# Patient Record
Sex: FEMALE | Race: WHITE | NOT HISPANIC OR LATINO
[De-identification: names, ages, dates, MRNs, and addresses within clinical notes are randomized per-mention and may not be internally consistent; named-entity substitution may affect disease eponyms.]

---

## 2019-06-26 ENCOUNTER — APPOINTMENT (OUTPATIENT)
Dept: PULMONOLOGY | Facility: CLINIC | Age: 84
End: 2019-06-26
Payer: MEDICARE

## 2019-06-26 VITALS
BODY MASS INDEX: 32.5 KG/M2 | OXYGEN SATURATION: 91 % | SYSTOLIC BLOOD PRESSURE: 100 MMHG | DIASTOLIC BLOOD PRESSURE: 59 MMHG | HEART RATE: 61 BPM | WEIGHT: 172 LBS

## 2019-06-26 DIAGNOSIS — J45.909 UNSPECIFIED ASTHMA, UNCOMPLICATED: ICD-10-CM

## 2019-06-26 DIAGNOSIS — R05 COUGH: ICD-10-CM

## 2019-06-26 PROCEDURE — 94729 DIFFUSING CAPACITY: CPT

## 2019-06-26 PROCEDURE — 99214 OFFICE O/P EST MOD 30 MIN: CPT | Mod: 25

## 2019-06-26 PROCEDURE — 94727 GAS DIL/WSHOT DETER LNG VOL: CPT

## 2019-06-26 PROCEDURE — 94060 EVALUATION OF WHEEZING: CPT

## 2019-06-26 PROCEDURE — 94618 PULMONARY STRESS TESTING: CPT

## 2019-06-27 PROBLEM — J45.909 ASTHMA: Status: ACTIVE | Noted: 2019-06-27

## 2019-06-27 NOTE — HISTORY OF PRESENT ILLNESS
[FreeTextEntry1] : 88 F  \par 84  presents for follow up.  she was least seen in 2015. She reports chronic shortness of breath. She has developed renal failure and is on hemodialysis.  She is using oxygen at home through nasal cannula. \par \par She has a past history of diabetes mellitus, hypertension, prior pneumonia, CVA 2005 and 2014, affected speech.  She also has retinopathy that has been treated with laser.\par \par Light smoker for a short time, many years ago\par \par She has been having cough, URI symptoms laryngitis, no fever, had weakness\par No dyspnea. Did not take antibiotics\par Took antihistamine.\par \par She feels mild improvement only.\par She has discolored phlegm.\par \par \par

## 2019-06-27 NOTE — PHYSICAL EXAM
[1+ Pitting] : 1+  pitting [General Appearance - Well Developed] : well developed [General Appearance - Well Nourished] : well nourished [General Appearance - In No Acute Distress] : no acute distress [Normal Oropharynx] : normal oropharynx [Neck Appearance] : the appearance of the neck was normal [Neck Cervical Mass (___cm)] : no neck mass was observed [Jugular Venous Distention Increased] : there was no jugular-venous distention [Heart Rate And Rhythm] : heart rate and rhythm were normal [Heart Sounds] : normal S1 and S2 [Arterial Pulses Normal] : the arterial pulses were normal [Murmurs] : no murmurs present [Exaggerated Use Of Accessory Muscles For Inspiration] : no accessory muscle use [Respiration, Rhythm And Depth] : normal respiratory rhythm and effort [Auscultation Breath Sounds / Voice Sounds] : lungs were clear to auscultation bilaterally [Abdomen Tenderness] : non-tender [Bowel Sounds] : normal bowel sounds [Abdomen Soft] : soft [] : no hepato-splenomegaly [Affect] : the affect was normal [Motor Exam] : the motor exam was normal [Mood] : the mood was normal [FreeTextEntry1] : mild diminished mild coarse [FreeTextEntry2] : pretibial

## 2019-06-27 NOTE — REVIEW OF SYSTEMS
[Constipation] : constipation [Fever] : no fever [Chills] : no chills [Fatigue] : no fatigue [Nasal Congestion] : no nasal congestion [Ear Disturbance] : no ear disturbance [Cough] : no cough [Sputum] : not coughing up ~M sputum [Hemoptysis] : no hemoptysis [Heartburn] : no heartburn [Reflux] : no reflux [Indigestion] : no indigestion [Urgency] : no feelings of urinary urgency [Dysuria] : no dysuria [Trauma] : no ~T physical trauma

## 2019-06-27 NOTE — PROCEDURE
[FreeTextEntry1] : \par She underwent 6 minute walk that demonstrated oxygen saturation maintained above 90%\par \par PFT\par \par moderate restriction \par moderate obstruction\par

## 2019-06-27 NOTE — DISCUSSION/SUMMARY
[FreeTextEntry1] : Obstructive airways disease, asthma\par \par Dyspnea on exertion\par \par Oxygen dependence.  I suspect she requires to use oxygen with exertion.  She was unable to walk adequately during testing.\par \par Plan:\par Overnight oximetry.  Continue oxygen therapy in the meantime\par inhaled bronchodilator\par \par No need for ICS/LABA based on PFT\par \par Ajit Mendenhall MD FCCP \par \par

## 2021-05-04 ENCOUNTER — INPATIENT (INPATIENT)
Facility: HOSPITAL | Age: 86
LOS: 12 days | Discharge: HOME | DRG: 640 | End: 2021-05-17
Attending: INTERNAL MEDICINE | Admitting: INTERNAL MEDICINE
Payer: MEDICARE

## 2021-05-04 VITALS
SYSTOLIC BLOOD PRESSURE: 185 MMHG | TEMPERATURE: 97 F | RESPIRATION RATE: 39 BRPM | HEART RATE: 73 BPM | DIASTOLIC BLOOD PRESSURE: 59 MMHG | OXYGEN SATURATION: 97 %

## 2021-05-04 DIAGNOSIS — J96.01 ACUTE RESPIRATORY FAILURE WITH HYPOXIA: ICD-10-CM

## 2021-05-04 DIAGNOSIS — Z95.0 PRESENCE OF CARDIAC PACEMAKER: Chronic | ICD-10-CM

## 2021-05-04 LAB
24R-OH-CALCIDIOL SERPL-MCNC: 34.1 NG/ML — SIGNIFICANT CHANGE UP (ref 30–80)
A1C WITH ESTIMATED AVERAGE GLUCOSE RESULT: 6.6 % — HIGH (ref 4–5.6)
ALBUMIN SERPL ELPH-MCNC: 3 G/DL — LOW (ref 3.5–5)
ALBUMIN SERPL ELPH-MCNC: 3.1 G/DL — LOW (ref 3.5–5)
ALP SERPL-CCNC: 177 U/L — HIGH (ref 40–120)
ALP SERPL-CCNC: 191 U/L — HIGH (ref 40–120)
ALT FLD-CCNC: 20 U/L DA — SIGNIFICANT CHANGE UP (ref 10–60)
ALT FLD-CCNC: 22 U/L DA — SIGNIFICANT CHANGE UP (ref 10–60)
ANION GAP SERPL CALC-SCNC: 6 MMOL/L — SIGNIFICANT CHANGE UP (ref 5–17)
ANION GAP SERPL CALC-SCNC: 8 MMOL/L — SIGNIFICANT CHANGE UP (ref 5–17)
APTT BLD: 27.7 SEC — SIGNIFICANT CHANGE UP (ref 27.5–35.5)
APTT BLD: >200 SEC — CRITICAL HIGH (ref 27.5–35.5)
AST SERPL-CCNC: 20 U/L — SIGNIFICANT CHANGE UP (ref 10–40)
AST SERPL-CCNC: 22 U/L — SIGNIFICANT CHANGE UP (ref 10–40)
BASE EXCESS BLDA CALC-SCNC: -3.1 MMOL/L — LOW (ref -2–3)
BASE EXCESS BLDV CALC-SCNC: -2.1 MMOL/L — SIGNIFICANT CHANGE UP
BASOPHILS # BLD AUTO: 0.06 K/UL — SIGNIFICANT CHANGE UP (ref 0–0.2)
BASOPHILS # BLD AUTO: 0.07 K/UL — SIGNIFICANT CHANGE UP (ref 0–0.2)
BASOPHILS NFR BLD AUTO: 0.4 % — SIGNIFICANT CHANGE UP (ref 0–2)
BASOPHILS NFR BLD AUTO: 0.5 % — SIGNIFICANT CHANGE UP (ref 0–2)
BILIRUB SERPL-MCNC: 0.3 MG/DL — SIGNIFICANT CHANGE UP (ref 0.2–1.2)
BILIRUB SERPL-MCNC: 0.4 MG/DL — SIGNIFICANT CHANGE UP (ref 0.2–1.2)
BLD GP AB SCN SERPL QL: SIGNIFICANT CHANGE UP
BLOOD GAS COMMENTS ARTERIAL: SIGNIFICANT CHANGE UP
BUN SERPL-MCNC: 55 MG/DL — HIGH (ref 7–18)
BUN SERPL-MCNC: 60 MG/DL — HIGH (ref 7–18)
CALCIUM SERPL-MCNC: 9 MG/DL — SIGNIFICANT CHANGE UP (ref 8.4–10.5)
CALCIUM SERPL-MCNC: 9.3 MG/DL — SIGNIFICANT CHANGE UP (ref 8.4–10.5)
CHLORIDE SERPL-SCNC: 96 MMOL/L — SIGNIFICANT CHANGE UP (ref 96–108)
CHLORIDE SERPL-SCNC: 97 MMOL/L — SIGNIFICANT CHANGE UP (ref 96–108)
CHOLEST SERPL-MCNC: 121 MG/DL — SIGNIFICANT CHANGE UP
CO2 SERPL-SCNC: 23 MMOL/L — SIGNIFICANT CHANGE UP (ref 22–31)
CO2 SERPL-SCNC: 26 MMOL/L — SIGNIFICANT CHANGE UP (ref 22–31)
CREAT SERPL-MCNC: 5.12 MG/DL — HIGH (ref 0.5–1.3)
CREAT SERPL-MCNC: 5.55 MG/DL — HIGH (ref 0.5–1.3)
EOSINOPHIL # BLD AUTO: 0.08 K/UL — SIGNIFICANT CHANGE UP (ref 0–0.5)
EOSINOPHIL # BLD AUTO: 0.15 K/UL — SIGNIFICANT CHANGE UP (ref 0–0.5)
EOSINOPHIL NFR BLD AUTO: 0.6 % — SIGNIFICANT CHANGE UP (ref 0–6)
EOSINOPHIL NFR BLD AUTO: 1 % — SIGNIFICANT CHANGE UP (ref 0–6)
ESTIMATED AVERAGE GLUCOSE: 143 MG/DL — HIGH (ref 68–114)
GLUCOSE SERPL-MCNC: 313 MG/DL — HIGH (ref 70–99)
GLUCOSE SERPL-MCNC: 332 MG/DL — HIGH (ref 70–99)
HBV SURFACE AG SER-ACNC: SIGNIFICANT CHANGE UP
HCO3 BLDA-SCNC: 25 MMOL/L — SIGNIFICANT CHANGE UP (ref 21–28)
HCO3 BLDV-SCNC: 28 MMOL/L — SIGNIFICANT CHANGE UP (ref 22–29)
HCT VFR BLD CALC: 21.6 % — LOW (ref 34.5–45)
HCT VFR BLD CALC: 24.3 % — LOW (ref 34.5–45)
HCT VFR BLD CALC: 26.3 % — LOW (ref 34.5–45)
HCT VFR BLD CALC: 28.8 % — LOW (ref 34.5–45)
HDLC SERPL-MCNC: 89 MG/DL — SIGNIFICANT CHANGE UP
HGB BLD-MCNC: 7.1 G/DL — LOW (ref 11.5–15.5)
HGB BLD-MCNC: 8 G/DL — LOW (ref 11.5–15.5)
HGB BLD-MCNC: 8.5 G/DL — LOW (ref 11.5–15.5)
HGB BLD-MCNC: 9.6 G/DL — LOW (ref 11.5–15.5)
HOROWITZ INDEX BLDA+IHG-RTO: 100 — SIGNIFICANT CHANGE UP
HOROWITZ INDEX BLDV+IHG-RTO: 100 — SIGNIFICANT CHANGE UP
IMM GRANULOCYTES NFR BLD AUTO: 1.5 % — SIGNIFICANT CHANGE UP (ref 0–1.5)
IMM GRANULOCYTES NFR BLD AUTO: 2.2 % — HIGH (ref 0–1.5)
INR BLD: 1.24 RATIO — HIGH (ref 0.88–1.16)
LACTATE SERPL-SCNC: 0.9 MMOL/L — SIGNIFICANT CHANGE UP (ref 0.7–2)
LIPID PNL WITH DIRECT LDL SERPL: 24 MG/DL — SIGNIFICANT CHANGE UP
LYMPHOCYTES # BLD AUTO: 0.99 K/UL — LOW (ref 1–3.3)
LYMPHOCYTES # BLD AUTO: 1.7 K/UL — SIGNIFICANT CHANGE UP (ref 1–3.3)
LYMPHOCYTES # BLD AUTO: 11.2 % — LOW (ref 13–44)
LYMPHOCYTES # BLD AUTO: 7.2 % — LOW (ref 13–44)
MAGNESIUM SERPL-MCNC: 2.5 MG/DL — SIGNIFICANT CHANGE UP (ref 1.6–2.6)
MCHC RBC-ENTMCNC: 31.6 PG — SIGNIFICANT CHANGE UP (ref 27–34)
MCHC RBC-ENTMCNC: 31.6 PG — SIGNIFICANT CHANGE UP (ref 27–34)
MCHC RBC-ENTMCNC: 31.8 PG — SIGNIFICANT CHANGE UP (ref 27–34)
MCHC RBC-ENTMCNC: 32.3 GM/DL — SIGNIFICANT CHANGE UP (ref 32–36)
MCHC RBC-ENTMCNC: 32.4 PG — SIGNIFICANT CHANGE UP (ref 27–34)
MCHC RBC-ENTMCNC: 32.9 GM/DL — SIGNIFICANT CHANGE UP (ref 32–36)
MCHC RBC-ENTMCNC: 32.9 GM/DL — SIGNIFICANT CHANGE UP (ref 32–36)
MCHC RBC-ENTMCNC: 33.3 GM/DL — SIGNIFICANT CHANGE UP (ref 32–36)
MCV RBC AUTO: 96 FL — SIGNIFICANT CHANGE UP (ref 80–100)
MCV RBC AUTO: 96.9 FL — SIGNIFICANT CHANGE UP (ref 80–100)
MCV RBC AUTO: 97.3 FL — SIGNIFICANT CHANGE UP (ref 80–100)
MCV RBC AUTO: 97.8 FL — SIGNIFICANT CHANGE UP (ref 80–100)
MONOCYTES # BLD AUTO: 1.47 K/UL — HIGH (ref 0–0.9)
MONOCYTES # BLD AUTO: 1.75 K/UL — HIGH (ref 0–0.9)
MONOCYTES NFR BLD AUTO: 10.7 % — SIGNIFICANT CHANGE UP (ref 2–14)
MONOCYTES NFR BLD AUTO: 11.6 % — SIGNIFICANT CHANGE UP (ref 2–14)
NEUTROPHILS # BLD AUTO: 10.98 K/UL — HIGH (ref 1.8–7.4)
NEUTROPHILS # BLD AUTO: 11.15 K/UL — HIGH (ref 1.8–7.4)
NEUTROPHILS NFR BLD AUTO: 73.5 % — SIGNIFICANT CHANGE UP (ref 43–77)
NEUTROPHILS NFR BLD AUTO: 79.6 % — HIGH (ref 43–77)
NON HDL CHOLESTEROL: 32 MG/DL — SIGNIFICANT CHANGE UP
NRBC # BLD: 0 /100 WBCS — SIGNIFICANT CHANGE UP (ref 0–0)
NT-PROBNP SERPL-SCNC: HIGH PG/ML (ref 0–450)
PCO2 BLDA: 59 MMHG — HIGH (ref 32–35)
PCO2 BLDV: 78 MMHG — HIGH (ref 39–42)
PH BLDA: 7.24 — LOW (ref 7.35–7.45)
PH BLDV: 7.17 — LOW (ref 7.32–7.43)
PHOSPHATE SERPL-MCNC: 5.1 MG/DL — HIGH (ref 2.5–4.5)
PLATELET # BLD AUTO: 206 K/UL — SIGNIFICANT CHANGE UP (ref 150–400)
PLATELET # BLD AUTO: 258 K/UL — SIGNIFICANT CHANGE UP (ref 150–400)
PLATELET # BLD AUTO: 268 K/UL — SIGNIFICANT CHANGE UP (ref 150–400)
PLATELET # BLD AUTO: 311 K/UL — SIGNIFICANT CHANGE UP (ref 150–400)
PO2 BLDA: 524 MMHG — HIGH (ref 83–108)
PO2 BLDV: 56 MMHG — SIGNIFICANT CHANGE UP
POTASSIUM SERPL-MCNC: 5.3 MMOL/L — SIGNIFICANT CHANGE UP (ref 3.5–5.3)
POTASSIUM SERPL-MCNC: 6 MMOL/L — HIGH (ref 3.5–5.3)
POTASSIUM SERPL-SCNC: 5.3 MMOL/L — SIGNIFICANT CHANGE UP (ref 3.5–5.3)
POTASSIUM SERPL-SCNC: 6 MMOL/L — HIGH (ref 3.5–5.3)
PROT SERPL-MCNC: 6.5 G/DL — SIGNIFICANT CHANGE UP (ref 6–8.3)
PROT SERPL-MCNC: 7.2 G/DL — SIGNIFICANT CHANGE UP (ref 6–8.3)
PROTHROM AB SERPL-ACNC: 14.6 SEC — HIGH (ref 10.6–13.6)
RAPID RVP RESULT: SIGNIFICANT CHANGE UP
RBC # BLD: 2.23 M/UL — LOW (ref 3.8–5.2)
RBC # BLD: 2.53 M/UL — LOW (ref 3.8–5.2)
RBC # BLD: 2.69 M/UL — LOW (ref 3.8–5.2)
RBC # BLD: 2.96 M/UL — LOW (ref 3.8–5.2)
RBC # FLD: 15.1 % — HIGH (ref 10.3–14.5)
RBC # FLD: 15.2 % — HIGH (ref 10.3–14.5)
RBC # FLD: 15.2 % — HIGH (ref 10.3–14.5)
RBC # FLD: 15.3 % — HIGH (ref 10.3–14.5)
SAO2 % BLDA: 100 % — SIGNIFICANT CHANGE UP
SAO2 % BLDV: 85.1 % — SIGNIFICANT CHANGE UP
SARS-COV-2 RNA SPEC QL NAA+PROBE: SIGNIFICANT CHANGE UP
SODIUM SERPL-SCNC: 126 MMOL/L — LOW (ref 135–145)
SODIUM SERPL-SCNC: 130 MMOL/L — LOW (ref 135–145)
TRIGL SERPL-MCNC: 42 MG/DL — SIGNIFICANT CHANGE UP
TROPONIN I SERPL-MCNC: <0.015 NG/ML — SIGNIFICANT CHANGE UP (ref 0–0.04)
VIT B12 SERPL-MCNC: 885 PG/ML — SIGNIFICANT CHANGE UP (ref 232–1245)
WBC # BLD: 10.2 K/UL — SIGNIFICANT CHANGE UP (ref 3.8–10.5)
WBC # BLD: 12.91 K/UL — HIGH (ref 3.8–10.5)
WBC # BLD: 13.79 K/UL — HIGH (ref 3.8–10.5)
WBC # BLD: 15.33 K/UL — HIGH (ref 3.8–10.5)
WBC # FLD AUTO: 10.2 K/UL — SIGNIFICANT CHANGE UP (ref 3.8–10.5)
WBC # FLD AUTO: 12.91 K/UL — HIGH (ref 3.8–10.5)
WBC # FLD AUTO: 13.79 K/UL — HIGH (ref 3.8–10.5)
WBC # FLD AUTO: 15.33 K/UL — HIGH (ref 3.8–10.5)

## 2021-05-04 PROCEDURE — 99291 CRITICAL CARE FIRST HOUR: CPT

## 2021-05-04 PROCEDURE — 71045 X-RAY EXAM CHEST 1 VIEW: CPT | Mod: 26

## 2021-05-04 PROCEDURE — 99291 CRITICAL CARE FIRST HOUR: CPT | Mod: CS

## 2021-05-04 RX ORDER — CHLORHEXIDINE GLUCONATE 213 G/1000ML
1 SOLUTION TOPICAL DAILY
Refills: 0 | Status: DISCONTINUED | OUTPATIENT
Start: 2021-05-04 | End: 2021-05-17

## 2021-05-04 RX ORDER — ERYTHROPOIETIN 10000 [IU]/ML
8000 INJECTION, SOLUTION INTRAVENOUS; SUBCUTANEOUS
Refills: 0 | Status: DISCONTINUED | OUTPATIENT
Start: 2021-05-04 | End: 2021-05-16

## 2021-05-04 RX ORDER — INSULIN LISPRO 100/ML
VIAL (ML) SUBCUTANEOUS EVERY 6 HOURS
Refills: 0 | Status: DISCONTINUED | OUTPATIENT
Start: 2021-05-04 | End: 2021-05-05

## 2021-05-04 RX ORDER — FUROSEMIDE 40 MG
40 TABLET ORAL ONCE
Refills: 0 | Status: COMPLETED | OUTPATIENT
Start: 2021-05-04 | End: 2021-05-04

## 2021-05-04 RX ORDER — NITROGLYCERIN 6.5 MG
10 CAPSULE, EXTENDED RELEASE ORAL
Qty: 50 | Refills: 0 | Status: DISCONTINUED | OUTPATIENT
Start: 2021-05-04 | End: 2021-05-04

## 2021-05-04 RX ORDER — HEPARIN SODIUM 5000 [USP'U]/ML
3500 INJECTION INTRAVENOUS; SUBCUTANEOUS EVERY 6 HOURS
Refills: 0 | Status: DISCONTINUED | OUTPATIENT
Start: 2021-05-04 | End: 2021-05-04

## 2021-05-04 RX ORDER — PHENYLEPHRINE HYDROCHLORIDE 10 MG/ML
0.1 INJECTION INTRAVENOUS
Qty: 40 | Refills: 0 | Status: DISCONTINUED | OUTPATIENT
Start: 2021-05-04 | End: 2021-05-05

## 2021-05-04 RX ORDER — HEPARIN SODIUM 5000 [USP'U]/ML
7500 INJECTION INTRAVENOUS; SUBCUTANEOUS EVERY 6 HOURS
Refills: 0 | Status: DISCONTINUED | OUTPATIENT
Start: 2021-05-04 | End: 2021-05-04

## 2021-05-04 RX ORDER — INSULIN GLARGINE 100 [IU]/ML
5 INJECTION, SOLUTION SUBCUTANEOUS AT BEDTIME
Refills: 0 | Status: DISCONTINUED | OUTPATIENT
Start: 2021-05-04 | End: 2021-05-04

## 2021-05-04 RX ORDER — HEPARIN SODIUM 5000 [USP'U]/ML
INJECTION INTRAVENOUS; SUBCUTANEOUS
Qty: 25000 | Refills: 0 | Status: DISCONTINUED | OUTPATIENT
Start: 2021-05-04 | End: 2021-05-04

## 2021-05-04 RX ADMIN — ERYTHROPOIETIN 8000 UNIT(S): 10000 INJECTION, SOLUTION INTRAVENOUS; SUBCUTANEOUS at 13:44

## 2021-05-04 RX ADMIN — CHLORHEXIDINE GLUCONATE 1 APPLICATION(S): 213 SOLUTION TOPICAL at 11:18

## 2021-05-04 RX ADMIN — HEPARIN SODIUM 1700 UNIT(S)/HR: 5000 INJECTION INTRAVENOUS; SUBCUTANEOUS at 07:07

## 2021-05-04 RX ADMIN — Medication 40 MILLIGRAM(S): at 00:55

## 2021-05-04 NOTE — ED ADULT NURSE NOTE - CAS DISCH ACCOMP BY
Eyes with no visual disturbances.  Ears clean and dry and no hearing difficulties. Nose with pink mucosa and no drainage.  Mouth mucous membranes moist and pink.  No tenderness or swelling to throat or neck. MD/RN

## 2021-05-04 NOTE — ADVANCED PRACTICE NURSE CONSULT - ASSESSMENT
This is a 89yr old female patient admitted for Acute Respiratory Failure, presenting with the following:  -There is a Stage 1 Pressure Injury to the Bilateral Heels and Coccyx, as evident by non-blanchable erythema  -There is a Stage 2 Pressure Injury to the Bilateral Gluteus with red tissue, scant drainage, and periwound maceration

## 2021-05-04 NOTE — H&P ADULT - NSICDXPASTMEDICALHX_GEN_ALL_CORE_FT
PAST MEDICAL HISTORY:  Afib     CKD (chronic kidney disease)     DM (diabetes mellitus)     HLD (hyperlipidemia)     HTN (hypertension)

## 2021-05-04 NOTE — H&P ADULT - ATTENDING COMMENTS
Patient seen and examined with ICU Resident upon consultation request at 1.30AM. Data reviewed. Case and plan of care discussed with resident and agree with note except as otherwise stated in my note as follows.  This is 89 year old woman with h/o HTN, DM, Afib (on Eliquis), ESRD (on HD) presented to the ED with SOB and found to have acute pulmonary edema. In the ED she was placed on BIPAP support and given IV lasix with little diuresis. Accepted to ICU on bi-level support with plan for HD in am.

## 2021-05-04 NOTE — ADVANCED PRACTICE NURSE CONSULT - RECOMMEDATIONS
-Clean all wounds with normal saline and apply skin prep to the surrounding skin  -Apply a Foam dressing to the Bilateral Gluteal wounds Q 72hrs PRN  -Elevate/float the patients heels using heel protectors and reposition the patient Q 2hrs using wedges or pillows

## 2021-05-04 NOTE — ED PROVIDER NOTE - CLINICAL SUMMARY MEDICAL DECISION MAKING FREE TEXT BOX
89 y/o female with history of HTN, HLD, afib with PPM, on hemodialysis, presents with acute shortness of breath. Placed on BiPAP on arrival to ED. Will obtain EKG, labs, and CXR. 89 y/o female with history of HTN, HLD, afib with PPM, on hemodialysis, presents with acute shortness of breath. Placed on BiPAP on arrival to ED. Will obtain EKG, labs, and CXR.  ekg shows vpaced rhythm, trop neg, proBNP 11K, CXR shows bibasilar opacities  Discussed baove with patient's daughter Sheila [phone]  discussed above with Dr. Huitron from nephrology who will schedule patient for HD  discussed above with ICU team Dr. Garcia who agrees with plan for admission

## 2021-05-04 NOTE — CONSULT NOTE ADULT - SUBJECTIVE AND OBJECTIVE BOX
[facility] NEPHROLOGY- CONSULTATION NOTE    Patient is a 89yo Female with ESRD on HD (TTS @ [facility]; Nephrologist Dr. 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 Pt a/w acute hypoxic respiratory distress 2/2 pulmonary edema requiring BIPAP. Pt now hypotensive and bradycardic. Nephrology consulted for ESRD status.     Unable to obtain history from patient; pt on BIPAP.   Last HD 5/1 @ [facility]. Spoke with pt's daughter Cece [phone]. Pt with 5 admissions in the past 6 weeks due to fluid overload/ SOB. Pt had COVID in March and the last hospitalization at [facility] d/c on 4/29 due to SOB, chest pain requiring leadless pacemaker placement. As per daughter she has chronic LE edema. However pt cannot stand the long stretch of no HD after Sat dialysis til Tuesday. She is requesting 4 times a week HD as an outpt. However, according to Dr. Goldberg, she needs onsite HD since they are having difficulty removing fluid despite midodrine on board; unstable.       PAST MEDICAL & SURGICAL HISTORY:  HTN (hypertension)    HLD (hyperlipidemia)    CKD (chronic kidney disease)    Afib    DM (diabetes mellitus)    Artificial pacemaker      No Known Allergies    Home Medications Reviewed  Hospital Medications:   MEDICATIONS  (STANDING):  chlorhexidine 2% Cloths 1 Application(s) Topical daily  heparin  Infusion.  Unit(s)/Hr (17 mL/Hr) IV Continuous <Continuous>  insulin glargine Injectable (LANTUS) 5 Unit(s) SubCutaneous at bedtime  insulin lispro (ADMELOG) corrective regimen sliding scale   SubCutaneous every 6 hours      REVIEW OF SYSTEMS: Unable to obtain; pt on BiPAP    VITALS:  T(F): 96.4 (05-04-21 @ 07:00), Max: 96.8 (05-04-21 @ 00:38)  HR: 49 (05-04-21 @ 11:00)  BP: 105/31 (05-04-21 @ 11:00)  RR: 17 (05-04-21 @ 11:00)  SpO2: 100% (05-04-21 @ 11:00)  Wt(kg): --    05-03 @ 07:01  -  05-04 @ 07:00  --------------------------------------------------------  IN: 17 mL / OUT: 150 mL / NET: -133 mL        Weight (kg): 94.5 (05-04 @ 04:01)    PHYSICAL EXAM:  Gen: Mild resp distress on BiPAP  HEENT: anicteric; on BIPAP  Neck: no JVD  Cards: julius , +S1/S2,   Resp: decreased BS at bases  GI: soft, NT/ND, NABS  : +meza  Extremities: +2-3 LE edema B/L  Derm: no rashes  Neuro: no gross deficits    LABS:  05-04    126<L>  |  97  |  60<H>  ----------------------------<  332<H>  6.0<H>   |  23  |  5.55<H>    Ca    9.0      04 May 2021 06:07  Phos  5.1     05-04  Mg     2.5     05-04    TPro  6.5  /  Alb  3.0<L>  /  TBili  0.4  /  DBili      /  AST  22  /  ALT  20  /  AlkPhos  177<H>  05-04    Creatinine Trend: 5.55 <--, 5.12 <--                        7.1    10.20 )-----------( 206      ( 04 May 2021 11:38 )             21.6     Urine Studies:      RADIOLOGY & ADDITIONAL STUDIES:    < from: Xray Chest 1 View- PORTABLE-Urgent (05.04.21 @ 01:48) >    EXAM:  XR CHEST PORTABLE URGENT 1V                            PROCEDURE DATE:  05/04/2021          INTERPRETATION:  INDICATION: Chest Pain    PRIORS: None    VIEWS: Portable AP radiography of the chest performed. Evaluation limited secondary to shallow inspiration.    FINDINGS: Heart size appears within normal limits. A right-sided dual-lumen central venous catheter is identified, the distal tips overlying the expected region of the SVC/RA confluence. Note is made of an indwelling Micra pacemaker. No hilar or superior mediastinal abnormalities are identified. Interstitial prominence bilaterally may reflect shallow inspiration. Mild interstitial edema cannot be excluded. No pleural effusion or pneumothorax is demonstrated. No mediastinal shift is noted. Degenerative changes thoracic spine.    IMPRESSION: Interstitial prominence may reflect shallow inspiration. Mild interstitial edema cannot be excluded.      < end of copied text >

## 2021-05-04 NOTE — H&P ADULT - HISTORY OF PRESENT ILLNESS
Pt is a 89 y/o F, wheel chair bound, from home with PMH of Afib on Eliquis, ESRD on HD, DM, HTN presented with shortness of breath since few days. Pt was also courtney batres, pt started to develop gradually shortness of breath since few days at rest. She mentioned that the pt was admitted recently to [facility] for shortness of breath and chest pain leadless pacemaker was placed, discharged on april 29th. She has been regularly getting dialysis on Tuesday/thursday / saturday.    Pt is a 89 y/o F, wheel chair bound, from home with PMH of Afib on Eliquis, ESRD on HD, DM, HTN presented with shortness of breath since few days. Pt also complaining of chest discomfort. As per pt's daughter Johnny batres, pt started to develop gradually shortness of breath since few days at rest. She mentioned that the pt was admitted recently to [facility] for shortness of breath and chest pain, leadless pacemaker was placed, discharged on april 29th. She has been regularly getting dialysis on Tuesday/thursday / saturday.  Pt denied fever, cough, abdominal pain. nausea, vomiting and other complains.

## 2021-05-04 NOTE — H&P ADULT - ASSESSMENT
Pt is a 89 y/o F, wheel chair bound, from home with PMH of Afib on Eliquis, ESRD on HD, DM, HTN presented with shortness of breath since few days.    Assessment:    Acute hypoxic respiratory failure  Pulmonary edema  ESRD  Mild Hyponatremia  Anemia Pt is a 89 y/o F, wheel chair bound, from home with PMH of Afib on Eliquis, ESRD on HD, DM, HTN presented with shortness of breath since few days.    Assessment:    Acute hypoxic respiratory failure  Pulmonary edema  ESRD  Mild Hyponatremia  Anemia    Plan    Neuro:  - Alert and oriented   -No acute issues  CARDIOVASCULAR  #  PULMONARY  #  GASTROINTESTINAL  #  RENAL  #  Neurology  #  INFECTIOUS DISEASE  #  ENDOCRINE  #  HEME  #  FLUIDS/ELECTROLYTES/NUTRITION  -IVF  -Monitor, Replete to K>4 and Mg>2  -Diet    PROPHYLAXIS  -DVT  -GI    DISPO: Admit to ICU    CODE STATUS: Full code Pt is a 89 y/o F, wheel chair bound, from home with PMH of Afib on Eliquis, ESRD on HD, DM, HTN presented with shortness of breath since few days.    Assessment:    1.Acute hypoxic respiratory failure  2.Pulmonary edema  3.ESRD  4.Mild Hyponatremia  5.Anemia    Plan    Neuro:  - Alert and oriented   -No acute issues    CARDIOVASCULAR  # Atrial fibrillation  -Pt is on Eliquis at home  -Continue Heparin drip for now as pt is npo    # CHF  -Possible CHF given elevated pro BNP and pulmonary edema  -Need to obtain record from [facility]  # Acute Hypoxic Respiratory failure  - Likely secondary to fluid overload  -Plan for HD at am  -Continue BiPAP support    GASTROINTESTINAL  # NPO as pt is on BiPAP    RENAL  #ESRD on Dialysis  -Pt gets regular dialysis on Tuesda/ thursday/saturday  -Plan for Dialysis today  -Monitor BMP  -Avoid nephrotoxic medications  -Nephrology Dr. Huitron    INFECTIOUS DISEASE  # No acute issues    ENDOCRINE  #Pt takes Lantus 10 U at bedtime and sliding scale Humalog at home  -Continue sliding scale Humalog and lantus  -F/u HBA1C    HEME  # Anemia  - Likely anemia due to chronic kidney disease  - Monitor cbc  -f/u iron studies      -Diet : NPO    PROPHYLAXIS  -DVT: Heparin       DISPO: Admit to ICU    CODE STATUS: Full code

## 2021-05-04 NOTE — PROGRESS NOTE ADULT - SUBJECTIVE AND OBJECTIVE BOX
INTERVAL HPI/OVERNIGHT EVENTS: ***    PRESSORS: [ ] YES [ ] NO  WHICH:    ANTIBIOTICS:                      Antimicrobial:    Cardiovascular:    Pulmonary:    Hematalogic:  heparin  Infusion.  Unit(s)/Hr IV Continuous <Continuous>    Other:  chlorhexidine 2% Cloths 1 Application(s) Topical daily  insulin glargine Injectable (LANTUS) 5 Unit(s) SubCutaneous at bedtime  insulin lispro (ADMELOG) corrective regimen sliding scale   SubCutaneous every 6 hours    chlorhexidine 2% Cloths 1 Application(s) Topical daily  heparin  Infusion.  Unit(s)/Hr IV Continuous <Continuous>  insulin glargine Injectable (LANTUS) 5 Unit(s) SubCutaneous at bedtime  insulin lispro (ADMELOG) corrective regimen sliding scale   SubCutaneous every 6 hours    Drug Dosing Weight    Weight (kg): 94.5 (04 May 2021 04:01)    CENTRAL LINE: [ ] YES [ ] NO  LOCATION:   DATE INSERTED:  REMOVE: [ ] YES [ ] NO  EXPLAIN:    STUBBS: [ ] YES [ ] NO    DATE INSERTED:  REMOVE:  [ ] YES [ ] NO  EXPLAIN:    A-LINE:  [ ] YES [ ] NO  LOCATION:   DATE INSERTED:  REMOVE:  [ ] YES [ ] NO  EXPLAIN:    PMH -reviewed admission note, no change since admission    ICU Vital Signs Last 24 Hrs  T(C): 35.8 (04 May 2021 07:00), Max: 36 (04 May 2021 00:38)  T(F): 96.4 (04 May 2021 07:00), Max: 96.8 (04 May 2021 00:38)  HR: 49 (04 May 2021 08:35) (49 - 73)  BP: 110/63 (04 May 2021 08:00) (83/54 - 185/59)  BP(mean): 74 (04 May 2021 08:00) (64 - 114)  ABP: --  ABP(mean): --  RR: 16 (04 May 2021 08:00) (14 - 39)  SpO2: 99% (04 May 2021 08:35) (97% - 100%)      ABG - ( 04 May 2021 02:08 )  pH, Arterial: 7.24  pH, Blood: x     /  pCO2: 59    /  pO2: 524   / HCO3: 25    / Base Excess: -3.1  /  SaO2: 100                   05-03 @ 07:01  -  05-04 @ 07:00  --------------------------------------------------------  IN: 17 mL / OUT: 150 mL / NET: -133 mL            PHYSICAL EXAM:    GENERAL: NAD, well-groomed, well-developed  HEAD:  Atraumatic, Normocephalic  EYES: EOMI, PERRLA, conjunctiva and sclera clear  ENMT: No tonsillar erythema, exudates, or enlargement; Moist mucous membranes, Good dentition, No lesions  NECK: Supple, normal appearance, No JVD; Normal thyroid; Trachea midline  NERVOUS SYSTEM:  Alert & Oriented X3, Good concentration; Motor Strength 5/5 B/L upper and lower extremities; DTRs 2+ intact and symmetric  CHEST/LUNG: No chest deformity; Normal percussion bilaterally; No rales, rhonchi, wheezing   HEART: Regular rate and rhythm; No murmurs, rubs, or gallops  ABDOMEN: Soft, Nontender, Nondistended; Bowel sounds present  EXTREMITIES:  2+ Peripheral Pulses, No clubbing, cyanosis, or edema  LYMPH: No lymphadenopathy noted  SKIN: No rashes or lesions; Good capillary refill      LABS:  CBC Full  -  ( 04 May 2021 06:07 )  WBC Count : 13.79 K/uL  RBC Count : 2.69 M/uL  Hemoglobin : 8.5 g/dL  Hematocrit : 26.3 %  Platelet Count - Automated : 258 K/uL  Mean Cell Volume : 97.8 fl  Mean Cell Hemoglobin : 31.6 pg  Mean Cell Hemoglobin Concentration : 32.3 gm/dL  Auto Neutrophil # : 10.98 K/uL  Auto Lymphocyte # : 0.99 K/uL  Auto Monocyte # : 1.47 K/uL  Auto Eosinophil # : 0.08 K/uL  Auto Basophil # : 0.06 K/uL  Auto Neutrophil % : 79.6 %  Auto Lymphocyte % : 7.2 %  Auto Monocyte % : 10.7 %  Auto Eosinophil % : 0.6 %  Auto Basophil % : 0.4 %    05-04    126<L>  |  97  |  60<H>  ----------------------------<  332<H>  6.0<H>   |  23  |  5.55<H>    Ca    9.0      04 May 2021 06:07  Phos  5.1     05-04  Mg     2.5     05-04    TPro  6.5  /  Alb  3.0<L>  /  TBili  0.4  /  DBili  x   /  AST  22  /  ALT  20  /  AlkPhos  177<H>  05-04    PT/INR - ( 04 May 2021 06:07 )   PT: 14.6 sec;   INR: 1.24 ratio         PTT - ( 04 May 2021 06:07 )  PTT:27.7 sec        RADIOLOGY & ADDITIONAL STUDIES REVIEWED:  ***    GOALS OF CARE DISCUSSION WITH PATIENT/FAMILY/PROXY:    CRITICAL CARE TIME SPENT: 35 minutes INTERVAL HPI/OVERNIGHT EVENTS:   Pt admitted overnight. Pt without complaints of pain at the time of examination. Pt on BIPAP, somnolent but arousable and able to follow commands and nod yes/no appropriately. Spoke with patietn daughter Cece Hoffman  to discuss patient condition and plans. Consent for central/a-line, blood transfusion obtained. Pt for urgent HD due to fluid overload and bipap requirement.     PRESSORS: [ ] YES [ ] NO  WHICH:    ANTIBIOTICS:                      Antimicrobial:    Cardiovascular:    Pulmonary:    Hematalogic:  heparin  Infusion.  Unit(s)/Hr IV Continuous <Continuous>    Other:  chlorhexidine 2% Cloths 1 Application(s) Topical daily  insulin glargine Injectable (LANTUS) 5 Unit(s) SubCutaneous at bedtime  insulin lispro (ADMELOG) corrective regimen sliding scale   SubCutaneous every 6 hours    chlorhexidine 2% Cloths 1 Application(s) Topical daily  heparin  Infusion.  Unit(s)/Hr IV Continuous <Continuous>  insulin glargine Injectable (LANTUS) 5 Unit(s) SubCutaneous at bedtime  insulin lispro (ADMELOG) corrective regimen sliding scale   SubCutaneous every 6 hours    Drug Dosing Weight    Weight (kg): 94.5 (04 May 2021 04:01)    CENTRAL LINE: [ ] YES [ ] NO  LOCATION:   DATE INSERTED:  REMOVE: [ ] YES [ ] NO  EXPLAIN:    STUBBS: [ ] YES [ ] NO    DATE INSERTED:  REMOVE:  [ ] YES [ ] NO  EXPLAIN:    A-LINE:  [ ] YES [ ] NO  LOCATION:   DATE INSERTED:  REMOVE:  [ ] YES [ ] NO  EXPLAIN:    PMH -reviewed admission note, no change since admission    ICU Vital Signs Last 24 Hrs  T(C): 35.8 (04 May 2021 07:00), Max: 36 (04 May 2021 00:38)  T(F): 96.4 (04 May 2021 07:00), Max: 96.8 (04 May 2021 00:38)  HR: 49 (04 May 2021 08:35) (49 - 73)  BP: 110/63 (04 May 2021 08:00) (83/54 - 185/59)  BP(mean): 74 (04 May 2021 08:00) (64 - 114)  ABP: --  ABP(mean): --  RR: 16 (04 May 2021 08:00) (14 - 39)  SpO2: 99% (04 May 2021 08:35) (97% - 100%)      ABG - ( 04 May 2021 02:08 )  pH, Arterial: 7.24  pH, Blood: x     /  pCO2: 59    /  pO2: 524   / HCO3: 25    / Base Excess: -3.1  /  SaO2: 100                   05-03 @ 07:01  -  05-04 @ 07:00  --------------------------------------------------------  IN: 17 mL / OUT: 150 mL / NET: -133 mL            PHYSICAL EXAM:    GENERAL: NAD, well-groomed, well-developed  HEAD:  Atraumatic, Normocephalic  EYES: EOMI, PERRLA, conjunctiva and sclera clear  ENMT: No tonsillar erythema, exudates, or enlargement; Moist mucous membranes, Good dentition, No lesions  NECK: Supple, normal appearance, No JVD; Normal thyroid; Trachea midline  NERVOUS SYSTEM:  Alert & Oriented X2  CHEST/LUNG: decreased breath sounds b/l  HEART: Regular rate and rhythm; No murmurs, rubs, or gallops  ABDOMEN: Soft, obese abdomen, Bowel sounds present  EXTREMITIES:  (+) b/l LE edema, +2-3  LYMPH: No lymphadenopathy noted  SKIN: No rashes or lesions; Good capillary refill      LABS:  CBC Full  -  ( 04 May 2021 06:07 )  WBC Count : 13.79 K/uL  RBC Count : 2.69 M/uL  Hemoglobin : 8.5 g/dL  Hematocrit : 26.3 %  Platelet Count - Automated : 258 K/uL  Mean Cell Volume : 97.8 fl  Mean Cell Hemoglobin : 31.6 pg  Mean Cell Hemoglobin Concentration : 32.3 gm/dL  Auto Neutrophil # : 10.98 K/uL  Auto Lymphocyte # : 0.99 K/uL  Auto Monocyte # : 1.47 K/uL  Auto Eosinophil # : 0.08 K/uL  Auto Basophil # : 0.06 K/uL  Auto Neutrophil % : 79.6 %  Auto Lymphocyte % : 7.2 %  Auto Monocyte % : 10.7 %  Auto Eosinophil % : 0.6 %  Auto Basophil % : 0.4 %    05-04    126<L>  |  97  |  60<H>  ----------------------------<  332<H>  6.0<H>   |  23  |  5.55<H>    Ca    9.0      04 May 2021 06:07  Phos  5.1     05-04  Mg     2.5     05-04    TPro  6.5  /  Alb  3.0<L>  /  TBili  0.4  /  DBili  x   /  AST  22  /  ALT  20  /  AlkPhos  177<H>  05-04    PT/INR - ( 04 May 2021 06:07 )   PT: 14.6 sec;   INR: 1.24 ratio         PTT - ( 04 May 2021 06:07 )  PTT:27.7 sec        RADIOLOGY & ADDITIONAL STUDIES REVIEWED:  ***    GOALS OF CARE DISCUSSION WITH PATIENT/FAMILY/PROXY:    CRITICAL CARE TIME SPENT: 35 minutes

## 2021-05-04 NOTE — H&P ADULT - NSHPLABSRESULTS_GEN_ALL_CORE
9.6    15.33 )-----------( 311      ( 04 May 2021 00:42 )             28.8     05-04    130<L>  |  96  |  55<H>  ----------------------------<  313<H>  5.3   |  26  |  5.12<H>    Ca    9.3      04 May 2021 00:42    TPro  7.2  /  Alb  3.1<L>  /  TBili  0.3  /  DBili  x   /  AST  20  /  ALT  22  /  AlkPhos  191<H>  05-04

## 2021-05-04 NOTE — ED PROVIDER NOTE - OBJECTIVE STATEMENT
89 y/o female h/o HTN, HLD, afib on Eliquis, CKD on dialysis, DM, coming in with acute onset of shortness of breath. Per EMS, pt was noted to be hypoxic in the 80's on room air, and was placed on a nonrebreather then on CPAP with improvement to high 90's. BP noted to be 90's systolic after nitroglycerin spray.

## 2021-05-04 NOTE — H&P ADULT - NEUROLOGICAL DETAILS
alert and oriented x 3/sensation intact/deep reflexes intact/cranial nerves intact/superficial reflexes intact/normal strength

## 2021-05-04 NOTE — PROGRESS NOTE ADULT - ASSESSMENT
Pt is a 89 y/o F, wheel chair bound, from home with PMH of Afib on Eliquis, ESRD on HD, DM, HTN presented with shortness of breath since few days.    Assessment:    1.Acute hypoxic respiratory failure  2.Pulmonary edema  3.ESRD  4.Mild Hyponatremia  5.Anemia    Plan    Neuro:  - Alert and oriented   -No acute issues    CARDIOVASCULAR  # Atrial fibrillation  -Pt is on Eliquis at home  -Continue Heparin drip for now as pt is npo    # CHF  -Possible CHF given elevated pro BNP and pulmonary edema  -Need to obtain record from [facility]  # Acute Hypoxic Respiratory failure  - Likely secondary to fluid overload  -Plan for HD at am  -Continue BiPAP support    GASTROINTESTINAL  # NPO as pt is on BiPAP    RENAL  #ESRD on Dialysis  -Pt gets regular dialysis on Tuesda/ thursday/saturday  -Plan for Dialysis today  -Monitor BMP  -Avoid nephrotoxic medications  -Nephrology Dr. Huitron    INFECTIOUS DISEASE  # No acute issues    ENDOCRINE  #Pt takes Lantus 10 U at bedtime and sliding scale Humalog at home  -Continue sliding scale Humalog and lantus  -F/u HBA1C    HEME  # Anemia  - Likely anemia due to chronic kidney disease  - Monitor cbc  -f/u iron studies      -Diet : NPO    PROPHYLAXIS  -DVT: Heparin       DISPO: Admit to ICU    CODE STATUS: Full code Pt is a 89 y/o F, wheel chair bound, from home with PMH of Afib on Eliquis, ESRD on HD, DM, HTN presented with shortness of breath since few days.    Assessment:    1.Acute hypoxic respiratory failure  2.Pulmonary edema  3.ESRD  4.Mild Hyponatremia  5.Anemia    Plan    Neuro:  - Alert and oriented   -No acute issues    CARDIOVASCULAR  # Atrial fibrillation  -Pt is on Eliquis at home  -initially started on heparin drip d/c due to acute drop in hemoglobin    # CHF  -Possible CHF given elevated pro BNP and pulmonary edema  -elevation in proBNP CHF vs ESRD  -Need to obtain record [facility]    #Hypotension  -borderline low bloodpressure that does not improve on midodrine for HD  -Start on phenylephrine for now  -Start on levophed due to events of bradycardia during dialysis once central line is placed    PULMONARY  # Acute Hypoxic Respiratory failure  - Likely secondary to fluid overload  -Continue BiPAP support    GASTROINTESTINAL  # NPO as pt is on BiPAP    RENAL  #ESRD on Dialysis  -Pt gets regular dialysis on Tuesday/thursday/saturday  -Plan for Dialysis 5/4  -Monitor BMP  -Avoid nephrotoxic medications  -Nephrology Dr. Huitron    INFECTIOUS DISEASE  # No acute issues    ENDOCRINE  #Pt takes Lantus 10 U at bedtime and sliding scale Humalog at home  -Continue sliding scale Humalog and lantus  -F/u HBA1C    HEME  # Anemia  - Hb 9 >8 >7 >8 >  - Likely anemia due to chronic kidney disease  - Monitor cbc  -f/u iron studies      -Diet : NPO    PROPHYLAXIS  -DVT: Heparin       DISPO: Monitor in ICU    CODE STATUS: Full code

## 2021-05-04 NOTE — ED PROVIDER NOTE - CARE PLAN
Principal Discharge DX:	Acute respiratory failure with hypoxia and hypercapnia  Secondary Diagnosis:	ESRD (end stage renal disease)  Secondary Diagnosis:	CHF (congestive heart failure)

## 2021-05-04 NOTE — ED PROVIDER NOTE - PMH
Afib    CKD (chronic kidney disease)    DM (diabetes mellitus)    HLD (hyperlipidemia)    HTN (hypertension)

## 2021-05-04 NOTE — ED PROVIDER NOTE - CARDIAC, MLM
Normal rate, regular rhythm.  Heart sounds S1, S2.  No murmurs, rubs or gallops. 3+ pitting edema b/l LE.

## 2021-05-04 NOTE — ED PROVIDER NOTE - UNABLE TO OBTAIN
Severe Illness/Injury HPI limited due to pt's clinical condition ROS limited due to pt's clinical condition

## 2021-05-04 NOTE — ED ADULT NURSE NOTE - OBJECTIVE STATEMENT
Pt BIBEMS, as notification c/o respiratory distress. Pt awake, breathing labored. Pt BIBEMS, as notification c/o respiratory distress. Pt obese, awake, breathing labored via non rebreather, speak in short word. Pt then place on BIPAP, Pt dialysis port to Rt upper chest, rounded abdomen, bilateral leg and feet +4 edema, Pt also noted redness and rash to under breast, and stage II ulcer to bilateral buttock,

## 2021-05-05 LAB
ALBUMIN SERPL ELPH-MCNC: 2.7 G/DL — LOW (ref 3.5–5)
ALP SERPL-CCNC: 172 U/L — HIGH (ref 40–120)
ALT FLD-CCNC: 17 U/L DA — SIGNIFICANT CHANGE UP (ref 10–60)
ANION GAP SERPL CALC-SCNC: 4 MMOL/L — LOW (ref 5–17)
AST SERPL-CCNC: 14 U/L — SIGNIFICANT CHANGE UP (ref 10–40)
BILIRUB SERPL-MCNC: 0.3 MG/DL — SIGNIFICANT CHANGE UP (ref 0.2–1.2)
BUN SERPL-MCNC: 26 MG/DL — HIGH (ref 7–18)
CALCIUM SERPL-MCNC: 8.4 MG/DL — SIGNIFICANT CHANGE UP (ref 8.4–10.5)
CHLORIDE SERPL-SCNC: 101 MMOL/L — SIGNIFICANT CHANGE UP (ref 96–108)
CO2 SERPL-SCNC: 29 MMOL/L — SIGNIFICANT CHANGE UP (ref 22–31)
COVID-19 SPIKE DOMAIN AB INTERP: POSITIVE
COVID-19 SPIKE DOMAIN ANTIBODY RESULT: 14.2 U/ML — HIGH
CREAT SERPL-MCNC: 3.65 MG/DL — HIGH (ref 0.5–1.3)
FERRITIN SERPL-MCNC: 972 NG/ML — HIGH (ref 15–150)
GLUCOSE SERPL-MCNC: 111 MG/DL — HIGH (ref 70–99)
HCT VFR BLD CALC: 27 % — LOW (ref 34.5–45)
HGB BLD-MCNC: 8.7 G/DL — LOW (ref 11.5–15.5)
IRON SATN MFR SERPL: 12 % — LOW (ref 15–50)
IRON SATN MFR SERPL: 39 UG/DL — LOW (ref 40–150)
MAGNESIUM SERPL-MCNC: 2.3 MG/DL — SIGNIFICANT CHANGE UP (ref 1.6–2.6)
MCHC RBC-ENTMCNC: 31.6 PG — SIGNIFICANT CHANGE UP (ref 27–34)
MCHC RBC-ENTMCNC: 32.2 GM/DL — SIGNIFICANT CHANGE UP (ref 32–36)
MCV RBC AUTO: 98.2 FL — SIGNIFICANT CHANGE UP (ref 80–100)
NRBC # BLD: 0 /100 WBCS — SIGNIFICANT CHANGE UP (ref 0–0)
PLATELET # BLD AUTO: 279 K/UL — SIGNIFICANT CHANGE UP (ref 150–400)
POTASSIUM SERPL-MCNC: 4.3 MMOL/L — SIGNIFICANT CHANGE UP (ref 3.5–5.3)
POTASSIUM SERPL-SCNC: 4.3 MMOL/L — SIGNIFICANT CHANGE UP (ref 3.5–5.3)
PROT SERPL-MCNC: 6.2 G/DL — SIGNIFICANT CHANGE UP (ref 6–8.3)
RBC # BLD: 2.75 M/UL — LOW (ref 3.8–5.2)
RBC # FLD: 15.9 % — HIGH (ref 10.3–14.5)
SARS-COV-2 IGG+IGM SERPL QL IA: 14.2 U/ML — HIGH
SARS-COV-2 IGG+IGM SERPL QL IA: POSITIVE
SODIUM SERPL-SCNC: 134 MMOL/L — LOW (ref 135–145)
TIBC SERPL-MCNC: 314 UG/DL — SIGNIFICANT CHANGE UP (ref 250–450)
UIBC SERPL-MCNC: 275 UG/DL — SIGNIFICANT CHANGE UP (ref 110–370)
WBC # BLD: 14.1 K/UL — HIGH (ref 3.8–10.5)
WBC # FLD AUTO: 14.1 K/UL — HIGH (ref 3.8–10.5)

## 2021-05-05 RX ORDER — INSULIN LISPRO 100/ML
VIAL (ML) SUBCUTANEOUS
Refills: 0 | Status: DISCONTINUED | OUTPATIENT
Start: 2021-05-05 | End: 2021-05-17

## 2021-05-05 RX ORDER — POLYETHYLENE GLYCOL 3350 17 G/17G
17 POWDER, FOR SOLUTION ORAL DAILY
Refills: 0 | Status: DISCONTINUED | OUTPATIENT
Start: 2021-05-05 | End: 2021-05-17

## 2021-05-05 RX ORDER — MIDODRINE HYDROCHLORIDE 2.5 MG/1
10 TABLET ORAL
Refills: 0 | Status: DISCONTINUED | OUTPATIENT
Start: 2021-05-05 | End: 2021-05-06

## 2021-05-05 RX ORDER — MIDODRINE HYDROCHLORIDE 2.5 MG/1
10 TABLET ORAL EVERY 8 HOURS
Refills: 0 | Status: DISCONTINUED | OUTPATIENT
Start: 2021-05-05 | End: 2021-05-05

## 2021-05-05 RX ORDER — AMIODARONE HYDROCHLORIDE 400 MG/1
200 TABLET ORAL DAILY
Refills: 0 | Status: DISCONTINUED | OUTPATIENT
Start: 2021-05-05 | End: 2021-05-17

## 2021-05-05 RX ORDER — APIXABAN 2.5 MG/1
2.5 TABLET, FILM COATED ORAL
Refills: 0 | Status: DISCONTINUED | OUTPATIENT
Start: 2021-05-05 | End: 2021-05-17

## 2021-05-05 RX ORDER — SENNA PLUS 8.6 MG/1
2 TABLET ORAL AT BEDTIME
Refills: 0 | Status: DISCONTINUED | OUTPATIENT
Start: 2021-05-05 | End: 2021-05-17

## 2021-05-05 RX ADMIN — SENNA PLUS 2 TABLET(S): 8.6 TABLET ORAL at 21:39

## 2021-05-05 RX ADMIN — CHLORHEXIDINE GLUCONATE 1 APPLICATION(S): 213 SOLUTION TOPICAL at 11:12

## 2021-05-05 RX ADMIN — Medication 3: at 17:21

## 2021-05-05 RX ADMIN — APIXABAN 2.5 MILLIGRAM(S): 2.5 TABLET, FILM COATED ORAL at 17:21

## 2021-05-05 RX ADMIN — Medication 4: at 21:39

## 2021-05-05 RX ADMIN — AMIODARONE HYDROCHLORIDE 200 MILLIGRAM(S): 400 TABLET ORAL at 21:39

## 2021-05-05 RX ADMIN — POLYETHYLENE GLYCOL 3350 17 GRAM(S): 17 POWDER, FOR SOLUTION ORAL at 12:15

## 2021-05-05 NOTE — PROGRESS NOTE ADULT - SUBJECTIVE AND OBJECTIVE BOX
INTERVAL HPI/OVERNIGHT EVENTS: ***    PRESSORS: [ ] YES [ ] NO  WHICH:    ANTIBIOTICS:                      Antimicrobial:    Cardiovascular:  phenylephrine    Infusion 0.1 MICROgram(s)/kG/Min IV Continuous <Continuous>    Pulmonary:    Hematalogic:    Other:  chlorhexidine 2% Cloths 1 Application(s) Topical daily  epoetin maria d-epbx (RETACRIT) Injectable 8000 Unit(s) IV Push <User Schedule>  insulin lispro (ADMELOG) corrective regimen sliding scale   SubCutaneous every 6 hours    chlorhexidine 2% Cloths 1 Application(s) Topical daily  epoetin maria d-epbx (RETACRIT) Injectable 8000 Unit(s) IV Push <User Schedule>  insulin lispro (ADMELOG) corrective regimen sliding scale   SubCutaneous every 6 hours  phenylephrine    Infusion 0.1 MICROgram(s)/kG/Min IV Continuous <Continuous>    Drug Dosing Weight    Weight (kg): 94.5 (04 May 2021 04:01)    CENTRAL LINE: [ ] YES [ ] NO  LOCATION:   DATE INSERTED:  REMOVE: [ ] YES [ ] NO  EXPLAIN:    STUBBS: [ ] YES [ ] NO    DATE INSERTED:  REMOVE:  [ ] YES [ ] NO  EXPLAIN:    A-LINE:  [ ] YES [ ] NO  LOCATION:   DATE INSERTED:  REMOVE:  [ ] YES [ ] NO  EXPLAIN:    PMH -reviewed admission note, no change since admission    ICU Vital Signs Last 24 Hrs  T(C): 36.4 (05 May 2021 04:00), Max: 37.1 (04 May 2021 11:30)  T(F): 97.5 (05 May 2021 04:00), Max: 98.8 (04 May 2021 11:30)  HR: 48 (05 May 2021 06:00) (44 - 66)  BP: 159/43 (05 May 2021 06:00) (100/30 - 160/23)  BP(mean): 76 (05 May 2021 06:00) (51 - 76)  ABP: --  ABP(mean): --  RR: 15 (05 May 2021 06:00) (15 - 24)  SpO2: 100% (05 May 2021 06:00) (99% - 100%)      ABG - ( 04 May 2021 02:08 )  pH, Arterial: 7.24  pH, Blood: x     /  pCO2: 59    /  pO2: 524   / HCO3: 25    / Base Excess: -3.1  /  SaO2: 100                   05-04 @ 07:01  -  05-05 @ 07:00  --------------------------------------------------------  IN: 208.8 mL / OUT: 48 mL / NET: 160.8 mL            PHYSICAL EXAM:    GENERAL: NAD, well-groomed, well-developed  HEAD:  Atraumatic, Normocephalic  EYES: EOMI, PERRLA, conjunctiva and sclera clear  ENMT: No tonsillar erythema, exudates, or enlargement; Moist mucous membranes, Good dentition, No lesions  NECK: Supple, normal appearance, No JVD; Normal thyroid; Trachea midline  NERVOUS SYSTEM:  Alert & Oriented X3, Good concentration; Motor Strength 5/5 B/L upper and lower extremities; DTRs 2+ intact and symmetric  CHEST/LUNG: No chest deformity; Normal percussion bilaterally; No rales, rhonchi, wheezing   HEART: Regular rate and rhythm; No murmurs, rubs, or gallops  ABDOMEN: Soft, Nontender, Nondistended; Bowel sounds present  EXTREMITIES:  2+ Peripheral Pulses, No clubbing, cyanosis, or edema  LYMPH: No lymphadenopathy noted  SKIN: No rashes or lesions; Good capillary refill      LABS:  CBC Full  -  ( 05 May 2021 05:22 )  WBC Count : 14.10 K/uL  RBC Count : 2.75 M/uL  Hemoglobin : 8.7 g/dL  Hematocrit : 27.0 %  Platelet Count - Automated : 279 K/uL  Mean Cell Volume : 98.2 fl  Mean Cell Hemoglobin : 31.6 pg  Mean Cell Hemoglobin Concentration : 32.2 gm/dL  Auto Neutrophil # : x  Auto Lymphocyte # : x  Auto Monocyte # : x  Auto Eosinophil # : x  Auto Basophil # : x  Auto Neutrophil % : x  Auto Lymphocyte % : x  Auto Monocyte % : x  Auto Eosinophil % : x  Auto Basophil % : x    05-05    134<L>  |  101  |  26<H>  ----------------------------<  111<H>  4.3   |  29  |  3.65<H>    Ca    8.4      05 May 2021 05:22  Phos  5.1     05-04  Mg     2.3     05-05    TPro  6.2  /  Alb  2.7<L>  /  TBili  0.3  /  DBili  x   /  AST  14  /  ALT  17  /  AlkPhos  172<H>  05-05    PT/INR - ( 04 May 2021 06:07 )   PT: 14.6 sec;   INR: 1.24 ratio         PTT - ( 04 May 2021 13:54 )  PTT:>200.0 sec    Culture Results:   No growth to date. (05-04 @ 06:24)  Culture Results:   No growth to date. (05-04 @ 06:24)      RADIOLOGY & ADDITIONAL STUDIES REVIEWED:  ***    GOALS OF CARE DISCUSSION WITH PATIENT/FAMILY/PROXY:    CRITICAL CARE TIME SPENT: 35 minutes INTERVAL HPI/OVERNIGHT EVENTS:  No acute events overnight. Pt last HD 5/4, 3.5L removed. Pt removed from BIPAP, placed on NC this AM.    PRESSORS: [X] YES [ ] NO  WHICH: pheny    ANTIBIOTICS:                      Antimicrobial:    Cardiovascular:  phenylephrine    Infusion 0.1 MICROgram(s)/kG/Min IV Continuous <Continuous>    Pulmonary:    Hematalogic:    Other:  chlorhexidine 2% Cloths 1 Application(s) Topical daily  epoetin maria d-epbx (RETACRIT) Injectable 8000 Unit(s) IV Push <User Schedule>  insulin lispro (ADMELOG) corrective regimen sliding scale   SubCutaneous every 6 hours    chlorhexidine 2% Cloths 1 Application(s) Topical daily  epoetin maria d-epbx (RETACRIT) Injectable 8000 Unit(s) IV Push <User Schedule>  insulin lispro (ADMELOG) corrective regimen sliding scale   SubCutaneous every 6 hours  phenylephrine    Infusion 0.1 MICROgram(s)/kG/Min IV Continuous <Continuous>    Drug Dosing Weight    Weight (kg): 94.5 (04 May 2021 04:01)    CENTRAL LINE: [ ] YES [ ] NO  LOCATION:   DATE INSERTED:  REMOVE: [ ] YES [ ] NO  EXPLAIN:    STUBBS: [X] YES [ ] NO    DATE INSERTED:  REMOVE:  [ ] YES [ ] NO  EXPLAIN:    A-LINE:  [ ] YES [ ] NO  LOCATION:   DATE INSERTED:  REMOVE:  [ ] YES [ ] NO  EXPLAIN:    PMH -reviewed admission note, no change since admission    ICU Vital Signs Last 24 Hrs  T(C): 36.4 (05 May 2021 04:00), Max: 37.1 (04 May 2021 11:30)  T(F): 97.5 (05 May 2021 04:00), Max: 98.8 (04 May 2021 11:30)  HR: 48 (05 May 2021 06:00) (44 - 66)  BP: 159/43 (05 May 2021 06:00) (100/30 - 160/23)  BP(mean): 76 (05 May 2021 06:00) (51 - 76)  ABP: --  ABP(mean): --  RR: 15 (05 May 2021 06:00) (15 - 24)  SpO2: 100% (05 May 2021 06:00) (99% - 100%)      ABG - ( 04 May 2021 02:08 )  pH, Arterial: 7.24  pH, Blood: x     /  pCO2: 59    /  pO2: 524   / HCO3: 25    / Base Excess: -3.1  /  SaO2: 100                   05-04 @ 07:01  -  05-05 @ 07:00  --------------------------------------------------------  IN: 208.8 mL / OUT: 48 mL / NET: 160.8 mL            PHYSICAL EXAM:    GENERAL: NAD, obese  HEAD:  Atraumatic, Normocephalic  EYES: EOMI, PERRLA, conjunctiva and sclera clear  ENMT: No tonsillar erythema, exudates, or enlargement; Moist mucous membranes, Good dentition, No lesions  NECK: Supple, normal appearance, No JVD; Normal thyroid; Trachea midline  NERVOUS SYSTEM:  Alert & Oriented X3, Good concentration; moves all extremities spontaneously  CHEST/LUNG: (+) b/l decreased breath sounds at bases with crackles  HEART: Regular rate and rhythm; No murmurs, rubs, or gallops  ABDOMEN: Soft, Nontender, Nondistended; Bowel sounds present  EXTREMITIES:  2+ Peripheral Pulses, No clubbing, cyanosis, or edema  LYMPH: No lymphadenopathy noted  SKIN: No rashes or lesions; Good capillary refill      LABS:  CBC Full  -  ( 05 May 2021 05:22 )  WBC Count : 14.10 K/uL  RBC Count : 2.75 M/uL  Hemoglobin : 8.7 g/dL  Hematocrit : 27.0 %  Platelet Count - Automated : 279 K/uL  Mean Cell Volume : 98.2 fl  Mean Cell Hemoglobin : 31.6 pg  Mean Cell Hemoglobin Concentration : 32.2 gm/dL  Auto Neutrophil # : x  Auto Lymphocyte # : x  Auto Monocyte # : x  Auto Eosinophil # : x  Auto Basophil # : x  Auto Neutrophil % : x  Auto Lymphocyte % : x  Auto Monocyte % : x  Auto Eosinophil % : x  Auto Basophil % : x    05-05    134<L>  |  101  |  26<H>  ----------------------------<  111<H>  4.3   |  29  |  3.65<H>    Ca    8.4      05 May 2021 05:22  Phos  5.1     05-04  Mg     2.3     05-05    TPro  6.2  /  Alb  2.7<L>  /  TBili  0.3  /  DBili  x   /  AST  14  /  ALT  17  /  AlkPhos  172<H>  05-05    PT/INR - ( 04 May 2021 06:07 )   PT: 14.6 sec;   INR: 1.24 ratio         PTT - ( 04 May 2021 13:54 )  PTT:>200.0 sec    Culture Results:   No growth to date. (05-04 @ 06:24)  Culture Results:   No growth to date. (05-04 @ 06:24)      RADIOLOGY & ADDITIONAL STUDIES REVIEWED:  ***    GOALS OF CARE DISCUSSION WITH PATIENT/FAMILY/PROXY:    CRITICAL CARE TIME SPENT: 35 minutes

## 2021-05-05 NOTE — PROGRESS NOTE ADULT - SUBJECTIVE AND OBJECTIVE BOX
[facility] NEPHROLOGY- PROGRESS NOTE    Patient is a 89yo Female with ESRD on HD, Afib on Eliquis, HTN, Intradialytic hypotension req Midodrine prior to HD, failed Rt AVF due to steal syndrome s/p ligation, h/o COVID in March, s/p PPM 2 weeks ago p/w SOB and chest comfort. Pt a/w acute hypoxic respiratory distress 2/2 pulmonary edema requiring BIPAP. Pt now hypotensive and bradycardic. Nephrology consulted for ESRD status.     Hospital Medications: Medications reviewed.  REVIEW OF SYSTEMS: Denies any SOB, chest pain, n/v/d or abd pain    VITALS:  T(F): 98.2 (05-05-21 @ 07:00), Max: 98.2 (05-04-21 @ 15:33)  HR: 59 (05-05-21 @ 12:15)  BP: 121/40 (05-05-21 @ 11:00)  RR: 16 (05-05-21 @ 12:15)  SpO2: 100% (05-05-21 @ 12:15)  Wt(kg): --    Weight (kg): 94.5 (05-04 @ 04:01)    05-04 @ 07:01  -  05-05 @ 07:00  --------------------------------------------------------  IN: 208.8 mL / OUT: 48 mL / NET: 160.8 mL      PHYSICAL EXAM:  Gen: NAD  HEENT: anicteric;  Neck: no JVD  Cards: julius , +S1/S2,   Resp: decreased BS at bases  GI: soft, NT/ND, NABS  : +meza  Extremities: +1 LE edema B/L  Access: Rt IJ tunneled HD catheter- benign    LABS:  05-05    134<L>  |  101  |  26<H>  ----------------------------<  111<H>  4.3   |  29  |  3.65<H>    Ca    8.4      05 May 2021 05:22  Phos  5.1     05-04  Mg     2.3     05-05    TPro  6.2  /  Alb  2.7<L>  /  TBili  0.3  /  DBili      /  AST  14  /  ALT  17  /  AlkPhos  172<H>  05-05    Creatinine Trend: 3.65 <--, 5.55 <--, 5.12 <--                        8.7    14.10 )-----------( 279      ( 05 May 2021 05:22 )             27.0     Urine Studies:      RADIOLOGY & ADDITIONAL STUDIES:

## 2021-05-05 NOTE — PROGRESS NOTE ADULT - ASSESSMENT
Pt is a 89 y/o F, wheel chair bound, from home with PMH of Afib on Eliquis, ESRD on HD, DM, HTN presented with shortness of breath since few days.    Assessment:    1.Acute hypoxic respiratory failure  2.Pulmonary edema  3.ESRD  4.Mild Hyponatremia  5.Anemia    Plan    Neuro:  - Alert and oriented   -No acute issues    CARDIOVASCULAR  # Atrial fibrillation  -Pt is on Eliquis at home  -initially started on heparin drip d/c due to acute drop in hemoglobin    # CHF  -Possible CHF given elevated pro BNP and pulmonary edema  -elevation in proBNP CHF vs ESRD  -Need to obtain record from [facility]    #Hypotension  -borderline low bloodpressure that does not improve on midodrine for HD  -Start on phenylephrine for now  -Start on levophed due to events of bradycardia during dialysis once central line is placed    PULMONARY  # Acute Hypoxic Respiratory failure  - Likely secondary to fluid overload  -Continue BiPAP support    GASTROINTESTINAL  # NPO as pt is on BiPAP    RENAL  #ESRD on Dialysis  -Pt gets regular dialysis on Tuesday/thursday/saturday  -Plan for Dialysis 5/4  -Monitor BMP  -Avoid nephrotoxic medications  -Nephrology Dr. Huitron    INFECTIOUS DISEASE  # No acute issues    ENDOCRINE  #Pt takes Lantus 10 U at bedtime and sliding scale Humalog at home  -Continue sliding scale Humalog and lantus  -F/u HBA1C    HEME  # Anemia  - Hb 9 >8 >7 >8 >  - Likely anemia due to chronic kidney disease  - Monitor cbc  -f/u iron studies      -Diet : NPO    PROPHYLAXIS  -DVT: Heparin       DISPO: Monitor in ICU    CODE STATUS: Full code Pt is a 89 y/o F, wheel chair bound, from home with PMH of Afib on Eliquis, ESRD on HD, DM, HTN presented with shortness of breath since few days.    Assessment:    1.Acute hypoxic respiratory failure  2.Pulmonary edema  3.ESRD  4.Mild Hyponatremia  5.Anemia    Plan    Neuro:  - Alert and oriented   -No acute issues    CARDIOVASCULAR  # Atrial fibrillation  -Pt is on Eliquis at home  - Restart home amio 200mg qd  -initially started on heparin drip d/c due to acute drop in hemoglobin  - Recent wireless pacemaker place 2 weeks ago    # CHF  -Possible CHF given elevated pro BNP and pulmonary edema  -elevation in proBNP CHF vs ESRD  -Need to obtain record from [facility]  - Cardio Consult Dr Arteaga    #Hypotension  -borderline low bloodpressure that does not improve on midodrine for HD  -Titrate down on pheny  - Start midodrine 10 q8 prior to dialysis  -Start on levophed due to events of bradycardia during dialysis once central line is placed    PULMONARY  # Acute Hypoxic Respiratory failure  - Likely secondary to fluid overload  -Continue BiPAP support    GASTROINTESTINAL  # NPO as pt is on BiPAP    RENAL  #ESRD on Dialysis  -Pt gets regular dialysis on Tuesday/thursday/saturday  -Plan for Dialysis 5/4  -Monitor BMP  -Avoid nephrotoxic medications  -Nephrology Dr. Huitron    INFECTIOUS DISEASE  # No acute issues    ENDOCRINE  #Pt takes Lantus 10 U at bedtime and sliding scale Humalog at home  -Continue sliding scale Humalog  - D/C lantus  -HBA1C 6.6    HEME  # Anemia  - Hb 9 >8 >7 >8 > 8.7   - Likely anemia due to chronic kidney disease  - Monitor cbc  -f/u iron studies      -Diet : NPO    PROPHYLAXIS  -DVT: Heparin       DISPO: Monitor in ICU    CODE STATUS: Full code Pt is a 89 y/o F, wheel chair bound, from home with PMH of Afib on Eliquis, ESRD on HD, DM, HTN presented with shortness of breath since few days.    Assessment:    1.Acute hypoxic respiratory failure  2.Pulmonary edema  3.ESRD  4.Mild Hyponatremia  5.Anemia    Plan    Neuro:  - Alert and oriented   -No acute issues    CARDIOVASCULAR  # Atrial fibrillation  -Pt is on Eliquis at home  - Restart home amio 200mg qd  -initially started on heparin drip d/c due to acute drop in hemoglobin  - Recent wireless pacemaker place 2 weeks ago    # CHF  -Possible CHF given elevated pro BNP and pulmonary edema  -elevation in proBNP CHF vs ESRD  -Need to obtain record from [facility]  - Cardio Consult Dr Arteaga    #Hypotension  -borderline low bloodpressure that does not improve on midodrine for HD  -Titrate down on pheny  - Start midodrine 10 q8 prior to dialysis  -Start on levophed due to events of bradycardia during dialysis once central line is placed    PULMONARY  # Acute Hypoxic Respiratory failure  - Likely secondary to fluid overload  -Continue BiPAP support    GASTROINTESTINAL  # NPO as pt is on BiPAP    RENAL  #ESRD on Dialysis  -Pt gets regular dialysis on Tuesday/thursday/saturday  -Last HD 5/5. Plan for Dialysis 5/6  -Monitor BMP  - c/w meza catheter, pt oliguric  -Avoid nephrotoxic medications  -Nephrology Dr. Huitron    INFECTIOUS DISEASE  # No acute issues    ENDOCRINE  #Pt takes Lantus 10 U at bedtime and sliding scale Humalog at home  -Continue sliding scale Humalog  - D/C lantus for now  -HBA1C 6.6    HEME  # Anemia  - Hb 9 >8 >7 >8 > 8.7   - Likely anemia due to chronic kidney disease  - Monitor cbc  - Iron studies ACD, low total iron and saturation    -Diet : NPO    PROPHYLAXIS  -DVT: eliquis      DISPO: Monitor in ICU    CODE STATUS: Full code Pt is a 89 y/o F, wheel chair bound, from home with PMH of Afib on Eliquis, ESRD on HD, DM, HTN presented with shortness of breath since few days.    Assessment:    1.Acute hypoxic respiratory failure  2.Pulmonary edema  3.ESRD  4.Mild Hyponatremia  5.Anemia    Plan    Neuro:  - Alert and oriented   -No acute issues    CARDIOVASCULAR  # Atrial fibrillation  -Pt is on Eliquis at home  - Restart home amio 200mg qd  -initially started on heparin drip d/c due to acute drop in hemoglobin  - Recent wireless pacemaker place 2 weeks ago    # CHF  -Possible CHF given elevated pro BNP and pulmonary edema  -elevation in proBNP CHF vs ESRD  -Need to obtain record from [facility]  - Cardio Consult Dr Arteaga    #Hypotension  -borderline low bloodpressure that does not improve on midodrine for HD  -Titrate down on pheny  - Start midodrine 10 q8 prior to dialysis  -Start on levophed due to events of bradycardia during dialysis once central line is placed    PULMONARY  # Acute Hypoxic Respiratory failure  - Likely secondary to fluid overload  -Continue BiPAP support    GASTROINTESTINAL  # NPO as pt is on BiPAP    RENAL  #ESRD on Dialysis  -Pt gets regular dialysis on Tuesday/thursday/saturday  -Last HD 5/5. Plan for Dialysis 5/6  -Monitor BMP  - c/w meza catheter, pt oliguric  -Avoid nephrotoxic medications  -Nephrology Dr. Huitron    INFECTIOUS DISEASE  # No acute issues    ENDOCRINE  #Pt takes Lantus 10 U at bedtime and sliding scale Humalog at home  -Continue sliding scale Humalog  - D/C lantus for now  -HBA1C 6.6    HEME  # Anemia  - Hb 9 >8 >7 >8 > 8.7   - Likely anemia due to chronic kidney disease  - Monitor cbc  - Iron studies ACD, low total iron and saturation    -Diet : NPO    PROPHYLAXIS  -DVT: eliquis      DISPO: Monitor in ICU    CODE STATUS: Full code

## 2021-05-05 NOTE — PROGRESS NOTE ADULT - ASSESSMENT
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 Pt a/w acute hypoxic respiratory distress 2/2 pulmonary edema requiring BIPAP. Pt now hypotensive and bradycardic. Nephrology consulted for ESRD status.     1) ESRD: Last HD on 5/4, tolerated well with net 2.7L removed. Plan for next HD 5/6. Monitor electrolytes.  2) Hyperkalemia- resolved with HD. c/w low K diet. Monitor lytes.   3) Hypotension- with bradycardia. Continue to hold antihypertensive medications. BP improving; now off IV pressors and on PO midodrine. Plan as per ICU. Bradycardia- with recent PPM placement; Cards followingt. Monitor BP/HR  4) Anemia of renal disease: Hb improving. Will avoid IV iron due to elevated Ferritin. c/w Epogen 8k units IV tiw with HD. Monitor Hb.  5) Hyperphosphatemia: Phosphorus acceptable. No need for phos binders at this time. c/w low phos diet. Monitor serum calcium and phosphorus.      [facility] NEPHROLOGY  Brad Chen M.D.  Oneal Tim D.O.  Roselia Huitron M.D.  Milli Platt, MSN, ANP-C  [phone]    [address] [place] [address]

## 2021-05-05 NOTE — CHART NOTE - NSCHARTNOTEFT_GEN_A_CORE
Spoke with HCP Cece at bedside to provide update on patient status and treatment plan. Support provided. All questions answered.

## 2021-05-05 NOTE — CONSULT NOTE ADULT - SUBJECTIVE AND OBJECTIVE BOX
HISTORY OF PRESENT ILLNESS: HPI:  Pt is a 89 y/o F, wheel chair bound, from home with PMH of PAF on Eliquis, ESRD on HD, DM, HTN presented with shortness of breath since few days. Pt also complaining of chest discomfort. As per pt's daughter Johnny batres, pt started to develop gradually shortness of breath since few days at rest. She mentioned that the pt was admitted recently to [facility] for shortness of breath and chest pain, leadless pacemaker was placed by Dr. Evangelina Mcclelland, discharged on april 29th. She has been regularly getting dialysis on Tuesday/thursday / saturday.  Pt denied fever, cough, abdominal pain. nausea, vomiting and other complains.        PAST MEDICAL & SURGICAL HISTORY:  HTN (hypertension)    HLD (hyperlipidemia)    CKD (chronic kidney disease)    Afib    DM (diabetes mellitus)    Artificial pacemaker            MEDICATIONS:  MEDICATIONS  (STANDING):  aMIOdarone    Tablet 200 milliGRAM(s) Oral daily  apixaban 2.5 milliGRAM(s) Oral two times a day  chlorhexidine 2% Cloths 1 Application(s) Topical daily  epoetin maria d-epbx (RETACRIT) Injectable 8000 Unit(s) IV Push <User Schedule>  insulin lispro (ADMELOG) corrective regimen sliding scale   SubCutaneous every 6 hours  midodrine 10 milliGRAM(s) Oral every 8 hours  polyethylene glycol 3350 17 Gram(s) Oral daily  senna 2 Tablet(s) Oral at bedtime      Allergies    No Known Allergies    Intolerances        FAMILY HISTORY:    Non-contributary for premature coronary disease or sudden cardiac death    SOCIAL HISTORY:    [ X] Non-smoker  [ ] Smoker  [ ] Alcohol      REVIEW OF SYSTEMS:  [ ]chest pain  [  ]shortness of breath  [  ]palpitations  [  ]syncope  [ ]near syncope [ ]upper extremity weakness   [ ] lower extremity weakness  [  ]diplopia  [  ]altered mental status   [  ]fevers  [ ]chills [ ]nausea  [ ]vomitting  [  ]dysphagia    [ ]abdominal pain  [ ]melena  [ ]BRBPR    [  ]epistaxis  [  ]rash    [ ]lower extremity edema        [ ] All others negative	  [ ] Unable to obtain      LABS:	 	    CARDIAC MARKERS:  CARDIAC MARKERS ( 04 May 2021 00:42 )  <0.015 ng/mL / x     / x     / x     / x                                  8.7    14.10 )-----------( 279      ( 05 May 2021 05:22 )             27.0     Hb Trend: 8.7<--    05-05    134<L>  |  101  |  26<H>  ----------------------------<  111<H>  4.3   |  29  |  3.65<H>    Ca    8.4      05 May 2021 05:22  Phos  5.1     05-04  Mg     2.3     05-05    TPro  6.2  /  Alb  2.7<L>  /  TBili  0.3  /  DBili  x   /  AST  14  /  ALT  17  /  AlkPhos  172<H>  05-05    Creatinine Trend: 3.65<--, 5.55<--, 5.12<--    PHYSICAL EXAM:  T(C): 36.8 (05-05-21 @ 07:00), Max: 36.8 (05-04-21 @ 15:33)  HR: 59 (05-05-21 @ 12:15) (44 - 66)  BP: 121/40 (05-05-21 @ 11:00) (103/39 - 160/23)  RR: 16 (05-05-21 @ 12:15) (14 - 24)  SpO2: 100% (05-05-21 @ 12:15) (97% - 100%)  Wt(kg): --     I&O's Summary    04 May 2021 07:01  -  05 May 2021 07:00  --------------------------------------------------------  IN: 208.8 mL / OUT: 48 mL / NET: 160.8 mL      HEENT:  (-)icterus (-)pallor  CV: N S1 S2 1/6 SHANE (+)2 Pulses B/l  Resp:  Clear to ausculatation B/L, normal effort  GI: (+) BS Soft, NT, ND  Lymph:  (-)Edema, (-)obvious lymphadenopathy  Skin: Warm to touch, Normal turgor  Psych: Appropriate mood and affect        TELEMETRY: 	  Sinus    ECG:  	afib demand vpaced 72 BPM    RADIOLOGY:         CXR:   < from: Xray Chest 1 View- PORTABLE-Urgent (05.04.21 @ 01:48) >  Interstitial prominence may reflect shallow inspiration. Mild interstitial edema cannot be excluded.    < end of copied text >      ASSESSMENT/PLAN: 	89y Female  wheel chair bound, from home with PMH of PAF on Eliquis, leadless PPM placed by Dr. Evangelina Mcclelland at [facility], ESRD on HD, DM, HTN presented with shortness of breath since few days.    - Keep net negative with HD  - Echo  - obtain records from primary cardio    I once again thank you for allowing me to participate in the care of your patient.  If you have any questions or concerns please do not hesitate to contact me.    Christopher Arteaga MD, [facility]  BEEPER [phone]

## 2021-05-06 DIAGNOSIS — R53.81 OTHER MALAISE: ICD-10-CM

## 2021-05-06 DIAGNOSIS — Z51.5 ENCOUNTER FOR PALLIATIVE CARE: ICD-10-CM

## 2021-05-06 DIAGNOSIS — J96.01 ACUTE RESPIRATORY FAILURE WITH HYPOXIA: ICD-10-CM

## 2021-05-06 DIAGNOSIS — N18.6 END STAGE RENAL DISEASE: ICD-10-CM

## 2021-05-06 LAB
ALBUMIN SERPL ELPH-MCNC: 2.5 G/DL — LOW (ref 3.5–5)
ALP SERPL-CCNC: 145 U/L — HIGH (ref 40–120)
ALT FLD-CCNC: 14 U/L DA — SIGNIFICANT CHANGE UP (ref 10–60)
ANION GAP SERPL CALC-SCNC: 7 MMOL/L — SIGNIFICANT CHANGE UP (ref 5–17)
APPEARANCE UR: ABNORMAL
AST SERPL-CCNC: 8 U/L — LOW (ref 10–40)
BACTERIA # UR AUTO: ABNORMAL /HPF
BASOPHILS # BLD AUTO: 0.03 K/UL — SIGNIFICANT CHANGE UP (ref 0–0.2)
BASOPHILS NFR BLD AUTO: 0.4 % — SIGNIFICANT CHANGE UP (ref 0–2)
BILIRUB SERPL-MCNC: 0.4 MG/DL — SIGNIFICANT CHANGE UP (ref 0.2–1.2)
BILIRUB UR-MCNC: NEGATIVE — SIGNIFICANT CHANGE UP
BUN SERPL-MCNC: 42 MG/DL — HIGH (ref 7–18)
CALCIUM SERPL-MCNC: 8.2 MG/DL — LOW (ref 8.4–10.5)
CHLORIDE SERPL-SCNC: 99 MMOL/L — SIGNIFICANT CHANGE UP (ref 96–108)
CO2 SERPL-SCNC: 28 MMOL/L — SIGNIFICANT CHANGE UP (ref 22–31)
COLOR SPEC: YELLOW — SIGNIFICANT CHANGE UP
CREAT SERPL-MCNC: 5.37 MG/DL — HIGH (ref 0.5–1.3)
DIFF PNL FLD: ABNORMAL
EOSINOPHIL # BLD AUTO: 0.11 K/UL — SIGNIFICANT CHANGE UP (ref 0–0.5)
EOSINOPHIL NFR BLD AUTO: 1.3 % — SIGNIFICANT CHANGE UP (ref 0–6)
EPI CELLS # UR: ABNORMAL /HPF
GLUCOSE BLDC GLUCOMTR-MCNC: 214 MG/DL — HIGH (ref 70–99)
GLUCOSE SERPL-MCNC: 151 MG/DL — HIGH (ref 70–99)
GLUCOSE UR QL: NEGATIVE — SIGNIFICANT CHANGE UP
HCT VFR BLD CALC: 24 % — LOW (ref 34.5–45)
HGB BLD-MCNC: 7.8 G/DL — LOW (ref 11.5–15.5)
IMM GRANULOCYTES NFR BLD AUTO: 0.9 % — SIGNIFICANT CHANGE UP (ref 0–1.5)
KETONES UR-MCNC: NEGATIVE — SIGNIFICANT CHANGE UP
LEUKOCYTE ESTERASE UR-ACNC: ABNORMAL
LYMPHOCYTES # BLD AUTO: 1.11 K/UL — SIGNIFICANT CHANGE UP (ref 1–3.3)
LYMPHOCYTES # BLD AUTO: 13.6 % — SIGNIFICANT CHANGE UP (ref 13–44)
MAGNESIUM SERPL-MCNC: 2.3 MG/DL — SIGNIFICANT CHANGE UP (ref 1.6–2.6)
MCHC RBC-ENTMCNC: 32.1 PG — SIGNIFICANT CHANGE UP (ref 27–34)
MCHC RBC-ENTMCNC: 32.5 GM/DL — SIGNIFICANT CHANGE UP (ref 32–36)
MCV RBC AUTO: 98.8 FL — SIGNIFICANT CHANGE UP (ref 80–100)
MONOCYTES # BLD AUTO: 1.32 K/UL — HIGH (ref 0–0.9)
MONOCYTES NFR BLD AUTO: 16.2 % — HIGH (ref 2–14)
NEUTROPHILS # BLD AUTO: 5.52 K/UL — SIGNIFICANT CHANGE UP (ref 1.8–7.4)
NEUTROPHILS NFR BLD AUTO: 67.6 % — SIGNIFICANT CHANGE UP (ref 43–77)
NITRITE UR-MCNC: NEGATIVE — SIGNIFICANT CHANGE UP
NRBC # BLD: 0 /100 WBCS — SIGNIFICANT CHANGE UP (ref 0–0)
PH UR: 6 — SIGNIFICANT CHANGE UP (ref 5–8)
PHOSPHATE SERPL-MCNC: 4.1 MG/DL — SIGNIFICANT CHANGE UP (ref 2.5–4.5)
PLATELET # BLD AUTO: 190 K/UL — SIGNIFICANT CHANGE UP (ref 150–400)
POTASSIUM SERPL-MCNC: 4.4 MMOL/L — SIGNIFICANT CHANGE UP (ref 3.5–5.3)
POTASSIUM SERPL-SCNC: 4.4 MMOL/L — SIGNIFICANT CHANGE UP (ref 3.5–5.3)
PROT SERPL-MCNC: 5.6 G/DL — LOW (ref 6–8.3)
PROT UR-MCNC: 500 MG/DL
RBC # BLD: 2.43 M/UL — LOW (ref 3.8–5.2)
RBC # FLD: 15.5 % — HIGH (ref 10.3–14.5)
RBC CASTS # UR COMP ASSIST: >50 /HPF (ref 0–2)
SODIUM SERPL-SCNC: 134 MMOL/L — LOW (ref 135–145)
SP GR SPEC: 1.01 — SIGNIFICANT CHANGE UP (ref 1.01–1.02)
UROBILINOGEN FLD QL: NEGATIVE — SIGNIFICANT CHANGE UP
WBC # BLD: 8.16 K/UL — SIGNIFICANT CHANGE UP (ref 3.8–10.5)
WBC # FLD AUTO: 8.16 K/UL — SIGNIFICANT CHANGE UP (ref 3.8–10.5)
WBC UR QL: >50 /HPF (ref 0–5)

## 2021-05-06 PROCEDURE — 99223 1ST HOSP IP/OBS HIGH 75: CPT

## 2021-05-06 PROCEDURE — 99497 ADVNCD CARE PLAN 30 MIN: CPT | Mod: 25

## 2021-05-06 RX ORDER — INSULIN GLARGINE 100 [IU]/ML
5 INJECTION, SOLUTION SUBCUTANEOUS AT BEDTIME
Refills: 0 | Status: DISCONTINUED | OUTPATIENT
Start: 2021-05-06 | End: 2021-05-17

## 2021-05-06 RX ORDER — MIDODRINE HYDROCHLORIDE 2.5 MG/1
10 TABLET ORAL
Refills: 0 | Status: DISCONTINUED | OUTPATIENT
Start: 2021-05-06 | End: 2021-05-13

## 2021-05-06 RX ORDER — INSULIN GLARGINE 100 [IU]/ML
5 INJECTION, SOLUTION SUBCUTANEOUS EVERY MORNING
Refills: 0 | Status: DISCONTINUED | OUTPATIENT
Start: 2021-05-06 | End: 2021-05-06

## 2021-05-06 RX ADMIN — Medication 2: at 21:41

## 2021-05-06 RX ADMIN — CHLORHEXIDINE GLUCONATE 1 APPLICATION(S): 213 SOLUTION TOPICAL at 11:43

## 2021-05-06 RX ADMIN — ERYTHROPOIETIN 8000 UNIT(S): 10000 INJECTION, SOLUTION INTRAVENOUS; SUBCUTANEOUS at 18:09

## 2021-05-06 RX ADMIN — Medication 3: at 11:54

## 2021-05-06 RX ADMIN — Medication 5: at 17:26

## 2021-05-06 RX ADMIN — AMIODARONE HYDROCHLORIDE 200 MILLIGRAM(S): 400 TABLET ORAL at 05:30

## 2021-05-06 RX ADMIN — INSULIN GLARGINE 5 UNIT(S): 100 INJECTION, SOLUTION SUBCUTANEOUS at 21:41

## 2021-05-06 RX ADMIN — Medication 5 MILLIGRAM(S): at 23:34

## 2021-05-06 RX ADMIN — POLYETHYLENE GLYCOL 3350 17 GRAM(S): 17 POWDER, FOR SOLUTION ORAL at 11:43

## 2021-05-06 RX ADMIN — MIDODRINE HYDROCHLORIDE 10 MILLIGRAM(S): 2.5 TABLET ORAL at 08:45

## 2021-05-06 RX ADMIN — APIXABAN 2.5 MILLIGRAM(S): 2.5 TABLET, FILM COATED ORAL at 05:30

## 2021-05-06 RX ADMIN — Medication 1: at 07:08

## 2021-05-06 RX ADMIN — SENNA PLUS 2 TABLET(S): 8.6 TABLET ORAL at 21:41

## 2021-05-06 NOTE — PROGRESS NOTE ADULT - SUBJECTIVE AND OBJECTIVE BOX
INTERVAL HPI/OVERNIGHT EVENTS: ***    PRESSORS: [ ] YES [ ] NO  WHICH:    ANTIBIOTICS:                      Antimicrobial:    Cardiovascular:  aMIOdarone    Tablet 200 milliGRAM(s) Oral daily  midodrine 10 milliGRAM(s) Oral <User Schedule>    Pulmonary:    Hematalogic:  apixaban 2.5 milliGRAM(s) Oral two times a day    Other:  chlorhexidine 2% Cloths 1 Application(s) Topical daily  epoetin maria d-epbx (RETACRIT) Injectable 8000 Unit(s) IV Push <User Schedule>  insulin glargine Injectable (LANTUS) 5 Unit(s) SubCutaneous at bedtime  insulin lispro (ADMELOG) corrective regimen sliding scale   SubCutaneous Before meals and at bedtime  polyethylene glycol 3350 17 Gram(s) Oral daily  senna 2 Tablet(s) Oral at bedtime    aMIOdarone    Tablet 200 milliGRAM(s) Oral daily  apixaban 2.5 milliGRAM(s) Oral two times a day  chlorhexidine 2% Cloths 1 Application(s) Topical daily  epoetin maria d-epbx (RETACRIT) Injectable 8000 Unit(s) IV Push <User Schedule>  insulin glargine Injectable (LANTUS) 5 Unit(s) SubCutaneous at bedtime  insulin lispro (ADMELOG) corrective regimen sliding scale   SubCutaneous Before meals and at bedtime  midodrine 10 milliGRAM(s) Oral <User Schedule>  polyethylene glycol 3350 17 Gram(s) Oral daily  senna 2 Tablet(s) Oral at bedtime    Drug Dosing Weight    Weight (kg): 94.5 (04 May 2021 04:01)    CENTRAL LINE: [ ] YES [ ] NO  LOCATION:   DATE INSERTED:  REMOVE: [ ] YES [ ] NO  EXPLAIN:    STUBBS: [ ] YES [ ] NO    DATE INSERTED:  REMOVE:  [ ] YES [ ] NO  EXPLAIN:    A-LINE:  [ ] YES [ ] NO  LOCATION:   DATE INSERTED:  REMOVE:  [ ] YES [ ] NO  EXPLAIN:    PMH -reviewed admission note, no change since admission    ICU Vital Signs Last 24 Hrs  T(C): 36.1 (06 May 2021 07:00), Max: 36.9 (05 May 2021 12:00)  T(F): 96.9 (06 May 2021 07:00), Max: 98.5 (05 May 2021 12:00)  HR: 57 (06 May 2021 08:15) (51 - 72)  BP: 145/47 (06 May 2021 08:00) (104/33 - 166/53)  BP(mean): 76 (06 May 2021 08:00) (54 - 87)  ABP: --  ABP(mean): --  RR: 16 (06 May 2021 08:00) (14 - 24)  SpO2: 99% (06 May 2021 08:15) (93% - 100%)            05- @ 07:01  -  - @ 07:00  --------------------------------------------------------  IN: 60 mL / OUT: 134 mL / NET: -74 mL            PHYSICAL EXAM:    GENERAL: NAD, well-groomed, well-developed  HEAD:  Atraumatic, Normocephalic  EYES: EOMI, PERRLA, conjunctiva and sclera clear  ENMT: No tonsillar erythema, exudates, or enlargement; Moist mucous membranes, Good dentition, No lesions  NECK: Supple, normal appearance, No JVD; Normal thyroid; Trachea midline  NERVOUS SYSTEM:  Alert & Oriented X3, Good concentration; Motor Strength 5/5 B/L upper and lower extremities; DTRs 2+ intact and symmetric  CHEST/LUNG: No chest deformity; Normal percussion bilaterally; No rales, rhonchi, wheezing   HEART: Regular rate and rhythm; No murmurs, rubs, or gallops  ABDOMEN: Soft, Nontender, Nondistended; Bowel sounds present  EXTREMITIES:  2+ Peripheral Pulses, No clubbing, cyanosis, or edema  LYMPH: No lymphadenopathy noted  SKIN: No rashes or lesions; Good capillary refill      LABS:  CBC Full  -  ( 06 May 2021 04:32 )  WBC Count : 8.16 K/uL  RBC Count : 2.43 M/uL  Hemoglobin : 7.8 g/dL  Hematocrit : 24.0 %  Platelet Count - Automated : 190 K/uL  Mean Cell Volume : 98.8 fl  Mean Cell Hemoglobin : 32.1 pg  Mean Cell Hemoglobin Concentration : 32.5 gm/dL  Auto Neutrophil # : 5.52 K/uL  Auto Lymphocyte # : 1.11 K/uL  Auto Monocyte # : 1.32 K/uL  Auto Eosinophil # : 0.11 K/uL  Auto Basophil # : 0.03 K/uL  Auto Neutrophil % : 67.6 %  Auto Lymphocyte % : 13.6 %  Auto Monocyte % : 16.2 %  Auto Eosinophil % : 1.3 %  Auto Basophil % : 0.4 %        134<L>  |  99  |  42<H>  ----------------------------<  151<H>  4.4   |  28  |  5.37<H>    Ca    8.2<L>      06 May 2021 04:32  Phos  4.1       Mg     2.3         TPro  5.6<L>  /  Alb  2.5<L>  /  TBili  0.4  /  DBili  x   /  AST  8<L>  /  ALT  14  /  AlkPhos  145<H>      PTT - ( 04 May 2021 13:54 )  PTT:>200.0 sec  Urinalysis Basic - ( 06 May 2021 04:15 )    Color: Yellow / Appearance: very cloudy / S.015 / pH: x  Gluc: x / Ketone: Negative  / Bili: Negative / Urobili: Negative   Blood: x / Protein: 500 mg/dL / Nitrite: Negative   Leuk Esterase: Moderate / RBC: >50 /HPF / WBC >50 /HPF   Sq Epi: x / Non Sq Epi: Occasional /HPF / Bacteria: Moderate /HPF      Culture Results:   No growth to date. ( @ 06:24)  Culture Results:   No growth to date. ( @ 06:24)      RADIOLOGY & ADDITIONAL STUDIES REVIEWED:  ***    GOALS OF CARE DISCUSSION WITH PATIENT/FAMILY/PROXY:    CRITICAL CARE TIME SPENT: 35 minutes INTERVAL HPI/OVERNIGHT EVENTS:  No acute events overnight. Pt not in distress, able to tolerate diet. Pt for HD today with midodrine to be given 30mins before dialysis.    PRESSORS: [ ] YES [ ] NO  WHICH:    ANTIBIOTICS:                      Antimicrobial:    Cardiovascular:  aMIOdarone    Tablet 200 milliGRAM(s) Oral daily  midodrine 10 milliGRAM(s) Oral <User Schedule>    Pulmonary:    Hematalogic:  apixaban 2.5 milliGRAM(s) Oral two times a day    Other:  chlorhexidine 2% Cloths 1 Application(s) Topical daily  epoetin maria d-epbx (RETACRIT) Injectable 8000 Unit(s) IV Push <User Schedule>  insulin glargine Injectable (LANTUS) 5 Unit(s) SubCutaneous at bedtime  insulin lispro (ADMELOG) corrective regimen sliding scale   SubCutaneous Before meals and at bedtime  polyethylene glycol 3350 17 Gram(s) Oral daily  senna 2 Tablet(s) Oral at bedtime    aMIOdarone    Tablet 200 milliGRAM(s) Oral daily  apixaban 2.5 milliGRAM(s) Oral two times a day  chlorhexidine 2% Cloths 1 Application(s) Topical daily  epoetin maria d-epbx (RETACRIT) Injectable 8000 Unit(s) IV Push <User Schedule>  insulin glargine Injectable (LANTUS) 5 Unit(s) SubCutaneous at bedtime  insulin lispro (ADMELOG) corrective regimen sliding scale   SubCutaneous Before meals and at bedtime  midodrine 10 milliGRAM(s) Oral <User Schedule>  polyethylene glycol 3350 17 Gram(s) Oral daily  senna 2 Tablet(s) Oral at bedtime    Drug Dosing Weight    Weight (kg): 94.5 (04 May 2021 04:01)    CENTRAL LINE: [ ] YES [ ] NO  LOCATION:   DATE INSERTED:  REMOVE: [ ] YES [ ] NO  EXPLAIN:    STUBBS: [ ] YES [ ] NO    DATE INSERTED:  REMOVE:  [ ] YES [ ] NO  EXPLAIN:    A-LINE:  [ ] YES [ ] NO  LOCATION:   DATE INSERTED:  REMOVE:  [ ] YES [ ] NO  EXPLAIN:    PMH -reviewed admission note, no change since admission    ICU Vital Signs Last 24 Hrs  T(C): 36.1 (06 May 2021 07:00), Max: 36.9 (05 May 2021 12:00)  T(F): 96.9 (06 May 2021 07:00), Max: 98.5 (05 May 2021 12:00)  HR: 57 (06 May 2021 08:15) (51 - 72)  BP: 145/47 (06 May 2021 08:00) (104/33 - 166/53)  BP(mean): 76 (06 May 2021 08:00) (54 - 87)  ABP: --  ABP(mean): --  RR: 16 (06 May 2021 08:00) (14 - 24)  SpO2: 99% (06 May 2021 08:15) (93% - 100%)            05- @ 07:01  -  - @ 07:00  --------------------------------------------------------  IN: 60 mL / OUT: 134 mL / NET: -74 mL            PHYSICAL EXAM:    GENERAL: NAD, well-groomed, well-developed  HEAD:  Atraumatic, Normocephalic  EYES: EOMI, PERRLA, conjunctiva and sclera clear  ENMT: No tonsillar erythema, exudates, or enlargement; Moist mucous membranes, Good dentition, No lesions  NECK: Supple, normal appearance, No JVD; Normal thyroid; Trachea midline  NERVOUS SYSTEM:  Alert & Oriented X3, Good concentration; Motor Strength 5/5 B/L upper and lower extremities; DTRs 2+ intact and symmetric  CHEST/LUNG: No chest deformity; Normal percussion bilaterally; No rales, rhonchi, wheezing   HEART: Regular rate and rhythm; No murmurs, rubs, or gallops  ABDOMEN: Soft, Nontender, Nondistended; Bowel sounds present  EXTREMITIES:  2+ Peripheral Pulses, No clubbing, cyanosis, or edema  LYMPH: No lymphadenopathy noted  SKIN: No rashes or lesions; Good capillary refill      LABS:  CBC Full  -  ( 06 May 2021 04:32 )  WBC Count : 8.16 K/uL  RBC Count : 2.43 M/uL  Hemoglobin : 7.8 g/dL  Hematocrit : 24.0 %  Platelet Count - Automated : 190 K/uL  Mean Cell Volume : 98.8 fl  Mean Cell Hemoglobin : 32.1 pg  Mean Cell Hemoglobin Concentration : 32.5 gm/dL  Auto Neutrophil # : 5.52 K/uL  Auto Lymphocyte # : 1.11 K/uL  Auto Monocyte # : 1.32 K/uL  Auto Eosinophil # : 0.11 K/uL  Auto Basophil # : 0.03 K/uL  Auto Neutrophil % : 67.6 %  Auto Lymphocyte % : 13.6 %  Auto Monocyte % : 16.2 %  Auto Eosinophil % : 1.3 %  Auto Basophil % : 0.4 %        134<L>  |  99  |  42<H>  ----------------------------<  151<H>  4.4   |  28  |  5.37<H>    Ca    8.2<L>      06 May 2021 04:32  Phos  4.1       Mg     2.3         TPro  5.6<L>  /  Alb  2.5<L>  /  TBili  0.4  /  DBili  x   /  AST  8<L>  /  ALT  14  /  AlkPhos  145<H>      PTT - ( 04 May 2021 13:54 )  PTT:>200.0 sec  Urinalysis Basic - ( 06 May 2021 04:15 )    Color: Yellow / Appearance: very cloudy / S.015 / pH: x  Gluc: x / Ketone: Negative  / Bili: Negative / Urobili: Negative   Blood: x / Protein: 500 mg/dL / Nitrite: Negative   Leuk Esterase: Moderate / RBC: >50 /HPF / WBC >50 /HPF   Sq Epi: x / Non Sq Epi: Occasional /HPF / Bacteria: Moderate /HPF      Culture Results:   No growth to date. ( @ 06:24)  Culture Results:   No growth to date. ( @ 06:24)      RADIOLOGY & ADDITIONAL STUDIES REVIEWED:  ***    GOALS OF CARE DISCUSSION WITH PATIENT/FAMILY/PROXY:    CRITICAL CARE TIME SPENT: 35 minutes

## 2021-05-06 NOTE — PROGRESS NOTE ADULT - ASSESSMENT
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 Pt a/w acute hypoxic respiratory distress 2/2 pulmonary edema requiring BIPAP. Pt now hypotensive and bradycardic. Nephrology consulted for ESRD status.     1) ESRD: Last HD on 5/4, tolerated well with net 2.7L removed. Plan for next HD 5/6. Monitor electrolytes.  2) Hyperkalemia- resolved with HD. c/w low K diet. Monitor lytes.   3) Hypotension- with bradycardia. Continue to hold antihypertensive medications. BP improving; now off IV pressors and on PO midodrine prior to HD to aid in fluid removal with HD  Bradycardia- with recent PPM placement; Cards following. Monitor BP/HR. Plan as per ICU  4) Anemia of renal disease: Hb low. Will avoid IV iron due to elevated Ferritin. c/w Epogen 8k units IV tiw with HD. Monitor Hb.  5) Hyperphosphatemia: Phosphorus acceptable. No need for phos binders at this time. c/w low phos diet. Monitor serum calcium and phosphorus.      [facility]PH[facility]  Brad Chen M.D.  Oneal Tim D.O.  Roselia Huitron M.D.  Milli Platt, PARAG, ANP-C  [phone]    [address] [place] [address]

## 2021-05-06 NOTE — DIETITIAN INITIAL EVALUATION ADULT. - ADD RECOMMEND
Please change PO supplement to Glucerna shake once/ day. Please add fluid controll to diet order, as needed. MD to monitor. RD available. Please change PO supplement to Glucerna shake once/ day. Please add fluid control to diet order, as needed. MD to monitor. RD available.

## 2021-05-06 NOTE — PROGRESS NOTE ADULT - ASSESSMENT
89 F, wheel chair bound, from home with PMH of Afib on Eliquis, ESRD on HD, DM, HTN presented with shortness of breath since few days admitted to ICU for AHRF secondary to fluid overload ESRD.    1.Acute hypoxic respiratory failure  2.Pulmonary edema  3.ESRD  4.Mild Hyponatremia  5.Anemia    Plan         CARDIOVASCULAR  # Atrial fibrillation  -Pt is on Eliquis at home  - c/w home amio 200mg qd  - initially started on heparin drip d/c due to acute drop in hemoglobin  - c/w eliquis  - Recent wireless pacemaker place 2 weeks ago  cards f/u    # CHF  -Possible CHF given elevated pro BNP and pulmonary edema  -elevation in proBNP CHF vs ESRD  - F/U Echo  -Need to obtain record from [facility]  - Cardio Consult Dr Arteaga    #Hypotension  -borderline low bloodpressure that does not improve on midodrine for HD  - d/c pheny   - Midodrine 10mg prior to dialysis    PULMONARY  # Acute Hypoxic Respiratory failure  - Likely secondary to fluid overload  -Continue BiPAP support    GASTROINTESTINAL  c/o constipation  start colace, senna    RENAL  #ESRD on Dialysis  -Pt gets regular dialysis on Tuesday/thursday/saturday  -Last HD 5/5. Plan for Dialysis 5/6  -Monitor BMP  - c/w meza catheter, pt oliguric  -Avoid nephrotoxic medications  -Nephrology Dr. Huitron    INFECTIOUS DISEASE  # No acute issues    ENDOCRINE  #Pt takes Lantus 10 U at bedtime and sliding scale Humalog at home  -Continue sliding scale Humalog  - Lantus 5u  -HBA1C 6.6    HEME  # Anemia  - Hb 9 >8 >7 >8 > 8.7 > 7.8  - Likely anemia due to chronic kidney disease  - Monitor cbc  - Iron studies ACD, low total iron and saturation    -Diet : regular    PROPHYLAXIS  -DVT: eliquis      plan d/w pt and icu    Advanced care planning was discussed with patient and family.  Advanced care planning forms were reviewed and discussed as appropriate.  Differential diagnosis and plan of care discussed with patient after the evaluation.   Pain assessed and judicious use of narcotics when appropriate was discussed.  Importance of Fall prevention discussed.  Counseling on Smoking and Alcohol cessation was offered when appropriate.  Counseling on Diet, exercise, and medication compliance was done.   Approx 50 minutes spent.

## 2021-05-06 NOTE — CHART NOTE - NSCHARTNOTEFT_GEN_A_CORE
ICU transfer Note    89 F, wheel chair bound, from home with PMH of Afib on Eliquis, ESRD on HD, DM, HTN presented with shortness of breath since few days admitted to ICU for AHRF secondary to fluid overload ESRD requiring BIPAP. Pt w/ afib on amiodarone, eliquis, metoprolol continued on amiodarone and eliquis. Metoprolol held due to hypotension. Rate controlled on telemetry. Pt with recent wireless pacemaker placed 2 weeks prior to admission. Pt with elevated proBNP is due ESRD as echo shoes normal EF >55% with mild to mod MR, and b/l pleural effusions. Cardiology Dr Arteaga consulted. Pt with fluid overload and hypotension. Pt underwent HD 5/4 with phenylephrine, 3.5L fluid removed. Pt off pressors 5/5. BIPAP discontinued 5/5. Hypertension meds continued to be held. Pt underwent HD 5/6 without phenylephrine or midodrine. Campa catheter removed 5/6, pt is oliguric, ~80cc of urine over 24hr. Pt diabetes, A1c 6.6, lantus 5u. Takes lantus 10u at home. Anemia hb trended with iron panel showing ACD. No transfusion needed.    Follow Up:  F/U Palliative care for GO - 5th hospitalization in 4 weeks  HD T TH S  Resume metoprolol for afib as tolerated by BP    Discussed with PGY-1 Cannabal covering Floor and Attending Dr. Duke. Patient will be transfered to floor. Discussed with ICU attending. Patient clinically stable for downgrade ICU transfer Note    89 F, wheel chair bound, from home with PMH of Afib on Eliquis, ESRD on HD, DM, HTN presented with shortness of breath since few days admitted to ICU for AHRF secondary to fluid overload ESRD requiring BIPAP. Pt w/ afib on amiodarone, eliquis, metoprolol continued on amiodarone and eliquis. Metoprolol held due to hypotension. Rate controlled on telemetry. Pt with recent wireless pacemaker placed 2 weeks prior to admission. Pt with elevated proBNP is due ESRD as echo shoes normal EF >55% with mild to mod MR, and b/l pleural effusions. Cardiology Dr Arteaga consulted. Pt with fluid overload and hypotension. Pt underwent HD 5/4 with phenylephrine, 3.5L fluid removed. Pt off pressors 5/5. BIPAP discontinued 5/5. Hypertension meds continued to be held. Pt underwent HD 5/6 without phenylephrine or midodrine. Campa catheter removed 5/6, pt is oliguric, ~80cc of urine over 24hr. Pt diabetes, A1c 6.6, lantus 5u. Takes lantus 10u at home. Anemia hb trended with iron panel showing ACD. No transfusion needed.    Follow Up:  F/U Palliative care for GOC at 3:30PM 5/7 - 5th hospitalization in 4 weeks  HD T TH S  Resume metoprolol for afib as tolerated by BP    Discussed with PGY-1 Cannabal covering Floor and Attending Dr. Duke. Patient will be transfered to floor. Discussed with ICU attending. Patient clinically stable for downgrade ICU transfer Note    89 F, wheel chair bound, from home with PMH of Afib on Eliquis, ESRD on HD, DM, HTN presented with shortness of breath since few days admitted to ICU for AHRF secondary to fluid overload ESRD requiring BIPAP. Pt w/ afib on amiodarone, eliquis, metoprolol continued on amiodarone and eliquis. Metoprolol held due to hypotension. Rate controlled on telemetry. Pt with recent wireless pacemaker placed 2 weeks prior to admission. Pt with elevated proBNP is due ESRD as echo shoes normal EF >55% with mild to mod MR, and b/l pleural effusions. Cardiology Dr Arteaga consulted. Pt with fluid overload and hypotension. Pt underwent HD 5/4 with phenylephrine, 3.5L fluid removed. Pt off pressors 5/5. BIPAP discontinued 5/5. Hypertension meds continued to be held. Pt underwent HD 5/6 without phenylephrine or midodrine. Campa catheter removed 5/6, pt is oliguric, ~80cc of urine over 24hr. Pt diabetes, A1c 6.6, lantus 5u. Takes lantus 10u at home. Anemia hb trended with iron panel showing ACD. No transfusion needed.    Follow Up:  F/U Palliative care for GOC at 3:30PM 5/7 - 5th hospitalization in 4 weeks  HD T TH S (last HD 5/6)  Resume metoprolol for afib as tolerated by BP    Discussed with PGY-1 Shaun covering Floor and Attending Dr. Duke. Patient will be transfered to floor. Discussed with ICU attending. Patient clinically stable for downgrade ICU transfer Note    89 F, wheel chair bound, from home with PMH of Afib on Eliquis, ESRD on HD, DM, HTN, Home O2 3L presented with shortness of breath since few days admitted to ICU for AHRF secondary to fluid overload ESRD requiring BIPAP. Pt w/ afib on amiodarone, eliquis, metoprolol continued on amiodarone and eliquis. Metoprolol held due to hypotension. Rate controlled on telemetry. Pt with recent wireless pacemaker placed 2 weeks prior to admission. Pt with elevated proBNP is due ESRD as echo shoes normal EF >55% with mild to mod MR, and b/l pleural effusions. Cardiology Dr Arteaga consulted. Pt with fluid overload and hypotension. Pt underwent HD 5/4 with phenylephrine, 3.5L fluid removed. Pt off pressors 5/5. BIPAP discontinued 5/5. Hypertension meds continued to be held. Pt underwent HD 5/6 without phenylephrine or midodrine. Campa catheter removed 5/6, pt is oliguric, ~80cc of urine over 24hr. Pt diabetes, A1c 6.6, lantus 5u. Takes lantus 10u at home. Anemia hb trended with iron panel showing ACD. No transfusion needed.    Follow Up:  F/U Palliative care for GOC at 3:30PM 5/7 - 5th hospitalization in 4 weeks  HD T TH S (last HD 5/6)  Resume metoprolol for afib as tolerated by BP    Discussed with PGY-1 Shaun covering Floor and Attending Dr. Duke. Patient will be transfered to floor. Discussed with ICU attending. Patient clinically stable for downgrade

## 2021-05-06 NOTE — CONSULT NOTE ADULT - PROBLEM SELECTOR RECOMMENDATION 2
Improving; 2/2 fluid overload due to ESRD on HD. Patient was on bipap; now NC. No signs of respiratory distress. 2/2 fluid overload due to ESRD on HD. Patient was on bipap; now NC. No signs of respiratory distress. Plan for HD today. Full code.

## 2021-05-06 NOTE — PHYSICAL THERAPY INITIAL EVALUATION ADULT - PERTINENT HX OF CURRENT PROBLEM, REHAB EVAL
Pt. admitted for gradual shortness of breath for few days. Diagnosed with acute respiratory failure with hypoxia.

## 2021-05-06 NOTE — PHYSICAL THERAPY INITIAL EVALUATION ADULT - DIAGNOSIS, PT EVAL
Overall decline in functional mobility due to generalized weakness, increasing SOB, decreased balance and endurance.

## 2021-05-06 NOTE — PROGRESS NOTE ADULT - SUBJECTIVE AND OBJECTIVE BOX
Patient denies chest pain or shortness of breath.   Review of systems otherwise (-)  	  MEDICATIONS:  MEDICATIONS  (STANDING):  aMIOdarone    Tablet 200 milliGRAM(s) Oral daily  apixaban 2.5 milliGRAM(s) Oral two times a day  chlorhexidine 2% Cloths 1 Application(s) Topical daily  epoetin maria d-epbx (RETACRIT) Injectable 8000 Unit(s) IV Push <User Schedule>  insulin glargine Injectable (LANTUS) 5 Unit(s) SubCutaneous at bedtime  insulin lispro (ADMELOG) corrective regimen sliding scale   SubCutaneous Before meals and at bedtime  midodrine 10 milliGRAM(s) Oral <User Schedule>  polyethylene glycol 3350 17 Gram(s) Oral daily  senna 2 Tablet(s) Oral at bedtime      LABS:	 	    CARDIAC MARKERS:  CARDIAC MARKERS ( 04 May 2021 00:42 )  <0.015 ng/mL / x     / x     / x     / x                                7.8    8.16  )-----------( 190      ( 06 May 2021 04:32 )             24.0     Hemoglobin: 7.8 g/dL (05-06 @ 04:32)  Hemoglobin: 8.7 g/dL (05-05 @ 05:22)  Hemoglobin: 8.0 g/dL (05-04 @ 12:29)  Hemoglobin: 7.1 g/dL (05-04 @ 11:38)  Hemoglobin: 8.5 g/dL (05-04 @ 06:07)      05-06    134<L>  |  99  |  42<H>  ----------------------------<  151<H>  4.4   |  28  |  5.37<H>    Ca    8.2<L>      06 May 2021 04:32  Phos  4.1     05-06  Mg     2.3     05-06    TPro  5.6<L>  /  Alb  2.5<L>  /  TBili  0.4  /  DBili  x   /  AST  8<L>  /  ALT  14  /  AlkPhos  145<H>  05-06    Creatinine Trend: 5.37<--, 3.65<--, 5.55<--, 5.12<--      PHYSICAL EXAM:  T(C): 36.1 (05-06-21 @ 07:00), Max: 36.6 (05-05-21 @ 16:00)  HR: 64 (05-06-21 @ 11:00) (54 - 73)  BP: 154/89 (05-06-21 @ 11:00) (108/33 - 166/53)  RR: 24 (05-06-21 @ 11:00) (15 - 24)  SpO2: 96% (05-06-21 @ 11:00) (93% - 100%)  Wt(kg): --  I&O's Summary    05 May 2021 07:01  -  06 May 2021 07:00  --------------------------------------------------------  IN: 60 mL / OUT: 134 mL / NET: -74 mL    06 May 2021 07:01  -  06 May 2021 12:37  --------------------------------------------------------  IN: 100 mL / OUT: 105 mL / NET: -5 mL      Height (cm): 157.5 (05-06 @ 11:09)    Gen: Appears well in NAD  HEENT:  (-)icterus (-)pallor  CV: N S1 S2 1/6 SHANE (+)2 Pulses B/l  Resp:  Clear to ausculatation B/L, normal effort  GI: (+) BS Soft, NT, ND  Lymph:  (-)Edema, (-)obvious lymphadenopathy  Skin: Warm to touch, Normal turgor  Psych: Appropriate mood and affect      TELEMETRY: 	  sinus        ASSESSMENT/PLAN: 	89y  Female wheel chair bound, from home with PMH of PAF on Eliquis, leadless PPM placed by Dr. Evangelina Mcclelland at [facility], ESRD on HD, DM, HTN presented with shortness of breath since few days.    - Keep net negative with HD  - Echo with normal LV function   - Palliative eval noted    Christopher Arteaga MD, [facility]  BEEPER [phone]

## 2021-05-06 NOTE — CONSULT NOTE ADULT - PROBLEM SELECTOR RECOMMENDATION 3
2/2 chronic comorbidities including ESRD on HD. Patient from home, lives with dtr, + wheel chair bound. Noted with + generalized weakness, dependent on transfers, assist with ADLs except independent of feeds. Recommend PT eval. 2/2 chronic comorbidities including ESRD on HD. Patient from home, lives with dtr, + wheel chair bound, assist with ADLs except feeding, +O2 3-4L via NC at night. Noted with + generalized weakness, dependent on transfers, assist with ADLs except independent of feeds. Recommend PT eval.

## 2021-05-06 NOTE — CONSULT NOTE ADULT - PROBLEM SELECTOR RECOMMENDATION 9
Patient dependent on HD. Last HD 5/4; plan for HD today. Off pressor; continues midodrine, retacrit. Per report as outpatient, patient unable to tolerate no HD after Sat dialysis until Tuesday and daughter was requesting 4X/week HD as outpatient, however HD center reported patient needs onsite HD since they are having difficulty removing fluid despite midodrine on board. Nephro following. Patient dependent on HD. Last HD 5/4; plan for HD today. Off pressor; continues midodrine, retacrit. Per report as outpatient, patient unable to tolerate no HD after Sat dialysis until Tuesday and daughter was requesting 4X/week HD as outpatient, however HD center reported patient needs onsite HD since they are having difficulty removing fluid despite midodrine on board. Family meeting planned for tomorrow 3:30 to further discuss future HD plans/options and goals of care. Nephro following. Patient remains a full code.

## 2021-05-06 NOTE — DIETITIAN INITIAL EVALUATION ADULT. - PERTINENT LABORATORY DATA
05-06 Na134 mmol/L<L> Glu 151 mg/dL<H> K+ 4.4 mmol/L Cr  5.37 mg/dL<H> BUN 42 mg/dL<H>   05-06 Phos 4.1 mg/dL   05-06 Alb 2.5 g/dL<L>     05-04 Chol 121 mg/dL LDL --    HDL 89 mg/dL Trig 42 mg/dL    05-04-21 @ 10:40 HgbA1C 6.6      Vitamin D, 25-Hydroxy: 34.1 ng/mL (05-04-21 @ 09:36)

## 2021-05-06 NOTE — DIETITIAN INITIAL EVALUATION ADULT. - OTHER INFO
Prtessure injury stage 2 B/L gluteus, stage 2 B/L heels, coccyx). Pt noted as wheelchair bound PTA. Admit with fluid overload. Pt seen, in HD. Reports good intake, no c/o other than does not like Nepro (took some, hard to get down). Pt's daughter outside HD, confirms info (reports she gives pt one Glucerna shakes/ day PTA. Daughter reports despite pt not generally liking some items PTA, pt has enjoyed/ consumed  the meals here (including those items like beef stew, Reports no need for consistency change

## 2021-05-06 NOTE — PROGRESS NOTE ADULT - SUBJECTIVE AND OBJECTIVE BOX
YOLANDA BROWN  89y Female  MRN:[id number]    Patient is a 89y old  Female who presents with a chief complaint of Shortness of breath (06 May 2021 17:00)    HPI:  Pt is a 89 y/o F, wheel chair bound, from home with PMH of Afib on Eliquis, ESRD on HD, DM, HTN presented with shortness of breath since few days. Pt also complaining of chest discomfort. As per pt's daughter Johnny brown, pt started to develop gradually shortness of breath since few days at rest. She mentioned that the pt was admitted recently to [facility] for shortness of breath and chest pain, leadless pacemaker was placed, discharged on . She has been regularly getting dialysis on Tuesday/thursday / saturday.  Pt denied fever, cough, abdominal pain. nausea, vomiting and other complains.     (04 May 2021 02:48)      Patient seen and evaluated at bedside. No acute events overnight except as noted.    Interval HPI:  pt now tx out of micu     PAST MEDICAL & SURGICAL HISTORY:  HTN (hypertension)    HLD (hyperlipidemia)    CKD (chronic kidney disease)    Afib    DM (diabetes mellitus)    Artificial pacemaker        REVIEW OF SYSTEMS:  as per hpi     VITALS:  Vital Signs Last 24 Hrs  T(C): 36.6 (06 May 2021 20:40), Max: 36.8 (06 May 2021 17:05)  T(F): 97.8 (06 May 2021 20:40), Max: 98.2 (06 May 2021 17:05)  HR: 76 (06 May 2021 20:40) (54 - 106)  BP: 135/45 (06 May 2021 20:40) (114/54 - 182/65)  BP(mean): 80 (06 May 2021 17:00) (65 - 129)  RR: 24 (06 May 2021 20:40) (15 - 29)  SpO2: 95% (06 May 2021 20:40) (93% - 100%)  CAPILLARY BLOOD GLUCOSE      POCT Blood Glucose.: 214 mg/dL (06 May 2021 21:24)  POCT Blood Glucose.: 356 mg/dL (06 May 2021 17:21)  POCT Blood Glucose.: 288 mg/dL (06 May 2021 11:50)  POCT Blood Glucose.: 174 mg/dL (06 May 2021 07:06)    I&O's Summary    05 May 2021 07:01  -  06 May 2021 07:00  --------------------------------------------------------  IN: 60 mL / OUT: 134 mL / NET: -74 mL    06 May 2021 07:01  -  06 May 2021 22:35  --------------------------------------------------------  IN: 900 mL / OUT: 3055 mL / NET: -2155 mL        PHYSICAL EXAM:  GENERAL: NAD, well-developed  HEAD:  Atraumatic, Normocephalic  EYES: EOMI, PERRLA, conjunctiva and sclera clear  NECK: Supple, No JVD  CHEST/LUNG: Clear to auscultation bilaterally; No wheeze  HEART: S1, S2; No murmurs, rubs, or gallops  ABDOMEN: Soft, Nontender, Nondistended; Bowel sounds present  EXTREMITIES:  2+ Peripheral Pulses, No clubbing, cyanosis, or edema  PSYCH: Normal affect  NEUROLOGY: AAOX3; non-focal  SKIN: No rashes or lesions    Consultant(s) Notes Reviewed:  [x ] YES  [ ] NO  Care Discussed with Consultants/Other Providers [ x] YES  [ ] NO    MEDS:  MEDICATIONS  (STANDING):  aMIOdarone    Tablet 200 milliGRAM(s) Oral daily  apixaban 2.5 milliGRAM(s) Oral two times a day  chlorhexidine 2% Cloths 1 Application(s) Topical daily  epoetin maria d-epbx (RETACRIT) Injectable 8000 Unit(s) IV Push <User Schedule>  insulin glargine Injectable (LANTUS) 5 Unit(s) SubCutaneous at bedtime  insulin lispro (ADMELOG) corrective regimen sliding scale   SubCutaneous Before meals and at bedtime  midodrine 10 milliGRAM(s) Oral <User Schedule>  polyethylene glycol 3350 17 Gram(s) Oral daily  senna 2 Tablet(s) Oral at bedtime    MEDICATIONS  (PRN):    ALLERGIES:  No Known Allergies      LABS:                        7.8    8.16  )-----------( 190      ( 06 May 2021 04:32 )             24.0     -    134<L>  |  99  |  42<H>  ----------------------------<  151<H>  4.4   |  28  |  5.37<H>    Ca    8.2<L>      06 May 2021 04:32  Phos  4.1     -  Mg     2.3     -    TPro  5.6<L>  /  Alb  2.5<L>  /  TBili  0.4  /  DBili  x   /  AST  8<L>  /  ALT  14  /  AlkPhos  145<H>            LIVER FUNCTIONS - ( 06 May 2021 04:32 )  Alb: 2.5 g/dL / Pro: 5.6 g/dL / ALK PHOS: 145 U/L / ALT: 14 U/L DA / AST: 8 U/L / GGT: x           Urinalysis Basic - ( 06 May 2021 04:15 )    Color: Yellow / Appearance: very cloudy / S.015 / pH: x  Gluc: x / Ketone: Negative  / Bili: Negative / Urobili: Negative   Blood: x / Protein: 500 mg/dL / Nitrite: Negative   Leuk Esterase: Moderate / RBC: >50 /HPF / WBC >50 /HPF   Sq Epi: x / Non Sq Epi: Occasional /HPF / Bacteria: Moderate /HPF      TSH:   A1c:  BNP:  Lipid panel:   cultures: Culture Results:   No growth to date. ( @ 06:24)  Culture Results:   No growth to date. ( @ 06:24)      RADIOLOGY & ADDITIONAL TESTS:    Imaging Personally Reviewed:  [ ] YES  [ ] NO

## 2021-05-06 NOTE — PROGRESS NOTE ADULT - ASSESSMENT
Pt is a 89 y/o F, wheel chair bound, from home with PMH of Afib on Eliquis, ESRD on HD, DM, HTN presented with shortness of breath since few days.    Assessment:    1.Acute hypoxic respiratory failure  2.Pulmonary edema  3.ESRD  4.Mild Hyponatremia  5.Anemia    Plan    Neuro:  - Alert and oriented   -No acute issues    CARDIOVASCULAR  # Atrial fibrillation  -Pt is on Eliquis at home  - Restart home amio 200mg qd  -initially started on heparin drip d/c due to acute drop in hemoglobin  - Recent wireless pacemaker place 2 weeks ago    # CHF  -Possible CHF given elevated pro BNP and pulmonary edema  -elevation in proBNP CHF vs ESRD  -Need to obtain record [facility]  - Cardio Consult Dr Arteaga    #Hypotension  -borderline low bloodpressure that does not improve on midodrine for HD  -Titrate down on pheny  - Start midodrine 10 q8 prior to dialysis  -Start on levophed due to events of bradycardia during dialysis once central line is placed    PULMONARY  # Acute Hypoxic Respiratory failure  - Likely secondary to fluid overload  -Continue BiPAP support    GASTROINTESTINAL  # NPO as pt is on BiPAP    RENAL  #ESRD on Dialysis  -Pt gets regular dialysis on Tuesday/thursday/saturday  -Last HD 5/5. Plan for Dialysis 5/6  -Monitor BMP  - c/w meza catheter, pt oliguric  -Avoid nephrotoxic medications  -Nephrology Dr. Huitron    INFECTIOUS DISEASE  # No acute issues    ENDOCRINE  #Pt takes Lantus 10 U at bedtime and sliding scale Humalog at home  -Continue sliding scale Humalog  - D/C lantus for now  -HBA1C 6.6    HEME  # Anemia  - Hb 9 >8 >7 >8 > 8.7   - Likely anemia due to chronic kidney disease  - Monitor cbc  - Iron studies ACD, low total iron and saturation    -Diet : NPO    PROPHYLAXIS  -DVT: eliquis      DISPO: Monitor in ICU    CODE STATUS: Full code 89 F, wheel chair bound, from home with PMH of Afib on Eliquis, ESRD on HD, DM, HTN presented with shortness of breath since few days admitted to ICU for AHRF secondary to fluid overload ESRD.    1.Acute hypoxic respiratory failure  2.Pulmonary edema  3.ESRD  4.Mild Hyponatremia  5.Anemia    Plan    Neuro:  - Alert and oriented   -No acute issues    CARDIOVASCULAR  # Atrial fibrillation  -Pt is on Eliquis at home  - c/w home amio 200mg qd  - initially started on heparin drip d/c due to acute drop in hemoglobin  - c/w eliquis  - Recent wireless pacemaker place 2 weeks ago    # CHF  -Possible CHF given elevated pro BNP and pulmonary edema  -elevation in proBNP CHF vs ESRD  - F/U Echo  -Need to obtain record from [facility]  - Cardio Consult Dr Arteaga    #Hypotension  -borderline low bloodpressure that does not improve on midodrine for HD  - d/c pheny   - Midodrine 10mg prior to dialysis    PULMONARY  # Acute Hypoxic Respiratory failure  - Likely secondary to fluid overload  -Continue BiPAP support    GASTROINTESTINAL  # on Renal diet    RENAL  #ESRD on Dialysis  -Pt gets regular dialysis on Tuesday/thursday/saturday  -Last HD 5/5. Plan for Dialysis 5/6  -Monitor BMP  - c/w meza catheter, pt oliguric  -Avoid nephrotoxic medications  -Nephrology Dr. Huitron    INFECTIOUS DISEASE  # No acute issues    ENDOCRINE  #Pt takes Lantus 10 U at bedtime and sliding scale Humalog at home  -Continue sliding scale Humalog  - Lantus 5u  -HBA1C 6.6    HEME  # Anemia  - Hb 9 >8 >7 >8 > 8.7 > 7.8  - Likely anemia due to chronic kidney disease  - Monitor cbc  - Iron studies ACD, low total iron and saturation    -Diet : NPO    PROPHYLAXIS  -DVT: eliquis      DISPO: Transfer to medicine    CODE STATUS: Full code 89 F, wheel chair bound, from home with PMH of Afib on Eliquis, ESRD on HD, DM, HTN presented with shortness of breath since few days admitted to ICU for AHRF secondary to fluid overload ESRD.    1.Acute hypoxic respiratory failure  2.Pulmonary edema  3.ESRD  4.Mild Hyponatremia  5.Anemia    Plan    Neuro:  - Alert and oriented   -No acute issues    CARDIOVASCULAR  # Atrial fibrillation  -Pt is on Eliquis at home  - c/w home amio 200mg qd  - initially started on heparin drip d/c due to acute drop in hemoglobin  - c/w eliquis  - Recent wireless pacemaker place 2 weeks ago    # CHF  -Possible CHF given elevated pro BNP and pulmonary edema  -elevation in proBNP CHF vs ESRD  - F/U Echo  -Need to obtain record from [facility]  - Cardio Consult Dr Arteaga    #Hypotension  -borderline low bloodpressure that does not improve on midodrine for HD  - d/c pheny   - Midodrine 10mg prior to dialysis    PULMONARY  # Acute Hypoxic Respiratory failure  - Likely secondary to fluid overload  -Continue BiPAP support    GASTROINTESTINAL  # on Renal diet    RENAL  #ESRD on Dialysis  -Pt gets regular dialysis on Tuesday/thursday/saturday  -Last HD 5/5. Plan for Dialysis 5/6  -Monitor BMP  - c/w meza catheter, pt oliguric  -Avoid nephrotoxic medications  -Nephrology Dr. Huitron    INFECTIOUS DISEASE  # No acute issues    ENDOCRINE  #Pt takes Lantus 10 U at bedtime and sliding scale Humalog at home  -Continue sliding scale Humalog  - Lantus 5u  -HBA1C 6.6    HEME  # Anemia  - Hb 9 >8 >7 >8 > 8.7 > 7.8  - Likely anemia due to chronic kidney disease  - Monitor cbc  - Iron studies ACD, low total iron and saturation    -Diet : regular    PROPHYLAXIS  -DVT: eliquis      DISPO: Transfer to medicine    CODE STATUS: Full code

## 2021-05-06 NOTE — DIETITIAN INITIAL EVALUATION ADULT. - PERTINENT MEDS FT
MEDICATIONS  (STANDING):  aMIOdarone    Tablet 200 milliGRAM(s) Oral daily  apixaban 2.5 milliGRAM(s) Oral two times a day  chlorhexidine 2% Cloths 1 Application(s) Topical daily  epoetin maria d-epbx (RETACRIT) Injectable 8000 Unit(s) IV Push <User Schedule>  insulin glargine Injectable (LANTUS) 5 Unit(s) SubCutaneous at bedtime  insulin lispro (ADMELOG) corrective regimen sliding scale   SubCutaneous Before meals and at bedtime  midodrine 10 milliGRAM(s) Oral <User Schedule>  polyethylene glycol 3350 17 Gram(s) Oral daily  senna 2 Tablet(s) Oral at bedtime    MEDICATIONS  (PRN):

## 2021-05-06 NOTE — CONSULT NOTE ADULT - PROBLEM SELECTOR RECOMMENDATION 4
See GOC section above. Patient's daughter/Cece Hoffman ([phone]) is primary surrogate.    Discussed with Dr. Correa, ICU team, Dr. Huitron, Dr. Arteaga See GOC section above. Patient's daughter/Cece Hoffman ([phone]) is primary surrogate. Family meeting planned for tomorrow 5/7 3:30 to further discuss future HD options and advance directives. At this time, patient remains a full code.     Discussed with Dr. Correa, ICU team, Dr. Huitron, Dr. Arteaga, [facility] HD Outpatient

## 2021-05-06 NOTE — CONSULT NOTE ADULT - CONVERSATION DETAILS
5/6/21: Reviewed status with ICU team and nephro. Spoke with patient at bedside. Patient A&O x 3, able to make needs known. Deferring to daughter/Cece ([phone]) for medical decision making. Spoke with dtr on the phone; ICU resident present. Discussed status; reviewed medications, labs, diagnostics. Daughter appears realistic - acknowledging patient's recurrent hospitalizations and need for additional support in the home with ADLs, etc. Discussed risks vs benefits of resuscitation, intubation. Daughter states she spoke with her siblings this morning about advance directives and they can benefit from additional education. Requested family meeting with patient tomorrow (along with 2 sons who will be present and conferencing in 2 daughters: Cece and Belkis) 5/7 3:30PM to further discuss goals of care and plan for HD. Diana, patient's nurse manager at [facility]ialysis unit ([phone]) will also be present to discuss future outpatient HD plans after speaking with medical director. Patient agreeable for family meeting. At this time, patient remains a full code.  services offered and accepted. 5/6/21: Reviewed status with ICU team and nephro. Spoke with patient at bedside. Patient A&O x 3, able to make needs known. Deferring to daughter/Cece ([phone]) for medical decision making. Spoke with dtr on the phone; ICU resident present. Discussed status; reviewed medications, labs, diagnostics. Daughter appears realistic - acknowledging patient's recurrent hospitalizations and need for additional support in the home with ADLs, etc. Discussed risks vs benefits of resuscitation, intubation. Daughter states she spoke with her siblings this morning about advance directives and they can benefit from additional education. Requested family meeting with patient tomorrow (along with 2 sons who will be present and conferencing in 2 daughters: Cece and Belkis) 5/7 3:30PM to further discuss goals of care and plan for HD. Diana, patient's nurse manager at [facility] Dialysis unit ([phone]) will also be present to discuss future outpatient HD plans after speaking with medical director. Patient agreeable for family meeting. At this time, patient remains a full code.  services offered and accepted.    Due to the patient's health status and restrictions on visitation during the Public Health Emergency, the Advance Care Planning service was performed via telephone with patient's ___daughter______.

## 2021-05-06 NOTE — CONSULT NOTE ADULT - ATTENDING COMMENTS
89 y/o F WCbound,  with ESRD on HD, multiple hospitalizations over past 6 wks. Plan for HD today off pressor, on midodrine.  Family had been addressing w outpt HD for 4d sessions. Pt alert, NAD. Pt deferring to daughter for decisionmaking. Ongoing GOC discussions, for FM w children and patient tomorrow.

## 2021-05-06 NOTE — PROGRESS NOTE ADULT - SUBJECTIVE AND OBJECTIVE BOX
[facility] NEPHROLOGY- PROGRESS NOTE    Patient is a 89yo Female with ESRD on HD, Afib on Eliquis, HTN, Intradialytic hypotension req Midodrine prior to HD, failed Rt AVF due to steal syndrome s/p ligation, h/o COVID in March, s/p PPM 2 weeks ago p/w SOB and chest comfort. Pt a/w acute hypoxic respiratory distress 2/2 pulmonary edema requiring BIPAP. Pt now hypotensive and bradycardic. Nephrology consulted for ESRD status.     Hospital Medications: Medications reviewed.  REVIEW OF SYSTEMS: Denies any SOB, chest pain, n/v/d or abd pain +itching under left breast    VITALS:  T(F): 96.9 (21 @ 07:00), Max: 97.8 (21 @ 16:00)  HR: 70 (21 @ 12:00)  BP: 157/62 (21 @ 12:00)  RR: 26 (21 @ 12:00)  SpO2: 94% (21 @ 12:00)  Wt(kg): --     @ 07:01  -   @ 07:00  --------------------------------------------------------  IN: 60 mL / OUT: 134 mL / NET: -74 mL     @ 07:01  -   @ 14:00  --------------------------------------------------------  IN: 100 mL / OUT: 105 mL / NET: -5 mL      Height (cm): 157.5 ( @ 11:09)      PHYSICAL EXAM:  Gen: NAD  HEENT: anicteric;  Neck: no JVD  Cards: julius , +S1/S2,   Resp: mild rales at bases b/l  GI: soft, NT/ND, NABS  : +meza  Extremities: +trace LE edema B/L  Access: Rt IJ tunneled HD catheter- benign    LABS:      134<L>  |  99  |  42<H>  ----------------------------<  151<H>  4.4   |  28  |  5.37<H>    Ca    8.2<L>      06 May 2021 04:32  Phos  4.1       Mg     2.3         TPro  5.6<L>  /  Alb  2.5<L>  /  TBili  0.4  /  DBili      /  AST  8<L>  /  ALT  14  /  AlkPhos  145<H>      Creatinine Trend: 5.37 <--, 3.65 <--, 5.55 <--, 5.12 <--                        7.8    8.16  )-----------( 190      ( 06 May 2021 04:32 )             24.0     Urine Studies:  Urinalysis Basic - ( 06 May 2021 04:15 )    Color: Yellow / Appearance: very cloudy / S.015 / pH:   Gluc:  / Ketone: Negative  / Bili: Negative / Urobili: Negative   Blood:  / Protein: 500 mg/dL / Nitrite: Negative   Leuk Esterase: Moderate / RBC: >50 /HPF / WBC >50 /HPF   Sq Epi:  / Non Sq Epi: Occasional /HPF / Bacteria: Moderate /HPF           [facility] NEPHROLOGY- PROGRESS NOTE    Patient is a 89yo Female with ESRD on HD, Afib on Eliquis, HTN, Intradialytic hypotension req Midodrine prior to HD, failed Rt AVF due to steal syndrome s/p ligation, h/o COVID in March, s/p PPM 2 weeks ago p/w SOB and chest comfort. Pt a/w acute hypoxic respiratory distress 2/2 pulmonary edema requiring BIPAP. Pt now hypotensive and bradycardic. Nephrology consulted for ESRD status.     Hospital Medications: Medications reviewed.  REVIEW OF SYSTEMS: Denies any SOB, chest pain, n/v/d or abd pain +itching under left breast    VITALS:  T(F): 96.9 (21 @ 07:00), Max: 97.8 (21 @ 16:00)  HR: 70 (21 @ 12:00)  BP: 157/62 (21 @ 12:00)  RR: 26 (21 @ 12:00)  SpO2: 94% (21 @ 12:00)  Wt(kg): --     @ 07:01  -   @ 07:00  --------------------------------------------------------  IN: 60 mL / OUT: 134 mL / NET: -74 mL     @ 07:01  -   @ 14:00  --------------------------------------------------------  IN: 100 mL / OUT: 105 mL / NET: -5 mL      Height (cm): 157.5 ( @ 11:09)      PHYSICAL EXAM:  Gen: NAD, +erythema under left breast  HEENT: anicteric;  Neck: no JVD  Cards: julius , +S1/S2,   Resp: mild rales at bases b/l  GI: soft, NT/ND, NABS  : +meza  Extremities: +trace LE edema B/L  Access: Rt IJ tunneled HD catheter- benign    LABS:      134<L>  |  99  |  42<H>  ----------------------------<  151<H>  4.4   |  28  |  5.37<H>    Ca    8.2<L>      06 May 2021 04:32  Phos  4.1       Mg     2.3         TPro  5.6<L>  /  Alb  2.5<L>  /  TBili  0.4  /  DBili      /  AST  8<L>  /  ALT  14  /  AlkPhos  145<H>      Creatinine Trend: 5.37 <--, 3.65 <--, 5.55 <--, 5.12 <--                        7.8    8.16  )-----------( 190      ( 06 May 2021 04:32 )             24.0     Urine Studies:  Urinalysis Basic - ( 06 May 2021 04:15 )    Color: Yellow / Appearance: very cloudy / S.015 / pH:   Gluc:  / Ketone: Negative  / Bili: Negative / Urobili: Negative   Blood:  / Protein: 500 mg/dL / Nitrite: Negative   Leuk Esterase: Moderate / RBC: >50 /HPF / WBC >50 /HPF   Sq Epi:  / Non Sq Epi: Occasional /HPF / Bacteria: Moderate /HPF

## 2021-05-06 NOTE — PHYSICAL THERAPY INITIAL EVALUATION ADULT - GENERAL OBSERVATIONS, REHAB EVAL
Pt. received supine in bed, NAD, on O2 via NC at 2L/M. Pt. on cardiac monitor, prima fit and bilateral lower limb protectors. Pt. cooperative and motivated during eval.  Pt. A&O x 3.

## 2021-05-06 NOTE — CONSULT NOTE ADULT - SUBJECTIVE AND OBJECTIVE BOX
HPI:  Pt is a 89 y/o F, wheel chair bound, from home with PMH of Afib on Eliquis, ESRD on HD, DM, HTN presented with shortness of breath since few days. Pt also complaining of chest discomfort. As per pt's daughter Johnny batres, pt started to develop gradually shortness of breath since few days at rest. She mentioned that the pt was admitted recently to [facility] for shortness of breath and chest pain, leadless pacemaker was placed, discharged on . She has been regularly getting dialysis on Tuesday/Thursday/Saturday. Pt denied fever, cough, abdominal pain. nausea, vomiting and other complains.  (04 May 2021 02:48)    Brief hospital course:  Patient admitted to ICU for acute respiratory failure requiring bipap 2/2 fluid overload. Patient s/p emergent HD ; plan for HD today. Patient now off pressor; continues midodrine. To note, this is patient's 6th hospitalization in the past 6 weeks due to fluid overload/ SOB. Pt had COVID in 2021 and the last hospitalization at [facility] d/c on  due to SOB, chest pain requiring leadless pacemaker placement. Per report, patient unable to tolerate no HD after Sat dialysis until Tuesday and daughter was requesting 4X/week HD as outpatient, however HD center reported patient needs onsite HD since they are having difficulty removing fluid despite midodrine on board.     PAST MEDICAL & SURGICAL HISTORY:  HTN (hypertension)  HLD (hyperlipidemia)  CKD (chronic kidney disease)  Afib  DM (diabetes mellitus)  Artificial pacemaker  On HD    SOCIAL HISTORY:    Admitted from:  home; lives with dtr  Substance abuse history:     none         Tobacco hx:  none             Alcohol hx:  none            Home Opioid hx: none  Orthodox:     n/a                               Preferred Language: English    Surrogate/HCP/Guardian:     Cece Batres (dtr): [phone]    FAMILY HISTORY: No significant family hx reported     Baseline ADLs (prior to admission): wheelchair bound    No Known Allergies    Present Symptoms:   Dyspnea:   Nausea/Vomiting:   Anxiety:  Depressed   Fatigue:  Loss of appetite:   Pain:                                location:          Review of Systems: [All others negative or Unable to obtain due to poor mentation]    MEDICATIONS  (STANDING):  aMIOdarone    Tablet 200 milliGRAM(s) Oral daily  apixaban 2.5 milliGRAM(s) Oral two times a day  chlorhexidine 2% Cloths 1 Application(s) Topical daily  epoetin maria d-epbx (RETACRIT) Injectable 8000 Unit(s) IV Push <User Schedule>  insulin glargine Injectable (LANTUS) 5 Unit(s) SubCutaneous at bedtime  insulin lispro (ADMELOG) corrective regimen sliding scale   SubCutaneous Before meals and at bedtime  midodrine 10 milliGRAM(s) Oral <User Schedule>  polyethylene glycol 3350 17 Gram(s) Oral daily  senna 2 Tablet(s) Oral at bedtime    MEDICATIONS  (PRN):      PHYSICAL EXAM:    Vital Signs Last 24 Hrs  T(C): 36.1 (06 May 2021 07:00), Max: 36.9 (05 May 2021 12:00)  T(F): 96.9 (06 May 2021 07:00), Max: 98.5 (05 May 2021 12:00)  HR: 63 (06 May 2021 10:00) (54 - 73)  BP: 114/54 (06 May 2021 10:00) (108/33 - 166/53)  BP(mean): 72 (06 May 2021 10:00) (54 - 129)  RR: 24 (06 May 2021 10:00) (15 - 24)  SpO2: 96% (06 May 2021 10:00) (93% - 100%)    General: Patient awake, A&O x3, verbal, follows commands. No sings of distress.   Karnofsky Performance Score/Palliative Performance Status Version2:    20%    HEENT: normal    Lungs: comfortable, on  . No signs of respiratory distress.    CV: PPM, on midodrine  GI: continent  : on HD, + meza  Musculoskeletal: +generalized weakness; dependent on transfers, assist with ADLs except independent of feeds   Skin: b/l buttocks stage 2, coccyx stage 1, b/l heel stage 1 as per nursing notes  Neuro: no deficits cognitive impairment dsyphagia/dysarthria paresis: other:  Oral intake ability: moderate  Diet: soft    LABS:                        7.8    8.16  )-----------( 190      ( 06 May 2021 04:32 )             24.0     05-06    134<L>  |  99  |  42<H>  ----------------------------<  151<H>  4.4   |  28  |  5.37<H>    Ca    8.2<L>      06 May 2021 04:32  Phos  4.1     05-  Mg     2.3     -    TPro  5.6<L>  /  Alb  2.5<L>  /  TBili  0.4  /  DBili  x   /  AST  8<L>  /  ALT  14  /  AlkPhos  145<H>  -    Urinalysis Basic - ( 06 May 2021 04:15 )    Color: Yellow / Appearance: very cloudy / S.015 / pH: x  Gluc: x / Ketone: Negative  / Bili: Negative / Urobili: Negative   Blood: x / Protein: 500 mg/dL / Nitrite: Negative   Leuk Esterase: Moderate / RBC: >50 /HPF / WBC >50 /HPF   Sq Epi: x / Non Sq Epi: Occasional /HPF / Bacteria: Moderate /HPF        RADIOLOGY & ADDITIONAL STUDIES:  < from: Xray Chest 1 View- PORTABLE-Urgent (21 @ 01:48) >  EXAM:  XR CHEST PORTABLE URGENT 1V                        PROCEDURE DATE:  2021    INTERPRETATION:  INDICATION: Chest Pain  PRIORS: None  VIEWS: Portable AP radiography of the chest performed. Evaluation limited secondary to shallow inspiration.  FINDINGS: Heart size appears within normal limits. A right-sided dual-lumen central venous catheter is identified, the distal tips overlying the expected region of the SVC/RA confluence. Note is made of an indwelling Micra pacemaker. No hilar or superior mediastinal abnormalities are identified. Interstitial prominence bilaterally may reflect shallow inspiration. Mild interstitial edema cannot be excluded. No pleural effusion or pneumothorax is demonstrated. No mediastinal shift is noted. Degenerative changes thoracic spine.    IMPRESSION: Interstitial prominence may reflect shallow inspiration. Mild interstitial edema cannot be excluded.  < end of copied text >      ADVANCE DIRECTIVES:    HPI:  Pt is a 89 y/o F, wheel chair bound, from home with PMH of Afib on Eliquis, ESRD on HD, DM, HTN presented with shortness of breath since few days. Pt also complaining of chest discomfort. As per pt's daughter Johnny batres, pt started to develop gradually shortness of breath since few days at rest. She mentioned that the pt was admitted recently to [facility] for shortness of breath and chest pain, leadless pacemaker was placed, discharged on . She has been regularly getting dialysis on Tuesday/Thursday/Saturday. Pt denied fever, cough, abdominal pain. nausea, vomiting and other complains.  (04 May 2021 02:48)    Brief hospital course:  Patient admitted to ICU for acute respiratory failure requiring bipap 2/2 fluid overload. Patient s/p emergent HD ; plan for HD today. Patient now off pressor; continues midodrine. To note, this is patient's 5th hospitalization in the past 6 weeks due to fluid overload/ SOB. Pt had COVID in 2021 and the last hospitalization at [facility] d/c on  due to SOB, chest pain requiring leadless pacemaker placement. Per report, patient unable to tolerate no HD after Sat dialysis until Tuesday and daughter was requesting 4X/week HD as outpatient, however HD center reported patient needs onsite HD since they are having difficulty removing fluid despite midodrine on board.     PAST MEDICAL & SURGICAL HISTORY:  HTN (hypertension)  HLD (hyperlipidemia)  CKD (chronic kidney disease)  Afib  DM (diabetes mellitus)  Artificial pacemaker  On HD    SOCIAL HISTORY:    Admitted from:  home; lives with dtsenia/Belkis  Substance abuse history:     none         Tobacco hx:  none             Alcohol hx:  none            Home Opioid hx: none  Shinto:     n/a                               Preferred Language: English    Surrogate/HCP/Guardian:     Cece Batres (dtr): [phone]    FAMILY HISTORY: No significant family hx reported     Baseline ADLs (prior to admission): wheelchair bound, daughter is primary caregiver; assist with ADLs except feeding. O2 3-4L NC at night    No Known Allergies    Present Symptoms:   Dyspnea: none  Nausea/Vomiting:  none  Pain:  none      Review of Systems: Patient with no c/o at time of exam.     MEDICATIONS  (STANDING):  aMIOdarone    Tablet 200 milliGRAM(s) Oral daily  apixaban 2.5 milliGRAM(s) Oral two times a day  chlorhexidine 2% Cloths 1 Application(s) Topical daily  epoetin maria d-epbx (RETACRIT) Injectable 8000 Unit(s) IV Push <User Schedule>  insulin glargine Injectable (LANTUS) 5 Unit(s) SubCutaneous at bedtime  insulin lispro (ADMELOG) corrective regimen sliding scale   SubCutaneous Before meals and at bedtime  midodrine 10 milliGRAM(s) Oral <User Schedule>  polyethylene glycol 3350 17 Gram(s) Oral daily  senna 2 Tablet(s) Oral at bedtime    MEDICATIONS  (PRN):      PHYSICAL EXAM:    Vital Signs Last 24 Hrs  T(C): 36.1 (06 May 2021 07:00), Max: 36.9 (05 May 2021 12:00)  T(F): 96.9 (06 May 2021 07:00), Max: 98.5 (05 May 2021 12:00)  HR: 63 (06 May 2021 10:00) (54 - 73)  BP: 114/54 (06 May 2021 10:00) (108/33 - 166/53)  BP(mean): 72 (06 May 2021 10:00) (54 - 129)  RR: 24 (06 May 2021 10:00) (15 - 24)  SpO2: 96% (06 May 2021 10:00) (93% - 100%)    General: Patient awake, A&O x3, verbal, follows commands. No signs of distress.   Karnofsky Performance Score/Palliative Performance Status Version2:    20%    HEENT: normal    Lungs: comfortable, on NC. No signs of respiratory distress.    CV: PPM, on midodrine  GI: continent  : on HD, + meza  Musculoskeletal: +generalized weakness; dependent on transfers, assist with ADLs except independent of feeds   Skin: b/l buttocks stage 2, coccyx stage 1, b/l heel stage 1 as per nursing notes  Neuro: patient A&O, verbal, follows commands    Oral intake ability: moderate  Diet: soft    LABS:                        7.8    8.16  )-----------( 190      ( 06 May 2021 04:32 )             24.0     05-06    134<L>  |  99  |  42<H>  ----------------------------<  151<H>  4.4   |  28  |  5.37<H>    Ca    8.2<L>      06 May 2021 04:32  Phos  4.1     -  Mg     2.3     -    TPro  5.6<L>  /  Alb  2.5<L>  /  TBili  0.4  /  DBili  x   /  AST  8<L>  /  ALT  14  /  AlkPhos  145<H>      Urinalysis Basic - ( 06 May 2021 04:15 )    Color: Yellow / Appearance: very cloudy / S.015 / pH: x  Gluc: x / Ketone: Negative  / Bili: Negative / Urobili: Negative   Blood: x / Protein: 500 mg/dL / Nitrite: Negative   Leuk Esterase: Moderate / RBC: >50 /HPF / WBC >50 /HPF   Sq Epi: x / Non Sq Epi: Occasional /HPF / Bacteria: Moderate /HPF        RADIOLOGY & ADDITIONAL STUDIES:  < from: Xray Chest 1 View- PORTABLE-Urgent (21 @ 01:48) >  EXAM:  XR CHEST PORTABLE URGENT 1V                        PROCEDURE DATE:  2021    INTERPRETATION:  INDICATION: Chest Pain  PRIORS: None  VIEWS: Portable AP radiography of the chest performed. Evaluation limited secondary to shallow inspiration.  FINDINGS: Heart size appears within normal limits. A right-sided dual-lumen central venous catheter is identified, the distal tips overlying the expected region of the SVC/RA confluence. Note is made of an indwelling Micra pacemaker. No hilar or superior mediastinal abnormalities are identified. Interstitial prominence bilaterally may reflect shallow inspiration. Mild interstitial edema cannot be excluded. No pleural effusion or pneumothorax is demonstrated. No mediastinal shift is noted. Degenerative changes thoracic spine.    IMPRESSION: Interstitial prominence may reflect shallow inspiration. Mild interstitial edema cannot be excluded.  < end of copied text >      ADVANCE DIRECTIVES:  full code    HPI:  Pt is a 89 y/o F, wheel chair bound, from home with PMH of Afib on Eliquis, ESRD on HD, DM, HTN presented with shortness of breath since few days. Pt also complaining of chest discomfort. As per pt's daughter Johnny batres, pt started to develop gradually shortness of breath since few days at rest. She mentioned that the pt was admitted recently to [facility] for shortness of breath and chest pain, leadless pacemaker was placed, discharged on . She has been regularly getting dialysis on Tuesday/Thursday/Saturday. Pt denied fever, cough, abdominal pain. nausea, vomiting and other complains.  (04 May 2021 02:48)    Brief hospital course:  Patient admitted to ICU for acute respiratory failure requiring bipap 2/2 fluid overload. Patient s/p emergent HD ; plan for HD today. Patient now off pressor; continues midodrine. To note, this is patient's 5th hospitalization in the past 6 weeks due to fluid overload/ SOB. Pt had COVID in 2021 and the last hospitalization at [facility] d/c on  due to SOB, chest pain requiring leadless pacemaker placement. Per report, patient unable to tolerate no HD after Sat dialysis until Tuesday and daughter was requesting 4X/week HD as outpatient, however HD center reported patient needs onsite HD since they are having difficulty removing fluid despite midodrine on board.     PAST MEDICAL & SURGICAL HISTORY:  HTN (hypertension)  HLD (hyperlipidemia)  CKD (chronic kidney disease)  Afib  DM (diabetes mellitus)  Artificial pacemaker  On HD    SOCIAL HISTORY:    Admitted from:  home; lives with dtsenia/Belkis  Substance abuse history:     none         Tobacco hx:  none             Alcohol hx:  none            Home Opioid hx: none  Moravian:     n/a                               Preferred Language: English    Surrogate/HCP/Guardian:     Cece Batres (dtr): [phone]    FAMILY HISTORY: No significant family hx reported     Baseline ADLs (prior to admission): wheelchair bound, daughter is primary caregiver; assist with ADLs except feeding. O2 3-4L NC at night    No Known Allergies    Present Symptoms:   Dyspnea: none  Nausea/Vomiting:  none  Pain:  none      Review of Systems: Patient with no c/o at time of exam.     MEDICATIONS  (STANDING):  aMIOdarone    Tablet 200 milliGRAM(s) Oral daily  apixaban 2.5 milliGRAM(s) Oral two times a day  chlorhexidine 2% Cloths 1 Application(s) Topical daily  epoetin maria d-epbx (RETACRIT) Injectable 8000 Unit(s) IV Push <User Schedule>  insulin glargine Injectable (LANTUS) 5 Unit(s) SubCutaneous at bedtime  insulin lispro (ADMELOG) corrective regimen sliding scale   SubCutaneous Before meals and at bedtime  midodrine 10 milliGRAM(s) Oral <User Schedule>  polyethylene glycol 3350 17 Gram(s) Oral daily  senna 2 Tablet(s) Oral at bedtime    MEDICATIONS  (PRN):      PHYSICAL EXAM:    Vital Signs Last 24 Hrs  T(C): 36.1 (06 May 2021 07:00), Max: 36.9 (05 May 2021 12:00)  T(F): 96.9 (06 May 2021 07:00), Max: 98.5 (05 May 2021 12:00)  HR: 63 (06 May 2021 10:00) (54 - 73)  BP: 114/54 (06 May 2021 10:00) (108/33 - 166/53)  BP(mean): 72 (06 May 2021 10:00) (54 - 129)  RR: 24 (06 May 2021 10:00) (15 - 24)  SpO2: 96% (06 May 2021 10:00) (93% - 100%)    General: Patient awake, A&O x3, verbal, follows commands. No signs of distress.   Karnofsky Performance Score/Palliative Performance Status Version2:    20%    HEENT: normal    Lungs: comfortable, on NC. No signs of respiratory distress.    CV: PPM, on midodrine  GI: continent, + morbidly obese  : on HD, + meza  Musculoskeletal: +generalized weakness; dependent on transfers, assist with ADLs except independent of feeds   Skin: b/l buttocks stage 2, coccyx stage 1, b/l heel stage 1 as per nursing notes  Neuro: patient A&O, verbal, follows commands    Oral intake ability: full capability  Diet: soft    LABS:                        7.8    8.16  )-----------( 190      ( 06 May 2021 04:32 )             24.0     05-06    134<L>  |  99  |  42<H>  ----------------------------<  151<H>  4.4   |  28  |  5.37<H>    Ca    8.2<L>      06 May 2021 04:32  Phos  4.1     -  Mg     2.3         TPro  5.6<L>  /  Alb  2.5<L>  /  TBili  0.4  /  DBili  x   /  AST  8<L>  /  ALT  14  /  AlkPhos  145<H>      Urinalysis Basic - ( 06 May 2021 04:15 )    Color: Yellow / Appearance: very cloudy / S.015 / pH: x  Gluc: x / Ketone: Negative  / Bili: Negative / Urobili: Negative   Blood: x / Protein: 500 mg/dL / Nitrite: Negative   Leuk Esterase: Moderate / RBC: >50 /HPF / WBC >50 /HPF   Sq Epi: x / Non Sq Epi: Occasional /HPF / Bacteria: Moderate /HPF        RADIOLOGY & ADDITIONAL STUDIES:  < from: Xray Chest 1 View- PORTABLE-Urgent (21 @ 01:48) >  EXAM:  XR CHEST PORTABLE URGENT 1V                        PROCEDURE DATE:  2021    INTERPRETATION:  INDICATION: Chest Pain  PRIORS: None  VIEWS: Portable AP radiography of the chest performed. Evaluation limited secondary to shallow inspiration.  FINDINGS: Heart size appears within normal limits. A right-sided dual-lumen central venous catheter is identified, the distal tips overlying the expected region of the SVC/RA confluence. Note is made of an indwelling Micra pacemaker. No hilar or superior mediastinal abnormalities are identified. Interstitial prominence bilaterally may reflect shallow inspiration. Mild interstitial edema cannot be excluded. No pleural effusion or pneumothorax is demonstrated. No mediastinal shift is noted. Degenerative changes thoracic spine.    IMPRESSION: Interstitial prominence may reflect shallow inspiration. Mild interstitial edema cannot be excluded.  < end of copied text >      ADVANCE DIRECTIVES:  full code    HPI:  Pt is a 89 y/o F, wheel chair bound, from home with PMH of Afib on Eliquis, ESRD on HD, DM, HTN presented with shortness of breath since few days. Pt also complaining of chest discomfort. As per pt's daughter Johnny batres, pt started to develop gradually shortness of breath since few days at rest. She mentioned that the pt was admitted recently to [facility] for shortness of breath and chest pain, leadless pacemaker was placed, discharged on . She has been regularly getting dialysis on Tuesday/Thursday/Saturday. Pt denied fever, cough, abdominal pain. nausea, vomiting and other complains.  (04 May 2021 02:48)    Brief hospital course:  Patient admitted to ICU for acute respiratory failure requiring bipap 2/2 fluid overload. Patient s/p emergent HD ; plan for HD today. Patient now off pressor; continues midodrine. To note, this is patient's 5th hospitalization in the past 6 weeks due to fluid overload/ SOB. Pt had COVID in 2021 and the last hospitalization at [facility] d/c on  due to SOB, chest pain requiring leadless pacemaker placement. Per report, patient unable to tolerate no HD after Sat dialysis until Tuesday and daughter was requesting 4X/week HD as outpatient, however HD center reported patient needs onsite HD since they are having difficulty removing fluid despite midodrine on board.     PAST MEDICAL & SURGICAL HISTORY:  HTN (hypertension)  HLD (hyperlipidemia)  CKD (chronic kidney disease)  Afib  DM (diabetes mellitus)  Artificial pacemaker  On HD    SOCIAL HISTORY:    Admitted from:  home; lives with dtsenia/Belkis  Substance abuse history:     none         Tobacco hx:  none             Alcohol hx:  none            Home Opioid hx: none  Taoism:   Congregational                        Preferred Language: English    Surrogate/HCP/Guardian:     Cece Batres (dtr): [phone]    FAMILY HISTORY: No significant family hx reported     Baseline ADLs (prior to admission): wheelchair bound, daughter is primary caregiver; assist with ADLs except feeding. O2 3-4L NC at night    No Known Allergies    Present Symptoms:   Dyspnea: none  Nausea/Vomiting:  none  Pain:  none      Review of Systems: Patient with no c/o at time of exam.     MEDICATIONS  (STANDING):  aMIOdarone    Tablet 200 milliGRAM(s) Oral daily  apixaban 2.5 milliGRAM(s) Oral two times a day  chlorhexidine 2% Cloths 1 Application(s) Topical daily  epoetin maria d-epbx (RETACRIT) Injectable 8000 Unit(s) IV Push <User Schedule>  insulin glargine Injectable (LANTUS) 5 Unit(s) SubCutaneous at bedtime  insulin lispro (ADMELOG) corrective regimen sliding scale   SubCutaneous Before meals and at bedtime  midodrine 10 milliGRAM(s) Oral <User Schedule>  polyethylene glycol 3350 17 Gram(s) Oral daily  senna 2 Tablet(s) Oral at bedtime    MEDICATIONS  (PRN):      PHYSICAL EXAM:    Vital Signs Last 24 Hrs  T(C): 36.1 (06 May 2021 07:00), Max: 36.9 (05 May 2021 12:00)  T(F): 96.9 (06 May 2021 07:00), Max: 98.5 (05 May 2021 12:00)  HR: 63 (06 May 2021 10:00) (54 - 73)  BP: 114/54 (06 May 2021 10:00) (108/33 - 166/53)  BP(mean): 72 (06 May 2021 10:00) (54 - 129)  RR: 24 (06 May 2021 10:00) (15 - 24)  SpO2: 96% (06 May 2021 10:00) (93% - 100%)    General: Patient awake, A&O x3, verbal, follows commands. No signs of distress.   Karnofsky Performance Score/Palliative Performance Status Version2:    20%    HEENT: normal    Lungs: comfortable, on NC. No signs of respiratory distress.    CV: PPM, on midodrine  GI: continent, + morbidly obese  : on HD, + meza  Musculoskeletal: +generalized weakness; dependent on transfers, assist with ADLs except independent of feeds   Skin: b/l buttocks stage 2, coccyx stage 1, b/l heel stage 1 as per nursing notes  Neuro: patient A&O, verbal, follows commands    Oral intake ability: full capability  Diet: soft    LABS:                        7.8    8.16  )-----------( 190      ( 06 May 2021 04:32 )             24.0     05-06    134<L>  |  99  |  42<H>  ----------------------------<  151<H>  4.4   |  28  |  5.37<H>    Ca    8.2<L>      06 May 2021 04:32  Phos  4.1     -  Mg     2.3     -    TPro  5.6<L>  /  Alb  2.5<L>  /  TBili  0.4  /  DBili  x   /  AST  8<L>  /  ALT  14  /  AlkPhos  145<H>  -    Urinalysis Basic - ( 06 May 2021 04:15 )    Color: Yellow / Appearance: very cloudy / S.015 / pH: x  Gluc: x / Ketone: Negative  / Bili: Negative / Urobili: Negative   Blood: x / Protein: 500 mg/dL / Nitrite: Negative   Leuk Esterase: Moderate / RBC: >50 /HPF / WBC >50 /HPF   Sq Epi: x / Non Sq Epi: Occasional /HPF / Bacteria: Moderate /HPF        RADIOLOGY & ADDITIONAL STUDIES:  < from: Xray Chest 1 View- PORTABLE-Urgent (21 @ 01:48) >  EXAM:  XR CHEST PORTABLE URGENT 1V                        PROCEDURE DATE:  2021    INTERPRETATION:  INDICATION: Chest Pain  PRIORS: None  VIEWS: Portable AP radiography of the chest performed. Evaluation limited secondary to shallow inspiration.  FINDINGS: Heart size appears within normal limits. A right-sided dual-lumen central venous catheter is identified, the distal tips overlying the expected region of the SVC/RA confluence. Note is made of an indwelling Micra pacemaker. No hilar or superior mediastinal abnormalities are identified. Interstitial prominence bilaterally may reflect shallow inspiration. Mild interstitial edema cannot be excluded. No pleural effusion or pneumothorax is demonstrated. No mediastinal shift is noted. Degenerative changes thoracic spine.    IMPRESSION: Interstitial prominence may reflect shallow inspiration. Mild interstitial edema cannot be excluded.  < end of copied text >      ADVANCE DIRECTIVES:  full code    HPI:  Pt is a 89 y/o F, wheel chair bound, from home with PMH of Afib on Eliquis, ESRD on HD, DM, HTN presented with shortness of breath since few days. Pt also complaining of chest discomfort. As per pt's daughter Johnny batres, pt started to develop gradually shortness of breath since few days at rest. She mentioned that the pt was admitted recently to [facility] for shortness of breath and chest pain, leadless pacemaker was placed, discharged on . She has been regularly getting dialysis on Tuesday/Thursday/Saturday. Pt denied fever, cough, abdominal pain. nausea, vomiting and other complains.  (04 May 2021 02:48)    Brief hospital course:  Patient admitted to ICU for acute respiratory failure requiring bipap 2/2 fluid overload. Patient s/p emergent HD ; plan for HD today. Patient now off pressor; continues midodrine. To note, this is patient's 5th hospitalization in the past 6 weeks due to fluid overload/ SOB. Pt had COVID in 2021 and the last hospitalization at [facility] d/c on  due to SOB, chest pain requiring leadless pacemaker placement. Per report, patient unable to tolerate no HD after Sat dialysis until Tuesday and daughter was requesting 4X/week HD as outpatient, however HD center reported patient needs onsite HD since they are having difficulty removing fluid despite midodrine on board.     PAST MEDICAL & SURGICAL HISTORY:  HTN (hypertension)  HLD (hyperlipidemia)  CKD (chronic kidney disease)  Afib  DM (diabetes mellitus)  Artificial pacemaker  On HD    SOCIAL HISTORY:    Admitted from:  home; lives with dtsenia/Belkis  Substance abuse history:     none         Tobacco hx:  none             Alcohol hx:  none            Home Opioid hx: none  Oriental orthodox:   Confucianist                        Preferred Language: English    Surrogate/HCP/Guardian:     Cece Batres (dtr): [phone]    FAMILY HISTORY: No significant family hx reported     Baseline ADLs (prior to admission): wheelchair bound, + walker as tolerated, daughter is primary caregiver; assist with ADLs except feeding. O2 3-4L NC at night    No Known Allergies    Present Symptoms:   Dyspnea: none  Nausea/Vomiting:  none  Pain:  none      Review of Systems: Patient with no c/o at time of exam.     MEDICATIONS  (STANDING):  aMIOdarone    Tablet 200 milliGRAM(s) Oral daily  apixaban 2.5 milliGRAM(s) Oral two times a day  chlorhexidine 2% Cloths 1 Application(s) Topical daily  epoetin maria d-epbx (RETACRIT) Injectable 8000 Unit(s) IV Push <User Schedule>  insulin glargine Injectable (LANTUS) 5 Unit(s) SubCutaneous at bedtime  insulin lispro (ADMELOG) corrective regimen sliding scale   SubCutaneous Before meals and at bedtime  midodrine 10 milliGRAM(s) Oral <User Schedule>  polyethylene glycol 3350 17 Gram(s) Oral daily  senna 2 Tablet(s) Oral at bedtime    MEDICATIONS  (PRN):      PHYSICAL EXAM:    Vital Signs Last 24 Hrs  T(C): 36.1 (06 May 2021 07:00), Max: 36.9 (05 May 2021 12:00)  T(F): 96.9 (06 May 2021 07:00), Max: 98.5 (05 May 2021 12:00)  HR: 63 (06 May 2021 10:00) (54 - 73)  BP: 114/54 (06 May 2021 10:00) (108/33 - 166/53)  BP(mean): 72 (06 May 2021 10:00) (54 - 129)  RR: 24 (06 May 2021 10:00) (15 - 24)  SpO2: 96% (06 May 2021 10:00) (93% - 100%)    General: Patient awake, A&O x3, verbal, follows commands. No signs of distress.   Karnofsky Performance Score/Palliative Performance Status Version2:    20%    HEENT: normal    Lungs: comfortable, on NC. No signs of respiratory distress.    CV: PPM, on midodrine  GI: continent, + morbidly obese  : on HD, + meza  Musculoskeletal: +generalized weakness; dependent on transfers, assist with ADLs except independent of feeds   Skin: b/l buttocks stage 2, coccyx stage 1, b/l heel stage 1 as per nursing notes  Neuro: patient A&O, verbal, follows commands    Oral intake ability: full capability  Diet: soft    LABS:                        7.8    8.16  )-----------( 190      ( 06 May 2021 04:32 )             24.0     05-06    134<L>  |  99  |  42<H>  ----------------------------<  151<H>  4.4   |  28  |  5.37<H>    Ca    8.2<L>      06 May 2021 04:32  Phos  4.1     -  Mg     2.3     -    TPro  5.6<L>  /  Alb  2.5<L>  /  TBili  0.4  /  DBili  x   /  AST  8<L>  /  ALT  14  /  AlkPhos  145<H>  -    Urinalysis Basic - ( 06 May 2021 04:15 )    Color: Yellow / Appearance: very cloudy / S.015 / pH: x  Gluc: x / Ketone: Negative  / Bili: Negative / Urobili: Negative   Blood: x / Protein: 500 mg/dL / Nitrite: Negative   Leuk Esterase: Moderate / RBC: >50 /HPF / WBC >50 /HPF   Sq Epi: x / Non Sq Epi: Occasional /HPF / Bacteria: Moderate /HPF        RADIOLOGY & ADDITIONAL STUDIES:  < from: Xray Chest 1 View- PORTABLE-Urgent (21 @ 01:48) >  EXAM:  XR CHEST PORTABLE URGENT 1V                        PROCEDURE DATE:  2021    INTERPRETATION:  INDICATION: Chest Pain  PRIORS: None  VIEWS: Portable AP radiography of the chest performed. Evaluation limited secondary to shallow inspiration.  FINDINGS: Heart size appears within normal limits. A right-sided dual-lumen central venous catheter is identified, the distal tips overlying the expected region of the SVC/RA confluence. Note is made of an indwelling Micra pacemaker. No hilar or superior mediastinal abnormalities are identified. Interstitial prominence bilaterally may reflect shallow inspiration. Mild interstitial edema cannot be excluded. No pleural effusion or pneumothorax is demonstrated. No mediastinal shift is noted. Degenerative changes thoracic spine.    IMPRESSION: Interstitial prominence may reflect shallow inspiration. Mild interstitial edema cannot be excluded.  < end of copied text >      ADVANCE DIRECTIVES:  full code

## 2021-05-07 DIAGNOSIS — E11.9 TYPE 2 DIABETES MELLITUS WITHOUT COMPLICATIONS: ICD-10-CM

## 2021-05-07 DIAGNOSIS — J96.01 ACUTE RESPIRATORY FAILURE WITH HYPOXIA: ICD-10-CM

## 2021-05-07 DIAGNOSIS — Z29.9 ENCOUNTER FOR PROPHYLACTIC MEASURES, UNSPECIFIED: ICD-10-CM

## 2021-05-07 DIAGNOSIS — I50.9 HEART FAILURE, UNSPECIFIED: ICD-10-CM

## 2021-05-07 DIAGNOSIS — I48.91 UNSPECIFIED ATRIAL FIBRILLATION: ICD-10-CM

## 2021-05-07 DIAGNOSIS — D64.9 ANEMIA, UNSPECIFIED: ICD-10-CM

## 2021-05-07 LAB
ALBUMIN SERPL ELPH-MCNC: 2.6 G/DL — LOW (ref 3.5–5)
ALP SERPL-CCNC: 157 U/L — HIGH (ref 40–120)
ALT FLD-CCNC: 15 U/L DA — SIGNIFICANT CHANGE UP (ref 10–60)
ANION GAP SERPL CALC-SCNC: 9 MMOL/L — SIGNIFICANT CHANGE UP (ref 5–17)
AST SERPL-CCNC: 13 U/L — SIGNIFICANT CHANGE UP (ref 10–40)
BASOPHILS # BLD AUTO: 0.04 K/UL — SIGNIFICANT CHANGE UP (ref 0–0.2)
BASOPHILS NFR BLD AUTO: 0.5 % — SIGNIFICANT CHANGE UP (ref 0–2)
BILIRUB SERPL-MCNC: 0.4 MG/DL — SIGNIFICANT CHANGE UP (ref 0.2–1.2)
BUN SERPL-MCNC: 23 MG/DL — HIGH (ref 7–18)
CALCIUM SERPL-MCNC: 8.4 MG/DL — SIGNIFICANT CHANGE UP (ref 8.4–10.5)
CHLORIDE SERPL-SCNC: 101 MMOL/L — SIGNIFICANT CHANGE UP (ref 96–108)
CO2 SERPL-SCNC: 27 MMOL/L — SIGNIFICANT CHANGE UP (ref 22–31)
CREAT SERPL-MCNC: 3.75 MG/DL — HIGH (ref 0.5–1.3)
EOSINOPHIL # BLD AUTO: 0.1 K/UL — SIGNIFICANT CHANGE UP (ref 0–0.5)
EOSINOPHIL NFR BLD AUTO: 1.3 % — SIGNIFICANT CHANGE UP (ref 0–6)
GLUCOSE BLDC GLUCOMTR-MCNC: 199 MG/DL — HIGH (ref 70–99)
GLUCOSE BLDC GLUCOMTR-MCNC: 210 MG/DL — HIGH (ref 70–99)
GLUCOSE BLDC GLUCOMTR-MCNC: 255 MG/DL — HIGH (ref 70–99)
GLUCOSE BLDC GLUCOMTR-MCNC: 372 MG/DL — HIGH (ref 70–99)
GLUCOSE SERPL-MCNC: 197 MG/DL — HIGH (ref 70–99)
HCT VFR BLD CALC: 26.4 % — LOW (ref 34.5–45)
HGB BLD-MCNC: 8.6 G/DL — LOW (ref 11.5–15.5)
IMM GRANULOCYTES NFR BLD AUTO: 1.1 % — SIGNIFICANT CHANGE UP (ref 0–1.5)
LYMPHOCYTES # BLD AUTO: 0.88 K/UL — LOW (ref 1–3.3)
LYMPHOCYTES # BLD AUTO: 11.1 % — LOW (ref 13–44)
MAGNESIUM SERPL-MCNC: 2.3 MG/DL — SIGNIFICANT CHANGE UP (ref 1.6–2.6)
MCHC RBC-ENTMCNC: 32.1 PG — SIGNIFICANT CHANGE UP (ref 27–34)
MCHC RBC-ENTMCNC: 32.6 GM/DL — SIGNIFICANT CHANGE UP (ref 32–36)
MCV RBC AUTO: 98.5 FL — SIGNIFICANT CHANGE UP (ref 80–100)
MONOCYTES # BLD AUTO: 1.07 K/UL — HIGH (ref 0–0.9)
MONOCYTES NFR BLD AUTO: 13.5 % — SIGNIFICANT CHANGE UP (ref 2–14)
NEUTROPHILS # BLD AUTO: 5.75 K/UL — SIGNIFICANT CHANGE UP (ref 1.8–7.4)
NEUTROPHILS NFR BLD AUTO: 72.5 % — SIGNIFICANT CHANGE UP (ref 43–77)
NRBC # BLD: 0 /100 WBCS — SIGNIFICANT CHANGE UP (ref 0–0)
PHOSPHATE SERPL-MCNC: 3 MG/DL — SIGNIFICANT CHANGE UP (ref 2.5–4.5)
PLATELET # BLD AUTO: 220 K/UL — SIGNIFICANT CHANGE UP (ref 150–400)
POTASSIUM SERPL-MCNC: 3.8 MMOL/L — SIGNIFICANT CHANGE UP (ref 3.5–5.3)
POTASSIUM SERPL-SCNC: 3.8 MMOL/L — SIGNIFICANT CHANGE UP (ref 3.5–5.3)
PROT SERPL-MCNC: 6.1 G/DL — SIGNIFICANT CHANGE UP (ref 6–8.3)
RBC # BLD: 2.68 M/UL — LOW (ref 3.8–5.2)
RBC # FLD: 15.2 % — HIGH (ref 10.3–14.5)
SODIUM SERPL-SCNC: 137 MMOL/L — SIGNIFICANT CHANGE UP (ref 135–145)
WBC # BLD: 7.93 K/UL — SIGNIFICANT CHANGE UP (ref 3.8–10.5)
WBC # FLD AUTO: 7.93 K/UL — SIGNIFICANT CHANGE UP (ref 3.8–10.5)

## 2021-05-07 PROCEDURE — 99497 ADVNCD CARE PLAN 30 MIN: CPT | Mod: 25

## 2021-05-07 PROCEDURE — 99498 ADVNCD CARE PLAN ADDL 30 MIN: CPT | Mod: 25

## 2021-05-07 PROCEDURE — 99232 SBSQ HOSP IP/OBS MODERATE 35: CPT

## 2021-05-07 RX ORDER — CALCIUM ACETATE 667 MG
667 TABLET ORAL
Refills: 0 | Status: DISCONTINUED | OUTPATIENT
Start: 2021-05-07 | End: 2021-05-07

## 2021-05-07 RX ORDER — METOPROLOL TARTRATE 50 MG
25 TABLET ORAL EVERY 8 HOURS
Refills: 0 | Status: DISCONTINUED | OUTPATIENT
Start: 2021-05-07 | End: 2021-05-13

## 2021-05-07 RX ORDER — ATORVASTATIN CALCIUM 80 MG/1
10 TABLET, FILM COATED ORAL AT BEDTIME
Refills: 0 | Status: DISCONTINUED | OUTPATIENT
Start: 2021-05-07 | End: 2021-05-17

## 2021-05-07 RX ORDER — ACETAMINOPHEN 500 MG
650 TABLET ORAL ONCE
Refills: 0 | Status: COMPLETED | OUTPATIENT
Start: 2021-05-07 | End: 2021-05-07

## 2021-05-07 RX ORDER — AMLODIPINE BESYLATE 2.5 MG/1
2.5 TABLET ORAL DAILY
Refills: 0 | Status: DISCONTINUED | OUTPATIENT
Start: 2021-05-07 | End: 2021-05-17

## 2021-05-07 RX ADMIN — Medication 5: at 11:38

## 2021-05-07 RX ADMIN — AMIODARONE HYDROCHLORIDE 200 MILLIGRAM(S): 400 TABLET ORAL at 05:03

## 2021-05-07 RX ADMIN — SENNA PLUS 2 TABLET(S): 8.6 TABLET ORAL at 21:47

## 2021-05-07 RX ADMIN — INSULIN GLARGINE 5 UNIT(S): 100 INJECTION, SOLUTION SUBCUTANEOUS at 21:46

## 2021-05-07 RX ADMIN — Medication 1: at 07:53

## 2021-05-07 RX ADMIN — Medication 2: at 17:41

## 2021-05-07 RX ADMIN — CHLORHEXIDINE GLUCONATE 1 APPLICATION(S): 213 SOLUTION TOPICAL at 11:38

## 2021-05-07 RX ADMIN — APIXABAN 2.5 MILLIGRAM(S): 2.5 TABLET, FILM COATED ORAL at 17:41

## 2021-05-07 RX ADMIN — APIXABAN 2.5 MILLIGRAM(S): 2.5 TABLET, FILM COATED ORAL at 05:03

## 2021-05-07 RX ADMIN — Medication 650 MILLIGRAM(S): at 23:10

## 2021-05-07 RX ADMIN — ATORVASTATIN CALCIUM 10 MILLIGRAM(S): 80 TABLET, FILM COATED ORAL at 21:47

## 2021-05-07 RX ADMIN — Medication 650 MILLIGRAM(S): at 22:40

## 2021-05-07 RX ADMIN — Medication 3: at 21:47

## 2021-05-07 RX ADMIN — Medication 10 MILLIGRAM(S): at 11:41

## 2021-05-07 RX ADMIN — Medication 25 MILLIGRAM(S): at 21:47

## 2021-05-07 NOTE — PROGRESS NOTE ADULT - SUBJECTIVE AND OBJECTIVE BOX
OVERNIGHT EVENTS: no overnight events. Tolerated HD yesterday. c/o constipation, no improvement w miralax, ordered for suppository per primary team.     Present Symptoms:   Pain: denies  Fatigue:denies  Nausea:denies  Lack of Appetite: denies  SOB: improved  Depression:denies  Anxiety:denies  Review of Systems:  All others negative      MEDICATIONS  (STANDING):  aMIOdarone    Tablet 200 milliGRAM(s) Oral daily  amLODIPine   Tablet 2.5 milliGRAM(s) Oral daily  apixaban 2.5 milliGRAM(s) Oral two times a day  atorvastatin 10 milliGRAM(s) Oral at bedtime  chlorhexidine 2% Cloths 1 Application(s) Topical daily  diphenhydrAMINE   Injectable 25 milliGRAM(s) IV Push once  epoetin maria d-epbx (RETACRIT) Injectable 8000 Unit(s) IV Push <User Schedule>  insulin glargine Injectable (LANTUS) 5 Unit(s) SubCutaneous at bedtime  insulin lispro (ADMELOG) corrective regimen sliding scale   SubCutaneous Before meals and at bedtime  metoprolol tartrate 25 milliGRAM(s) Oral every 8 hours  midodrine 10 milliGRAM(s) Oral <User Schedule>  polyethylene glycol 3350 17 Gram(s) Oral daily  senna 2 Tablet(s) Oral at bedtime    MEDICATIONS  (PRN):  aluminum hydroxide/magnesium hydroxide/simethicone Suspension 30 milliLiter(s) Oral every 6 hours PRN Dyspepsia  bisacodyl Suppository 10 milliGRAM(s) Rectal daily PRN Constipation      PHYSICAL EXAM:  Vital Signs Last 24 Hrs  T(C): 36.7 (11 May 2021 11:00), Max: 36.8 (10 May 2021 13:46)  T(F): 98 (11 May 2021 11:00), Max: 98.3 (10 May 2021 13:46)  HR: 62 (11 May 2021 11:00) (59 - 67)  BP: 169/66 (11 May 2021 11:00) (153/55 - 191/58)  BP(mean): --  RR: 18 (11 May 2021 11:00) (16 - 18)  SpO2: 100% (11 May 2021 11:00) (95% - 100%)         Palliative Performance Scale/Karnofsky Score: 50      GENERAL: alert, sitting in chair, obese,  NAD  HEENT: Atraumatic, oropharynx clear, neck supple  CHEST/LUNG: unlabored  HEART: Regular rate and rhythm    ABDOMEN: Soft, Nontender, Nondistended   MUSCULOSKELETAL:  No  edema   NERVOUS SYSTEM:  Alert & Oriented X3,  follows commands  SKIN: No rashes or lesions noted  Oral intake: fair       LABS:                          8.4    7.67  )-----------( 230      ( 11 May 2021 11:02 )             26.3     05-11    133<L>  |  97  |  55<H>  ----------------------------<  297<H>  4.3   |  26  |  6.26<H>    Ca    7.7<L>      11 May 2021 11:02  Phos  4.1     05-10  Mg     2.3     05-10          RADIOLOGY & ADDITIONAL STUDIES:

## 2021-05-07 NOTE — PROGRESS NOTE ADULT - SUBJECTIVE AND OBJECTIVE BOX
[facility] NEPHROLOGY- PROGRESS NOTE    Patient is a 89yo Female with ESRD on HD, Afib on Eliquis, HTN, Intradialytic hypotension req Midodrine prior to HD, failed Rt AVF due to steal syndrome s/p ligation, h/o COVID in March, s/p PPM 2 weeks ago p/w SOB and chest comfort. Pt a/w acute hypoxic respiratory distress 2/2 pulmonary edema requiring BIPAP. Pt now hypotensive and bradycardic. Nephrology consulted for ESRD status.     Hospital Medications: Medications reviewed.  REVIEW OF SYSTEMS: Denies any SOB, chest pain, n/v/d or abd pain +iconstipated    VITALS:  T(F): 98 (21 @ 13:54), Max: 98.2 (21 @ 17:05)  HR: 89 (21 @ 13:54)  BP: 175/73 (21 @ 13:55)  RR: 16 (21 @ 13:54)  SpO2: 95% (21 @ 13:54)  Wt(kg): --     @ 07:01  -   @ 07:00  --------------------------------------------------------  IN: 900 mL / OUT: 3055 mL / NET: -2155 mL      Height (cm): 157.4 ( @ 17:00)      PHYSICAL EXAM:  Gen: NAD,   HEENT: anicteric;  Neck: no JVD  Cards: +S1/S2,   Resp: mild rales at bases b/l  GI: soft, NT/ND, NABS  Extremities: +trace LE edema B/L  Access: Rt IJ tunneled HD catheter- benign    LABS:      137  |  101  |  23<H>  ----------------------------<  197<H>  3.8   |  27  |  3.75<H>    Ca    8.4      07 May 2021 08:20  Phos  3.0       Mg     2.3         TPro  6.1  /  Alb  2.6<L>  /  TBili  0.4  /  DBili      /  AST  13  /  ALT  15  /  AlkPhos  157<H>  -07    Creatinine Trend: 3.75 <--, 5.37 <--, 3.65 <--, 5.55 <--, 5.12 <--                        8.6    7.93  )-----------( 220      ( 07 May 2021 08:20 )             26.4     Urine Studies:  Urinalysis Basic - ( 06 May 2021 04:15 )    Color: Yellow / Appearance: very cloudy / S.015 / pH:   Gluc:  / Ketone: Negative  / Bili: Negative / Urobili: Negative   Blood:  / Protein: 500 mg/dL / Nitrite: Negative   Leuk Esterase: Moderate / RBC: >50 /HPF / WBC >50 /HPF   Sq Epi:  / Non Sq Epi: Occasional /HPF / Bacteria: Moderate /HPF

## 2021-05-07 NOTE — PROGRESS NOTE ADULT - SUBJECTIVE AND OBJECTIVE BOX
Patient denies chest pain or shortness of breath.   Review of systems otherwise (-)  	  aMIOdarone    Tablet 200 milliGRAM(s) Oral daily  apixaban 2.5 milliGRAM(s) Oral two times a day  bisacodyl Suppository 10 milliGRAM(s) Rectal daily PRN  chlorhexidine 2% Cloths 1 Application(s) Topical daily  epoetin maria d-epbx (RETACRIT) Injectable 8000 Unit(s) IV Push <User Schedule>  insulin glargine Injectable (LANTUS) 5 Unit(s) SubCutaneous at bedtime  insulin lispro (ADMELOG) corrective regimen sliding scale   SubCutaneous Before meals and at bedtime  midodrine 10 milliGRAM(s) Oral <User Schedule>  polyethylene glycol 3350 17 Gram(s) Oral daily  senna 2 Tablet(s) Oral at bedtime                            8.6    7.93  )-----------( 220      ( 07 May 2021 08:20 )             26.4       Hemoglobin: 8.6 g/dL (05-07 @ 08:20)  Hemoglobin: 7.8 g/dL (05-06 @ 04:32)  Hemoglobin: 8.7 g/dL (05-05 @ 05:22)  Hemoglobin: 8.0 g/dL (05-04 @ 12:29)  Hemoglobin: 7.1 g/dL (05-04 @ 11:38)      05-07    137  |  101  |  23<H>  ----------------------------<  197<H>  3.8   |  27  |  3.75<H>    Ca    8.4      07 May 2021 08:20  Phos  3.0     05-07  Mg     2.3     05-07    TPro  6.1  /  Alb  2.6<L>  /  TBili  0.4  /  DBili  x   /  AST  13  /  ALT  15  /  AlkPhos  157<H>  05-07    Creatinine Trend: 3.75<--, 5.37<--, 3.65<--, 5.55<--, 5.12<--    COAGS:           T(C): 36.8 (05-07-21 @ 05:10), Max: 36.8 (05-06-21 @ 17:05)  HR: 69 (05-07-21 @ 11:54) (66 - 106)  BP: 139/60 (05-07-21 @ 11:54) (127/59 - 182/65)  RR: 18 (05-07-21 @ 05:10) (18 - 29)  SpO2: 99% (05-07-21 @ 11:54) (95% - 100%)  Wt(kg): --    I&O's Summary    06 May 2021 07:01  -  07 May 2021 07:00  --------------------------------------------------------  IN: 900 mL / OUT: 3055 mL / NET: -2155 mL        HEENT:  (-)icterus (-)pallor  CV: N S1 S2 1/6 SHANE (+)2 Pulses B/l  Resp:  Clear to ausculatation B/L, normal effort  GI: (+) BS Soft, NT, ND  Lymph:  (-)Edema, (-)obvious lymphadenopathy  Skin: Warm to touch, Normal turgor  Psych: Appropriate mood and affect      TELEMETRY: 	  off        ASSESSMENT/PLAN: 	89y  Female wheel chair bound, from home with PMH of PAF on Eliquis, leadless PPM placed by Dr. Evangelina Mcclelland at [facility], ESRD on HD, DM, HTN presented with shortness of breath since few days.    - Keep net negative with HD  - Echo with normal LV function   - Palliative eval noted  - On midodrine for BP support on HD  - Cont eliquis and Amio follow up with Dr. Evangelina Mcclelland for EP    Christopher Arteaga MD, [facility]  BEEPER [phone]

## 2021-05-07 NOTE — PROGRESS NOTE ADULT - PROBLEM SELECTOR PLAN 1
Patient dependent on HD. Off pressor. Last HD 5/6, tolerated on midodrine.   Per report as outpatient, patient unable to tolerate no HD after Sat dialysis until Tuesday and daughter was requesting 4X/week HD as outpatient. Dr Huitron following while inpatient, has confirmed w HD outpatient center that 4th session on Mondays is available for patient once discharged. Family and pt in agreement.

## 2021-05-07 NOTE — PROGRESS NOTE ADULT - PROBLEM SELECTOR PLAN 2
Pt gets regular dialysis on T,T,S  Last HD 5/6. Plan for Dialysis 5/8  Monitor BMP  c/w meza catheter, pt oliguric  Avoid nephrotoxic medications  Nephrology Dr. Huitron

## 2021-05-07 NOTE — PROGRESS NOTE ADULT - ASSESSMENT
Pt is a 89 y/o F, wheel chair bound, from home with PMH of Afib on Eliquis, ESRD on HD, DM, HTN presented with shortness of breath since few days. Pt also complaining of chest discomfort. As per pt's daughter Johnny batres, pt started to develop gradually shortness of breath since few days at rest. She mentioned that the pt was admitted recently to [facility] for shortness of breath and chest pain, leadless pacemaker was placed, discharged on april 29th. She has been regularly getting dialysis on Tuesday/thursday / saturday.  Patient admitted to ICU for SOB 2/2 fluid overload and was downgraded to medicine for management. Nephrology consulted, last HD yesterday. Cardiology consulted for management of SOB and Afib. Echo with EF>55%. PT evaluation done with recommendation for home PT, patien tis wheelchair bound and has own rolling walker.

## 2021-05-07 NOTE — PROGRESS NOTE ADULT - CONVERSATION DETAILS
Had extensive family discussion with patient, 2 sons present as well as 2 daughters conferenced by phone regarding current condition, overall goals of care. Dr Huitron and nurse manager from [facility] HD center also participated via phone for part of the discussion. Daughter Cece verbalized various concerns regarding current HD schedule as well as medications and wanting more assistance w nurse visits at home. Dr Huitron and nurse mgr advised that patient has been confirmed an additional 4th  session on Mondays to try to prevent freq hospitalizations due to volume overload. Patient expressed that she has been tolerating HD ok and wishes to continue her HD sessions as long as she can. Her goal is to continue whatever measures can prolong her life and benefit her. Discussed risks/benefits of other LST such as CPR/intubation, feeding tubes in the context of her comorbidities, freq hospitalizations, debility and to consider what quality of life is most important to her as aggressive measures unlikely to improve her overall condition and likely would leave her more debilitated if successful at all. They will discuss as a family and requested that the MOLST form be left to review. Support provided, all questions answered.
Patient is the decision maker when in full capacity and wants full treatment including trial of intubation and CPR.  If patient becomes incapable making decisions, she designates her daughter Cece and son Agustin as healthcare proxy

## 2021-05-07 NOTE — PROGRESS NOTE ADULT - PROBLEM SELECTOR PLAN 3
Pt is on Eliquis at home  c/w home amio 200mg qd  initially started on heparin drip d/c due to acute drop in hemoglobin  c/w eliquis  Recent wireless pacemaker place 2 weeks ago  cards f/u

## 2021-05-07 NOTE — PROGRESS NOTE ADULT - PROBLEM SELECTOR PLAN 6
Anemia  Hb 8.6/26.4 today  Likely anemia due to chronic kidney disease  Monitor cbc  Iron studies ACD, low total iron and saturation

## 2021-05-07 NOTE — PROGRESS NOTE ADULT - PROBLEM SELECTOR PLAN 3
2/2 chronic comorbidities including ESRD on HD. Patient from home, lives with dtr, + wheel chair bound, assist with ADLs except feeding, +O2 3-4L via NC at night. Noted with + generalized weakness, dependent on transfers, assist with ADLs except independent of feeds. Recommend PT eval.

## 2021-05-07 NOTE — PROGRESS NOTE ADULT - PROBLEM SELECTOR PLAN 4
Possible CHF given elevated pro BNP and pulmonary edema  elevation in proBNP CHF vs ESRD  Echo with EF>55%  Need to obtain record from [facility]  Cardio Consult Dr Arteaga

## 2021-05-07 NOTE — PROGRESS NOTE ADULT - ASSESSMENT
Patient is a 89yo Female with ESRD on HD, Afib on Eliquis, HTN, Intradialytic hypotension req Midodrine prior to HD, failed Rt AVF due to steal syndrome s/p ligation, h/o COVID in March, s/p PPM 2 weeks ago p/w SOB and chest comfort. Pt a/w acute hypoxic respiratory distress 2/2 pulmonary edema requiring BIPAP. Pt now hypotensive and bradycardic. Nephrology consulted for ESRD status.     1) ESRD: Last HD on 5/6, tolerated well with net 2L removed. Plan for next HD 5/8. Monitor electrolytes.  2) Hyperkalemia- resolved with HD. c/w low K diet. Monitor lytes.   3) Hypotension- with bradycardia. Continue to hold antihypertensive medications. BP improving; now off IV pressors and on PO midodrine prior to HD to aid in fluid removal with HD  Bradycardia- with recent PPM placement; resolved. Cards following. Monitor BP/HR.   4) Anemia of renal disease: Hb improving. Will avoid IV iron due to elevated Ferritin. c/w Epogen 8k units IV tiw with HD. Monitor Hb.  5) Hyperphosphatemia: Phosphorus acceptable. No need for phos binders at this time. c/w low phos diet. Monitor serum calcium and phosphorus.      [facility] NEPHROLOGY  Brad Chen M.D.  Oneal Tim D.O.  Roselia uHitron M.D.  Milli Platt, MSN, ANP-C  [phone]    [address] [place] [address]

## 2021-05-07 NOTE — PROGRESS NOTE ADULT - PROBLEM SELECTOR PLAN 2
2/2 fluid overload due to ESRD on HD. Patient was on bipap; now NC. No signs of respiratory distress. HD schedule per renal.  Full code.

## 2021-05-08 LAB
GLUCOSE BLDC GLUCOMTR-MCNC: 129 MG/DL — HIGH (ref 70–99)
GLUCOSE BLDC GLUCOMTR-MCNC: 139 MG/DL — HIGH (ref 70–99)
GLUCOSE BLDC GLUCOMTR-MCNC: 286 MG/DL — HIGH (ref 70–99)
GLUCOSE BLDC GLUCOMTR-MCNC: 291 MG/DL — HIGH (ref 70–99)

## 2021-05-08 RX ADMIN — INSULIN GLARGINE 5 UNIT(S): 100 INJECTION, SOLUTION SUBCUTANEOUS at 21:14

## 2021-05-08 RX ADMIN — AMLODIPINE BESYLATE 2.5 MILLIGRAM(S): 2.5 TABLET ORAL at 05:16

## 2021-05-08 RX ADMIN — POLYETHYLENE GLYCOL 3350 17 GRAM(S): 17 POWDER, FOR SOLUTION ORAL at 11:44

## 2021-05-08 RX ADMIN — ATORVASTATIN CALCIUM 10 MILLIGRAM(S): 80 TABLET, FILM COATED ORAL at 21:13

## 2021-05-08 RX ADMIN — SENNA PLUS 2 TABLET(S): 8.6 TABLET ORAL at 21:14

## 2021-05-08 RX ADMIN — APIXABAN 2.5 MILLIGRAM(S): 2.5 TABLET, FILM COATED ORAL at 05:16

## 2021-05-08 RX ADMIN — APIXABAN 2.5 MILLIGRAM(S): 2.5 TABLET, FILM COATED ORAL at 21:13

## 2021-05-08 RX ADMIN — ERYTHROPOIETIN 8000 UNIT(S): 10000 INJECTION, SOLUTION INTRAVENOUS; SUBCUTANEOUS at 19:41

## 2021-05-08 RX ADMIN — CHLORHEXIDINE GLUCONATE 1 APPLICATION(S): 213 SOLUTION TOPICAL at 14:46

## 2021-05-08 RX ADMIN — Medication 3: at 11:43

## 2021-05-08 RX ADMIN — Medication 3: at 16:52

## 2021-05-08 RX ADMIN — AMIODARONE HYDROCHLORIDE 200 MILLIGRAM(S): 400 TABLET ORAL at 05:16

## 2021-05-08 RX ADMIN — MIDODRINE HYDROCHLORIDE 10 MILLIGRAM(S): 2.5 TABLET ORAL at 16:26

## 2021-05-08 RX ADMIN — Medication 25 MILLIGRAM(S): at 05:16

## 2021-05-08 NOTE — PROGRESS NOTE ADULT - PROBLEM SELECTOR PLAN 2
Pt gets regular dialysis on T,T,S  tolerated HD today   Monitor BMP  c/w meza catheter, pt oliguric  Avoid nephrotoxic medications  Nephrology Dr. Huitron

## 2021-05-08 NOTE — PROGRESS NOTE ADULT - ASSESSMENT
Patient is a 89yo Female with ESRD on HD, Afib on Eliquis, HTN, Intradialytic hypotension req Midodrine prior to HD, failed Rt AVF due to steal syndrome s/p ligation, h/o COVID in March, s/p PPM 2 weeks ago p/w SOB and chest comfort. Pt a/w acute hypoxic respiratory distress 2/2 pulmonary edema requiring BIPAP. Pt now hypotensive and bradycardic. Nephrology consulted for ESRD status.     1) ESRD: HD today. Continue with maintenance hemodialysis treatment. Monitor BMP.  2) Hyperkalemia- resolved with HD. c/w low K diet. Monitor lytes.   3) Hypotension- with bradycardia. Continue to hold antihypertensive medications. BP improving; now off IV pressors and on PO midodrine prior to HD to aid in fluid removal with HD  Bradycardia- with recent PPM placement; resolved. Cards following. Monitor BP/HR.   4) Anemia of renal disease: Hb improving. Will avoid IV iron due to elevated Ferritin. c/w Epogen 8k units IV tiw with HD. Monitor Hb.  5) Hyperphosphatemia: Phosphorus acceptable. No need for phos binders at this time. c/w low phos diet. Monitor serum calcium and phosphorus.      [facility] NEPHROLOGY  Brad Chen M.D.  Oneal Tim D.O.  Roselia Huitron M.D.  Milli Platt, MSN, ANP-C  [phone]    [address] [place] [address]   Patient is a 89yo Female with ESRD on HD, Afib on Eliquis, HTN, Intradialytic hypotension req Midodrine prior to HD, failed Rt AVF due to steal syndrome s/p ligation, h/o COVID in March, s/p PPM 2 weeks ago p/w SOB and chest comfort. Pt a/w acute hypoxic respiratory distress 2/2 pulmonary edema requiring BIPAP. Pt now hypotensive and bradycardic. Nephrology consulted for ESRD status.     1) ESRD: HD today. Continue with maintenance hemodialysis treatment. Monitor BMP.  2) Hyperkalemia- resolved with HD. c/w low K diet. Monitor lytes.   3) Hypotension- with bradycardia, now with very variable and fluctuating BPs. Pt is on metoprolol and midodrine. Management per cardiology.  Bradycardia- with recent PPM placement; resolved. Cards following. Monitor BP/HR.   4) Anemia of renal disease: Hb improving. Will avoid IV iron due to elevated Ferritin. c/w Epogen 8k units IV tiw with HD. Monitor Hb.  5) Hyperphosphatemia: Phosphorus acceptable. No need for phos binders at this time. c/w low phos diet. Monitor serum calcium and phosphorus.      [facility] NEPHROLOGY  Brad Chen M.D.  Oneal Tim D.O.  Roselia Huitron M.D.  Milli Platt, MSN, ANP-C  [phone]    [address] [place] [address]

## 2021-05-08 NOTE — PROGRESS NOTE ADULT - SUBJECTIVE AND OBJECTIVE BOX
Patient is a 89y old  Female who presents with a chief complaint of Shortness of breath (06 May 2021 19:35)      INTERVAL HPI/OVERNIGHT EVENTS: no new complaints    MEDICATIONS  (STANDING):  aMIOdarone    Tablet 200 milliGRAM(s) Oral daily  amLODIPine   Tablet 2.5 milliGRAM(s) Oral daily  apixaban 2.5 milliGRAM(s) Oral two times a day  atorvastatin 10 milliGRAM(s) Oral at bedtime  chlorhexidine 2% Cloths 1 Application(s) Topical daily  epoetin maria d-epbx (RETACRIT) Injectable 8000 Unit(s) IV Push <User Schedule>  insulin glargine Injectable (LANTUS) 5 Unit(s) SubCutaneous at bedtime  insulin lispro (ADMELOG) corrective regimen sliding scale   SubCutaneous Before meals and at bedtime  metoprolol tartrate 25 milliGRAM(s) Oral every 8 hours  midodrine 10 milliGRAM(s) Oral <User Schedule>  polyethylene glycol 3350 17 Gram(s) Oral daily  senna 2 Tablet(s) Oral at bedtime    MEDICATIONS  (PRN):  bisacodyl Suppository 10 milliGRAM(s) Rectal daily PRN Constipation      __________________________________________________  REVIEW OF SYSTEMS:    CONSTITUTIONAL: No fever,   EYES: no acute visual disturbances  NECK: No pain or stiffness  RESPIRATORY: No cough; shortness of breath  CARDIOVASCULAR: No chest pain, no palpitations  GASTROINTESTINAL: No pain. No nausea or vomiting; No diarrhea   NEUROLOGICAL: No headache or numbness, no tremors  MUSCULOSKELETAL: No joint pain, no muscle pain  GENITOURINARY: ESRD on HD,   PSYCHIATRY: no depression , no anxiety  ALL OTHER  ROS negative      Vital Signs Last 24 Hrs  T(C): 36.6 (08 May 2021 20:45), Max: 36.7 (07 May 2021 22:39)  T(F): 97.9 (08 May 2021 20:45), Max: 98 (07 May 2021 22:39)  HR: 57 (08 May 2021 20:45) (57 - 65)  BP: 151/61 (08 May 2021 20:45) (102/65 - 183/75)  BP(mean): --  RR: 17 (08 May 2021 20:45) (17 - 18)  SpO2: 100% (08 May 2021 20:45) (98% - 100%)    ________________________________________________  PHYSICAL EXAM:  GENERAL: NAD  HEENT: Normocephalic;  conjunctivae and sclerae clear; moist mucous membranes;   NECK : supple  CHEST/LUNG: Clear to auscultation bilaterally with good air entry   HEART: Afib,  irregular; no murmurs, gallops or rubs  ABDOMEN: Soft, Nontender, Nondistended; Bowel sounds present  EXTREMITIES: no cyanosis; no edema; no calf tenderness  SKIN: warm and dry; no rash  NERVOUS SYSTEM:  Awake and alert; Oriented  to place, person and time ; no new deficits    _________________________________________________  LABS:                                               8.6    7.93  )-----------( 220      ( 07 May 2021 08:20 )             26.4   05-07    137  |  101  |  23<H>  ----------------------------<  197<H>  3.8   |  27  |  3.75<H>    Ca    8.4      07 May 2021 08:20  Phos  3.0     05-07  Mg     2.3     05-07    TPro  6.1  /  Alb  2.6<L>  /  TBili  0.4  /  DBili  x   /  AST  13  /  ALT  15  /  AlkPhos  157<H>  05-07         PROCEDURE DATE:  05/04/2021          INTERPRETATION:  INDICATION: Chest Pain    PRIORS: None    VIEWS: Portable AP radiography of the chest performed. Evaluation limited secondary to shallow inspiration.    FINDINGS: Heart size appears within normal limits. A right-sided dual-lumen central venous catheter is identified, the distal tips overlying the expected region of the SVC/RA confluence. Note is made of an indwelling Micra pacemaker. No hilar or superior mediastinal abnormalities are identified. Interstitial prominence bilaterally may reflect shallow inspiration. Mild interstitial edema cannot be excluded. No pleural effusion or pneumothorax is demonstrated. No mediastinal shift is noted. Degenerative changes thoracic spine.    IMPRESSION: Interstitial prominence may reflect shallow inspiration. Mild interstitial edema cannot be excluded.              NYASIA VAIL MD; Attending Radiologist  This document has been electronically signed. May  4 2021  9:12AM    Imaging  Reviewed:  YES/NO    Consultant(s) Notes Reviewed:   YES/ No      Plan of care was discussed with patient and /or primary care giver; all questions and concerns were addressed

## 2021-05-08 NOTE — PROGRESS NOTE ADULT - PROBLEM SELECTOR PLAN 4
Possible CHF given elevated pro BNP and pulmonary edema  elevation in proBNP CHF vs ESRD  Echo with EF>55%  Need to obtain record from [facility]  Cardio Consult Dr Arteaga

## 2021-05-08 NOTE — PROGRESS NOTE ADULT - ASSESSMENT
Pt is a 89 y/o F, wheel chair bound, from home with PMH of Afib on Eliquis, ESRD on HD, DM, HTN presented with shortness of breath since few days. Pt also complaining of chest discomfort. As per pt's daughter Johnny batres, pt started to develop gradually shortness of breath since few days at rest. She mentioned that the pt was admitted recently to [facility] for shortness of breath and chest pain, leadless pacemaker was placed, discharged on april 29th. She has been regularly getting dialysis on Tuesday/thursday / saturday.  Patient admitted to ICU for SOB 2/2 fluid overload and was downgraded to medicine for management. Nephrology consulted, last HD yesterday. Cardiology consulted for management of SOB and Afib. Echo with EF>55%. PT evaluation done with recommendation for home PT, patien tis wheelchair bound and has own rolling walker.

## 2021-05-08 NOTE — PROGRESS NOTE ADULT - SUBJECTIVE AND OBJECTIVE BOX
[facility] NEPHROLOGY- PROGRESS NOTE    Patient is a 89yo Female with ESRD on HD, Afib on Eliquis, HTN, Intradialytic hypotension req Midodrine prior to HD, failed Rt AVF due to steal syndrome s/p ligation, h/o COVID in March, s/p PPM 2 weeks ago p/w SOB and chest comfort. Pt a/w acute hypoxic respiratory distress 2/2 pulmonary edema requiring BIPAP. Pt now hypotensive and bradycardic. Nephrology consulted for ESRD status.     Hospital Medications: Medications reviewed.  REVIEW OF SYSTEMS: Denies any SOB, chest pain, n/v/d or abd pain +iconstipated    VITALS:  T(F): 98 (21 @ 22:39), Max: 98 (21 @ 13:54)  HR: 59 (21 @ 06:38)  BP: 102/65 (21 @ 06:38)  RR: 18 (21 @ 06:38)  SpO2: 100% (21 @ 06:38)        PHYSICAL EXAM:  Gen: NAD,   HEENT: anicteric;  Neck: no JVD  Cards: +S1/S2,   Resp: mild rales at bases b/l  GI: soft, NT/ND, NABS  Extremities: +trace LE edema B/L  Access: Rt IJ tunneled HD catheter- benign      LABS:      137  |  101  |  23<H>  ----------------------------<  197<H>  3.8   |  27  |  3.75<H>    Ca    8.4      07 May 2021 08:20  Phos  3.0       Mg     2.3         TPro  6.1  /  Alb  2.6<L>  /  TBili  0.4  /  DBili      /  AST  13  /  ALT  15  /  AlkPhos  157<H>      Creatinine Trend: 3.75 <--, 5.37 <--, 3.65 <--, 5.55 <--, 5.12 <--                        8.6    7.93  )-----------( 220      ( 07 May 2021 08:20 )             26.4     Urine Studies:  Urinalysis Basic - ( 06 May 2021 04:15 )    Color: Yellow / Appearance: very cloudy / S.015 / pH:   Gluc:  / Ketone: Negative  / Bili: Negative / Urobili: Negative   Blood:  / Protein: 500 mg/dL / Nitrite: Negative   Leuk Esterase: Moderate / RBC: >50 /HPF / WBC >50 /HPF   Sq Epi:  / Non Sq Epi: Occasional /HPF / Bacteria: Moderate /HPF   [facility] NEPHROLOGY- PROGRESS NOTE    Patient is a 89yo Female with ESRD on HD, Afib on Eliquis, HTN, Intradialytic hypotension req Midodrine prior to HD, failed Rt AVF due to steal syndrome s/p ligation, h/o COVID in March, s/p PPM 2 weeks ago p/w SOB and chest comfort. Pt a/w acute hypoxic respiratory distress 2/2 pulmonary edema requiring BIPAP. Pt now hypotensive and bradycardic. Nephrology consulted for ESRD status.     Hospital Medications: Medications reviewed.  REVIEW OF SYSTEMS: Denies any SOB, chest pain, n/v/d or abd pain +iconstipated    VITALS:  T(F): 98 (21 @ 22:39), Max: 98 (21 @ 13:54)  HR: 59 (21 @ 06:38)  BP: 102/65 (21 @ 06:38)  RR: 18 (21 @ 06:38)  SpO2: 100% (21 @ 06:38)    PHYSICAL EXAM:  Gen: NAD,   HEENT: anicteric;  Neck: no JVD  Cards: +S1/S2,   Resp: mild rales at bases b/l  GI: soft, NT/ND, NABS  Extremities: +trace-1+ LE edema B/L  Access: Rt IJ tunneled HD catheter- benign      LABS:      137  |  101  |  23<H>  ----------------------------<  197<H>  3.8   |  27  |  3.75<H>    Ca    8.4      07 May 2021 08:20  Phos  3.0       Mg     2.3         TPro  6.1  /  Alb  2.6<L>  /  TBili  0.4  /  DBili      /  AST  13  /  ALT  15  /  AlkPhos  157<H>      Creatinine Trend: 3.75 <--, 5.37 <--, 3.65 <--, 5.55 <--, 5.12 <--                        8.6    7.93  )-----------( 220      ( 07 May 2021 08:20 )             26.4     Urine Studies:  Urinalysis Basic - ( 06 May 2021 04:15 )    Color: Yellow / Appearance: very cloudy / S.015 / pH:   Gluc:  / Ketone: Negative  / Bili: Negative / Urobili: Negative   Blood:  / Protein: 500 mg/dL / Nitrite: Negative   Leuk Esterase: Moderate / RBC: >50 /HPF / WBC >50 /HPF   Sq Epi:  / Non Sq Epi: Occasional /HPF / Bacteria: Moderate /HPF

## 2021-05-08 NOTE — PROGRESS NOTE ADULT - SUBJECTIVE AND OBJECTIVE BOX
Patient denies chest pain or shortness of breath.   Review of systems otherwise (-)  	  aMIOdarone    Tablet 200 milliGRAM(s) Oral daily  amLODIPine   Tablet 2.5 milliGRAM(s) Oral daily  apixaban 2.5 milliGRAM(s) Oral two times a day  atorvastatin 10 milliGRAM(s) Oral at bedtime  bisacodyl Suppository 10 milliGRAM(s) Rectal daily PRN  chlorhexidine 2% Cloths 1 Application(s) Topical daily  epoetin maria d-epbx (RETACRIT) Injectable 8000 Unit(s) IV Push <User Schedule>  insulin glargine Injectable (LANTUS) 5 Unit(s) SubCutaneous at bedtime  insulin lispro (ADMELOG) corrective regimen sliding scale   SubCutaneous Before meals and at bedtime  metoprolol tartrate 25 milliGRAM(s) Oral every 8 hours  midodrine 10 milliGRAM(s) Oral <User Schedule>  polyethylene glycol 3350 17 Gram(s) Oral daily  senna 2 Tablet(s) Oral at bedtime                            8.6    7.93  )-----------( 220      ( 07 May 2021 08:20 )             26.4       Hemoglobin: 8.6 g/dL (05-07 @ 08:20)  Hemoglobin: 7.8 g/dL (05-06 @ 04:32)  Hemoglobin: 8.7 g/dL (05-05 @ 05:22)  Hemoglobin: 8.0 g/dL (05-04 @ 12:29)  Hemoglobin: 7.1 g/dL (05-04 @ 11:38)      05-07    137  |  101  |  23<H>  ----------------------------<  197<H>  3.8   |  27  |  3.75<H>    Ca    8.4      07 May 2021 08:20  Phos  3.0     05-07  Mg     2.3     05-07    TPro  6.1  /  Alb  2.6<L>  /  TBili  0.4  /  DBili  x   /  AST  13  /  ALT  15  /  AlkPhos  157<H>  05-07    Creatinine Trend: 3.75<--, 5.37<--, 3.65<--, 5.55<--, 5.12<--    COAGS:           T(C): 36.7 (05-08-21 @ 14:01), Max: 36.7 (05-07-21 @ 22:39)  HR: 59 (05-08-21 @ 14:01) (59 - 65)  BP: 169/58 (05-08-21 @ 14:01) (102/65 - 183/75)  RR: 17 (05-08-21 @ 14:01) (17 - 18)  SpO2: 100% (05-08-21 @ 14:01) (98% - 100%)  Wt(kg): --    I&O's Summary      HEENT:  (-)icterus (-)pallor  CV: N S1 S2 1/6 SHANE (+)2 Pulses B/l  Resp:  Clear to ausculatation B/L, normal effort  GI: (+) BS Soft, NT, ND  Lymph:  (-)Edema, (-)obvious lymphadenopathy  Skin: Warm to touch, Normal turgor  Psych: Appropriate mood and affect      TELEMETRY: 	  off        ASSESSMENT/PLAN: 	89y  Female wheel chair bound, from home with PMH of PAF on Eliquis, leadless PPM placed by Dr. Evangelina Mcclelland at [facility], ESRD on HD, DM, HTN presented with shortness of breath since few days.    - Keep net negative with HD  - Echo with normal LV function   - Palliative eval noted  - On midodrine for BP support on HD  - Cont eliquis and Amio follow up with Dr. Evangelina Mcclelland for EP  - No need for further inpatient cardiac work up.      Christopher Arteaga MD, [facility]  BEEPER [phone]

## 2021-05-08 NOTE — PROGRESS NOTE ADULT - PROBLEM SELECTOR PLAN 6
Anemia  Likely anemia due to chronic kidney disease  Monitor cbc  Iron studies ACD, low total iron and saturation

## 2021-05-09 ENCOUNTER — TRANSCRIPTION ENCOUNTER (OUTPATIENT)
Age: 86
End: 2021-05-09

## 2021-05-09 LAB
CULTURE RESULTS: SIGNIFICANT CHANGE UP
CULTURE RESULTS: SIGNIFICANT CHANGE UP
GLUCOSE BLDC GLUCOMTR-MCNC: 160 MG/DL — HIGH (ref 70–99)
GLUCOSE BLDC GLUCOMTR-MCNC: 276 MG/DL — HIGH (ref 70–99)
GLUCOSE BLDC GLUCOMTR-MCNC: 284 MG/DL — HIGH (ref 70–99)
GLUCOSE BLDC GLUCOMTR-MCNC: 303 MG/DL — HIGH (ref 70–99)
SPECIMEN SOURCE: SIGNIFICANT CHANGE UP
SPECIMEN SOURCE: SIGNIFICANT CHANGE UP

## 2021-05-09 RX ADMIN — Medication 3: at 17:45

## 2021-05-09 RX ADMIN — Medication 3: at 12:00

## 2021-05-09 RX ADMIN — AMIODARONE HYDROCHLORIDE 200 MILLIGRAM(S): 400 TABLET ORAL at 05:21

## 2021-05-09 RX ADMIN — Medication 25 MILLIGRAM(S): at 05:21

## 2021-05-09 RX ADMIN — CHLORHEXIDINE GLUCONATE 1 APPLICATION(S): 213 SOLUTION TOPICAL at 12:00

## 2021-05-09 RX ADMIN — Medication 1: at 07:40

## 2021-05-09 RX ADMIN — Medication 25 MILLIGRAM(S): at 21:44

## 2021-05-09 RX ADMIN — AMLODIPINE BESYLATE 2.5 MILLIGRAM(S): 2.5 TABLET ORAL at 05:21

## 2021-05-09 RX ADMIN — Medication 4: at 21:44

## 2021-05-09 RX ADMIN — SENNA PLUS 2 TABLET(S): 8.6 TABLET ORAL at 21:44

## 2021-05-09 RX ADMIN — ATORVASTATIN CALCIUM 10 MILLIGRAM(S): 80 TABLET, FILM COATED ORAL at 21:44

## 2021-05-09 RX ADMIN — INSULIN GLARGINE 5 UNIT(S): 100 INJECTION, SOLUTION SUBCUTANEOUS at 21:43

## 2021-05-09 RX ADMIN — APIXABAN 2.5 MILLIGRAM(S): 2.5 TABLET, FILM COATED ORAL at 17:45

## 2021-05-09 RX ADMIN — APIXABAN 2.5 MILLIGRAM(S): 2.5 TABLET, FILM COATED ORAL at 05:21

## 2021-05-09 RX ADMIN — POLYETHYLENE GLYCOL 3350 17 GRAM(S): 17 POWDER, FOR SOLUTION ORAL at 12:00

## 2021-05-09 NOTE — PROGRESS NOTE ADULT - SUBJECTIVE AND OBJECTIVE BOX
Patient denies chest pain or shortness of breath.   Review of systems otherwise (-)  	  aMIOdarone    Tablet 200 milliGRAM(s) Oral daily  amLODIPine   Tablet 2.5 milliGRAM(s) Oral daily  apixaban 2.5 milliGRAM(s) Oral two times a day  atorvastatin 10 milliGRAM(s) Oral at bedtime  bisacodyl Suppository 10 milliGRAM(s) Rectal daily PRN  chlorhexidine 2% Cloths 1 Application(s) Topical daily  epoetin maria d-epbx (RETACRIT) Injectable 8000 Unit(s) IV Push <User Schedule>  insulin glargine Injectable (LANTUS) 5 Unit(s) SubCutaneous at bedtime  insulin lispro (ADMELOG) corrective regimen sliding scale   SubCutaneous Before meals and at bedtime  metoprolol tartrate 25 milliGRAM(s) Oral every 8 hours  midodrine 10 milliGRAM(s) Oral <User Schedule>  polyethylene glycol 3350 17 Gram(s) Oral daily  senna 2 Tablet(s) Oral at bedtime                            8.6    7.93  )-----------( 220      ( 07 May 2021 08:20 )             26.4       Hemoglobin: 8.6 g/dL (05-07 @ 08:20)  Hemoglobin: 7.8 g/dL (05-06 @ 04:32)  Hemoglobin: 8.7 g/dL (05-05 @ 05:22)  Hemoglobin: 8.0 g/dL (05-04 @ 12:29)  Hemoglobin: 7.1 g/dL (05-04 @ 11:38)      05-07    137  |  101  |  23<H>  ----------------------------<  197<H>  3.8   |  27  |  3.75<H>    Ca    8.4      07 May 2021 08:20  Phos  3.0     05-07  Mg     2.3     05-07    TPro  6.1  /  Alb  2.6<L>  /  TBili  0.4  /  DBili  x   /  AST  13  /  ALT  15  /  AlkPhos  157<H>  05-07    Creatinine Trend: 3.75<--, 5.37<--, 3.65<--, 5.55<--, 5.12<--    COAGS:           T(C): 36.7 (05-08-21 @ 14:01), Max: 36.7 (05-07-21 @ 22:39)  HR: 59 (05-08-21 @ 14:01) (59 - 65)  BP: 169/58 (05-08-21 @ 14:01) (102/65 - 183/75)  RR: 17 (05-08-21 @ 14:01) (17 - 18)  SpO2: 100% (05-08-21 @ 14:01) (98% - 100%)  Wt(kg): --    I&O's Summary      HEENT:  (-)icterus (-)pallor  CV: N S1 S2 1/6 SHANE (+)2 Pulses B/l  Resp:  Clear to ausculatation B/L, normal effort  GI: (+) BS Soft, NT, ND  Lymph:  (-)Edema, (-)obvious lymphadenopathy  Skin: Warm to touch, Normal turgor  Psych: Appropriate mood and affect      TELEMETRY: 	  off        ASSESSMENT/PLAN: 	89y  Female wheel chair bound, from home with PMH of PAF on Eliquis, leadless PPM placed by Dr. Evangelina Mcclelland at [facility], ESRD on HD, DM, HTN presented with shortness of breath since few days.    - Keep net negative with HD  - Echo with normal LV function   - Palliative eval noted  - On midodrine for BP support on HD  - Cont eliquis and Amio follow up with Dr. Evangelina Mcclelland for EP  - No need for further inpatient cardiac work up.      Christopher Arteaga MD, [facility]  BEEPER [phone]

## 2021-05-09 NOTE — DISCHARGE NOTE PROVIDER - CARE PROVIDER_API CALL
jasvir, june  Electrophysiology at [facility]  Phone: (   )    -  Fax: (   )    -  Follow Up Time:    TIRSO WITT  [facility]  [id number] [place] [address]  Phone: [phone]  Fax: [phone]  Follow Up Time: 1 week    jasvir june Electrophysiology at [facility]  Phone: (   )    -  Fax: (   )    -  Follow Up Time:     Nephrology,   Phone: (   )    -  Fax: (   )    -  Follow Up Time: Routine

## 2021-05-09 NOTE — PROGRESS NOTE ADULT - SUBJECTIVE AND OBJECTIVE BOX
[facility] NEPHROLOGY- PROGRESS NOTE    Patient is a 89yo Female with ESRD on HD, Afib on Eliquis, HTN, Intradialytic hypotension req Midodrine prior to HD, failed Rt AVF due to steal syndrome s/p ligation, h/o COVID in March, s/p PPM 2 weeks ago p/w SOB and chest comfort. Pt a/w acute hypoxic respiratory distress 2/2 pulmonary edema requiring BIPAP. Pt now hypotensive and bradycardic. Nephrology consulted for ESRD status.     Hospital Medications: Medications reviewed.  REVIEW OF SYSTEMS: Denies any SOB, chest pain, n/v/d or abd pain +iconstipated    VITALS:  T(F): 97.7 (05-09-21 @ 04:33), Max: 98 (05-08-21 @ 14:01)  HR: 52 (05-09-21 @ 06:43)  BP: 150/37 (05-09-21 @ 06:43)  RR: 18 (05-09-21 @ 06:43)  SpO2: 100% (05-09-21 @ 06:43)  Wt(kg): --    PHYSICAL EXAM:  Gen: NAD,   HEENT: anicteric;  Neck: no JVD  Cards: +S1/S2,   Resp: mild rales at bases b/l  GI: soft, NT/ND, NABS  Extremities: +trace-1+ LE edema B/L  Access: Rt IJ tunneled HD catheter- benign    LABS: no new labs

## 2021-05-09 NOTE — DISCHARGE NOTE PROVIDER - NSDCMRMEDTOKEN_GEN_ALL_CORE_FT
amiodarone 200 mg oral tablet: 1 tab(s) orally once a day  amLODIPine 2.5 mg oral tablet: 1 tab(s) orally once a day  Only on non dialysis day  busPIRone 5 mg oral tablet: 1 tab(s) orally once a day  Before dialysis  calcium acetate 667 mg oral capsule: 2 cap(s) orally 3 times a day (with meals)  Crestor 10 mg oral tablet: 1 tab(s) orally once a day  Eliquis 2.5 mg oral tablet: 1 tab(s) orally 2 times a day  folic acid 1 mg oral tablet: 5 tab(s) orally once a day  HumaLOG 100 units/mL injectable solution: Sliding scale  Metoprolol Tartrate 25 mg oral tablet: 1 tab(s) orally every 8 hours  midodrine 5 mg oral tablet: 2 tab(s) orally once a day    Only before dialysis  Renal Caps oral capsule: 1 cap(s) orally once a day  Toujeo SoloStar 300 units/mL subcutaneous solution: 10 unit(s) subcutaneous once a day (at bedtime)   amiodarone 200 mg oral tablet: 1 tab(s) orally once a day  amLODIPine 2.5 mg oral tablet: 1 tab(s) orally once a day  Only on non dialysis day  bisacodyl 10 mg rectal suppository: 1 suppository(ies) rectal once a day, As needed, Constipation  Crestor 10 mg oral tablet: 1 tab(s) orally once a day  Eliquis 2.5 mg oral tablet: 1 tab(s) orally 2 times a day  HumaLOG 100 units/mL injectable solution: Sliding scale  Metoprolol Tartrate 25 mg oral tablet: 1 tab(s) orally every 8 hours  midodrine 5 mg oral tablet: 2 tab(s) orally once a day    Only before dialysis  Toujeo SoloStar 300 units/mL subcutaneous solution: 10 unit(s) subcutaneous once a day (at bedtime)   Albuterol (Eqv-ProAir HFA) 90 mcg/inh inhalation aerosol: 2 puff(s) inhaled prn, As Needed -Shortness of Breath and/or Wheezing   amiodarone 200 mg oral tablet: 1 tab(s) orally once a day  amLODIPine 2.5 mg oral tablet: 1 tab(s) orally once a day  Only on non dialysis day  bisacodyl 10 mg rectal suppository: 1 suppository(ies) rectal once a day, As needed, Constipation  Crestor 10 mg oral tablet: 1 tab(s) orally once a day  Eliquis 2.5 mg oral tablet: 1 tab(s) orally 2 times a day  HumaLOG 100 units/mL injectable solution: Sliding scale  Metoprolol Tartrate 25 mg oral tablet: 1 tab(s) orally every 8 hours  midodrine 5 mg oral tablet: 2 tab(s) orally once a day    Only before dialysis  Toujeo SoloStar 300 units/mL subcutaneous solution: 10 unit(s) subcutaneous once a day (at bedtime)   Albuterol (Eqv-ProAir HFA) 90 mcg/inh inhalation aerosol: 2 puff(s) inhaled prn, As Needed -Shortness of Breath and/or Wheezing   amiodarone 200 mg oral tablet: 1 tab(s) orally once a day  amLODIPine 2.5 mg oral tablet: 1 tab(s) orally once a day  Only on non dialysis day  bisacodyl 10 mg rectal suppository: 1 suppository(ies) rectal once a day, As needed, Constipation  Crestor 10 mg oral tablet: 1 tab(s) orally once a day  Eliquis 2.5 mg oral tablet: 1 tab(s) orally 2 times a day  HumaLOG 100 units/mL injectable solution: Sliding scale  Metoprolol Tartrate 25 mg oral tablet: 1 tab(s) orally every 8 hours  midodrine 10 mg oral tablet: 1 tab(s) orally once a day ONLY BEFORE DIALYSIS  midodrine 10 mg oral tablet: 1 tab(s) orally ONLY IF NEEDED DURING DIALYSIS (2 hrs after start of dialysis)  Toujeo SoloStar 300 units/mL subcutaneous solution: 10 unit(s) subcutaneous once a day (at bedtime)

## 2021-05-09 NOTE — DISCHARGE NOTE PROVIDER - HOSPITAL COURSE
Pt is a 89 y/o F, wheel chair bound, from home with PMH of Afib on Eliquis, ESRD on HD(T/Th/S), DM, HTN presented with shortness of breath since few days with c/o  chest discomfort.   Patient admitted to ICU for SOB 2/2 fluid overload and was downgraded to medicine for  further medical optimization. Nephrology consulted, Getting HD as scheduled. Cardiology consulted for management of SOB and Afib. Echo with EF>55%. Advised to continue  eliquis and Amiodarone and  follow up with Dr. Evangelina Mcclelland for EP at [facility].   PT evaluation done with recommendation for home PT, patient is wheelchair bound and has own rolling walker.     Given patient's improved clinical status and current hemodynamic stability decision was made to discharge. Pt is stable for discharge per attending and is advised to f/u with PCP as out-patient. Please refer to Pt's complete medical chart with documents for a full hospital course, for this is only a brief summary. Pt is a 89 y/o F, wheel chair bound, from home with PMH of Afib on Eliquis, ESRD on HD(T/Th/S), DM, HTN presented with shortness of breath since few days with c/o  chest discomfort.   Patient admitted to ICU for SOB 2/2 fluid overload and was downgraded to medicine for further medical optimization. Nephrology Dr. Chen consulted for ESRD. Got multiple sessions of HD and UF. During HD sessions, pt gets episodes of hypotension and treated with midodrine and after dialysis pt gets hypertensive and treated with amlodipine and lopressor. Cardiology Dr. Ruiz consulted. Echo with EF>55%. For AFib,  to continue eliquis and Amiodarone and  follow up with Dr. Evangelina Mcclelland for EP at [facility].   PT evaluation done with recommendation for home PT, patient is wheelchair bound and has own rolling walker.     Given patient's improved clinical status and current hemodynamic stability decision was made to discharge. Pt is stable for discharge per attending and is advised to f/u with PCP as out-patient. Please refer to Pt's complete medical chart with documents for a full hospital course, for this is only a brief summary. Pt is a 89 y/o F, wheel chair bound, from home with PMH of Afib on Eliquis, ESRD on HD(T/Th/S), DM, HTN presented with shortness of breath since few days with c/o  chest discomfort.   Patient admitted to ICU for SOB 2/2 fluid overload and was downgraded to medicine for further medical optimization. Nephrology Dr. Chen consulted for ESRD. Got multiple sessions of HD and UF. During HD sessions, pt gets episodes of hypotension and treated with midodrine and after dialysis pt gets hypertensive and treated with amlodipine and lopressor. Cardiology Dr. Ruiz consulted. Echo with EF>55%. For AFib, was treated with eliquis and Amiodarone and will follow up with Dr. Evangelina Mcclelland for EP at [facility] as o/p.  PT evaluation done with recommendation for home PT, patient is wheelchair bound and has own rolling walker.     Given patient's improved clinical status and current hemodynamic stability decision was made to discharge. Pt is stable for discharge per attending and is advised to f/u with PCP as out-patient. Please refer to Pt's complete medical chart with documents for a full hospital course, for this is only a brief summary.

## 2021-05-09 NOTE — DISCHARGE NOTE PROVIDER - PROVIDER TOKENS
FREE:[LAST:[kim],FIRST:[june],PHONE:[(   )    -],FAX:[(   )    -],ADDRESS:[Electrophysiology at [facility]]] PROVIDER:[TOKEN:[[id number]],FOLLOWUP:[1 week]],FREE:[LAST:[jasvir],FIRST:[june],PHONE:[(   )    -],FAX:[(   )    -],ADDRESS:[Electrophysiology at [facility]]],FREE:[LAST:[Nephrology],PHONE:[(   )    -],FAX:[(   )    -],FOLLOWUP:[Routine]]

## 2021-05-09 NOTE — PROGRESS NOTE ADULT - ASSESSMENT
Pt is a 89 y/o F, wheel chair bound, from home with PMH of Afib on Eliquis, ESRD on HD, DM, HTN presented with shortness of breath since few days. Pt also complaining of chest discomfort. As per pt's daughter Johnny batres, pt started to develop gradually shortness of breath since few days at rest. She mentioned that the pt was admitted recently to [facility] for shortness of breath and chest pain, leadless pacemaker was placed, discharged on april 29th. She has been regularly getting dialysis on Tuesday/thursday / saturday.  Patient admitted to ICU for SOB 2/2 fluid overload and was downgraded to medicine for management. Nephrology consulted, last HD yesterday. Cardiology consulted for management of SOB and Afib. Echo with EF>55%. PT evaluation done with recommendation for home PT, patien tis wheelchair bound and has own rolling walker.

## 2021-05-09 NOTE — PROGRESS NOTE ADULT - PROBLEM SELECTOR PLAN 4
Possible CHF given elevated pro BNP and pulmonary edema  elevation in proBNP CHF vs ESRD  Echo with EF>55%  Need to obtain record from [facility]  Cardio Consult Dr Arteaga

## 2021-05-09 NOTE — PROGRESS NOTE ADULT - ASSESSMENT
Patient is a 89yo Female with ESRD on HD, Afib on Eliquis, HTN, Intradialytic hypotension req Midodrine prior to HD, failed Rt AVF due to steal syndrome s/p ligation, h/o COVID in March, s/p PPM 2 weeks ago p/w SOB and chest comfort. Pt a/w acute hypoxic respiratory distress 2/2 pulmonary edema requiring BIPAP. Pt now hypotensive and bradycardic. Nephrology consulted for ESRD status.     1) ESRD: HD TTS. Continue with maintenance hemodialysis treatment. Monitor BMP.  2) Hyperkalemia- resolved with HD. c/w low K diet. Monitor lytes.   3) Hypotension- with bradycardia, now with very variable and fluctuating BPs. Pt is on metoprolol and midodrine. Management per cardiology.  Bradycardia- with recent PPM placement; resolved. Cards following. Monitor BP/HR.   4) Anemia of renal disease: Hb improving. Will avoid IV iron due to elevated Ferritin. c/w Epogen 8k units IV tiw with HD. Monitor Hb.  5) Hyperphosphatemia: Phosphorus acceptable. No need for phos binders at this time. c/w low phos diet. Monitor serum calcium and phosphorus.      [facility] NEPHROLOGY  Brad Chen M.D.  Oneal Tim D.O.  Roselia Huitron M.D.  Milli Platt, MSN, ANP-C  [phone]    [address] [place] [address]

## 2021-05-09 NOTE — PROGRESS NOTE ADULT - SUBJECTIVE AND OBJECTIVE BOX
Patient is a 89y old  Female who presents with a chief complaint of Shortness of breath (06 May 2021 19:35)      INTERVAL HPI/OVERNIGHT EVENTS: no new complaints    MEDICATIONS  (STANDING):  aMIOdarone    Tablet 200 milliGRAM(s) Oral daily  amLODIPine   Tablet 2.5 milliGRAM(s) Oral daily  apixaban 2.5 milliGRAM(s) Oral two times a day  atorvastatin 10 milliGRAM(s) Oral at bedtime  chlorhexidine 2% Cloths 1 Application(s) Topical daily  epoetin maria d-epbx (RETACRIT) Injectable 8000 Unit(s) IV Push <User Schedule>  insulin glargine Injectable (LANTUS) 5 Unit(s) SubCutaneous at bedtime  insulin lispro (ADMELOG) corrective regimen sliding scale   SubCutaneous Before meals and at bedtime  metoprolol tartrate 25 milliGRAM(s) Oral every 8 hours  midodrine 10 milliGRAM(s) Oral <User Schedule>  polyethylene glycol 3350 17 Gram(s) Oral daily  senna 2 Tablet(s) Oral at bedtime    MEDICATIONS  (PRN):  bisacodyl Suppository 10 milliGRAM(s) Rectal daily PRN Constipation      __________________________________________________  REVIEW OF SYSTEMS:    CONSTITUTIONAL: No fever,   EYES: no acute visual disturbances  NECK: No pain or stiffness  RESPIRATORY: No cough; shortness of breath  CARDIOVASCULAR: No chest pain, no palpitations  GASTROINTESTINAL: No pain. No nausea or vomiting; No diarrhea   NEUROLOGICAL: No headache or numbness, no tremors  MUSCULOSKELETAL: No joint pain, no muscle pain  GENITOURINARY: ESRD on HD,   PSYCHIATRY: no depression , no anxiety  ALL OTHER  ROS negative      Vital Signs Last 24 Hrs  T(C): 36.6 (09 May 2021 13:47), Max: 36.6 (08 May 2021 20:45)  T(F): 97.8 (09 May 2021 13:47), Max: 97.9 (08 May 2021 20:45)  HR: 54 (09 May 2021 13:47) (52 - 65)  BP: 148/55 (09 May 2021 13:47) (148/55 - 194/66)  BP(mean): --  RR: 16 (09 May 2021 13:47) (16 - 19)  SpO2: 100% (09 May 2021 13:47) (100% - 100%)  ________________________________________________  PHYSICAL EXAM:  GENERAL: NAD  HEENT: Normocephalic;  conjunctivae and sclerae clear; moist mucous membranes;   NECK : supple  CHEST/LUNG: Clear to auscultation bilaterally with good air entry   HEART: Afib,  irregular; no murmurs, gallops or rubs  ABDOMEN: Soft, Nontender, Nondistended; Bowel sounds present  EXTREMITIES: no cyanosis; no edema; no calf tenderness  SKIN: warm and dry; no rash  NERVOUS SYSTEM:  Awake and alert; Oriented  to place, person and time ; no new deficits    _________________________________________________  LABS:                                 PROCEDURE DATE:  05/04/2021          INTERPRETATION:  INDICATION: Chest Pain    PRIORS: None    VIEWS: Portable AP radiography of the chest performed. Evaluation limited secondary to shallow inspiration.    FINDINGS: Heart size appears within normal limits. A right-sided dual-lumen central venous catheter is identified, the distal tips overlying the expected region of the SVC/RA confluence. Note is made of an indwelling Micra pacemaker. No hilar or superior mediastinal abnormalities are identified. Interstitial prominence bilaterally may reflect shallow inspiration. Mild interstitial edema cannot be excluded. No pleural effusion or pneumothorax is demonstrated. No mediastinal shift is noted. Degenerative changes thoracic spine.    IMPRESSION: Interstitial prominence may reflect shallow inspiration. Mild interstitial edema cannot be excluded.              NYASIA VAIL MD; Attending Radiologist  This document has been electronically signed. May  4 2021  9:12AM    Imaging  Reviewed:  YES/NO    Consultant(s) Notes Reviewed:   YES/ No      Plan of care was discussed with patient and /or primary care giver; all questions and concerns were addressed

## 2021-05-09 NOTE — DISCHARGE NOTE PROVIDER - NSDCCPCAREPLAN_GEN_ALL_CORE_FT
PRINCIPAL DISCHARGE DIAGNOSIS  Diagnosis: Acute respiratory failure with hypoxia and hypercapnia  Assessment and Plan of Treatment: mary ann due to chornic CHF   continue to use your home oxygen   continue to take all medicaitons as prescribed and follow up with cardiology Dr. Evangelina Mcclelland  and pulmonology      SECONDARY DISCHARGE DIAGNOSES  Diagnosis: ESRD (end stage renal disease)  Assessment and Plan of Treatment: You were on dailysis as your normal schedule which is Tuesday/ Thursday and Satruday during this admission  continue dialysis    Diagnosis: CHF (congestive heart failure)  Assessment and Plan of Treatment: Weigh yourself daily.  If you gain 3lbs in 3 days, or 5lbs in a week call your Health Care Provider.  Do not eat or drink foods containing more than 2000mg of salt (sodium) in your diet every day.  Call your Health Care Provider if you have any swelling or increased swelling in your feet, ankles, and/or stomach.  Take all of your medication as directed.  If you become dizzy call your Health Care Provider.       PRINCIPAL DISCHARGE DIAGNOSIS  Diagnosis: Acute respiratory failure with hypoxia and hypercapnia  Assessment and Plan of Treatment: likley due to fluid overload with chornic CHF   continue to use your home oxygen   continue to take all medicaitons as prescribed and follow up with cardiology Dr. Evangelina Mcclelland  and primary MD.      SECONDARY DISCHARGE DIAGNOSES  Diagnosis: CHF (congestive heart failure)  Assessment and Plan of Treatment: You were evaluated by cardiology for pulmonary edema with elevated pro BNP. Echo resulted ejection fraction 55%. no further cardiac work up indicated this time.   contiune home PT after discharge.  Weigh yourself daily.  If you gain 3lbs in 3 days, or 5lbs in a week call your Health Care Provider.  Do not eat or drink foods containing more than 2000mg of salt (sodium) in your diet every day.  Call your Health Care Provider if you have any swelling or increased swelling in your feet, ankles, and/or stomach.  Take all of your medication as directed.  If you become dizzy call your Health Care Provider.      Diagnosis: ESRD (end stage renal disease)  Assessment and Plan of Treatment: You were on dailysis as your normal schedule which is Tuesday/ Thursday and Satruday during this admission  continue dialysis    Diagnosis: Afib  Assessment and Plan of Treatment: Continue taking medications as prescribed  Atrial fibrillation is the most common heart rhythm problem.  The condition puts you at risk for has stroke and heart attack  It helps if you control your blood pressure, not drink more than 1-2 alcohol drinks per day, cut down on caffeine, getting treatment for over active thyroid gland, and get regular exercise  Call your doctor if you feel your heart racing or beating unusually, chest tightness or pain, lightheaded, faint, shortness of breath especially with exercise  It is important to take your heart medication as prescribed  You may be on anticoagulation which is very important to take as directed - you may need blood work to monitor drug levels      Diagnosis: Diabetes mellitus  Assessment and Plan of Treatment: HgA1C 6.6 at this admission.  Make sure you get your HgA1c checked every three months.  continue insulin therapy at home.  check your blood glucose before meals and at bedtime.  It's important not to skip any meals.  Keep a log of your blood glucose results and always take it with you to your doctor appointments.  Keep a list of your current medications including injectables and over the counter medications and bring this medication list with you to all your doctor appointments.  If you have not seen your ophthalmologist this year call for appointment.  Check your feet daily for redness, sores, or openings. Do not self treat. If no improvement in two days call your primary care physician for an appointment.  Low blood sugar (hypoglycemia) is a blood sugar below 70mg/dl. Check your blood sugar if you feel signs/symptoms of hypoglycemia. If your blood sugar is below 70 take 15 grams of carbohydrates (ex 4 oz of apple juice, 3-4 glucose tablets, or 4-6 oz of regular soda) wait 15 minutes and repeat blood sugar to make sure it comes up above 70.  If your blood sugar is above 70 and you are due for a meal, have a meal.  If you are not due for a meal have a snack.  This snack helps keeps your blood sugar at a safe range.      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 continue to follow up with nephrology. monitor for blooding.   Symptoms to report, bleeding, palpitations, fatigue, pale skin, cold skin, dizziness. Take medications as ordered by PCP       PRINCIPAL DISCHARGE DIAGNOSIS  Diagnosis: Acute respiratory failure with hypoxia and hypercapnia  Assessment and Plan of Treatment: You came to the hospital with fluid overload with chronic heart failure and end stage renal disease. You were treated with multiple episodes of hemodialysis and ultrafiltration. Also were treated with lasix. Nephrologist Dr. Chen followed up. You were also treated with albuterol inhalers which you can take whenever necessary. Pulmonologist Dr. Meraz consulted and asked to follow up as outpatient for pulmonary function tests. Kindly continue to take all medications as prescribed and follow up with Cardiology Dr. Evangelina Mcclelland and primary care physician within two weeks.      SECONDARY DISCHARGE DIAGNOSES  Diagnosis: ESRD (end stage renal disease)  Assessment and Plan of Treatment: You have history of ESRD and and on dialysis as described above.    Diagnosis: CHF (congestive heart failure)  Assessment and Plan of Treatment: You have history of CHF and was treated with lasix and managed as described above.    Diagnosis: Afib  Assessment and Plan of Treatment: You have history of Atrial fibrillation. You were treated with amiodarone and eliquis.    Diagnosis: Diabetes mellitus  Assessment and Plan of Treatment: You have history of diabetes melitus. Your HgA1c is 6.6 at this admission. You were treated with insulin. Kindly continue your previous home medications and regimen.    Diagnosis: Anemia  Assessment and Plan of Treatment: Your hemoglobin levels were low due to end stage renal disease. You did not have any bleeding. You were treated with retacrit.     PRINCIPAL DISCHARGE DIAGNOSIS  Diagnosis: Acute respiratory failure with hypoxia and hypercapnia  Assessment and Plan of Treatment: You came to the hospital with fluid overload with chronic heart failure and end stage renal disease. You were treated with multiple episodes of hemodialysis and ultrafiltration. Also were treated with lasix. Nephrologist Dr. Chen followed up. You were also treated with albuterol inhalers which you can take whenever necessary. Pulmonologist Dr. Meraz consulted and asked to follow up as outpatient for pulmonary function tests. Kindly continue to take all medications as prescribed and follow up with Cardiology Dr. Evangelina Mcclelland and primary care physician within two weeks.      SECONDARY DISCHARGE DIAGNOSES  Diagnosis: ESRD (end stage renal disease)  Assessment and Plan of Treatment: You have history of ESRD and and on dialysis as described above. Your NEW Dialysis regimen is Monday/ Tuesday/Thursday and Saturday. You will need to take Midodrine BEFORE the Dialysis session and another dose  ONLY IF NEEDED DURING the Dialysis ( 2 hours after the start of dialysis)    Diagnosis: CHF (congestive heart failure)  Assessment and Plan of Treatment: You have history of CHF and was treated with lasix and managed as described above.    Diagnosis: Afib  Assessment and Plan of Treatment: You have history of Atrial fibrillation. You were treated with amiodarone and eliquis.    Diagnosis: Diabetes mellitus  Assessment and Plan of Treatment: You have history of diabetes melitus. Your HgA1c is 6.6 at this admission. You were treated with insulin. Kindly continue your previous home medications and regimen.    Diagnosis: Anemia  Assessment and Plan of Treatment: Your hemoglobin levels were low due to end stage renal disease. You did not have any bleeding. You were treated with retacrit.

## 2021-05-10 DIAGNOSIS — Z02.9 ENCOUNTER FOR ADMINISTRATIVE EXAMINATIONS, UNSPECIFIED: ICD-10-CM

## 2021-05-10 LAB
ANION GAP SERPL CALC-SCNC: 11 MMOL/L — SIGNIFICANT CHANGE UP (ref 5–17)
BUN SERPL-MCNC: 35 MG/DL — HIGH (ref 7–18)
CALCIUM SERPL-MCNC: 8.1 MG/DL — LOW (ref 8.4–10.5)
CHLORIDE SERPL-SCNC: 99 MMOL/L — SIGNIFICANT CHANGE UP (ref 96–108)
CO2 SERPL-SCNC: 26 MMOL/L — SIGNIFICANT CHANGE UP (ref 22–31)
CREAT SERPL-MCNC: 4.86 MG/DL — HIGH (ref 0.5–1.3)
GLUCOSE BLDC GLUCOMTR-MCNC: 151 MG/DL — HIGH (ref 70–99)
GLUCOSE BLDC GLUCOMTR-MCNC: 292 MG/DL — HIGH (ref 70–99)
GLUCOSE BLDC GLUCOMTR-MCNC: 297 MG/DL — HIGH (ref 70–99)
GLUCOSE BLDC GLUCOMTR-MCNC: 342 MG/DL — HIGH (ref 70–99)
GLUCOSE SERPL-MCNC: 136 MG/DL — HIGH (ref 70–99)
HCT VFR BLD CALC: 26.5 % — LOW (ref 34.5–45)
HGB BLD-MCNC: 8.3 G/DL — LOW (ref 11.5–15.5)
MAGNESIUM SERPL-MCNC: 2.3 MG/DL — SIGNIFICANT CHANGE UP (ref 1.6–2.6)
MCHC RBC-ENTMCNC: 31.3 GM/DL — LOW (ref 32–36)
MCHC RBC-ENTMCNC: 31.3 PG — SIGNIFICANT CHANGE UP (ref 27–34)
MCV RBC AUTO: 100 FL — SIGNIFICANT CHANGE UP (ref 80–100)
NRBC # BLD: 0 /100 WBCS — SIGNIFICANT CHANGE UP (ref 0–0)
PHOSPHATE SERPL-MCNC: 4.1 MG/DL — SIGNIFICANT CHANGE UP (ref 2.5–4.5)
PLATELET # BLD AUTO: 215 K/UL — SIGNIFICANT CHANGE UP (ref 150–400)
POTASSIUM SERPL-MCNC: 3.9 MMOL/L — SIGNIFICANT CHANGE UP (ref 3.5–5.3)
POTASSIUM SERPL-SCNC: 3.9 MMOL/L — SIGNIFICANT CHANGE UP (ref 3.5–5.3)
RBC # BLD: 2.65 M/UL — LOW (ref 3.8–5.2)
RBC # FLD: 15.7 % — HIGH (ref 10.3–14.5)
SODIUM SERPL-SCNC: 136 MMOL/L — SIGNIFICANT CHANGE UP (ref 135–145)
WBC # BLD: 7.44 K/UL — SIGNIFICANT CHANGE UP (ref 3.8–10.5)
WBC # FLD AUTO: 7.44 K/UL — SIGNIFICANT CHANGE UP (ref 3.8–10.5)

## 2021-05-10 RX ADMIN — AMIODARONE HYDROCHLORIDE 200 MILLIGRAM(S): 400 TABLET ORAL at 06:02

## 2021-05-10 RX ADMIN — Medication 4: at 11:36

## 2021-05-10 RX ADMIN — SENNA PLUS 2 TABLET(S): 8.6 TABLET ORAL at 21:44

## 2021-05-10 RX ADMIN — Medication 25 MILLIGRAM(S): at 21:44

## 2021-05-10 RX ADMIN — Medication 3: at 17:18

## 2021-05-10 RX ADMIN — ATORVASTATIN CALCIUM 10 MILLIGRAM(S): 80 TABLET, FILM COATED ORAL at 21:46

## 2021-05-10 RX ADMIN — APIXABAN 2.5 MILLIGRAM(S): 2.5 TABLET, FILM COATED ORAL at 17:19

## 2021-05-10 RX ADMIN — Medication 3: at 21:45

## 2021-05-10 RX ADMIN — Medication 30 MILLILITER(S): at 17:19

## 2021-05-10 RX ADMIN — Medication 25 MILLIGRAM(S): at 14:28

## 2021-05-10 RX ADMIN — INSULIN GLARGINE 5 UNIT(S): 100 INJECTION, SOLUTION SUBCUTANEOUS at 21:44

## 2021-05-10 RX ADMIN — Medication 1: at 08:07

## 2021-05-10 RX ADMIN — APIXABAN 2.5 MILLIGRAM(S): 2.5 TABLET, FILM COATED ORAL at 06:02

## 2021-05-10 RX ADMIN — AMLODIPINE BESYLATE 2.5 MILLIGRAM(S): 2.5 TABLET ORAL at 06:02

## 2021-05-10 RX ADMIN — CHLORHEXIDINE GLUCONATE 1 APPLICATION(S): 213 SOLUTION TOPICAL at 11:36

## 2021-05-10 RX ADMIN — POLYETHYLENE GLYCOL 3350 17 GRAM(S): 17 POWDER, FOR SOLUTION ORAL at 11:36

## 2021-05-10 NOTE — PROGRESS NOTE ADULT - PROBLEM SELECTOR PLAN 3
Pt is on Eliquis at home  c/w home amio 200mg qd  initially started on heparin drip d/c due to acute drop in hemoglobin  c/w eliquis  Recent wireless pacemaker place 2 weeks ago  cards f/u Pt is on Eliquis at home      -Cont Eliquis, Amiodarone and Metoprolol   Recent wireless pacemaker place 2 weeks ago  cards f/u Pt is on Eliquis at home  -Cont Eliquis, Amiodarone and Metoprolol   -Recent wireless pacemaker place 2 weeks ago  -card Dr. Arteaga

## 2021-05-10 NOTE — PROGRESS NOTE ADULT - PROBLEM SELECTOR PLAN 1
Likely secondary to fluid overload  downgraded from ICU  not in distress saturating 100% on 2L NC Likely secondary to fluid overload  -Downgraded from ICU  -HD scheduled for tomorrow 5/11  -Not in distress saturating 96% on 2L NC

## 2021-05-10 NOTE — PROGRESS NOTE ADULT - SUBJECTIVE AND OBJECTIVE BOX
Patient denies chest pain or shortness of breath.   Review of systems otherwise (-)  	  aMIOdarone    Tablet 200 milliGRAM(s) Oral daily  amLODIPine   Tablet 2.5 milliGRAM(s) Oral daily  apixaban 2.5 milliGRAM(s) Oral two times a day  atorvastatin 10 milliGRAM(s) Oral at bedtime  bisacodyl Suppository 10 milliGRAM(s) Rectal daily PRN  chlorhexidine 2% Cloths 1 Application(s) Topical daily  epoetin maria d-epbx (RETACRIT) Injectable 8000 Unit(s) IV Push <User Schedule>  insulin glargine Injectable (LANTUS) 5 Unit(s) SubCutaneous at bedtime  insulin lispro (ADMELOG) corrective regimen sliding scale   SubCutaneous Before meals and at bedtime  metoprolol tartrate 25 milliGRAM(s) Oral every 8 hours  midodrine 10 milliGRAM(s) Oral <User Schedule>  polyethylene glycol 3350 17 Gram(s) Oral daily  senna 2 Tablet(s) Oral at bedtime                            8.3    7.44  )-----------( 215      ( 10 May 2021 06:40 )             26.5       Hemoglobin: 8.3 g/dL (05-10 @ 06:40)  Hemoglobin: 8.6 g/dL (05-07 @ 08:20)  Hemoglobin: 7.8 g/dL (05-06 @ 04:32)      05-10    136  |  99  |  35<H>  ----------------------------<  136<H>  3.9   |  26  |  4.86<H>    Ca    8.1<L>      10 May 2021 06:40  Phos  4.1     05-10  Mg     2.3     05-10      Creatinine Trend: 4.86<--, 3.75<--, 5.37<--, 3.65<--, 5.55<--, 5.12<--    COAGS:           T(C): 36.6 (05-10-21 @ 05:25), Max: 36.7 (05-09-21 @ 21:30)  HR: 58 (05-10-21 @ 11:32) (54 - 60)  BP: 152/68 (05-10-21 @ 11:32) (148/55 - 203/62)  RR: 18 (05-10-21 @ 05:25) (16 - 18)  SpO2: 100% (05-10-21 @ 11:32) (97% - 100%)  Wt(kg): --    I&O's Summary      HEENT:  (-)icterus (-)pallor  CV: N S1 S2 1/6 SHANE (+)2 Pulses B/l  Resp:  Clear to ausculatation B/L, normal effort  GI: (+) BS Soft, NT, ND  Lymph:  (-)Edema, (-)obvious lymphadenopathy  Skin: Warm to touch, Normal turgor  Psych: Appropriate mood and affect      TELEMETRY: 	  off        ASSESSMENT/PLAN: 	89y  Female wheel chair bound, from home with PMH of PAF on Eliquis, leadless PPM placed by Dr. Evangelina Mcclelland at [facility], ESRD on HD, DM, HTN presented with shortness of breath since few days.    - Keep net negative with HD  - Echo with normal LV function   - Palliative eval noted  - On midodrine for BP support on HD  - Cont eliquis and Amio follow up with Dr. Evangelina Mcclelland for EP  - No need for further inpatient cardiac work up.      Christopher Arteaga MD, [facility]  BEEPER [phone]

## 2021-05-10 NOTE — PROGRESS NOTE ADULT - PROBLEM SELECTOR PLAN 6
Anemia  Hb 8.6/26.4 today  Likely anemia due to chronic kidney disease  Monitor cbc  Iron studies ACD, low total iron and saturation Anemia  Hb 8.3/26.5 today  Likely anemia due to chronic kidney disease  Monitor cbc  Iron studies ACD, low total iron and saturation

## 2021-05-10 NOTE — PROGRESS NOTE ADULT - SUBJECTIVE AND OBJECTIVE BOX
NP Note discussed with  Primary Attending    Patient is a 89y old  Female who presents with a chief complaint of Shortness of breath (09 May 2021 20:17)      INTERVAL HPI/OVERNIGHT EVENTS: no new complaints    MEDICATIONS  (STANDING):  aMIOdarone    Tablet 200 milliGRAM(s) Oral daily  amLODIPine   Tablet 2.5 milliGRAM(s) Oral daily  apixaban 2.5 milliGRAM(s) Oral two times a day  atorvastatin 10 milliGRAM(s) Oral at bedtime  chlorhexidine 2% Cloths 1 Application(s) Topical daily  epoetin maria d-epbx (RETACRIT) Injectable 8000 Unit(s) IV Push <User Schedule>  insulin glargine Injectable (LANTUS) 5 Unit(s) SubCutaneous at bedtime  insulin lispro (ADMELOG) corrective regimen sliding scale   SubCutaneous Before meals and at bedtime  metoprolol tartrate 25 milliGRAM(s) Oral every 8 hours  midodrine 10 milliGRAM(s) Oral <User Schedule>  polyethylene glycol 3350 17 Gram(s) Oral daily  senna 2 Tablet(s) Oral at bedtime    MEDICATIONS  (PRN):  bisacodyl Suppository 10 milliGRAM(s) Rectal daily PRN Constipation      __________________________________________________  REVIEW OF SYSTEMS:  "Im still short of breath"    CONSTITUTIONAL: No fever,   EYES: no acute visual disturbances  NECK: No pain or stiffness  RESPIRATORY: No cough; No shortness of breath  CARDIOVASCULAR: No chest pain, no palpitations  GASTROINTESTINAL: No pain. No nausea or vomiting; No diarrhea   NEUROLOGICAL: No headache or numbness, no tremors  MUSCULOSKELETAL: No joint pain, no muscle pain  GENITOURINARY: no dysuria, no frequency, no hesitancy  PSYCHIATRY: no depression , no anxiety  ALL OTHER  ROS negative        Vital Signs Last 24 Hrs  T(C): 36.6 (10 May 2021 05:25), Max: 36.7 (09 May 2021 21:30)  T(F): 97.8 (10 May 2021 05:25), Max: 98 (09 May 2021 21:30)  HR: 58 (10 May 2021 05:25) (54 - 60)  BP: 150/72 (10 May 2021 05:25) (148/55 - 203/62)  BP(mean): --  RR: 18 (10 May 2021 05:25) (16 - 18)  SpO2: 99% (10 May 2021 05:25) (97% - 100%)    ________________________________________________  PHYSICAL EXAM:  Obese,   GENERAL: NAD  HEENT: Normocephalic;  conjunctivae and sclerae clear; moist mucous membranes;   NECK : supple  CHEST/LUNG: Slight dyspnea, on N/C 2L sat 95%,   HEART: S1 S2  regular; no murmurs, gallops or rubs  ABDOMEN: Soft, Nontender, Nondistended; Bowel sounds present  EXTREMITIES: no cyanosis; no edema; no calf tenderness  SKIN: warm and dry; no rash  NERVOUS SYSTEM:  Awake and alert; Oriented  to place, person and time ; no new deficits    _________________________________________________  LABS:                        8.3    7.44  )-----------( 215      ( 10 May 2021 06:40 )             26.5     05-10    136  |  99  |  35<H>  ----------------------------<  136<H>  3.9   |  26  |  4.86<H>    Ca    8.1<L>      10 May 2021 06:40  Phos  4.1     05-10  Mg     2.3     05-10          CAPILLARY BLOOD GLUCOSE      POCT Blood Glucose.: 151 mg/dL (10 May 2021 07:47)  POCT Blood Glucose.: 303 mg/dL (09 May 2021 21:41)  POCT Blood Glucose.: 276 mg/dL (09 May 2021 17:25)  POCT Blood Glucose.: 284 mg/dL (09 May 2021 11:16)      RADIOLOGY & ADDITIONAL TESTS:    Xray Chest 1 View- PORTABLE-Urgent (05.04.21 @ 01:48) >  EXAM:  XR CHEST PORTABLE URGENT 1V                            PROCEDURE DATE:  05/04/2021          INTERPRETATION:  INDICATION: Chest Pain    PRIORS: None    VIEWS: Portable AP radiography of the chest performed. Evaluation limited secondary to shallow inspiration.    FINDINGS: Heart size appears within normal limits. A right-sided dual-lumen central venous catheter is identified, the distal tips overlying the expected region of the SVC/RA confluence. Note is made of an indwelling Micra pacemaker. No hilar or superior mediastinal abnormalities are identified. Interstitial prominence bilaterally may reflect shallow inspiration. Mild interstitial edema cannot be excluded. No pleural effusion or pneumothorax is demonstrated. No mediastinal shift is noted. Degenerative changes thoracic spine.    IMPRESSION: Interstitial prominence may reflect shallow inspiration. Mild interstitial edema cannot be excluded.    < end of copied text >    Transthoracic Echocardiogram (05.05.21 @ 13:44) >      PROCEDURE: Transthoracic echocardiogram with 2-D, M-Mode  and complete spectral and color flow Doppler.  INDICATION: Dyspnea, unspecified (R06.00)  HISTORY:  ------------------------------------------------------------------------  DIMENSIONS:  Dimensions:     Normal Values:  LA:     3.5 cm    2.0 - 4.0 cm  Ao:     3.2 cm    2.0 - 3.8 cm  SEPTUM: 1.0 cm    0.6 - 1.2 cm  PWT:    1.0 cm    0.6 - 1.1 cm  LVIDd:  4.5 cm    3.0 - 5.6 cm  LVIDs:  3.2 cm    1.8 - 4.0 cm      Derived Variables:  LVMI: 76 g/m2  RWT: 0.44  Ejection Fraction Visual Estimate: >55 %    ------------------------------------------------------------------------  OBSERVATIONS:  Mitral Valve: Mitral annular calcification, and calcified  mitral leaflets with normal diastolic opening. Mild to  moderate mitral regurgitation.  Aortic Root: Normal aortic root.  Aortic Valve: Aortic valve leaflet morphology not well  visualized, opens normally.  Left Atrium: Normal left atrium.  LA volume index = 22  cc/m2.  Left Ventricle: Endocardium not well visualized; grossly  normal left ventricular systolic function.   Segmental wall  motion could not be assessed. Normal left ventricular  internal dimensions and wall thicknesses.  Right Heart: Right ventricle not well visualized.  Pericardium/PleuraNormal pericardium with no pericardial  effusion. Bilateral pleural effusions.  Hemodynamic: Incomplete tricuspid regurgitation jet  precludes accurate assessment of pulmonary artery systolic  pressure.  ------------------------------------------------------------------------  CONCLUSIONS:  TECHNICALLY VERY DIFFICULT STUDY, POOR ACOUSTIC WINDOWS    1. Mitral annular calcification, and calcified mitral  leaflets with normal diastolic opening. Mild to moderate  mitral regurgitation.  2. Normal left ventricular internal dimensions and wall  thicknesses.  3. Endocardium not well visualized; grossly normal left  ventricular systolic function.   Segmental wall motion  could not be assessed.  4. Right ventricle not well visualized.  5. Bilateral pleural effusions.    < end of copied text >      Imaging Personally Reviewed:  YES  Consultant(s) Notes Reviewed:   YES  Care Discussed with Consultants :     Plan of care was discussed with patient and /or primary care giver; all questions and concerns were addressed and care was aligned with patient's wishes.

## 2021-05-10 NOTE — PROGRESS NOTE ADULT - PROBLEM SELECTOR PLAN 2
Pt gets regular dialysis on T,T,S  Last HD 5/6. Plan for Dialysis 5/8  Monitor BMP  c/w meza catheter, pt oliguric  Avoid nephrotoxic medications  Nephrology Dr. Huitron Pt gets regular dialysis on T,T,S  -Last HD 5/8, next HD 5/11  -Nephrology Dr. Huitron  -Avoid nephrotoxic medications Pt gets regular dialysis on T,T,S  -Last HD 5/8, next HD 5/11  -On midodrine for BP support on HD  -Nephrology Dr. Hiutron  -Avoid nephrotoxic medications

## 2021-05-10 NOTE — PROGRESS NOTE ADULT - ASSESSMENT
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 Pt a/w acute hypoxic respiratory distress 2/2 pulmonary edema requiring BIPAP. Pt now hypotensive and bradycardic. Nephrology consulted for ESRD status.     1) ESRD: HD TTS. Continue with maintenance hemodialysis treatment. Monitor BMP.  2) Hyperkalemia- resolved with HD. c/w low K diet. Monitor lytes.   3) Hypotension- with bradycardia, now with very variable and fluctuating BPs. Pt is on metoprolol and midodrine. Management per cardiology.  Bradycardia- with recent PPM placement; resolved. Cards following. Monitor BP/HR.   4) Anemia of renal disease: Hb improving. Will avoid IV iron due to elevated Ferritin. c/w Epogen 8k units IV tiw with HD. Monitor Hb.  5) Hyperphosphatemia: Phosphorus acceptable. No need for phos binders at this time. c/w low phos diet. Monitor serum calcium and phosphorus.      [facility] NEPHROLOGY  Brad Chen M.D.  Oneal Tim D.O.  Roselia Huitron M.D.  Milli Platt, MSN, ANP-C  [phone]    [address] [place] [address]   day(s)/9

## 2021-05-10 NOTE — PROGRESS NOTE ADULT - ASSESSMENT
Pt is a 89 y/o F, wheel chair bound, from home with PMH of Afib on Eliquis, ESRD on HD, DM, HTN presented with shortness of breath since few days. Pt also complaining of chest discomfort. As per pt's daughter Johnny batres, pt started to develop gradually shortness of breath since few days at rest. She mentioned that the pt was admitted recently to [facility] for shortness of breath and chest pain, leadless pacemaker was placed, discharged on april 29th. She has been regularly getting dialysis on Tuesday/thursday / saturday.  Patient admitted to ICU for SOB 2/2 fluid overload and was downgraded to medicine for management. Nephrology consulted, last HD 5/8. Cardiology consulted for management of SOB and Afib. Echo with EF>55%, mild to moderate mitral regurgitation and B/L pleural effusions.  PT evaluation done with recommendation for home PT, patient tis wheelchair bound and has own rolling walker. Pt is a 89 y/o F, wheel chair bound, from home with PMH of Afib on Eliquis, ESRD on HD, DM, HTN presented with shortness of breath since few days. Pt also complaining of chest discomfort. As per pt's daughter Johnny batres, pt started to develop gradually shortness of breath since few days at rest. She mentioned that the pt was admitted recently to [facility] for shortness of breath and chest pain, leadless pacemaker was placed, discharged on april 29th. She has been regularly getting dialysis on Tuesday/thursday / saturday.  Patient admitted to ICU for SOB 2/2 fluid overload and was downgraded to medicine for management. Nephrology consulted, last HD 5/8. Cardiology consulted for management of SOB and Afib. Echo with EF>55%, mild to moderate mitral regurgitation and B/L pleural effusions.  PT evaluation done with recommendation for home PT, patient tis wheelchair bound and has own rolling walker.  5/10-Pt. seen and examined, noted A&O x 3 with slight dyspnea, reports feeling better.  Pt. scheduled for HD tomorrow after which she can be discharged.  Called dialysis to request early session.  Pt.'s daughter Hina called at [phone], updated re pt.'s condition, plan of care and discharge to home with home PT tomorrow discussed and agreed.

## 2021-05-10 NOTE — PROGRESS NOTE ADULT - PROBLEM SELECTOR PLAN 4
Possible CHF given elevated pro BNP and pulmonary edema  elevation in proBNP CHF vs ESRD  Echo with EF>55%  Need to obtain record from [facility]  Cardio Consult Dr Arteaga Possible CHF given elevated pro BNP and pulmonary edema  -elevation in proBNP CHF vs ESRD  -Echo with EF>55%  -Card Dr. Arteaga  -No further cardiac w/u indicated

## 2021-05-10 NOTE — PROGRESS NOTE ADULT - SUBJECTIVE AND OBJECTIVE BOX
[facility] NEPHROLOGY- PROGRESS NOTE    Patient is a 89yo Female with ESRD on HD, Afib on Eliquis, HTN, Intradialytic hypotension req Midodrine prior to HD, failed Rt AVF due to steal syndrome s/p ligation, h/o COVID in March, s/p PPM 2 weeks ago p/w SOB and chest comfort. Pt a/w acute hypoxic respiratory distress 2/2 pulmonary edema requiring BIPAP. Pt now hypotensive and bradycardic. Nephrology consulted for ESRD status.     Hospital Medications: Medications reviewed.  REVIEW OF SYSTEMS: Denies any SOB, chest pain, n/v/d or abd pain +iconstipated    VITALS:  T(F): 98.1 (05-10-21 @ 20:22), Max: 98.3 (05-10-21 @ 13:46)  HR: 60 (05-10-21 @ 20:22)  BP: 173/59 (05-10-21 @ 20:22)  RR: 16 (05-10-21 @ 20:22)  SpO2: 97% (05-10-21 @ 20:22)  Wt(kg): --    PHYSICAL EXAM:  Gen: NAD,   HEENT: anicteric;  Neck: no JVD  Cards: +S1/S2,   Resp: mild rales at bases b/l  GI: soft, NT/ND, NABS  Extremities: +trace-1+ LE edema B/L  Access: Rt IJ tunneled HD catheter- benign    LABS:  05-10    136  |  99  |  35<H>  ----------------------------<  136<H>  3.9   |  26  |  4.86<H>    Ca    8.1<L>      10 May 2021 06:40  Phos  4.1     05-10  Mg     2.3     05-10      Creatinine Trend: 4.86 <--, 3.75 <--, 5.37 <--, 3.65 <--, 5.55 <--, 5.12 <--                        8.3    7.44  )-----------( 215      ( 10 May 2021 06:40 )             26.5     Urine Studies:  Urinalysis Basic - ( 06 May 2021 04:15 )    Color: Yellow / Appearance: very cloudy / S.015 / pH:   Gluc:  / Ketone: Negative  / Bili: Negative / Urobili: Negative   Blood:  / Protein: 500 mg/dL / Nitrite: Negative   Leuk Esterase: Moderate / RBC: >50 /HPF / WBC >50 /HPF   Sq Epi:  / Non Sq Epi: Occasional /HPF / Bacteria: Moderate /HPF

## 2021-05-11 DIAGNOSIS — I95.9 HYPOTENSION, UNSPECIFIED: ICD-10-CM

## 2021-05-11 LAB
ANION GAP SERPL CALC-SCNC: 10 MMOL/L — SIGNIFICANT CHANGE UP (ref 5–17)
BUN SERPL-MCNC: 55 MG/DL — HIGH (ref 7–18)
CALCIUM SERPL-MCNC: 7.7 MG/DL — LOW (ref 8.4–10.5)
CHLORIDE SERPL-SCNC: 97 MMOL/L — SIGNIFICANT CHANGE UP (ref 96–108)
CO2 SERPL-SCNC: 26 MMOL/L — SIGNIFICANT CHANGE UP (ref 22–31)
CREAT SERPL-MCNC: 6.26 MG/DL — HIGH (ref 0.5–1.3)
GLUCOSE BLDC GLUCOMTR-MCNC: 229 MG/DL — HIGH (ref 70–99)
GLUCOSE BLDC GLUCOMTR-MCNC: 232 MG/DL — HIGH (ref 70–99)
GLUCOSE BLDC GLUCOMTR-MCNC: 236 MG/DL — HIGH (ref 70–99)
GLUCOSE BLDC GLUCOMTR-MCNC: 281 MG/DL — HIGH (ref 70–99)
GLUCOSE BLDC GLUCOMTR-MCNC: 292 MG/DL — HIGH (ref 70–99)
GLUCOSE BLDC GLUCOMTR-MCNC: 292 MG/DL — HIGH (ref 70–99)
GLUCOSE SERPL-MCNC: 297 MG/DL — HIGH (ref 70–99)
HCT VFR BLD CALC: 26.3 % — LOW (ref 34.5–45)
HGB BLD-MCNC: 8.4 G/DL — LOW (ref 11.5–15.5)
MCHC RBC-ENTMCNC: 31.6 PG — SIGNIFICANT CHANGE UP (ref 27–34)
MCHC RBC-ENTMCNC: 31.9 GM/DL — LOW (ref 32–36)
MCV RBC AUTO: 98.9 FL — SIGNIFICANT CHANGE UP (ref 80–100)
NRBC # BLD: 0 /100 WBCS — SIGNIFICANT CHANGE UP (ref 0–0)
PLATELET # BLD AUTO: 230 K/UL — SIGNIFICANT CHANGE UP (ref 150–400)
POTASSIUM SERPL-MCNC: 4.3 MMOL/L — SIGNIFICANT CHANGE UP (ref 3.5–5.3)
POTASSIUM SERPL-SCNC: 4.3 MMOL/L — SIGNIFICANT CHANGE UP (ref 3.5–5.3)
RBC # BLD: 2.66 M/UL — LOW (ref 3.8–5.2)
RBC # FLD: 15.7 % — HIGH (ref 10.3–14.5)
SODIUM SERPL-SCNC: 133 MMOL/L — LOW (ref 135–145)
WBC # BLD: 7.67 K/UL — SIGNIFICANT CHANGE UP (ref 3.8–10.5)
WBC # FLD AUTO: 7.67 K/UL — SIGNIFICANT CHANGE UP (ref 3.8–10.5)

## 2021-05-11 RX ORDER — SODIUM CHLORIDE 9 MG/ML
250 INJECTION INTRAMUSCULAR; INTRAVENOUS; SUBCUTANEOUS ONCE
Refills: 0 | Status: DISCONTINUED | OUTPATIENT
Start: 2021-05-11 | End: 2021-05-13

## 2021-05-11 RX ORDER — FOLIC ACID 0.8 MG
5 TABLET ORAL
Qty: 0 | Refills: 0 | DISCHARGE

## 2021-05-11 RX ORDER — DIPHENHYDRAMINE HCL 50 MG
25 CAPSULE ORAL ONCE
Refills: 0 | Status: DISCONTINUED | OUTPATIENT
Start: 2021-05-11 | End: 2021-05-17

## 2021-05-11 RX ORDER — CALCIUM ACETATE 667 MG
2 TABLET ORAL
Qty: 0 | Refills: 0 | DISCHARGE

## 2021-05-11 RX ADMIN — ERYTHROPOIETIN 8000 UNIT(S): 10000 INJECTION, SOLUTION INTRAVENOUS; SUBCUTANEOUS at 12:46

## 2021-05-11 RX ADMIN — Medication 2: at 21:34

## 2021-05-11 RX ADMIN — CHLORHEXIDINE GLUCONATE 1 APPLICATION(S): 213 SOLUTION TOPICAL at 10:00

## 2021-05-11 RX ADMIN — SENNA PLUS 2 TABLET(S): 8.6 TABLET ORAL at 21:34

## 2021-05-11 RX ADMIN — Medication 10 MILLIGRAM(S): at 08:21

## 2021-05-11 RX ADMIN — Medication 25 MILLIGRAM(S): at 21:34

## 2021-05-11 RX ADMIN — AMLODIPINE BESYLATE 2.5 MILLIGRAM(S): 2.5 TABLET ORAL at 06:07

## 2021-05-11 RX ADMIN — Medication 30 MILLILITER(S): at 10:25

## 2021-05-11 RX ADMIN — APIXABAN 2.5 MILLIGRAM(S): 2.5 TABLET, FILM COATED ORAL at 06:07

## 2021-05-11 RX ADMIN — Medication 2: at 07:44

## 2021-05-11 RX ADMIN — Medication 3: at 17:06

## 2021-05-11 RX ADMIN — ATORVASTATIN CALCIUM 10 MILLIGRAM(S): 80 TABLET, FILM COATED ORAL at 21:32

## 2021-05-11 RX ADMIN — INSULIN GLARGINE 5 UNIT(S): 100 INJECTION, SOLUTION SUBCUTANEOUS at 21:33

## 2021-05-11 RX ADMIN — APIXABAN 2.5 MILLIGRAM(S): 2.5 TABLET, FILM COATED ORAL at 18:39

## 2021-05-11 NOTE — PROGRESS NOTE ADULT - SUBJECTIVE AND OBJECTIVE BOX
Patient denies chest pain or shortness of breath.   Review of systems otherwise (-)  	  aluminum hydroxide/magnesium hydroxide/simethicone Suspension 30 milliLiter(s) Oral every 6 hours PRN  aMIOdarone    Tablet 200 milliGRAM(s) Oral daily  amLODIPine   Tablet 2.5 milliGRAM(s) Oral daily  apixaban 2.5 milliGRAM(s) Oral two times a day  atorvastatin 10 milliGRAM(s) Oral at bedtime  bisacodyl Suppository 10 milliGRAM(s) Rectal daily PRN  chlorhexidine 2% Cloths 1 Application(s) Topical daily  diphenhydrAMINE   Injectable 25 milliGRAM(s) IV Push once  epoetin maria d-epbx (RETACRIT) Injectable 8000 Unit(s) IV Push <User Schedule>  insulin glargine Injectable (LANTUS) 5 Unit(s) SubCutaneous at bedtime  insulin lispro (ADMELOG) corrective regimen sliding scale   SubCutaneous Before meals and at bedtime  metoprolol tartrate 25 milliGRAM(s) Oral every 8 hours  midodrine 10 milliGRAM(s) Oral <User Schedule>  polyethylene glycol 3350 17 Gram(s) Oral daily  senna 2 Tablet(s) Oral at bedtime                            8.4    7.67  )-----------( 230      ( 11 May 2021 11:02 )             26.3       Hemoglobin: 8.4 g/dL (05-11 @ 11:02)  Hemoglobin: 8.3 g/dL (05-10 @ 06:40)  Hemoglobin: 8.6 g/dL (05-07 @ 08:20)      05-11    133<L>  |  97  |  55<H>  ----------------------------<  297<H>  4.3   |  26  |  6.26<H>    Ca    7.7<L>      11 May 2021 11:02  Phos  4.1     05-10  Mg     2.3     05-10      Creatinine Trend: 6.26<--, 4.86<--, 3.75<--, 5.37<--, 3.65<--, 5.55<--    COAGS:           T(C): 36.7 (05-11-21 @ 11:00), Max: 36.8 (05-10-21 @ 13:46)  HR: 62 (05-11-21 @ 11:00) (59 - 67)  BP: 169/66 (05-11-21 @ 11:00) (153/55 - 191/58)  RR: 18 (05-11-21 @ 11:00) (16 - 18)  SpO2: 100% (05-11-21 @ 11:00) (95% - 100%)  Wt(kg): --    I&O's Summary        HEENT:  (-)icterus (-)pallor  CV: N S1 S2 1/6 SHANE (+)2 Pulses B/l  Resp:  Clear to ausculatation B/L, normal effort  GI: (+) BS Soft, NT, ND  Lymph:  (-)Edema, (-)obvious lymphadenopathy  Skin: Warm to touch, Normal turgor  Psych: Appropriate mood and affect      TELEMETRY: 	  off        ASSESSMENT/PLAN: 	89y  Female wheel chair bound, from home with PMH of PAF on Eliquis, leadless PPM placed by Dr. Evangelina Mcclelland at [facility], ESRD on HD, DM, HTN presented with shortness of breath since few days.    - Keep net negative with HD  - Echo with normal LV function   - Palliative eval noted  - On midodrine for BP support on HD, would consider D/C since she is hypertensive now will d/w Nephrology  - Cont eliquis and Amio follow up with Dr. Evangelina Mcclelland for EP  - No need for further inpatient cardiac work up.      Christopher Arteaga MD, [facility]  BEEPER [phone]

## 2021-05-11 NOTE — PROGRESS NOTE ADULT - PROBLEM SELECTOR PLAN 4
Pt is on Eliquis at home  -Cont Eliquis, Amiodarone and Metoprolol   -Recent wireless pacemaker place 2 weeks ago  -card Dr. Arteaga  -will transfer to tele floor per dr. Arteaga Pt is on Eliquis at home  -Cont Eliquis, Amiodarone and Metoprolol   -Recent wireless pacemaker place 2 weeks ago  -card Dr. Arteaga  -will transfer to tele floor per dr. Arteaga  -outpt cardiology dr. Anthony gee [phone]

## 2021-05-11 NOTE — PROGRESS NOTE ADULT - ASSESSMENT
Patient is a 89yo Female with ESRD on HD, Afib on Eliquis, HTN, Intradialytic hypotension req Midodrine prior to HD, failed Rt AVF due to steal syndrome s/p ligation, h/o COVID in March, s/p PPM 2 weeks ago p/w SOB and chest comfort. Pt a/w acute hypoxic respiratory distress 2/2 pulmonary edema requiring BIPAP. Pt now hypotensive and bradycardic. Nephrology consulted for ESRD status.     1) ESRD: HD TTS.  Consider a pure UF tomorrow for volume removal. Continue with maintenance hemodialysis treatment. Monitor BMP.  2) Hyperkalemia- resolved with HD. c/w low K diet. Monitor lytes.   3) Hypotension- today with tachycardia (previously with bradycardia), now with very variable and fluctuating BPs. Hypotension is limiting HD.  D/w cardiology, who will consider medication changes.  Bradycardia- with recent PPM placement; resolved. Cards following. Monitor BP/HR.   4) Anemia of renal disease: Hb improving. Will avoid IV iron due to elevated Ferritin. c/w Epogen 8k units IV tiw with HD. Monitor Hb.  5) Hyperphosphatemia: Phosphorus acceptable. No need for phos binders at this time. c/w low phos diet. Monitor serum calcium and phosphorus.      [facility] NEPHROLOGY  Brad Chen M.D.  Oneal Tim D.O.  Roselia Huitron M.D.  Milli Platt, MSN, ANP-C  [phone]    [address] [place] [address]

## 2021-05-11 NOTE — PROGRESS NOTE ADULT - PROBLEM SELECTOR PLAN 6
Pt takes Lantus 10 U at bedtime and sliding scale Humalog at home  Continue sliding scale Humalog  Lantus 5u  HBA1C 6.6

## 2021-05-11 NOTE — PROGRESS NOTE ADULT - PROBLEM SELECTOR PLAN 3
Pt gets regular dialysis on T,T,S  -Last HD 5/8, next HD 5/11  -On midodrine for BP support on HD  -Nephrology Dr. Huitron/Gustavo  -Avoid nephrotoxic medications

## 2021-05-11 NOTE — PROGRESS NOTE ADULT - SUBJECTIVE AND OBJECTIVE BOX
[facility] NEPHROLOGY- PROGRESS NOTE    Patient is a 89yo Female with ESRD on HD, Afib on Eliquis, HTN, Intradialytic hypotension req Midodrine prior to HD, failed Rt AVF due to steal syndrome s/p ligation, h/o COVID in March, s/p PPM 2 weeks ago p/w SOB and chest comfort. Pt a/w acute hypoxic respiratory distress 2/2 pulmonary edema requiring BIPAP. Pt now hypotensive and bradycardic. Nephrology consulted for ESRD status.     Pt hypotensive at dialysis today, with mild tachycardia.  Pt required IVF and therefore net + following HD.  Pt on telemetry now.  D/w cardiology.    REVIEW OF SYSTEMS: + SOB, no CP, +weakness.    Vital Signs Last 24 Hrs  T(C): 36.7 (11 May 2021 20:11), Max: 36.9 (11 May 2021 17:00)  T(F): 98 (11 May 2021 20:11), Max: 98.5 (11 May 2021 17:00)  HR: 81 (11 May 2021 20:11) (59 - 103)  BP: 158/54 (11 May 2021 20:11) (91/46 - 199/60)  RR: 20 (11 May 2021 20:11) (16 - 24)  SpO2: 100% (11 May 2021 20:11) (95% - 100%)      PHYSICAL EXAM:  Gen: NAD,   HEENT: anicteric;  Neck: no JVD  Cards: +S1/S2,   Resp: CTAB, no w/r/r  GI: soft, NT/ND, NABS  Extremities: +trace-1+ LE edema B/L  Access: Rt IJ tunneled HD catheter- benign    LABS:      133<L>  |  97  |  55<H>  ----------------------------<  297<H>  4.3   |  26  |  6.26<H>    Ca    7.7<L>      11 May 2021 11:02  Phos  4.1     05-10  Mg     2.3     05-10      Creatinine Trend: 6.26 <--, 4.86 <--, 3.75 <--, 5.37 <--, 3.65 <--                        8.4    7.67  )-----------( 230      ( 11 May 2021 11:02 )             26.3     Urine Studies:  Urinalysis Basic - ( 06 May 2021 04:15 )    Color: Yellow / Appearance: very cloudy / S.015 / pH:   Gluc:  / Ketone: Negative  / Bili: Negative / Urobili: Negative   Blood:  / Protein: 500 mg/dL / Nitrite: Negative   Leuk Esterase: Moderate / RBC: >50 /HPF / WBC >50 /HPF   Sq Epi:  / Non Sq Epi: Occasional /HPF / Bacteria: Moderate /HPF

## 2021-05-11 NOTE — PROGRESS NOTE ADULT - SUBJECTIVE AND OBJECTIVE BOX
NP Note discussed with  Primary Attending    Patient is a 89y old  Female who presents with a chief complaint of Shortness of breath (11 May 2021 12:15)      INTERVAL HPI/OVERNIGHT EVENTS: seen at bedside in HD floor, pt complains of palpitation, denies chest pain, sob, or headache    MEDICATIONS  (STANDING):  aMIOdarone    Tablet 200 milliGRAM(s) Oral daily  amLODIPine   Tablet 2.5 milliGRAM(s) Oral daily  apixaban 2.5 milliGRAM(s) Oral two times a day  atorvastatin 10 milliGRAM(s) Oral at bedtime  chlorhexidine 2% Cloths 1 Application(s) Topical daily  diphenhydrAMINE   Injectable 25 milliGRAM(s) IV Push once  epoetin maria d-epbx (RETACRIT) Injectable 8000 Unit(s) IV Push <User Schedule>  insulin glargine Injectable (LANTUS) 5 Unit(s) SubCutaneous at bedtime  insulin lispro (ADMELOG) corrective regimen sliding scale   SubCutaneous Before meals and at bedtime  metoprolol tartrate 25 milliGRAM(s) Oral every 8 hours  midodrine 10 milliGRAM(s) Oral <User Schedule>  polyethylene glycol 3350 17 Gram(s) Oral daily  senna 2 Tablet(s) Oral at bedtime  sodium chloride 0.9% Bolus 250 milliLiter(s) IV Bolus once    MEDICATIONS  (PRN):  aluminum hydroxide/magnesium hydroxide/simethicone Suspension 30 milliLiter(s) Oral every 6 hours PRN Dyspepsia  bisacodyl Suppository 10 milliGRAM(s) Rectal daily PRN Constipation      __________________________________________________  REVIEW OF SYSTEMS:    CONSTITUTIONAL: No fever,   EYES: no acute visual disturbances  NECK: No pain or stiffness  RESPIRATORY: No cough; No shortness of breath  CARDIOVASCULAR: No chest pain, + palpitations  GASTROINTESTINAL: No pain. No nausea or vomiting; No diarrhea   NEUROLOGICAL: No headache or numbness, no tremors  MUSCULOSKELETAL: No joint pain, no muscle pain  GENITOURINARY: no dysuria, no frequency, no hesitancy  PSYCHIATRY: no depression , no anxiety  ALL OTHER  ROS negative        Vital Signs Last 24 Hrs  T(C): 36.7 (11 May 2021 11:00), Max: 36.8 (10 May 2021 15:10)  T(F): 98 (11 May 2021 11:00), Max: 98.2 (10 May 2021 15:10)  HR: 62 (11 May 2021 11:00) (59 - 67)  BP: 169/66 (11 May 2021 11:00) (153/55 - 191/58)  BP(mean): --  RR: 18 (11 May 2021 11:00) (16 - 18)  SpO2: 100% (11 May 2021 11:00) (95% - 100%)    ________________________________________________  PHYSICAL EXAM:  GENERAL: NAD, anxious  HEENT: Normocephalic;  conjunctivae and sclerae clear; moist mucous membranes;   NECK : supple  CHEST/LUNG: Clear to auscultation bilaterally with fair air entry   HEART: S1 S2 irregular. tachycardic,  no murmurs, gallops or rubs  ABDOMEN: Soft, Nontender, Nondistended; Bowel sounds present  EXTREMITIES: no cyanosis; no edema; no calf tenderness  SKIN: warm and dry; no rash  NERVOUS SYSTEM:  Awake and alert; Oriented  to place, person      _________________________________________________  LABS:                        8.4    7.67  )-----------( 230      ( 11 May 2021 11:02 )             26.3     05-11    133<L>  |  97  |  55<H>  ----------------------------<  297<H>  4.3   |  26  |  6.26<H>    Ca    7.7<L>      11 May 2021 11:02  Phos  4.1     05-10  Mg     2.3     05-10          CAPILLARY BLOOD GLUCOSE      POCT Blood Glucose.: 229 mg/dL (11 May 2021 11:49)  POCT Blood Glucose.: 236 mg/dL (11 May 2021 07:14)  POCT Blood Glucose.: 297 mg/dL (10 May 2021 21:05)  POCT Blood Glucose.: 292 mg/dL (10 May 2021 16:31)  POCT Blood Glucose.: 292 mg/dL (10 May 2021 16:31)  POCT Blood Glucose.: 292 mg/dL (10 May 2021 16:31)        RADIOLOGY & ADDITIONAL TESTS:    Imaging  Reviewed:  YES    Consultant(s) Notes Reviewed:   YES      Plan of care was discussed with patient and /or primary care giver; all questions and concerns were addressed

## 2021-05-11 NOTE — PROGRESS NOTE ADULT - PROBLEM SELECTOR PLAN 1
pt noted hypotensive in HD. -86/48, c/o palpitation no chest pain. O2 sat 96% on 2L.   prior to HD. BP was noted 187/70, HR 69.   -Pt takes amiodarone, eliquis, BB   -give IV bolus 250ml x 1.   -f/u EKG  -cardiology dr. Arteaga  -will need tele monitor for Afib with RVR pt noted hypotensive in HD. -86/48, c/o palpitation no chest pain. O2 sat 96% on 2L.   prior to HD. BP was noted 187/70, HR 69.   -Pt takes amiodarone, eliquis, BB   -give IV bolus 250ml x 1.   -f/u EKG  -cardiology dr. Arteaga  -will need tele monitor for Afib with RVR  -repeat VS after bolus.

## 2021-05-11 NOTE — PROGRESS NOTE ADULT - PROBLEM SELECTOR PLAN 2
Likely secondary to fluid overload  -Downgraded from ICU  -HD scheduled for tomorrow 5/11  -Not in distress saturating 96% on 2L NC

## 2021-05-11 NOTE — PROGRESS NOTE ADULT - ASSESSMENT
Pt is a 89 y/o F, wheel chair bound, from home with PMH of Afib on Eliquis, ESRD on HD, DM, HTN presented with shortness of breath since few days. Pt also complaining of chest discomfort. As per pt's daughter Johnny batres, pt started to develop gradually shortness of breath since few days at rest. She mentioned that the pt was admitted recently to [facility] for shortness of breath and chest pain, leadless pacemaker was placed, discharged on april 29th. She has been regularly getting dialysis on Tuesday/thursday / saturday.  Patient admitted to ICU for SOB 2/2 fluid overload and was downgraded to medicine for management. Nephrology consulted, last HD 5/8. Cardiology consulted for management of SOB and Afib. Echo with EF>55%, mild to moderate mitral regurgitation and B/L pleural effusions.  PT evaluation done with recommendation for home PT, patient tis wheelchair bound and has own rolling walker.  5/10-Pt. seen and examined, noted A&O x 3 with slight dyspnea, reports feeling better.  Pt. scheduled for HD tomorrow after which she can be discharged.  Called dialysis to request early session.  Pt.'s daughter Hina called at [phone], updated re pt.'s condition, plan of care and discharge to home with home PT tomorrow discussed and agreed.    5/11- pt BP dropped 86/48 in HD. pt c/o palpitation but no chest pain, feeling sob. Saturating 96% on 2L.  s/p IV bolus 250ml. ECG ordered.   spoke with cardiology dr. Arteaga and nephrology dr. Donahue.  to transfer to tele [facility].  Pt is a 89 y/o F, wheel chair bound, from home with PMH of Afib on Eliquis, ESRD on HD, DM, HTN presented with shortness of breath since few days. Pt also complaining of chest discomfort. As per pt's daughter Johnny batres, pt started to develop gradually shortness of breath since few days at rest. She mentioned that the pt was admitted recently to [facility] for shortness of breath and chest pain, leadless pacemaker was placed, discharged on april 29th. She has been regularly getting dialysis on Tuesday/thursday / saturday.  Patient admitted to ICU for SOB 2/2 fluid overload and was downgraded to medicine for management. Nephrology consulted, last HD 5/8. Cardiology consulted for management of SOB and Afib. Echo with EF>55%, mild to moderate mitral regurgitation and B/L pleural effusions.  PT evaluation done with recommendation for home PT, patient tis wheelchair bound and has own rolling walker.  5/10-Pt. seen and examined, noted A&O x 3 with slight dyspnea, reports feeling better.  Pt. scheduled for HD tomorrow after which she can be discharged.  Called dialysis to request early session.  Pt.'s daughter Hina called at [phone], updated re pt.'s condition, plan of care and discharge to home with home PT tomorrow discussed and agreed.    5/11- pt BP dropped 86/48 in HD. pt c/o palpitation but no chest pain, feeling sob. Saturating 96% on 2L.  s/p IV bolus 250ml. ECG ordered.   spoke with cardiology dr. Arteaga and nephrology dr. Donahue.  to transfer to tele floor.     Updated condition to Hina daughter [phone], Full code as wishes

## 2021-05-12 LAB
ALBUMIN SERPL ELPH-MCNC: 2.6 G/DL — LOW (ref 3.5–5)
ALP SERPL-CCNC: 118 U/L — SIGNIFICANT CHANGE UP (ref 40–120)
ALT FLD-CCNC: 18 U/L DA — SIGNIFICANT CHANGE UP (ref 10–60)
ANION GAP SERPL CALC-SCNC: 6 MMOL/L — SIGNIFICANT CHANGE UP (ref 5–17)
AST SERPL-CCNC: 18 U/L — SIGNIFICANT CHANGE UP (ref 10–40)
BILIRUB SERPL-MCNC: 0.3 MG/DL — SIGNIFICANT CHANGE UP (ref 0.2–1.2)
BUN SERPL-MCNC: 30 MG/DL — HIGH (ref 7–18)
CALCIUM SERPL-MCNC: 8.4 MG/DL — SIGNIFICANT CHANGE UP (ref 8.4–10.5)
CHLORIDE SERPL-SCNC: 104 MMOL/L — SIGNIFICANT CHANGE UP (ref 96–108)
CO2 SERPL-SCNC: 29 MMOL/L — SIGNIFICANT CHANGE UP (ref 22–31)
CREAT SERPL-MCNC: 4.18 MG/DL — HIGH (ref 0.5–1.3)
GLUCOSE BLDC GLUCOMTR-MCNC: 150 MG/DL — HIGH (ref 70–99)
GLUCOSE BLDC GLUCOMTR-MCNC: 220 MG/DL — HIGH (ref 70–99)
GLUCOSE BLDC GLUCOMTR-MCNC: 236 MG/DL — HIGH (ref 70–99)
GLUCOSE BLDC GLUCOMTR-MCNC: 261 MG/DL — HIGH (ref 70–99)
GLUCOSE SERPL-MCNC: 149 MG/DL — HIGH (ref 70–99)
HCT VFR BLD CALC: 26.9 % — LOW (ref 34.5–45)
HGB BLD-MCNC: 8.3 G/DL — LOW (ref 11.5–15.5)
MAGNESIUM SERPL-MCNC: 2.5 MG/DL — SIGNIFICANT CHANGE UP (ref 1.6–2.6)
MCHC RBC-ENTMCNC: 30.9 GM/DL — LOW (ref 32–36)
MCHC RBC-ENTMCNC: 31.6 PG — SIGNIFICANT CHANGE UP (ref 27–34)
MCV RBC AUTO: 102.3 FL — HIGH (ref 80–100)
NRBC # BLD: 0 /100 WBCS — SIGNIFICANT CHANGE UP (ref 0–0)
PHOSPHATE SERPL-MCNC: 3.7 MG/DL — SIGNIFICANT CHANGE UP (ref 2.5–4.5)
PLATELET # BLD AUTO: 197 K/UL — SIGNIFICANT CHANGE UP (ref 150–400)
POTASSIUM SERPL-MCNC: 4.3 MMOL/L — SIGNIFICANT CHANGE UP (ref 3.5–5.3)
POTASSIUM SERPL-SCNC: 4.3 MMOL/L — SIGNIFICANT CHANGE UP (ref 3.5–5.3)
PROT SERPL-MCNC: 5.9 G/DL — LOW (ref 6–8.3)
RBC # BLD: 2.63 M/UL — LOW (ref 3.8–5.2)
RBC # FLD: 16.2 % — HIGH (ref 10.3–14.5)
SODIUM SERPL-SCNC: 139 MMOL/L — SIGNIFICANT CHANGE UP (ref 135–145)
WBC # BLD: 6.14 K/UL — SIGNIFICANT CHANGE UP (ref 3.8–10.5)
WBC # FLD AUTO: 6.14 K/UL — SIGNIFICANT CHANGE UP (ref 3.8–10.5)

## 2021-05-12 PROCEDURE — 99223 1ST HOSP IP/OBS HIGH 75: CPT

## 2021-05-12 RX ORDER — MIDODRINE HYDROCHLORIDE 2.5 MG/1
10 TABLET ORAL
Refills: 0 | Status: DISCONTINUED | OUTPATIENT
Start: 2021-05-12 | End: 2021-05-17

## 2021-05-12 RX ADMIN — INSULIN GLARGINE 5 UNIT(S): 100 INJECTION, SOLUTION SUBCUTANEOUS at 21:15

## 2021-05-12 RX ADMIN — Medication 25 MILLIGRAM(S): at 18:55

## 2021-05-12 RX ADMIN — Medication 2: at 12:25

## 2021-05-12 RX ADMIN — Medication 2: at 17:21

## 2021-05-12 RX ADMIN — CHLORHEXIDINE GLUCONATE 1 APPLICATION(S): 213 SOLUTION TOPICAL at 12:45

## 2021-05-12 RX ADMIN — POLYETHYLENE GLYCOL 3350 17 GRAM(S): 17 POWDER, FOR SOLUTION ORAL at 12:46

## 2021-05-12 RX ADMIN — Medication 3: at 21:15

## 2021-05-12 RX ADMIN — AMIODARONE HYDROCHLORIDE 200 MILLIGRAM(S): 400 TABLET ORAL at 06:01

## 2021-05-12 RX ADMIN — Medication 25 MILLIGRAM(S): at 06:01

## 2021-05-12 RX ADMIN — AMLODIPINE BESYLATE 2.5 MILLIGRAM(S): 2.5 TABLET ORAL at 06:01

## 2021-05-12 RX ADMIN — ATORVASTATIN CALCIUM 10 MILLIGRAM(S): 80 TABLET, FILM COATED ORAL at 21:15

## 2021-05-12 RX ADMIN — APIXABAN 2.5 MILLIGRAM(S): 2.5 TABLET, FILM COATED ORAL at 06:01

## 2021-05-12 RX ADMIN — APIXABAN 2.5 MILLIGRAM(S): 2.5 TABLET, FILM COATED ORAL at 18:56

## 2021-05-12 RX ADMIN — SENNA PLUS 2 TABLET(S): 8.6 TABLET ORAL at 21:15

## 2021-05-12 NOTE — PROGRESS NOTE ADULT - ASSESSMENT
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 Pt a/w acute hypoxic respiratory distress 2/2 pulmonary edema requiring BIPAP. Pt now hypotensive and bradycardic. Nephrology consulted for ESRD status.     1) ESRD: HD TTS.  Will not do HD today, but await further cardiac optimization.  Volume status is acceptable today. Continue with maintenance hemodialysis treatment. Will try PUF prior to HD tomorrow. Monitor BMP.  2) Hyperkalemia- resolved with HD. c/w low K diet. Monitor lytes.   3) Hypotension- today with bradycardia again, now with very variable and fluctuating BPs. Hypotension is limited HD. Tomorrow will try PUF prior to HD for volume optimization.  D/w cardiology, who will consider medication changes.  Bradycardia- with recent PPM placement; resolved. Cards following. Monitor BP/HR.   4) Anemia of renal disease: Hb improving. Will avoid IV iron due to elevated Ferritin. c/w Epogen 8k units IV tiw with HD. Monitor Hb.  5) Hyperphosphatemia: Phosphorus acceptable. No need for phos binders at this time. c/w low phos diet. Monitor serum calcium and phosphorus.      [facility] NEPHROLOGY  Brad Chen M.D.  Oneal Tim D.O.  Roselia Huitron M.D.  Milli Platt, PARAG, ANP-C  [phone]    [address] [place] [address]

## 2021-05-12 NOTE — PROGRESS NOTE ADULT - PROBLEM SELECTOR PLAN 1
pt noted hypotensive in HD. -86/48, c/o palpitation no chest pain. O2 sat 96% on 2L.   prior to HD. BP was noted 187/70, HR 69.   -Pt takes amiodarone, eliquis, BB   -give IV bolus 250ml x 1.   -f/u EKG  -cardiology dr. Arteaga  -will need tele monitor for Afib with RVR  -repeat VS after bolus.

## 2021-05-12 NOTE — PROGRESS NOTE ADULT - SUBJECTIVE AND OBJECTIVE BOX
[facility] NEPHROLOGY- PROGRESS NOTE    Patient is a 89yo Female with ESRD on HD, Afib on Eliquis, HTN, Intradialytic hypotension req Midodrine prior to HD, failed Rt AVF due to steal syndrome s/p ligation, h/o COVID in March, s/p PPM 2 weeks ago p/w SOB and chest comfort. Pt a/w acute hypoxic respiratory distress 2/2 pulmonary edema requiring BIPAP. Pt now hypotensive and bradycardic. Nephrology consulted for ESRD status.     Pt hypotensive at dialysis today, with mild tachycardia.  Pt required IVF and therefore net + following HD.  Pt on telemetry now.  D/w cardiology.    REVIEW OF SYSTEMS: + SOB, no CP, +weakness.    Vital Signs Last 24 Hrs  T(C): 36.8 (12 May 2021 07:07), Max: 36.9 (11 May 2021 17:00)  T(F): 98.2 (12 May 2021 07:07), Max: 98.5 (11 May 2021 17:00)  HR: 55 (12 May 2021 07:07) (55 - 103)  BP: 150/47 (12 May 2021 07:07) (91/46 - 199/60)  RR: 20 (12 May 2021 07:07) (16 - 24)  SpO2: 100% (12 May 2021 07:07) (95% - 100%)        PHYSICAL EXAM:  Gen: NAD,   HEENT: anicteric;  Neck: no JVD  Cards: +S1/S2,   Resp: CTAB, no w/r/r  GI: soft, NT/ND, NABS  Extremities: +1+ LE edema B/L  Access: Rt IJ tunneled HD catheter- benign        LABS:      139  |  104  |  30<H>  ----------------------------<  149<H>  4.3   |  29  |  4.18<H>    Ca    8.4      12 May 2021 07:27  Phos  3.7       Mg     2.5         TPro  5.9<L>  /  Alb  2.6<L>  /  TBili  0.3  /  DBili      /  AST  18  /  ALT  18  /  AlkPhos  118      Creatinine Trend: 4.18 <--, 6.26 <--, 4.86 <--, 3.75 <--, 5.37 <--                        8.3    6.14  )-----------( 197      ( 12 May 2021 07:27 )             26.9     Urine Studies:  Urinalysis Basic - ( 06 May 2021 04:15 )    Color: Yellow / Appearance: very cloudy / S.015 / pH:   Gluc:  / Ketone: Negative  / Bili: Negative / Urobili: Negative   Blood:  / Protein: 500 mg/dL / Nitrite: Negative   Leuk Esterase: Moderate / RBC: >50 /HPF / WBC >50 /HPF   Sq Epi:  / Non Sq Epi: Occasional /HPF / Bacteria: Moderate /HPF

## 2021-05-12 NOTE — PROGRESS NOTE ADULT - ASSESSMENT
Pt is a 89 y/o F, wheel chair bound, from home with PMH of Afib on Eliquis, ESRD on HD, DM, HTN presented with shortness of breath since few days. Pt also complaining of chest discomfort. As per pt's daughter Johnny batres, pt started to develop gradually shortness of breath since few days at rest. She mentioned that the pt was admitted recently to [facility] for shortness of breath and chest pain, leadless pacemaker was placed, discharged on april 29th. She has been regularly getting dialysis on Tuesday/thursday / saturday.  Patient admitted to ICU for SOB 2/2 fluid overload and was downgraded to medicine for management. Nephrology consulted, last HD 5/8. Cardiology consulted for management of SOB and Afib. Echo with EF>55%, mild to moderate mitral regurgitation and B/L pleural effusions.  PT evaluation done with recommendation for home PT, patient tis wheelchair bound and has own rolling walker.  5/10-Pt. seen and examined, noted A&O x 3 with slight dyspnea, reports feeling better.  Pt. scheduled for HD tomorrow after which she can be discharged.  Called dialysis to request early session.  Pt.'s daughter Hina called at [phone], updated re pt.'s condition, plan of care and discharge to home with home PT tomorrow discussed and agreed.    5/11- pt BP dropped 86/48 in HD. pt c/o palpitation but no chest pain, feeling sob. Saturating 96% on 2L.  s/p IV bolus 250ml. ECG ordered.   spoke with cardiology dr. Arteaga and nephrology dr. Donahue.  to transfer to tele floor.     Updated condition to Hina daughter [phone], Full code as wishes

## 2021-05-12 NOTE — PROGRESS NOTE ADULT - SUBJECTIVE AND OBJECTIVE BOX
PGY-1 Progress Note discussed with attending    PAGER #: [[phone]] TILL 5:00 PM  PLEASE CONTACT ON CALL TEAM:  - On Call Team (Please refer to Sheri) FROM 5:00 PM - 8:30PM  - Nightfloat Team FROM 8:30 -7:30 AM    INTERVAL HPI/OVERNIGHT EVENTS:   No adverse events overnight.    REVIEW OF SYSTEMS:  CONSTITUTIONAL: No fever, weight loss, or fatigue  RESPIRATORY: No cough, wheezing, chills or hemoptysis; No shortness of breath  CARDIOVASCULAR: No chest pain, palpitations, dizziness, or leg swelling  GASTROINTESTINAL: No abdominal pain. No nausea, vomiting, or hematemesis; No diarrhea or constipation. No melena or hematochezia.  GENITOURINARY: No dysuria or hematuria, urinary frequency  NEUROLOGICAL: No headaches, memory loss, loss of strength, numbness, or tremors  SKIN: No itching, burning, rashes, or lesions     Vital Signs Last 24 Hrs  T(C): 36.6 (12 May 2021 16:01), Max: 36.9 (11 May 2021 17:00)  T(F): 97.9 (12 May 2021 16:01), Max: 98.5 (11 May 2021 17:00)  HR: 60 (12 May 2021 16:01) (55 - 83)  BP: 149/68 (12 May 2021 16:01) (148/41 - 199/60)  BP(mean): --  RR: 19 (12 May 2021 16:01) (19 - 20)  SpO2: 95% (12 May 2021 16:01) (95% - 100%)    PHYSICAL EXAMINATION:  GENERAL: NAD, well built  HEAD:  Atraumatic, Normocephalic  EYES:  conjunctiva and sclera clear  NECK: Supple, No JVD, Normal thyroid  CHEST/LUNG: Clear to auscultation. Clear to percussion bilaterally; No rales, rhonchi, wheezing, or rubs  HEART: Regular rate and rhythm; No murmurs, rubs, or gallops  ABDOMEN: Soft, Nontender, Nondistended; Bowel sounds present  NERVOUS SYSTEM:  Alert & Oriented X3,    EXTREMITIES:  2+ Peripheral Pulses, No clubbing, cyanosis, or edema  SKIN: warm dry                          8.3    6.14  )-----------( 197      ( 12 May 2021 07:27 )             26.9     05-12    139  |  104  |  30<H>  ----------------------------<  149<H>  4.3   |  29  |  4.18<H>    Ca    8.4      12 May 2021 07:27  Phos  3.7     05-12  Mg     2.5     05-12    TPro  5.9<L>  /  Alb  2.6<L>  /  TBili  0.3  /  DBili  x   /  AST  18  /  ALT  18  /  AlkPhos  118  05-12    LIVER FUNCTIONS - ( 12 May 2021 07:27 )  Alb: 2.6 g/dL / Pro: 5.9 g/dL / ALK PHOS: 118 U/L / ALT: 18 U/L DA / AST: 18 U/L / GGT: x           CAPILLARY BLOOD GLUCOSE      RADIOLOGY & ADDITIONAL TESTS:

## 2021-05-12 NOTE — PROGRESS NOTE ADULT - PROBLEM SELECTOR PLAN 4
Pt is on Eliquis at home  -Cont Eliquis, Amiodarone and Metoprolol   -Recent wireless pacemaker place 2 weeks ago  -card Dr. Arteaga  -will transfer to tele floor per dr. Arteaga  -outpt cardiology dr. Anthony gee [phone]

## 2021-05-12 NOTE — PROGRESS NOTE ADULT - SUBJECTIVE AND OBJECTIVE BOX
Patient denies chest pain or shortness of breath.   Review of systems otherwise (-)  	  aluminum hydroxide/magnesium hydroxide/simethicone Suspension 30 milliLiter(s) Oral every 6 hours PRN  aMIOdarone    Tablet 200 milliGRAM(s) Oral daily  amLODIPine   Tablet 2.5 milliGRAM(s) Oral daily  apixaban 2.5 milliGRAM(s) Oral two times a day  atorvastatin 10 milliGRAM(s) Oral at bedtime  bisacodyl Suppository 10 milliGRAM(s) Rectal daily PRN  chlorhexidine 2% Cloths 1 Application(s) Topical daily  diphenhydrAMINE   Injectable 25 milliGRAM(s) IV Push once  epoetin maria d-epbx (RETACRIT) Injectable 8000 Unit(s) IV Push <User Schedule>  insulin glargine Injectable (LANTUS) 5 Unit(s) SubCutaneous at bedtime  insulin lispro (ADMELOG) corrective regimen sliding scale   SubCutaneous Before meals and at bedtime  metoprolol tartrate 25 milliGRAM(s) Oral every 8 hours  midodrine 10 milliGRAM(s) Oral <User Schedule>  midodrine. 10 milliGRAM(s) Oral <User Schedule>  polyethylene glycol 3350 17 Gram(s) Oral daily  senna 2 Tablet(s) Oral at bedtime  sodium chloride 0.9% Bolus 250 milliLiter(s) IV Bolus once                            8.3    6.14  )-----------( 197      ( 12 May 2021 07:27 )             26.9       Hemoglobin: 8.3 g/dL (05-12 @ 07:27)  Hemoglobin: 8.4 g/dL (05-11 @ 11:02)  Hemoglobin: 8.3 g/dL (05-10 @ 06:40)      05-12    139  |  104  |  30<H>  ----------------------------<  149<H>  4.3   |  29  |  4.18<H>    Ca    8.4      12 May 2021 07:27  Phos  3.7     05-12  Mg     2.5     05-12    TPro  5.9<L>  /  Alb  2.6<L>  /  TBili  0.3  /  DBili  x   /  AST  18  /  ALT  18  /  AlkPhos  118  05-12    Creatinine Trend: 4.18<--, 6.26<--, 4.86<--, 3.75<--, 5.37<--, 3.65<--    COAGS:           T(C): 36.4 (05-12-21 @ 11:09), Max: 36.9 (05-11-21 @ 17:00)  HR: 61 (05-12-21 @ 11:09) (55 - 103)  BP: 148/41 (05-12-21 @ 11:09) (91/46 - 199/60)  RR: 20 (05-12-21 @ 11:09) (19 - 24)  SpO2: 100% (05-12-21 @ 11:09) (100% - 100%)  Wt(kg): --    I&O's Summary      HEENT:  (-)icterus (-)pallor  CV: N S1 S2 1/6 SHANE (+)2 Pulses B/l  Resp:  Clear to ausculatation B/L, normal effort  GI: (+) BS Soft, NT, ND  Lymph:  (-)Edema, (-)obvious lymphadenopathy  Skin: Warm to touch, Normal turgor  Psych: Appropriate mood and affect      TELEMETRY: 	  off      ASSESSMENT/PLAN: 	89y  Female wheel chair bound, from home with PMH of PAF on Eliquis, leadless PPM placed by Dr. Evangelina Mcclelland at [facility], ESRD on HD, DM, HTN presented with shortness of breath since few days.    - Keep net negative with HD  - Echo with normal LV function   - Palliative eval noted  - On midodrine for BP support on HD, suspect she has some element of dysautonomia  - Cont eliquis and Amio follow up with Dr. Evangelina Mcclelland for EP      Christopher Arteaga MD, [facility]  BEEPER [phone]

## 2021-05-12 NOTE — CONSULT NOTE ADULT - SUBJECTIVE AND OBJECTIVE BOX
Patient is a 89y old  Female who presents with a chief complaint of Shortness of breath (12 May 2021 16:12)      HPI:   Hypotension during dialysis and reportedly as orthostatic dizziness  PAST MEDICAL & SURGICAL HISTORY:  HTN (hypertension)    HLD (hyperlipidemia)    CKD (chronic kidney disease)    Afib    DM (diabetes mellitus)    Artificial pacemaker        FAMILY HISTORY:        Social Hx:  Nonsmoker, no drug or alcohol use    MEDICATIONS  (STANDING):  aMIOdarone    Tablet 200 milliGRAM(s) Oral daily  amLODIPine   Tablet 2.5 milliGRAM(s) Oral daily  apixaban 2.5 milliGRAM(s) Oral two times a day  atorvastatin 10 milliGRAM(s) Oral at bedtime  chlorhexidine 2% Cloths 1 Application(s) Topical daily  diphenhydrAMINE   Injectable 25 milliGRAM(s) IV Push once  epoetin maria d-epbx (RETACRIT) Injectable 8000 Unit(s) IV Push <User Schedule>  insulin glargine Injectable (LANTUS) 5 Unit(s) SubCutaneous at bedtime  insulin lispro (ADMELOG) corrective regimen sliding scale   SubCutaneous Before meals and at bedtime  metoprolol tartrate 25 milliGRAM(s) Oral every 8 hours  midodrine 10 milliGRAM(s) Oral <User Schedule>  midodrine. 10 milliGRAM(s) Oral <User Schedule>  polyethylene glycol 3350 17 Gram(s) Oral daily  senna 2 Tablet(s) Oral at bedtime  sodium chloride 0.9% Bolus 250 milliLiter(s) IV Bolus once       Allergies    No Known Allergies    Intolerances        ROS: Pertinent positives in HPI, all other ROS were reviewed and are negative.      Vital Signs Last 24 Hrs  T(C): 36.6 (12 May 2021 16:01), Max: 36.8 (12 May 2021 07:07)  T(F): 97.9 (12 May 2021 16:01), Max: 98.2 (12 May 2021 07:07)  HR: 60 (12 May 2021 16:01) (55 - 83)  BP: 149/68 (12 May 2021 16:01) (148/41 - 173/83)  BP(mean): --  RR: 19 (12 May 2021 16:01) (19 - 20)  SpO2: 95% (12 May 2021 16:01) (95% - 100%)        Constitutional: awake and alert.  HEENT: PERRLA, EOMI,   Neck: Supple.  Respiratory: Breath sounds are clear bilaterally  Cardiovascular: S1 and S2, regular / irregular rhythm  Gastrointestinal: soft, nontender  Extremities:  no edema  Vascular: Caritid Bruit - no  Musculoskeletal: no joint swelling/tenderness, no abnormal movements  Skin: No rashes    Neurological exam:  HF: A x O x 3. Appropriately interactive, normal affect. Speech fluent, No Aphasia or paraphasic errors. Naming /repetition intact   CN: CIELO, EOMI, VFF, facial sensation normal, no NLFD, tongue midline, Palate moves equally, SCM equal bilaterally  Motor: No pronator drift, Strength 5/5 in all 4 ext, normal bulk and tone, no tremor, rigidity or bradykinesia.    Sens: Intact to light touch / PP/ VS/ JS    Reflexes: Symmetric and normal . BJ 1+, BR1+, KJ 0+, AJ 0, downgoing toes b/l  Coord:  No FNFA, dysmetria, ORVILLE intact   Gait/Balance: Unable to walk without assistance; stands only with assistance    NIHSS:          Labs:                        8.3    6.14  )-----------( 197      ( 12 May 2021 07:27 )             26.9     05-12    139  |  104  |  30<H>  ----------------------------<  149<H>  4.3   |  29  |  4.18<H>    Ca    8.4      12 May 2021 07:27  Phos  3.7     05-12  Mg     2.5     05-12    TPro  5.9<L>  /  Alb  2.6<L>  /  TBili  0.3  /  DBili  x   /  AST  18  /  ALT  18  /  AlkPhos  118  05-12      05-04 Chol 121 LDL -- HDL 89 Trig 42      Radiology report:  - CT head:  - MRI brain  - MRA head/carotids  - EEG

## 2021-05-13 LAB
CRP SERPL-MCNC: 16 MG/L — HIGH
ERYTHROCYTE [SEDIMENTATION RATE] IN BLOOD: 65 MM/HR — HIGH (ref 0–20)
GLUCOSE BLDC GLUCOMTR-MCNC: 123 MG/DL — HIGH (ref 70–99)
GLUCOSE BLDC GLUCOMTR-MCNC: 149 MG/DL — HIGH (ref 70–99)
GLUCOSE BLDC GLUCOMTR-MCNC: 207 MG/DL — HIGH (ref 70–99)
GLUCOSE BLDC GLUCOMTR-MCNC: 215 MG/DL — HIGH (ref 70–99)
INTERPRETATION SERPL IFE-IMP: SIGNIFICANT CHANGE UP
T PALLIDUM AB TITR SER: NEGATIVE — SIGNIFICANT CHANGE UP
TROPONIN I SERPL-MCNC: <0.015 NG/ML — SIGNIFICANT CHANGE UP (ref 0–0.04)

## 2021-05-13 PROCEDURE — 71045 X-RAY EXAM CHEST 1 VIEW: CPT | Mod: 26,77

## 2021-05-13 PROCEDURE — 71045 X-RAY EXAM CHEST 1 VIEW: CPT | Mod: 26

## 2021-05-13 PROCEDURE — 93010 ELECTROCARDIOGRAM REPORT: CPT

## 2021-05-13 RX ORDER — FUROSEMIDE 40 MG
80 TABLET ORAL ONCE
Refills: 0 | Status: COMPLETED | OUTPATIENT
Start: 2021-05-13 | End: 2021-05-13

## 2021-05-13 RX ORDER — MIDODRINE HYDROCHLORIDE 2.5 MG/1
10 TABLET ORAL ONCE
Refills: 0 | Status: COMPLETED | OUTPATIENT
Start: 2021-05-14 | End: 2021-05-14

## 2021-05-13 RX ORDER — ALBUTEROL 90 UG/1
2 AEROSOL, METERED ORAL EVERY 6 HOURS
Refills: 0 | Status: DISCONTINUED | OUTPATIENT
Start: 2021-05-13 | End: 2021-05-17

## 2021-05-13 RX ORDER — BUDESONIDE AND FORMOTEROL FUMARATE DIHYDRATE 160; 4.5 UG/1; UG/1
2 AEROSOL RESPIRATORY (INHALATION)
Refills: 0 | Status: DISCONTINUED | OUTPATIENT
Start: 2021-05-13 | End: 2021-05-16

## 2021-05-13 RX ORDER — FUROSEMIDE 40 MG
40 TABLET ORAL ONCE
Refills: 0 | Status: COMPLETED | OUTPATIENT
Start: 2021-05-13 | End: 2021-05-13

## 2021-05-13 RX ORDER — HYDRALAZINE HCL 50 MG
10 TABLET ORAL ONCE
Refills: 0 | Status: DISCONTINUED | OUTPATIENT
Start: 2021-05-13 | End: 2021-05-13

## 2021-05-13 RX ORDER — METOPROLOL TARTRATE 50 MG
25 TABLET ORAL EVERY 8 HOURS
Refills: 0 | Status: DISCONTINUED | OUTPATIENT
Start: 2021-05-13 | End: 2021-05-17

## 2021-05-13 RX ADMIN — Medication 25 MILLIGRAM(S): at 15:57

## 2021-05-13 RX ADMIN — MIDODRINE HYDROCHLORIDE 10 MILLIGRAM(S): 2.5 TABLET ORAL at 12:25

## 2021-05-13 RX ADMIN — ALBUTEROL 2 PUFF(S): 90 AEROSOL, METERED ORAL at 03:32

## 2021-05-13 RX ADMIN — Medication 80 MILLIGRAM(S): at 19:55

## 2021-05-13 RX ADMIN — BUDESONIDE AND FORMOTEROL FUMARATE DIHYDRATE 2 PUFF(S): 160; 4.5 AEROSOL RESPIRATORY (INHALATION) at 21:18

## 2021-05-13 RX ADMIN — APIXABAN 2.5 MILLIGRAM(S): 2.5 TABLET, FILM COATED ORAL at 18:51

## 2021-05-13 RX ADMIN — AMLODIPINE BESYLATE 2.5 MILLIGRAM(S): 2.5 TABLET ORAL at 05:40

## 2021-05-13 RX ADMIN — ERYTHROPOIETIN 8000 UNIT(S): 10000 INJECTION, SOLUTION INTRAVENOUS; SUBCUTANEOUS at 11:08

## 2021-05-13 RX ADMIN — BUDESONIDE AND FORMOTEROL FUMARATE DIHYDRATE 2 PUFF(S): 160; 4.5 AEROSOL RESPIRATORY (INHALATION) at 15:58

## 2021-05-13 RX ADMIN — POLYETHYLENE GLYCOL 3350 17 GRAM(S): 17 POWDER, FOR SOLUTION ORAL at 15:57

## 2021-05-13 RX ADMIN — INSULIN GLARGINE 5 UNIT(S): 100 INJECTION, SOLUTION SUBCUTANEOUS at 22:04

## 2021-05-13 RX ADMIN — SENNA PLUS 2 TABLET(S): 8.6 TABLET ORAL at 21:22

## 2021-05-13 RX ADMIN — Medication 80 MILLIGRAM(S): at 03:31

## 2021-05-13 RX ADMIN — Medication 40 MILLIGRAM(S): at 10:44

## 2021-05-13 RX ADMIN — Medication 2: at 22:05

## 2021-05-13 RX ADMIN — BUDESONIDE AND FORMOTEROL FUMARATE DIHYDRATE 2 PUFF(S): 160; 4.5 AEROSOL RESPIRATORY (INHALATION) at 03:32

## 2021-05-13 RX ADMIN — ATORVASTATIN CALCIUM 10 MILLIGRAM(S): 80 TABLET, FILM COATED ORAL at 21:18

## 2021-05-13 RX ADMIN — Medication 25 MILLIGRAM(S): at 05:42

## 2021-05-13 RX ADMIN — CHLORHEXIDINE GLUCONATE 1 APPLICATION(S): 213 SOLUTION TOPICAL at 15:57

## 2021-05-13 RX ADMIN — AMIODARONE HYDROCHLORIDE 200 MILLIGRAM(S): 400 TABLET ORAL at 05:40

## 2021-05-13 RX ADMIN — MIDODRINE HYDROCHLORIDE 10 MILLIGRAM(S): 2.5 TABLET ORAL at 15:57

## 2021-05-13 RX ADMIN — Medication 25 MILLIGRAM(S): at 22:07

## 2021-05-13 RX ADMIN — APIXABAN 2.5 MILLIGRAM(S): 2.5 TABLET, FILM COATED ORAL at 05:40

## 2021-05-13 NOTE — PROGRESS NOTE ADULT - PROBLEM SELECTOR PLAN 4
Pt is on Eliquis at home  -Cont Eliquis, Amiodarone and Metoprolol   -Recent wireless pacemaker place 2 weeks ago  -card Dr. Arteaga  -will transfer to tele floor per dr. Arteaga  -outpt cardiology dr. Antohny gee [phone]

## 2021-05-13 NOTE — PROGRESS NOTE ADULT - ASSESSMENT
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 Pt a/w acute hypoxic respiratory distress 2/2 pulmonary edema requiring BIPAP. Pt now hypotensive and bradycardic. Nephrology consulted for ESRD status.     1) ESRD: Will plan for daily dialysis for now, though I am not sure that fluid overload is solely responsible for pt's respiratory failure. Consider pulmonology evaluation.  Pt to be placed on bipap per primary team.  Continue Midodrine 10mg pre-HD and 2h into HD for hypotension thought to be due to dysautonomia.    2) Hyperkalemia- resolved with HD. c/w low K diet. Monitor lytes.   3) Hypotension- with bradycardia again, with very variable and fluctuating BPs. Hypotension had been limited HD. Will use midodrine as above. d/w cardiology.  Bradycardia- with recent PPM placement; resolved. Cards following. On amiodarone. Monitor BP/HR.   4) Anemia of renal disease: Hb improving. Will avoid IV iron due to elevated Ferritin. c/w Epogen 8k units IV tiw with HD. Monitor Hb.  5) Hyperphosphatemia: Phosphorus acceptable. No need for phos binders at this time. c/w low phos diet. Monitor serum calcium and phosphorus.      [facility] NEPHROLOGY  Brad Chen M.D.  Oneal Tim D.O.  Roselia Huitron M.D.  Milli Platt, MSN, ANP-C  [phone]    [id number] [id number]

## 2021-05-13 NOTE — PROGRESS NOTE ADULT - SUBJECTIVE AND OBJECTIVE BOX
[facility] NEPHROLOGY- PROGRESS NOTE    Patient is a 89yo Female with ESRD on HD, Afib on Eliquis, HTN, Intradialytic hypotension req Midodrine prior to HD, failed Rt AVF due to steal syndrome s/p ligation, h/o COVID in March, s/p PPM 2 weeks ago p/w SOB and chest comfort. Pt a/w acute hypoxic respiratory distress 2/2 pulmonary edema requiring BIPAP. Pt now hypotensive and bradycardic. Nephrology consulted for ESRD status.     5/12: Pt hypotensive at dialysis, with mild tachycardia.  Pt required IVF and therefore net + following HD.  Pt on telemetry now.  D/w cardiology.  5/13: Pt tolerated ~ 2L UF with Midodrine 10mg pre-HD and another 10mg at 2h jaswant during HD.    REVIEW OF SYSTEMS: + SOB, no CP, palpitations +weakness.    VITALS:  T(F): 98.3 (05-13-21 @ 15:45), Max: 98.3 (05-13-21 @ 15:45)  HR: 56 (05-13-21 @ 15:45)  BP: 136/64 (05-13-21 @ 15:45)  RR: 19 (05-13-21 @ 15:45)  SpO2: 100% (05-13-21 @ 15:45)  Wt(kg): --    05-12 @ 07:01  -  05-13 @ 07:00  --------------------------------------------------------  IN: 275 mL / OUT: 0 mL / NET: 275 mL    05-13 @ 07:01  -  05-13 @ 20:20  --------------------------------------------------------  IN: 600 mL / OUT: 1504 mL / NET: -904 mL      PHYSICAL EXAM:  Gen: NAD,   HEENT: anicteric;  Neck: no JVD  Cards: +S1/S2,   Resp: scattered crackles, pt using accessory muscles of respiration, paradoxical respiration.  GI: soft, NT/ND, NABS  Extremities: +1+ LE edema B/L  Access: Rt IJ tunneled HD catheter- benign    LABS:  05-12    139  |  104  |  30<H>  ----------------------------<  149<H>  4.3   |  29  |  4.18<H>    Ca    8.4      12 May 2021 07:27  Phos  3.7     05-12  Mg     2.5     05-12    TPro  5.9<L>  /  Alb  2.6<L>  /  TBili  0.3  /  DBili      /  AST  18  /  ALT  18  /  AlkPhos  118  05-12    Creatinine Trend: 4.18 <--, 6.26 <--, 4.86 <--, 3.75 <--                        8.3    6.14  )-----------( 197      ( 12 May 2021 07:27 )             26.9

## 2021-05-13 NOTE — PROGRESS NOTE ADULT - ASSESSMENT
Pt is a 89 y/o F, wheel chair bound, from home with PMH of Afib on Eliquis, ESRD on HD, DM, HTN presented with shortness of breath since few days. Pt also complaining of chest discomfort. As per pt's daughter Johnny batres, pt started to develop gradually shortness of breath since few days at rest. She mentioned that the pt was admitted recently to [facility] for shortness of breath and chest pain, leadless pacemaker was placed, discharged on april 29th. She has been regularly getting dialysis on Tuesday/thursday / saturday.  Patient admitted to ICU for SOB 2/2 fluid overload and was downgraded to medicine for management. Nephrology consulted, last HD 5/8. Cardiology consulted for management of SOB and Afib. Echo with EF>55%, mild to moderate mitral regurgitation and B/L pleural effusions.  PT evaluation done with recommendation for home PT, patient tis wheelchair bound and has own rolling walker.  5/10-Pt. seen and examined, noted A&O x 3 with slight dyspnea, reports feeling better.  Pt. scheduled for HD tomorrow after which she can be discharged.  Called dialysis to request early session.  Pt.'s daughter Hina called at [phone], updated re pt.'s condition, plan of care and discharge to home with home PT tomorrow discussed and agreed.    5/11- pt BP dropped 86/48 in HD. pt c/o palpitation but no chest pain, feeling sob. Saturating 96% on 2L.  s/p IV bolus 250ml. ECG ordered.   spoke with cardiology dr. Arteaga and nephrology dr. Donahue.  to transfer to tele floor.     Updated condition to Hina daughter [phone], Full code as wishes

## 2021-05-13 NOTE — CHART NOTE - NSCHARTNOTEFT_GEN_A_CORE
Team notified around 2:30am of respiratory distress, SBP 170s, HR 60s. Patient seen and examined at bedside, noted to have increased RR and mildly increased WOB. Denied CP, but said she felt as if she could not get enough air in. Bibasilar crackles heard, L>R. EKG showed sinus julius. CXR and troponin ordered stat, Lasix 80mg IV given (confirmed with patient and RN that she makes urine), will help with breathing and BP, so no other BP meds given at this time. Changed NC to NRB and added albuterol and Symbicort to help WOB. Will continue to follow.

## 2021-05-13 NOTE — PROGRESS NOTE ADULT - SUBJECTIVE AND OBJECTIVE BOX
PGY-1 Progress Note discussed with attending    PAGER #: [[phone]] TILL 5:00 PM  PLEASE CONTACT ON CALL TEAM:  - On Call Team (Please refer to Sheri) FROM 5:00 PM - 8:30PM  - Nightfloat Team FROM 8:30 -7:30 AM    INTERVAL HPI/OVERNIGHT EVENTS:   Pt had multiple episodes of respiratory distress due to overload was given IV lasix and got HD, it improved after.    REVIEW OF SYSTEMS:  CONSTITUTIONAL: No fever, weight loss, or fatigue  RESPIRATORY: No cough, wheezing, chills or hemoptysis; No shortness of breath  CARDIOVASCULAR: No chest pain, palpitations, dizziness, or leg swelling  GASTROINTESTINAL: No abdominal pain. No nausea, vomiting, or hematemesis; No diarrhea or constipation. No melena or hematochezia.  GENITOURINARY: No dysuria or hematuria, urinary frequency  NEUROLOGICAL: No headaches, memory loss, loss of strength, numbness, or tremors  SKIN: No itching, burning, rashes, or lesions     Vital Signs Last 24 Hrs  T(C): 36.8 (13 May 2021 15:45), Max: 36.8 (13 May 2021 15:45)  T(F): 98.3 (13 May 2021 15:45), Max: 98.3 (13 May 2021 15:45)  HR: 56 (13 May 2021 15:45) (55 - 69)  BP: 136/64 (13 May 2021 15:45) (136/64 - 174/53)  BP(mean): --  RR: 19 (13 May 2021 15:45) (16 - 22)  SpO2: 100% (13 May 2021 15:45) (96% - 100%)    PHYSICAL EXAMINATION:  GENERAL: NAD, well built  HEAD:  Atraumatic, Normocephalic  EYES:  conjunctiva and sclera clear  NECK: Supple, No JVD, Normal thyroid  CHEST/LUNG: Has bilateral crackles  HEART: Regular rate and rhythm; No murmurs, rubs, or gallops  ABDOMEN: Soft, Nontender, Nondistended; Bowel sounds present  NERVOUS SYSTEM:  Alert & Oriented X3,    EXTREMITIES:  2+ Peripheral Pulses, No clubbing, cyanosis, or edema  SKIN: warm dry                          8.3    6.14  )-----------( 197      ( 12 May 2021 07:27 )             26.9     05-12    139  |  104  |  30<H>  ----------------------------<  149<H>  4.3   |  29  |  4.18<H>    Ca    8.4      12 May 2021 07:27  Phos  3.7     05-12  Mg     2.5     05-12    TPro  5.9<L>  /  Alb  2.6<L>  /  TBili  0.3  /  DBili  x   /  AST  18  /  ALT  18  /  AlkPhos  118  05-12    LIVER FUNCTIONS - ( 12 May 2021 07:27 )  Alb: 2.6 g/dL / Pro: 5.9 g/dL / ALK PHOS: 118 U/L / ALT: 18 U/L DA / AST: 18 U/L / GGT: x           CARDIAC MARKERS ( 13 May 2021 03:36 )  <0.015 ng/mL / x     / x     / x     / x          CAPILLARY BLOOD GLUCOSE      RADIOLOGY & ADDITIONAL TESTS:

## 2021-05-13 NOTE — PROGRESS NOTE ADULT - PROBLEM SELECTOR PLAN 3
Pt gets regular dialysis on T,T,S  -Last HD 5/8, next HD today  -On midodrine for BP support on HD  -Nephrology Dr. Huitron/Gustavo  -Avoid nephrotoxic medications

## 2021-05-13 NOTE — PROGRESS NOTE ADULT - SUBJECTIVE AND OBJECTIVE BOX
Patient denies chest pain events noted, required NRB now on HD Review of systems otherwise (-)  	  ALBUTerol    90 MICROgram(s) HFA Inhaler 2 Puff(s) Inhalation every 6 hours PRN  aluminum hydroxide/magnesium hydroxide/simethicone Suspension 30 milliLiter(s) Oral every 6 hours PRN  aMIOdarone    Tablet 200 milliGRAM(s) Oral daily  amLODIPine   Tablet 2.5 milliGRAM(s) Oral daily  apixaban 2.5 milliGRAM(s) Oral two times a day  atorvastatin 10 milliGRAM(s) Oral at bedtime  bisacodyl Suppository 10 milliGRAM(s) Rectal daily PRN  budesonide  80 MICROgram(s)/formoterol 4.5 MICROgram(s) Inhaler 2 Puff(s) Inhalation two times a day  chlorhexidine 2% Cloths 1 Application(s) Topical daily  diphenhydrAMINE   Injectable 25 milliGRAM(s) IV Push once  epoetin maria d-epbx (RETACRIT) Injectable 8000 Unit(s) IV Push <User Schedule>  insulin glargine Injectable (LANTUS) 5 Unit(s) SubCutaneous at bedtime  insulin lispro (ADMELOG) corrective regimen sliding scale   SubCutaneous Before meals and at bedtime  metoprolol tartrate 25 milliGRAM(s) Oral every 8 hours  midodrine 10 milliGRAM(s) Oral <User Schedule>  midodrine. 10 milliGRAM(s) Oral <User Schedule>  polyethylene glycol 3350 17 Gram(s) Oral daily  senna 2 Tablet(s) Oral at bedtime  sodium chloride 0.9% Bolus 250 milliLiter(s) IV Bolus once                            8.3    6.14  )-----------( 197      ( 12 May 2021 07:27 )             26.9       Hemoglobin: 8.3 g/dL (05-12 @ 07:27)  Hemoglobin: 8.4 g/dL (05-11 @ 11:02)  Hemoglobin: 8.3 g/dL (05-10 @ 06:40)      05-12    139  |  104  |  30<H>  ----------------------------<  149<H>  4.3   |  29  |  4.18<H>    Ca    8.4      12 May 2021 07:27  Phos  3.7     05-12  Mg     2.5     05-12    TPro  5.9<L>  /  Alb  2.6<L>  /  TBili  0.3  /  DBili  x   /  AST  18  /  ALT  18  /  AlkPhos  118  05-12    Creatinine Trend: 4.18<--, 6.26<--, 4.86<--, 3.75<--, 5.37<--, 3.65<--    COAGS:     CARDIAC MARKERS ( 13 May 2021 03:36 )  <0.015 ng/mL / x     / x     / x     / x            T(C): 36.2 (05-13-21 @ 10:15), Max: 36.7 (05-12-21 @ 19:39)  HR: 55 (05-13-21 @ 10:15) (55 - 69)  BP: 142/39 (05-13-21 @ 10:15) (142/39 - 174/53)  RR: 16 (05-13-21 @ 10:15) (16 - 22)  SpO2: 100% (05-13-21 @ 10:15) (95% - 100%)  Wt(kg): --    I&O's Summary    12 May 2021 07:01  -  13 May 2021 07:00  --------------------------------------------------------  IN: 275 mL / OUT: 0 mL / NET: 275 mL      HEENT:  (-)icterus (-)pallor  CV: N S1 S2 1/6 SHANE (+)2 Pulses B/l  Resp:  Clear to ausculatation B/L, normal effort  GI: (+) BS Soft, NT, ND  Lymph:  (-)Edema, (-)obvious lymphadenopathy  Skin: Warm to touch, Normal turgor  Psych: Appropriate mood and affect      TELEMETRY: 	  off      ASSESSMENT/PLAN: 	89y  Female wheel chair bound, from home with PMH of PAF on Eliquis, leadless PPM placed by Dr. Evangelina Mcclelland at [facility], ESRD on HD, DM, HTN presented with shortness of breath since few days.    - Keep net negative with HD  - Echo with normal LV function   - Palliative eval noted  - On midodrine for BP support on HD, suspect she has some element of dysautonomia seems to be responding   - Cont eliquis and Nolvia follow up with Dr. Evangelina Mcclelland for EP      Christopher Arteaga MD, [facility]  BEEPER [phone]

## 2021-05-13 NOTE — PROGRESS NOTE ADULT - PROBLEM SELECTOR PLAN 1
pt noted hypotensive in HD. -86/48, c/o palpitation no chest pain. O2 sat 96% on 2L.   prior to HD. BP was noted 187/70, HR 69.   -Pt takes amiodarone, eliquis, BB   -give IV bolus 250ml x 1.   -EKG showed sinus julius  -Cardiology Dr. Arteaga  -will need tele monitor for Afib with RVR  -repeat VS after bolus.

## 2021-05-13 NOTE — CHART NOTE - NSCHARTNOTEFT_GEN_A_CORE
Called because of respiratory distress. Patient Examinated at bedside. she is alert and awake, using abdominal muscles for respiration. VS reviewed, tele reviewed, patient being paced at 70bpm, Sat 100% on NRB 15L, /42, T 97.8. Chart reviewed, she had dialysis 2L removed, xray ordered showing some pulmonary edema and effusions. 80mg of Lasix IV push given, pt started on Bipap at night. Patient seen with Dr. Chen at bedside. Pulm consult might be indicated, as patient is in Amiodarone, and there could be a pulmonary component to her symptoms, (fibrosis?).    Possible dyalisis session tomorrow as per Dr. Chen.     Monitor Urine output and respiration.     Spoke to pt's daughter Cece. pt remains FULL CODE  Daughter is very upset team is not talking to her and giving her information.   Other daughter, Ludy,  at bedside is also upset that  in the evening told her he would go to the room and never came back.    Primary team to follow up. Called because of respiratory distress. Patient Examinated at bedside. she is alert and awake, using abdominal muscles for respiration. VS reviewed, tele reviewed, patient being paced at 70bpm, Sat 100% on NRB 15L, /42, T 97.8. Chart reviewed, she had dialysis 2L removed, xray ordered showing some pulmonary edema and effusions. 80mg of Lasix IV push given, pt started on Bipap at night. Patient seen with Dr. Chen at bedside. Pulm consult might be indicated, as patient is in Amiodarone, and there could be a pulmonary component to her symptoms, (fibrosis?).    Possible dyalisis session tomorrow as per Dr. Chen.     Monitor Urine output and respiration.     Spoke to pt's daughter Cece. pt remains FULL CODE. She is very upset team is not talking to her and giving her constant information. She is questioning why patient got Midodrine during dialyisis if her bp was good, patient even knew the BP reading at 15:00 and was questioning why Midodrine was given if her BP was ok. Chart review shows there were 2 orders for Midodrine 10mg and patient received both doses. I discontinued one order.  Other daughter, Ludy,  at bedside is also upset that Dr in the evening told her he would go to the room and never came back.    Primary team to follow up.

## 2021-05-13 NOTE — CHART NOTE - NSCHARTNOTEFT_GEN_A_CORE
Primary team notified by dialysis that patient was in respiratory distress. Patient saturating well on 100% NRB. Patient has increased work of breathing due to fluid overload, crackles on exam. Patient going to get HD now with fluid removal. IV lasix also given stat. Attending Dr Vasquez also notified. If patient does not tolerate HD today again, will reconsult palliative.

## 2021-05-14 LAB
ALBUMIN SERPL ELPH-MCNC: 2.8 G/DL — LOW (ref 3.5–5)
ALP SERPL-CCNC: 124 U/L — HIGH (ref 40–120)
ALT FLD-CCNC: 21 U/L DA — SIGNIFICANT CHANGE UP (ref 10–60)
ANA TITR SER: NEGATIVE — SIGNIFICANT CHANGE UP
ANION GAP SERPL CALC-SCNC: 8 MMOL/L — SIGNIFICANT CHANGE UP (ref 5–17)
AST SERPL-CCNC: 17 U/L — SIGNIFICANT CHANGE UP (ref 10–40)
BASE EXCESS BLDA CALC-SCNC: 6.3 MMOL/L — HIGH (ref -2–3)
BILIRUB SERPL-MCNC: 0.5 MG/DL — SIGNIFICANT CHANGE UP (ref 0.2–1.2)
BLOOD GAS COMMENTS ARTERIAL: SIGNIFICANT CHANGE UP
BUN SERPL-MCNC: 40 MG/DL — HIGH (ref 7–18)
CALCIUM SERPL-MCNC: 8.3 MG/DL — LOW (ref 8.4–10.5)
CHLORIDE SERPL-SCNC: 100 MMOL/L — SIGNIFICANT CHANGE UP (ref 96–108)
CO2 SERPL-SCNC: 29 MMOL/L — SIGNIFICANT CHANGE UP (ref 22–31)
CREAT SERPL-MCNC: 4.57 MG/DL — HIGH (ref 0.5–1.3)
GLUCOSE BLDC GLUCOMTR-MCNC: 111 MG/DL — HIGH (ref 70–99)
GLUCOSE BLDC GLUCOMTR-MCNC: 152 MG/DL — HIGH (ref 70–99)
GLUCOSE BLDC GLUCOMTR-MCNC: 157 MG/DL — HIGH (ref 70–99)
GLUCOSE BLDC GLUCOMTR-MCNC: 286 MG/DL — HIGH (ref 70–99)
GLUCOSE SERPL-MCNC: 148 MG/DL — HIGH (ref 70–99)
HCO3 BLDA-SCNC: 29 MMOL/L — HIGH (ref 21–28)
HCT VFR BLD CALC: 27.7 % — LOW (ref 34.5–45)
HGB BLD-MCNC: 8.4 G/DL — LOW (ref 11.5–15.5)
HOROWITZ INDEX BLDA+IHG-RTO: 32 — SIGNIFICANT CHANGE UP
MAGNESIUM SERPL-MCNC: 2.6 MG/DL — SIGNIFICANT CHANGE UP (ref 1.6–2.6)
MCHC RBC-ENTMCNC: 30.3 GM/DL — LOW (ref 32–36)
MCHC RBC-ENTMCNC: 31.5 PG — SIGNIFICANT CHANGE UP (ref 27–34)
MCV RBC AUTO: 103.7 FL — HIGH (ref 80–100)
NRBC # BLD: 0 /100 WBCS — SIGNIFICANT CHANGE UP (ref 0–0)
PCO2 BLDA: 32 MMHG — SIGNIFICANT CHANGE UP (ref 32–35)
PH BLDA: 7.56 — HIGH (ref 7.35–7.45)
PHOSPHATE SERPL-MCNC: 4.8 MG/DL — HIGH (ref 2.5–4.5)
PLATELET # BLD AUTO: 215 K/UL — SIGNIFICANT CHANGE UP (ref 150–400)
PO2 BLDA: 145 MMHG — HIGH (ref 83–108)
POTASSIUM SERPL-MCNC: 4.3 MMOL/L — SIGNIFICANT CHANGE UP (ref 3.5–5.3)
POTASSIUM SERPL-SCNC: 4.3 MMOL/L — SIGNIFICANT CHANGE UP (ref 3.5–5.3)
PROT SERPL-MCNC: 6.3 G/DL — SIGNIFICANT CHANGE UP (ref 6–8.3)
RBC # BLD: 2.67 M/UL — LOW (ref 3.8–5.2)
RBC # FLD: 16.3 % — HIGH (ref 10.3–14.5)
SAO2 % BLDA: 99 % — SIGNIFICANT CHANGE UP
SODIUM SERPL-SCNC: 137 MMOL/L — SIGNIFICANT CHANGE UP (ref 135–145)
WBC # BLD: 8.49 K/UL — SIGNIFICANT CHANGE UP (ref 3.8–10.5)
WBC # FLD AUTO: 8.49 K/UL — SIGNIFICANT CHANGE UP (ref 3.8–10.5)

## 2021-05-14 PROCEDURE — 99232 SBSQ HOSP IP/OBS MODERATE 35: CPT

## 2021-05-14 RX ORDER — HYDRALAZINE HCL 50 MG
10 TABLET ORAL ONCE
Refills: 0 | Status: COMPLETED | OUTPATIENT
Start: 2021-05-14 | End: 2021-05-14

## 2021-05-14 RX ADMIN — AMIODARONE HYDROCHLORIDE 200 MILLIGRAM(S): 400 TABLET ORAL at 05:53

## 2021-05-14 RX ADMIN — APIXABAN 2.5 MILLIGRAM(S): 2.5 TABLET, FILM COATED ORAL at 05:53

## 2021-05-14 RX ADMIN — Medication 25 MILLIGRAM(S): at 05:54

## 2021-05-14 RX ADMIN — Medication 10 MILLIGRAM(S): at 01:58

## 2021-05-14 RX ADMIN — Medication 1: at 08:21

## 2021-05-14 RX ADMIN — SENNA PLUS 2 TABLET(S): 8.6 TABLET ORAL at 22:13

## 2021-05-14 RX ADMIN — INSULIN GLARGINE 5 UNIT(S): 100 INJECTION, SOLUTION SUBCUTANEOUS at 22:10

## 2021-05-14 RX ADMIN — POLYETHYLENE GLYCOL 3350 17 GRAM(S): 17 POWDER, FOR SOLUTION ORAL at 17:15

## 2021-05-14 RX ADMIN — APIXABAN 2.5 MILLIGRAM(S): 2.5 TABLET, FILM COATED ORAL at 17:14

## 2021-05-14 RX ADMIN — ATORVASTATIN CALCIUM 10 MILLIGRAM(S): 80 TABLET, FILM COATED ORAL at 22:10

## 2021-05-14 RX ADMIN — MIDODRINE HYDROCHLORIDE 10 MILLIGRAM(S): 2.5 TABLET ORAL at 10:51

## 2021-05-14 RX ADMIN — Medication 3: at 17:15

## 2021-05-14 RX ADMIN — AMLODIPINE BESYLATE 2.5 MILLIGRAM(S): 2.5 TABLET ORAL at 05:53

## 2021-05-14 RX ADMIN — BUDESONIDE AND FORMOTEROL FUMARATE DIHYDRATE 2 PUFF(S): 160; 4.5 AEROSOL RESPIRATORY (INHALATION) at 13:28

## 2021-05-14 RX ADMIN — CHLORHEXIDINE GLUCONATE 1 APPLICATION(S): 213 SOLUTION TOPICAL at 13:31

## 2021-05-14 RX ADMIN — BUDESONIDE AND FORMOTEROL FUMARATE DIHYDRATE 2 PUFF(S): 160; 4.5 AEROSOL RESPIRATORY (INHALATION) at 22:13

## 2021-05-14 RX ADMIN — MIDODRINE HYDROCHLORIDE 10 MILLIGRAM(S): 2.5 TABLET ORAL at 09:10

## 2021-05-14 RX ADMIN — Medication 1: at 22:10

## 2021-05-14 NOTE — CONSULT NOTE ADULT - ASSESSMENT
Check orthostatic BP pulse; physical therapy; Immunofixation serum and urine for other causes of autonomic neuropathy, ganglioside antibody panel, ESR, AUNDREA, RPR.
Patient is a 89yo Female with ESRD on HD, Afib on Eliquis, HTN, Intradialytic hypotension req Midodrine prior to HD, failed Rt AVF due to steal syndrome s/p ligation, h/o COVID in March, s/p PPM 2 weeks ago p/w SOB and chest comfort. Pt a/w acute hypoxic respiratory distress 2/2 pulmonary edema requiring BIPAP. Pt now hypotensive and bradycardic. Nephrology consulted for ESRD status.     1) ESRD: Last HD on 5/1 @ [facility]. Pt with fluid overload req BiPAP; plan for urgent HD this am. UF as tolerated. Monitor electrolytes.  2) Hyperkalemia- will use 2K HD bath. Monitor lytes.   3) Hypotension- with bradycardia. Hold antihypertensive medications. Pt started on pressors to aid in fluid removal. Pressors as per ICU. Bradycardia- with recent PPM placement; recc Cards consult. Monitor BP/HR  4) Anemia of renal disease: Hb decreasing. Pt on A/C for Afib. Check FOBT. Repeat hgb 8.0. Will hold prbc transfusion since pt fluid overloaded with no signs of gross bleeding. Check iron studies and ferritin in am. Will give Epogen 8k units IV tiw with HD. Monitor Hb.  5) Hyperphosphatemia: Phosphorus acceptable. No need for phos binder while NPO. Monitor serum calcium and phosphorus.      [facility] NEPHROLOGY  Brad Chen M.D.  Oneal Tim D.O.  Roselia Huitron M.D.  Milli Platt, PARAG, ANP-C  [phone]    [address] [place] [address]  
1. SOB   Likely 2nd to fluid overload.   Check ABG   BIPAP PRN   Ongoing HD   XR noted   Continue HD  Bronchodilators PRN   O2 Supplement - on NRM   F/U CXR   PFTs as OP  DVT and GI PPX     2. ESRD   On HD  Avoid nephrotoxic drugs  Monitor labs   Renal F.U     3. Morbid Obesity   R.O SHARON   PFTs and PSG's as OP     4. CHF   on HD  Monitor labs  I&Os  Cardio F/U     5. HTN   with fluctuating BP's.  Midodrine was given for hypotension during HD   Cardio and renal F/U   Monitor BP

## 2021-05-14 NOTE — PROGRESS NOTE ADULT - SUBJECTIVE AND OBJECTIVE BOX
[facility] NEPHROLOGY- PROGRESS NOTE    Patient is a 89yo Female with ESRD on HD, Afib on Eliquis, HTN, Intradialytic hypotension req Midodrine prior to HD, failed Rt AVF due to steal syndrome s/p ligation, h/o COVID in March, s/p PPM 2 weeks ago p/w SOB and chest comfort. Pt a/w acute hypoxic respiratory distress 2/2 pulmonary edema requiring BIPAP. Pt now hypotensive and bradycardic. Nephrology consulted for ESRD status.     5/12: Pt hypotensive at dialysis, with mild tachycardia.  Pt required IVF and therefore net + following HD.  Pt on telemetry now.  D/w cardiology.  5/13: Pt tolerated ~ 2L UF with Midodrine 10mg pre-HD and another 10mg at 2h jaswant during HD.  5/14: Pt tolerated  3L UF  with Midodrine 10mg pre-HD and another 10mg at 1h jaswant during HD at which point she was hypotensive.  Despite intermittent hypotension, UF was successful today.    REVIEW OF SYSTEMS: + SOB improving., no CP, palpitations +weakness.    VITALS:  T(F): 97.6 (05-14-21 @ 19:22), Max: 98.3 (05-14-21 @ 09:00)  HR: 57 (05-14-21 @ 19:22)  BP: 148/34 (05-14-21 @ 19:22)  RR: 19 (05-14-21 @ 19:22)  SpO2: 97% (05-14-21 @ 19:22)  Wt(kg): --    05-13 @ 07:01  -  05-14 @ 07:00  --------------------------------------------------------  IN: 600 mL / OUT: 1504 mL / NET: -904 mL    05-14 @ 07:01  -  05-14 @ 20:42  --------------------------------------------------------  IN: 700 mL / OUT: 3200 mL / NET: -2500 mL      PHYSICAL EXAM:  Gen: NAD,   HEENT: anicteric;  Neck: no JVD  Cards: +S1/S2,   Resp: much improved today.  A few basilar crackles B  GI: soft, NT/ND, NABS  Extremities: +1+ LE edema B/L  Access: Rt IJ tunneled HD catheter- benign    LABS:  05-14    137  |  100  |  40<H>  ----------------------------<  148<H>  4.3   |  29  |  4.57<H>    Ca    8.3<L>      14 May 2021 07:15  Phos  4.8     05-14  Mg     2.6     05-14    TPro  6.3  /  Alb  2.8<L>  /  TBili  0.5  /  DBili      /  AST  17  /  ALT  21  /  AlkPhos  124<H>  05-14    Creatinine Trend: 4.57 <--, 4.18 <--, 6.26 <--, 4.86 <--                        8.4    8.49  )-----------( 215      ( 14 May 2021 07:15 )             27.7

## 2021-05-14 NOTE — PROGRESS NOTE ADULT - SUBJECTIVE AND OBJECTIVE BOX
Patient denies chest pain events noted, requiring NRB at HD,  Review of systems otherwise (-)  	  ALBUTerol    90 MICROgram(s) HFA Inhaler 2 Puff(s) Inhalation every 6 hours PRN  aluminum hydroxide/magnesium hydroxide/simethicone Suspension 30 milliLiter(s) Oral every 6 hours PRN  aMIOdarone    Tablet 200 milliGRAM(s) Oral daily  amLODIPine   Tablet 2.5 milliGRAM(s) Oral daily  apixaban 2.5 milliGRAM(s) Oral two times a day  atorvastatin 10 milliGRAM(s) Oral at bedtime  bisacodyl Suppository 10 milliGRAM(s) Rectal daily PRN  budesonide  80 MICROgram(s)/formoterol 4.5 MICROgram(s) Inhaler 2 Puff(s) Inhalation two times a day  chlorhexidine 2% Cloths 1 Application(s) Topical daily  diphenhydrAMINE   Injectable 25 milliGRAM(s) IV Push once  epoetin maria d-epbx (RETACRIT) Injectable 8000 Unit(s) IV Push <User Schedule>  insulin glargine Injectable (LANTUS) 5 Unit(s) SubCutaneous at bedtime  insulin lispro (ADMELOG) corrective regimen sliding scale   SubCutaneous Before meals and at bedtime  metoprolol tartrate 25 milliGRAM(s) Oral every 8 hours  midodrine. 10 milliGRAM(s) Oral <User Schedule>  midodrine. 10 milliGRAM(s) Oral once  polyethylene glycol 3350 17 Gram(s) Oral daily  senna 2 Tablet(s) Oral at bedtime                            8.4    8.49  )-----------( 215      ( 14 May 2021 07:15 )             27.7       Hemoglobin: 8.4 g/dL (05-14 @ 07:15)  Hemoglobin: 8.3 g/dL (05-12 @ 07:27)  Hemoglobin: 8.4 g/dL (05-11 @ 11:02)  Hemoglobin: 8.3 g/dL (05-10 @ 06:40)      05-14    137  |  100  |  40<H>  ----------------------------<  148<H>  4.3   |  29  |  4.57<H>    Ca    8.3<L>      14 May 2021 07:15  Phos  4.8     05-14  Mg     2.6     05-14    TPro  6.3  /  Alb  2.8<L>  /  TBili  0.5  /  DBili  x   /  AST  17  /  ALT  21  /  AlkPhos  124<H>  05-14    Creatinine Trend: 4.57<--, 4.18<--, 6.26<--, 4.86<--, 3.75<--, 5.37<--    COAGS:     CARDIAC MARKERS ( 13 May 2021 03:36 )  <0.015 ng/mL / x     / x     / x     / x            T(C): 36.8 (05-14-21 @ 09:00), Max: 36.8 (05-13-21 @ 15:45)  HR: 57 (05-14-21 @ 09:00) (56 - 71)  BP: 128/51 (05-14-21 @ 09:00) (111/37 - 178/91)  RR: 16 (05-14-21 @ 09:00) (16 - 20)  SpO2: 100% (05-14-21 @ 09:00) (96% - 100%)  Wt(kg): --    I&O's Summary    13 May 2021 07:01  -  14 May 2021 07:00  --------------------------------------------------------  IN: 600 mL / OUT: 1504 mL / NET: -904 mL          HEENT:  (-)icterus (-)pallor  CV: N S1 S2 1/6 SHANE (+)2 Pulses B/l  Resp:  Clear to ausculatation B/L, normal effort  GI: (+) BS Soft, NT, ND  Lymph:  (-)Edema, (-)obvious lymphadenopathy  Skin: Warm to touch, Normal turgor  Psych: Appropriate mood and affect      TELEMETRY: 	  off      ASSESSMENT/PLAN: 	89y  Female wheel chair bound, from home with PMH of PAF on Eliquis, leadless PPM placed by Dr. Evangelina Mcclelland at [facility], ESRD on HD, DM, HTN presented with shortness of breath since few days.    - Keep net negative with HD  - Echo with normal LV function   - Palliative f/u  - On midodrine for BP support on HD, suspect she has some element of dysautonomia seems to be responding   - Cont eliquis and Nolvia follow up with Dr. Evangelina Mcclelland for EP      Christopher Arteaga MD, [facility]  BEEPER [phone]

## 2021-05-14 NOTE — PROGRESS NOTE ADULT - PROBLEM SELECTOR PLAN 2
Likely secondary to fluid overload  -Downgraded from ICU  -HD today 5/13  -In distress on 6L NC  pulm consulted

## 2021-05-14 NOTE — PROGRESS NOTE ADULT - ASSESSMENT
Hypotensive bradycardiac in pulmonary edema; there is no apparent primary neurological disorder to help improve her condition; high ESR and C-Rp consistent with an inflammatory condition, but unlikely to be a primary inflammatory neuropathy underlying her disorder given her age and pattern of presentation with preserved strength. Will sign off; please call for re-consultation

## 2021-05-14 NOTE — PROGRESS NOTE ADULT - SUBJECTIVE AND OBJECTIVE BOX
INTERVAL HPI/OVERNIGHT EVENTS:    HEALTH ISSUES - PROBLEM Dx:  ESRD (end stage renal disease) on dialysis  ESRD (end stage renal disease) on dialysis    Acute respiratory failure with hypoxia  Acute respiratory failure with hypoxia    Debility  Debility    Palliative care encounter  Palliative care encounter    Acute respiratory failure with hypoxia and hypercapnia  Acute respiratory failure with hypoxia and hypercapnia    Afib  Afib    CHF (congestive heart failure)  CHF (congestive heart failure)    DM (diabetes mellitus)  DM (diabetes mellitus)    Anemia  Anemia    Prophylactic measure  Prophylactic measure    Discharge planning issues  Discharge planning issues    Hypotension  Hypotension            MEDICATIONS  (STANDING):  aMIOdarone    Tablet 200 milliGRAM(s) Oral daily  amLODIPine   Tablet 2.5 milliGRAM(s) Oral daily  apixaban 2.5 milliGRAM(s) Oral two times a day  atorvastatin 10 milliGRAM(s) Oral at bedtime  budesonide  80 MICROgram(s)/formoterol 4.5 MICROgram(s) Inhaler 2 Puff(s) Inhalation two times a day  chlorhexidine 2% Cloths 1 Application(s) Topical daily  diphenhydrAMINE   Injectable 25 milliGRAM(s) IV Push once  epoetin maria d-epbx (RETACRIT) Injectable 8000 Unit(s) IV Push <User Schedule>  insulin glargine Injectable (LANTUS) 5 Unit(s) SubCutaneous at bedtime  insulin lispro (ADMELOG) corrective regimen sliding scale   SubCutaneous Before meals and at bedtime  metoprolol tartrate 25 milliGRAM(s) Oral every 8 hours  midodrine. 10 milliGRAM(s) Oral <User Schedule>  polyethylene glycol 3350 17 Gram(s) Oral daily  senna 2 Tablet(s) Oral at bedtime    MEDICATIONS  (PRN):  ALBUTerol    90 MICROgram(s) HFA Inhaler 2 Puff(s) Inhalation every 6 hours PRN Shortness of Breath and/or Wheezing  aluminum hydroxide/magnesium hydroxide/simethicone Suspension 30 milliLiter(s) Oral every 6 hours PRN Dyspepsia  bisacodyl Suppository 10 milliGRAM(s) Rectal daily PRN Constipation      Allergies    No Known Allergies    Intolerances        REVIEW OF SYSTEMS      General:	    Skin/Breast:  	  Ophthalmologic:  	  ENMT:	    Respiratory and Thorax:  	  Cardiovascular:	    Gastrointestinal:	    Genitourinary:	    Musculoskeletal:	    Neurological:	    Psychiatric:	    Hematology/Lymphatics:	    Endocrine:	    Allergic/Immunologic:	    Vital Signs Last 24 Hrs  T(C): 36.4 (14 May 2021 13:30), Max: 36.8 (13 May 2021 15:45)  T(F): 97.6 (14 May 2021 13:30), Max: 98.3 (13 May 2021 15:45)  HR: 50 (14 May 2021 13:30) (50 - 71)  BP: 125/50 (14 May 2021 13:30) (111/37 - 178/91)  BP(mean): --  RR: 19 (14 May 2021 13:30) (16 - 20)  SpO2: 99% (14 May 2021 13:30) (96% - 100%)    PHYSICAL EXAM:    Constitutional: awake and alert.  HEENT: PERRLA, EOMI,   Neck: Supple.  Respiratory: Breath sounds are clear bilaterally  Cardiovascular: S1 and S2, regular / irregular rhythm  Gastrointestinal: soft, nontender  Extremities:  no edema  Vascular: Caritid Bruit - no  Musculoskeletal: no joint swelling/tenderness, no abnormal movements  Skin: No rashes    Neurological exam:  HF: A x O x 3. Appropriately interactive, normal affect. Speech fluent, No Aphasia or paraphasic errors. Naming /repetition intact   CN: CIELO, EOMI, VFF, facial sensation normal, no NLFD, tongue midline, Palate moves equally, SCM equal bilaterally  Motor: No pronator drift, Strength 5/5 in all 4 ext, normal bulk and tone, no tremor, rigidity or bradykinesia.    Sens: Intact to light touch / PP/ VS/ JS    Reflexes: Symmetric and normal . BJ 1+, BR1+, KJ 0+, AJ 0, downgoing toes b/l  Coord:  No FNFA, dysmetria, ORVILLE intact   Gait/Balance: Unable to walk without assistance; stands only with assistance      LABS:                        8.4    8.49  )-----------( 215      ( 14 May 2021 07:15 )             27.7     05-14    137  |  100  |  40<H>  ----------------------------<  148<H>  4.3   |  29  |  4.57<H>    Ca    8.3<L>      14 May 2021 07:15  Phos  4.8     05-14  Mg     2.6     05-14    TPro  6.3  /  Alb  2.8<L>  /  TBili  0.5  /  DBili  x   /  AST  17  /  ALT  21  /  AlkPhos  124<H>  05-14    Immunofixation, Serum:   No Monoclonal Band Identified   Reference Range: None Detected (05.13.21 @ 14:36)   Treponema Pallidum Antibody Interpretation: Negative: A negative treponemal antibody screen indicates no serologic evidence of syphilis  C-Reactive Protein, Serum (05.13.21 @ 14:19)   C-Reactive Protein, Serum: 16: Please note: Reference range has changed due to units of measure change. mg/L   Sedimentation Rate, Erythrocyte: 65 mm/Hr (05.13.21 @ 10:13)       RADIOLOGY & ADDITIONAL TESTS:

## 2021-05-14 NOTE — CHART NOTE - NSCHARTNOTEFT_GEN_A_CORE
Assessment:      Nutrition reassessment for follow-up. Chart reviewed, pt for HD when visited today; event of respiratory distress last night noted, SOB with fluid overload per MD, on NRB, s/p Pulmonary consult;     Factors impacting intake:  [ X ] other: acute on chronic comorbidities including ESRD on HD, CHF; Individual/personal food preferences     Diet Prescription: Diet, Soft:   Consistent Carbohydrate {Evening Snacks}  For patients receiving Renal Replacement - No Protein Restr, No Conc K, No Conc Phos, Low Sodium (RENAL)   Tube Feed Start Time: 23:00  Supplement Feeding Modality:  Oral  Nepro Cans or Servings Per Day:  1       Frequency:  Two Times a day (21 @ 12:50)    Intake: varied intake depending on food served and how pt feels, doesn't like Nepro, prefers Glucerna Shake     Daily Weight in k.3 (14 May 2021 09:00)  Weight in k.1 (14 May 2021 04:50)  Weight in k.6 (13 May 2021 14:25)  Weight in k.1 (13 May 2021 10:15)  Weight in k.5 (13 May 2021 05:02)  Weight in k.7 (12 May 2021 05:00)  Weight in k.7 (11 May 2021 17:00)  Weight in k.8 (11 May 2021 14:05)  Weight in k.7 (11 May 2021 05:20)  Weight in k.2 (10 May 2021 05:25)  Weight in k.9 (09 May 2021 04:33)  Weight in k.6 (08 May 2021 20:19)  Weight in k.4 (08 May 2021 17:17)  Weight in k.1 (08 May 2021 05:15)    % Weight Change: a bit fluctuated, may due to fluid shift from HD and/or scale variance     Pertinent Medications: MEDICATIONS  (STANDING):  aMIOdarone    Tablet 200 milliGRAM(s) Oral daily  amLODIPine   Tablet 2.5 milliGRAM(s) Oral daily  apixaban 2.5 milliGRAM(s) Oral two times a day  atorvastatin 10 milliGRAM(s) Oral at bedtime  budesonide  80 MICROgram(s)/formoterol 4.5 MICROgram(s) Inhaler 2 Puff(s) Inhalation two times a day  chlorhexidine 2% Cloths 1 Application(s) Topical daily  diphenhydrAMINE   Injectable 25 milliGRAM(s) IV Push once  epoetin maria d-epbx (RETACRIT) Injectable 8000 Unit(s) IV Push <User Schedule>  insulin glargine Injectable (LANTUS) 5 Unit(s) SubCutaneous at bedtime  insulin lispro (ADMELOG) corrective regimen sliding scale   SubCutaneous Before meals and at bedtime  metoprolol tartrate 25 milliGRAM(s) Oral every 8 hours  midodrine. 10 milliGRAM(s) Oral <User Schedule>  polyethylene glycol 3350 17 Gram(s) Oral daily  senna 2 Tablet(s) Oral at bedtime    MEDICATIONS  (PRN):  ALBUTerol    90 MICROgram(s) HFA Inhaler 2 Puff(s) Inhalation every 6 hours PRN Shortness of Breath and/or Wheezing  aluminum hydroxide/magnesium hydroxide/simethicone Suspension 30 milliLiter(s) Oral every 6 hours PRN Dyspepsia  bisacodyl Suppository 10 milliGRAM(s) Rectal daily PRN Constipation    Pertinent Labs:  Na137 mmol/L Glu 148 mg/dL<H> K+ 4.3 mmol/L Cr  4.57 mg/dL<H> BUN 40 mg/dL<H>  Phos 4.8 mg/dL<H>  Alb 2.8 g/dL<L>  Chol 121 mg/dL LDL --    HDL 89 mg/dL Trig 42 mg/dL     CAPILLARY BLOOD GLUCOSE    POCT Blood Glucose.: 111 mg/dL (14 May 2021 11:00)  POCT Blood Glucose.: 157 mg/dL (14 May 2021 08:15)  POCT Blood Glucose.: 207 mg/dL (13 May 2021 21:27)  POCT Blood Glucose.: 149 mg/dL (13 May 2021 16:42)    Skin: Pressure Injury: multiple stage II, I    Estimated Needs:   [ X ] no change since previous assessment  [ ] recalculated:     Previous Nutrition Diagnosis:   [ X ] Altered Nutrition Related Laboratory Values     Nutrition Diagnosis is [ X ] ongoing  [ ] Improving   [ ] resolved [ ] not applicable     New Nutrition Diagnosis: [ X ] not applicable       Interventions:   Recommend  [ X ] Change oral nutritional supplement to Glucerna Shake 1 can daily, d/c Nepro   [ ] Nutrition Supplement  [ ] Nutrition Support  [ X ] Other: Discussed with RN                    Provide food choices within diet Rx as available/updated                    Pt to be followed by dietitian at out-pt dialysis center when medically ready to be discharged     Monitoring and Evaluation:   [ X ] PO intake [ x ] Tolerance to diet prescription [ x ] weights [ x ] labs[ x ] follow up per protocol  [ ] other: Assessment:      Nutrition reassessment for follow-up. Chart reviewed, downgraded from ICU; pt for HD when visited today; event of respiratory distress last night noted, SOB with fluid overload per MD, on NRB    Factors impacting intake:  [ X ] other: acute on chronic comorbidities including ESRD on HD, CHF; Individual/personal food preferences     Diet Prescription: Diet, Soft:   Consistent Carbohydrate {Evening Snacks}  For patients receiving Renal Replacement - No Protein Restr, No Conc K, No Conc Phos, Low Sodium (RENAL)   Tube Feed Start Time: 23:00  Supplement Feeding Modality:  Oral  Nepro Cans or Servings Per Day:  1       Frequency:  Two Times a day (21 @ 12:50)    Intake: varied intake depending on food served and how pt feels, doesn't like Nepro, prefers Glucerna Shake     Daily Weight in k.3 (14 May 2021 09:00)  Weight in k.1 (14 May 2021 04:50)  Weight in k.6 (13 May 2021 14:25)  Weight in k.1 (13 May 2021 10:15)  Weight in k.5 (13 May 2021 05:02)  Weight in k.7 (12 May 2021 05:00)  Weight in k.7 (11 May 2021 17:00)  Weight in k.8 (11 May 2021 14:05)  Weight in k.7 (11 May 2021 05:20)  Weight in k.2 (10 May 2021 05:25)  Weight in k.9 (09 May 2021 04:33)  Weight in k.6 (08 May 2021 20:19)  Weight in k.4 (08 May 2021 17:17)  Weight in k.1 (08 May 2021 05:15)    % Weight Change: a bit fluctuated, may due to fluid shift from HD and/or scale variance; 1+ to 2+ extremity edema     Pertinent Medications: MEDICATIONS  (STANDING):  aMIOdarone    Tablet 200 milliGRAM(s) Oral daily  amLODIPine   Tablet 2.5 milliGRAM(s) Oral daily  apixaban 2.5 milliGRAM(s) Oral two times a day  atorvastatin 10 milliGRAM(s) Oral at bedtime  budesonide  80 MICROgram(s)/formoterol 4.5 MICROgram(s) Inhaler 2 Puff(s) Inhalation two times a day  chlorhexidine 2% Cloths 1 Application(s) Topical daily  diphenhydrAMINE   Injectable 25 milliGRAM(s) IV Push once  epoetin maria d-epbx (RETACRIT) Injectable 8000 Unit(s) IV Push <User Schedule>  insulin glargine Injectable (LANTUS) 5 Unit(s) SubCutaneous at bedtime  insulin lispro (ADMELOG) corrective regimen sliding scale   SubCutaneous Before meals and at bedtime  metoprolol tartrate 25 milliGRAM(s) Oral every 8 hours  midodrine. 10 milliGRAM(s) Oral <User Schedule>  polyethylene glycol 3350 17 Gram(s) Oral daily  senna 2 Tablet(s) Oral at bedtime    MEDICATIONS  (PRN):  ALBUTerol    90 MICROgram(s) HFA Inhaler 2 Puff(s) Inhalation every 6 hours PRN Shortness of Breath and/or Wheezing  aluminum hydroxide/magnesium hydroxide/simethicone Suspension 30 milliLiter(s) Oral every 6 hours PRN Dyspepsia  bisacodyl Suppository 10 milliGRAM(s) Rectal daily PRN Constipation    Pertinent Labs:  Na137 mmol/L Glu 148 mg/dL<H> K+ 4.3 mmol/L Cr  4.57 mg/dL<H> BUN 40 mg/dL<H>  Phos 4.8 mg/dL<H>  Alb 2.8 g/dL<L> 05-04 Chol 121 mg/dL LDL --    HDL 89 mg/dL Trig 42 mg/dL     CAPILLARY BLOOD GLUCOSE    POCT Blood Glucose.: 111 mg/dL (14 May 2021 11:00)  POCT Blood Glucose.: 157 mg/dL (14 May 2021 08:15)  POCT Blood Glucose.: 207 mg/dL (13 May 2021 21:27)  POCT Blood Glucose.: 149 mg/dL (13 May 2021 16:42)    Skin: Pressure Injury: multiple stage II, I    Estimated Needs:   [ X ] no change since previous assessment  [ ] recalculated:     Previous Nutrition Diagnosis:   [ X ] Altered Nutrition Related Laboratory Values     Nutrition Diagnosis is [ X ] ongoing  [ ] Improving   [ ] resolved [ ] not applicable     New Nutrition Diagnosis: [ X ] not applicable       Interventions:   Recommend  [ X ] Continue diet Rx as ordered          MD to adjust fluid restriction as medically feasible   [ X ] Nutrition Supplement:  Change oral nutritional supplement to Glucerna Shake 1 can daily, d/c Nepro   [ ] Nutrition Support  [ X ] Other: Discussed with RN                    Provide food choices within diet Rx as available/updated                    Pt to be followed by dietitian at out-pt dialysis center when medically ready to be discharged     Monitoring and Evaluation:   [ X ] PO intake [ x ] Tolerance to diet prescription [ x ] weights [ x ] labs[ x ] follow up per protocol  [ ] other:

## 2021-05-14 NOTE — CONSULT NOTE ADULT - CONSULT REASON
unable to stand
SOB
SOB
ESRD
89 y/o F with ESRD on HD, multiple hospitalizations. LACE 14. Request to establish GOC.

## 2021-05-14 NOTE — PROGRESS NOTE ADULT - PROBLEM SELECTOR PLAN 1
pt noted hypotensive in HD., c/o palpitation no chest pain. O2 sat 96% on 2L.   prior to HD. BP was noted 187/70, HR 69.   -Pt takes amiodarone, eliquis, BB   -give IV bolus 250ml x 1.   -EKG showed sinus julius  -Cardiology Dr. Arteaga  -will need tele monitor for Afib with RVR  -repeat VS after bolus.  midodrine ordered

## 2021-05-14 NOTE — CHART NOTE - NSCHARTNOTEFT_GEN_A_CORE
Daughter had questions about patient's management. Intern Dr Smith spoke to both daughters multiple times yesterday. ABGs were recommended by pulmonologist- daughter asking why. I spoke to daughter at bedside and explained to her in detail about patient's condition, explained pulmonologist Dr Cuellar was consulted who recommended ABG. Also explained to her there might be a pulmonary component to her SOB beside the kidneys. Daughter expressed understanding.

## 2021-05-14 NOTE — PROGRESS NOTE ADULT - ASSESSMENT
Patient is a 89yo Female with ESRD on HD, Afib on Eliquis, HTN, Intradialytic hypotension req Midodrine prior to HD, failed Rt AVF due to steal syndrome s/p ligation, h/o COVID in March, s/p PPM 2 weeks ago p/w SOB and chest comfort. Pt a/w acute hypoxic respiratory distress 2/2 pulmonary edema requiring BIPAP. Pt now hypotensive and bradycardic. Nephrology consulted for ESRD status.     1) ESRD: Will plan for daily dialysis for now, though I am not sure that fluid overload is solely responsible for pt's respiratory failure. Pulmonology consult appreciated.  Continue Midodrine 10mg pre-HD and 2h into HD for hypotension thought to be due to dysautonomia.    2) Hyperkalemia- resolved with HD. c/w low K diet. Monitor lytes.   3) Hypotension- with bradycardia again, with very variable and fluctuating BPs. Hypotension had been limited HD. Will use midodrine as above. d/w cardiology.  Bradycardia- with recent PPM placement; resolved. Cards following. On amiodarone. Monitor BP/HR.   4) Anemia of renal disease: Hb improving. Will avoid IV iron due to elevated Ferritin. c/w Epogen 8k units IV tiw with HD. Monitor Hb.  5) Hyperphosphatemia: Phosphorus acceptable. No need for phos binders at this time. c/w low phos diet. Monitor serum calcium and phosphorus.      [facility] NEPHROLOGY  Brad Chen M.D.  Oneal Tim D.O.  Roselia Huitron M.D.  Milli Platt, PARAG, ANP-C  [phone]    [id number] [id number]

## 2021-05-14 NOTE — PROGRESS NOTE ADULT - ASSESSMENT
Pt is a 89 y/o F, wheel chair bound, from home with PMH of Afib on Eliquis, ESRD on HD, DM, HTN presented with shortness of breath since few days. Pt also complaining of chest discomfort. As per pt's daughter Johnny batres, pt started to develop gradually shortness of breath since few days at rest. She mentioned that the pt was admitted recently to [facility] for shortness of breath and chest pain, leadless pacemaker was placed, discharged on april 29th. She has been regularly getting dialysis on Tuesday/thursday / saturday.  Patient admitted to ICU for SOB 2/2 fluid overload and was downgraded to medicine for management. Nephrology consulted, last HD 5/8. Cardiology consulted for management of SOB and Afib. Echo with EF>55%, mild to moderate mitral regurgitation and B/L pleural effusions.  PT evaluation done with recommendation for home PT, patient tis wheelchair bound and has own rolling walker.  5/10-Pt. seen and examined, noted A&O x 3 with slight dyspnea, reports feeling better.  Pt. scheduled for HD tomorrow after which she can be discharged.  Called dialysis to request early session.  Pt.'s daughter Hina called at [phone], updated re pt.'s condition, plan of care and discharge to home with home PT tomorrow discussed and agreed.    5/11- pt BP dropped 86/48 in HD. pt c/o palpitation but no chest pain, feeling sob. Saturating 96% on 2L.  s/p IV bolus 250ml. ECG ordered.   spoke with cardiology dr. Arteaga and nephrology dr. Donahue.  to transfer to tele floor.     Updated condition to Hina daughter [phone], Full code as wishes

## 2021-05-14 NOTE — CONSULT NOTE ADULT - SUBJECTIVE AND OBJECTIVE BOX
PULMONARY CONSULT NOTE      YOLANDA BROWN  MRN-[id number]    Patient is a 89y old  Female who presents with a chief complaint of Shortness of breath (14 May 2021 10:31)      HISTORY OF PRESENT ILLNESS:  History of Present Illness:  Reason for Admission: Shortness of breath  History of Present Illness:   Pt is a 89 y/o F, wheel chair bound, from home with PMH of Afib on Eliquis, ESRD on HD, DM, HTN presented with shortness of breath since few days. Pt also complaining of chest discomfort. As per pt's daughter Johnny brown, pt started to develop gradually shortness of breath since few days at rest. She mentioned that the pt was admitted recently to [facility] for shortness of breath and chest pain, leadless pacemaker was placed, discharged on april 29th. She has been regularly getting dialysis on Tuesday/thursday / saturday.  Pt denied fever, cough, abdominal pain. nausea, vomiting and other complains.      Pt is awake, alert, in HD. Lungs clear, with some lower ext. edema. On NRM.     MEDICATIONS  (STANDING):  aMIOdarone    Tablet 200 milliGRAM(s) Oral daily  amLODIPine   Tablet 2.5 milliGRAM(s) Oral daily  apixaban 2.5 milliGRAM(s) Oral two times a day  atorvastatin 10 milliGRAM(s) Oral at bedtime  budesonide  80 MICROgram(s)/formoterol 4.5 MICROgram(s) Inhaler 2 Puff(s) Inhalation two times a day  chlorhexidine 2% Cloths 1 Application(s) Topical daily  diphenhydrAMINE   Injectable 25 milliGRAM(s) IV Push once  epoetin maria d-epbx (RETACRIT) Injectable 8000 Unit(s) IV Push <User Schedule>  insulin glargine Injectable (LANTUS) 5 Unit(s) SubCutaneous at bedtime  insulin lispro (ADMELOG) corrective regimen sliding scale   SubCutaneous Before meals and at bedtime  metoprolol tartrate 25 milliGRAM(s) Oral every 8 hours  midodrine. 10 milliGRAM(s) Oral <User Schedule>  midodrine. 10 milliGRAM(s) Oral once  polyethylene glycol 3350 17 Gram(s) Oral daily  senna 2 Tablet(s) Oral at bedtime      MEDICATIONS  (PRN):  ALBUTerol    90 MICROgram(s) HFA Inhaler 2 Puff(s) Inhalation every 6 hours PRN Shortness of Breath and/or Wheezing  aluminum hydroxide/magnesium hydroxide/simethicone Suspension 30 milliLiter(s) Oral every 6 hours PRN Dyspepsia  bisacodyl Suppository 10 milliGRAM(s) Rectal daily PRN Constipation      Allergies    No Known Allergies    Intolerances        PAST MEDICAL & SURGICAL HISTORY:  HTN (hypertension)    HLD (hyperlipidemia)    CKD (chronic kidney disease)    Afib    DM (diabetes mellitus)    Artificial pacemaker        FAMILY HISTORY:      SOCIAL HISTORY  Smoking History:     REVIEW OF SYSTEMS:    CONSTITUTIONAL:  No fevers, chills, sweats    HEENT:  Eyes:  No diplopia or blurred vision. ENT:  No earache, sore throat or runny nose.    CARDIOVASCULAR:  No pressure, squeezing, tightness, or heaviness about the chest; no palpitations.    RESPIRATORY:  Per HPI    GASTROINTESTINAL:  No abdominal pain, nausea, vomiting or diarrhea.    GENITOURINARY:  No dysuria, frequency or urgency.    NEUROLOGIC:  No paresthesias, fasciculations, seizures or weakness.    PSYCHIATRIC:  No disorder of thought or mood.    Vital Signs Last 24 Hrs  T(C): 36.8 (14 May 2021 09:00), Max: 36.8 (13 May 2021 15:45)  T(F): 98.3 (14 May 2021 09:00), Max: 98.3 (13 May 2021 15:45)  HR: 57 (14 May 2021 09:00) (56 - 71)  BP: 128/51 (14 May 2021 09:00) (111/37 - 178/91)  BP(mean): --  RR: 16 (14 May 2021 09:00) (16 - 20)  SpO2: 100% (14 May 2021 09:00) (96% - 100%)  I&O's Detail    13 May 2021 07:01  -  14 May 2021 07:00  --------------------------------------------------------  IN:    Other (mL): 600 mL  Total IN: 600 mL    OUT:    Oral Fluid: 0 mL    Other (mL): 1504 mL  Total OUT: 1504 mL    Total NET: -904 mL          PHYSICAL EXAMINATION:    GENERAL: The patient is a well-developed, well-nourished no apparent distress.     HEENT: Head is normocephalic and atraumatic. Extraocular muscles are intact. Mucous membranes are moist.     NECK: Supple.     LUNGS: Clear to auscultation without wheezing, rales, or rhonchi. Respirations unlabored    HEART: Regular rate and rhythm without murmur.    ABDOMEN: Soft, nontender, and nondistended.  No hepatosplenomegaly is noted.    EXTREMITIES: + lower ext edema    NEUROLOGIC: Grossly intact.      LABS:                        8.4    8.49  )-----------( 215      ( 14 May 2021 07:15 )             27.7     05-14    137  |  100  |  40<H>  ----------------------------<  148<H>  4.3   |  29  |  4.57<H>    Ca    8.3<L>      14 May 2021 07:15  Phos  4.8     05-14  Mg     2.6     05-14    TPro  6.3  /  Alb  2.8<L>  /  TBili  0.5  /  DBili  x   /  AST  17  /  ALT  21  /  AlkPhos  124<H>  05-14          CARDIAC MARKERS ( 13 May 2021 03:36 )  <0.015 ng/mL / x     / x     / x     / x                    MICROBIOLOGY:    RADIOLOGY & ADDITIONAL STUDIES:    CXR:  IMPRESSION:  Cardiomegaly with mild vascular congestion. Left pleural effusion..    IMPRESSION: Increasing CHF.    Ct scan chest:    ekg;    echo:

## 2021-05-14 NOTE — PROGRESS NOTE ADULT - PROBLEM SELECTOR PLAN 4
Pt is on Eliquis at home  -Cont Eliquis, Amiodarone and Metoprolol   -Recent wireless pacemaker place 2 weeks ago  -card Dr. Arteaga  -will transfer to tele floor per dr. Arteaga  -outpt cardiology dr. Anthony gee [phone]

## 2021-05-14 NOTE — PROGRESS NOTE ADULT - SUBJECTIVE AND OBJECTIVE BOX
INTERVAL HPI/OVERNIGHT EVENTS:   Pt had episodes of respiratory distress      Vital Signs Last 24 Hrs  T(C): 36.8 (14 May 2021 16:22), Max: 36.8 (14 May 2021 09:00)  T(F): 98.2 (14 May 2021 16:22), Max: 98.3 (14 May 2021 09:00)  HR: 58 (14 May 2021 16:22) (50 - 71)  BP: 145/31 (14 May 2021 16:22) (111/37 - 178/91)  BP(mean): --  RR: 20 (14 May 2021 16:22) (16 - 20)  SpO2: 98% (14 May 2021 16:22) (96% - 100%)    PHYSICAL EXAMINATION:  GENERAL: NAD, well built  HEAD:  Atraumatic, Normocephalic  EYES:  conjunctiva and sclera clear  NECK: Supple, No JVD, Normal thyroid  CHEST/LUNG: Has bilateral crackles  HEART: Regular rate and rhythm; No murmurs, rubs, or gallops  ABDOMEN: Soft, Nontender, Nondistended; Bowel sounds present  NERVOUS SYSTEM:  Alert & Oriented X3,    EXTREMITIES:  2+ Peripheral Pulses, No clubbing, cyanosis, or edema  SKIN: warm dry                                      8.4    8.49  )-----------( 215      ( 14 May 2021 07:15 )             27.7   05-14    137  |  100  |  40<H>  ----------------------------<  148<H>  4.3   |  29  |  4.57<H>    Ca    8.3<L>      14 May 2021 07:15  Phos  4.8     05-14  Mg     2.6     05-14    TPro  6.3  /  Alb  2.8<L>  /  TBili  0.5  /  DBili  x   /  AST  17  /  ALT  21  /  AlkPhos  124<H>  05-14

## 2021-05-15 LAB
ALBUMIN SERPL ELPH-MCNC: 2.5 G/DL — LOW (ref 3.5–5)
ALP SERPL-CCNC: 112 U/L — SIGNIFICANT CHANGE UP (ref 40–120)
ALT FLD-CCNC: 16 U/L DA — SIGNIFICANT CHANGE UP (ref 10–60)
ANION GAP SERPL CALC-SCNC: 11 MMOL/L — SIGNIFICANT CHANGE UP (ref 5–17)
AST SERPL-CCNC: 13 U/L — SIGNIFICANT CHANGE UP (ref 10–40)
BILIRUB SERPL-MCNC: 0.5 MG/DL — SIGNIFICANT CHANGE UP (ref 0.2–1.2)
BUN SERPL-MCNC: 32 MG/DL — HIGH (ref 7–18)
CALCIUM SERPL-MCNC: 8.2 MG/DL — LOW (ref 8.4–10.5)
CHLORIDE SERPL-SCNC: 99 MMOL/L — SIGNIFICANT CHANGE UP (ref 96–108)
CO2 SERPL-SCNC: 26 MMOL/L — SIGNIFICANT CHANGE UP (ref 22–31)
CREAT SERPL-MCNC: 3.94 MG/DL — HIGH (ref 0.5–1.3)
GLUCOSE BLDC GLUCOMTR-MCNC: 138 MG/DL — HIGH (ref 70–99)
GLUCOSE BLDC GLUCOMTR-MCNC: 235 MG/DL — HIGH (ref 70–99)
GLUCOSE BLDC GLUCOMTR-MCNC: 238 MG/DL — HIGH (ref 70–99)
GLUCOSE BLDC GLUCOMTR-MCNC: 283 MG/DL — HIGH (ref 70–99)
GLUCOSE SERPL-MCNC: 126 MG/DL — HIGH (ref 70–99)
HCT VFR BLD CALC: 26.6 % — LOW (ref 34.5–45)
HGB BLD-MCNC: 8.3 G/DL — LOW (ref 11.5–15.5)
MAGNESIUM SERPL-MCNC: 2.3 MG/DL — SIGNIFICANT CHANGE UP (ref 1.6–2.6)
MCHC RBC-ENTMCNC: 31.2 GM/DL — LOW (ref 32–36)
MCHC RBC-ENTMCNC: 31.9 PG — SIGNIFICANT CHANGE UP (ref 27–34)
MCV RBC AUTO: 102.3 FL — HIGH (ref 80–100)
NRBC # BLD: 0 /100 WBCS — SIGNIFICANT CHANGE UP (ref 0–0)
PHOSPHATE SERPL-MCNC: 3.9 MG/DL — SIGNIFICANT CHANGE UP (ref 2.5–4.5)
PLATELET # BLD AUTO: 236 K/UL — SIGNIFICANT CHANGE UP (ref 150–400)
POTASSIUM SERPL-MCNC: 3.9 MMOL/L — SIGNIFICANT CHANGE UP (ref 3.5–5.3)
POTASSIUM SERPL-SCNC: 3.9 MMOL/L — SIGNIFICANT CHANGE UP (ref 3.5–5.3)
PROT SERPL-MCNC: 6 G/DL — SIGNIFICANT CHANGE UP (ref 6–8.3)
RBC # BLD: 2.6 M/UL — LOW (ref 3.8–5.2)
RBC # FLD: 16.1 % — HIGH (ref 10.3–14.5)
SODIUM SERPL-SCNC: 136 MMOL/L — SIGNIFICANT CHANGE UP (ref 135–145)
WBC # BLD: 9.06 K/UL — SIGNIFICANT CHANGE UP (ref 3.8–10.5)
WBC # FLD AUTO: 9.06 K/UL — SIGNIFICANT CHANGE UP (ref 3.8–10.5)

## 2021-05-15 RX ADMIN — APIXABAN 2.5 MILLIGRAM(S): 2.5 TABLET, FILM COATED ORAL at 05:50

## 2021-05-15 RX ADMIN — INSULIN GLARGINE 5 UNIT(S): 100 INJECTION, SOLUTION SUBCUTANEOUS at 22:06

## 2021-05-15 RX ADMIN — Medication 25 MILLIGRAM(S): at 15:21

## 2021-05-15 RX ADMIN — Medication 2: at 22:05

## 2021-05-15 RX ADMIN — BUDESONIDE AND FORMOTEROL FUMARATE DIHYDRATE 2 PUFF(S): 160; 4.5 AEROSOL RESPIRATORY (INHALATION) at 22:06

## 2021-05-15 RX ADMIN — BUDESONIDE AND FORMOTEROL FUMARATE DIHYDRATE 2 PUFF(S): 160; 4.5 AEROSOL RESPIRATORY (INHALATION) at 10:00

## 2021-05-15 RX ADMIN — ERYTHROPOIETIN 8000 UNIT(S): 10000 INJECTION, SOLUTION INTRAVENOUS; SUBCUTANEOUS at 10:00

## 2021-05-15 RX ADMIN — MIDODRINE HYDROCHLORIDE 10 MILLIGRAM(S): 2.5 TABLET ORAL at 08:07

## 2021-05-15 RX ADMIN — AMLODIPINE BESYLATE 2.5 MILLIGRAM(S): 2.5 TABLET ORAL at 05:50

## 2021-05-15 RX ADMIN — ATORVASTATIN CALCIUM 10 MILLIGRAM(S): 80 TABLET, FILM COATED ORAL at 22:06

## 2021-05-15 RX ADMIN — Medication 25 MILLIGRAM(S): at 22:11

## 2021-05-15 RX ADMIN — SENNA PLUS 2 TABLET(S): 8.6 TABLET ORAL at 22:11

## 2021-05-15 RX ADMIN — APIXABAN 2.5 MILLIGRAM(S): 2.5 TABLET, FILM COATED ORAL at 17:23

## 2021-05-15 RX ADMIN — Medication 2: at 17:24

## 2021-05-15 RX ADMIN — POLYETHYLENE GLYCOL 3350 17 GRAM(S): 17 POWDER, FOR SOLUTION ORAL at 15:21

## 2021-05-15 RX ADMIN — AMIODARONE HYDROCHLORIDE 200 MILLIGRAM(S): 400 TABLET ORAL at 05:50

## 2021-05-15 NOTE — PROGRESS NOTE ADULT - PROBLEM SELECTOR PLAN 4
Pt is on Eliquis at home  -Cont Eliquis, Amiodarone and Metoprolol   -Recent wireless pacemaker place 2 weeks ago  -card Dr. Arteaga  -will transfer to tele floor per dr. Arteaga  -outpt cardiology dr. Anthony gee [phone]

## 2021-05-15 NOTE — PROGRESS NOTE ADULT - PROBLEM SELECTOR PLAN 2
Likely secondary to fluid overload  -Downgraded from ICU  -HD on 5/13, 5/14 and 5/15  -on 4L NC O2  Pulm Dr. Meraz consulted

## 2021-05-15 NOTE — PROGRESS NOTE ADULT - PROBLEM SELECTOR PLAN 1
pt noted hypotensive in HD., c/o palpitation no chest pain. O2 sat 96% on 2L.   prior to HD. BP was noted 187/70, HR 69.   -Pt takes amiodarone, eliquis, BB   -give IV bolus 250ml x 1.   -EKG showed sinus julius  -On tele monitor for monitoring Afib with RVR and julius  -on midodrine during dialysis  -Cardiology Dr. Arteaga

## 2021-05-15 NOTE — PROGRESS NOTE ADULT - ASSESSMENT
1. SOB   Likely 2nd to fluid overload.   BIPAP PRN    HD today again  XR noted   Continue HD  Bronchodilators PRN   O2 Supplement - on NRM   F/U CXR   PFTs as OP  DVT and GI PPX     2. ESRD   On HD  Avoid nephrotoxic drugs  Monitor labs   Renal F.U     3. Morbid Obesity   R.O SHARON   PFTs and PSG's as OP     4. CHF   on HD  Monitor labs  I&Os  Cardio F/U     5. HTN   with fluctuating BP's.  Midodrine was given for hypotension during HD   Cardio and renal F/U   Monitor BP

## 2021-05-15 NOTE — PROGRESS NOTE ADULT - PROBLEM SELECTOR PLAN 3
Pt gets regular dialysis on T,T,S  -HD on 5/13, 5/14 and 5/15  -On midodrine for BP support on HD  -Nephrology Dr. Huitron/Gustavo  -Avoid nephrotoxic medications

## 2021-05-15 NOTE — PROGRESS NOTE ADULT - SUBJECTIVE AND OBJECTIVE BOX
PGY-1 Progress Note discussed with attending    PAGER #: [[phone]] TILL 5:00 PM  PLEASE CONTACT ON CALL TEAM:  - On Call Team (Please refer to Sheri) FROM 5:00 PM - 8:30PM  - Nightfloat Team FROM 8:30 -7:30 AM    CHIEF COMPLAINT & BRIEF HOSPITAL COURSE:    INTERVAL HPI/OVERNIGHT EVENTS:     REVIEW OF SYSTEMS:  CONSTITUTIONAL: No fever, weight loss, or fatigue  RESPIRATORY: No cough, wheezing, chills or hemoptysis; No shortness of breath  CARDIOVASCULAR: No chest pain, palpitations, dizziness, or leg swelling  GASTROINTESTINAL: No abdominal pain. No nausea, vomiting, or hematemesis; No diarrhea or constipation. No melena or hematochezia.  GENITOURINARY: No dysuria or hematuria, urinary frequency  NEUROLOGICAL: No headaches, memory loss, loss of strength, numbness, or tremors  SKIN: No itching, burning, rashes, or lesions     Vital Signs Last 24 Hrs  T(C): 36.8 (15 May 2021 15:48), Max: 37.1 (14 May 2021 23:00)  T(F): 98.2 (15 May 2021 15:48), Max: 98.7 (14 May 2021 23:00)  HR: 62 (15 May 2021 15:48) (56 - 68)  BP: 127/74 (15 May 2021 15:48) (127/74 - 153/44)  BP(mean): --  RR: 19 (15 May 2021 15:48) (18 - 19)  SpO2: 100% (15 May 2021 15:48) (97% - 100%)    PHYSICAL EXAMINATION:  GENERAL: NAD, well built  HEAD:  Atraumatic, Normocephalic  EYES:  conjunctiva and sclera clear  NECK: Supple, No JVD, Normal thyroid  CHEST/LUNG: Clear to auscultation. Clear to percussion bilaterally; No rales, rhonchi, wheezing, or rubs  HEART: Regular rate and rhythm; No murmurs, rubs, or gallops  ABDOMEN: Soft, Nontender, Nondistended; Bowel sounds present  NERVOUS SYSTEM:  Alert & Oriented X3,    EXTREMITIES:  2+ Peripheral Pulses, No clubbing, cyanosis, or edema  SKIN: warm dry                          8.3    9.06  )-----------( 236      ( 15 May 2021 06:22 )             26.6     05-15    136  |  99  |  32<H>  ----------------------------<  126<H>  3.9   |  26  |  3.94<H>    Ca    8.2<L>      15 May 2021 06:22  Phos  3.9     05-15  Mg     2.3     05-15    TPro  6.0  /  Alb  2.5<L>  /  TBili  0.5  /  DBili  x   /  AST  13  /  ALT  16  /  AlkPhos  112  05-15    LIVER FUNCTIONS - ( 15 May 2021 06:22 )  Alb: 2.5 g/dL / Pro: 6.0 g/dL / ALK PHOS: 112 U/L / ALT: 16 U/L DA / AST: 13 U/L / GGT: x           CAPILLARY BLOOD GLUCOSE      RADIOLOGY & ADDITIONAL TESTS:

## 2021-05-15 NOTE — PROGRESS NOTE ADULT - SUBJECTIVE AND OBJECTIVE BOX
Patient denies chest pain events noted, requiring NRB at HD,  Review of systems otherwise (-)  	  ALBUTerol    90 MICROgram(s) HFA Inhaler 2 Puff(s) Inhalation every 6 hours PRN  aMIOdarone    Tablet 200 milliGRAM(s) Oral daily  amLODIPine   Tablet 2.5 milliGRAM(s) Oral daily  apixaban 2.5 milliGRAM(s) Oral two times a day  atorvastatin 10 milliGRAM(s) Oral at bedtime  bisacodyl Suppository 10 milliGRAM(s) Rectal daily PRN  budesonide  80 MICROgram(s)/formoterol 4.5 MICROgram(s) Inhaler 2 Puff(s) Inhalation two times a day  chlorhexidine 2% Cloths 1 Application(s) Topical daily  diphenhydrAMINE   Injectable 25 milliGRAM(s) IV Push once  epoetin maria d-epbx (RETACRIT) Injectable 8000 Unit(s) IV Push <User Schedule>  insulin glargine Injectable (LANTUS) 5 Unit(s) SubCutaneous at bedtime  insulin lispro (ADMELOG) corrective regimen sliding scale   SubCutaneous Before meals and at bedtime  metoprolol tartrate 25 milliGRAM(s) Oral every 8 hours  midodrine. 10 milliGRAM(s) Oral <User Schedule>  polyethylene glycol 3350 17 Gram(s) Oral daily  senna 2 Tablet(s) Oral at bedtime                            8.3    9.06  )-----------( 236      ( 15 May 2021 06:22 )             26.6       05-15    136  |  99  |  32<H>  ----------------------------<  126<H>  3.9   |  26  |  3.94<H>    Ca    8.2<L>      15 May 2021 06:22  Phos  3.9     05-15  Mg     2.3     05-15    TPro  6.0  /  Alb  2.5<L>  /  TBili  0.5  /  DBili  x   /  AST  13  /  ALT  16  /  AlkPhos  112  05-15      CARDIAC MARKERS ( 13 May 2021 03:36 )  <0.015 ng/mL / x     / x     / x     / x          T(C): 37.1 (05-15-21 @ 09:45), Max: 37.1 (05-14-21 @ 23:00)  HR: 63 (05-15-21 @ 09:45) (56 - 68)  BP: 146/43 (05-15-21 @ 09:45) (145/31 - 153/44)  RR: 18 (05-15-21 @ 09:45) (18 - 20)  SpO2: 100% (05-15-21 @ 09:45) (97% - 100%)  Wt(kg): --    I&O's Summary    14 May 2021 07:01  -  15 May 2021 07:00  --------------------------------------------------------  IN: 700 mL / OUT: 3200 mL / NET: -2500 mL    Gen: resting comfortably, no acute distress.  HEENT:  (-)icterus (-)pallor  CV: N S1 S2 1/6 SHANE (+)2 Pulses B/l  Resp:  Clear to ausculatation B/L, normal effort  GI: (+) BS Soft, NT, ND  Lymph:  (-)Edema, (-)obvious lymphadenopathy  Skin: Warm to touch, Normal turgor  Psych: Appropriate mood and affect      TELEMETRY: 	  off      ASSESSMENT/PLAN: 	89y  Female wheel chair bound, from home with PMH of PAF on Eliquis, leadless PPM placed by Dr. Evangelina Mcclelland at [facility], ESRD on HD, DM, HTN presented with shortness of breath since few days.    - Keep net negative with HD  - Echo with normal LV function   - Palliative f/u  - On midodrine for BP support on HD, suspect she has some element of dysautonomia seems to be responding   - Cont eliquis and Amio follow up with Dr. Evangelina Mcclelland for EP    Abdelrahman Avitia M.D.  Cardiac Electrophysiology  [phone]

## 2021-05-15 NOTE — PROGRESS NOTE ADULT - ASSESSMENT
Pt is a 89 y/o F, wheel chair bound, from home with PMH of Afib on Eliquis, ESRD on HD, DM, HTN presented with shortness of breath since few days. Pt also complaining of chest discomfort. As per pt's daughter Johnny batres, pt started to develop gradually shortness of breath since few days at rest. She mentioned that the pt was admitted recently to [facility] for shortness of breath and chest pain, leadless pacemaker was placed, discharged on april 29th. She has been regularly getting dialysis on Tuesday/thursday / saturday.  Patient admitted to ICU for SOB 2/2 fluid overload and was downgraded to medicine for management. Nephrology consulted, last HD 5/8. Cardiology consulted for management of SOB and Afib. Echo with EF>55%, mild to moderate mitral regurgitation and B/L pleural effusions.  PT evaluation done with recommendation for home PT, patient tis wheelchair bound and has own rolling walker.  5/10-Pt. seen and examined, noted A&O x 3 with slight dyspnea, reports feeling better.  Pt. scheduled for HD tomorrow after which she can be discharged.  Called dialysis to request early session.  Pt.'s daughter Hina called at [phone], updated re pt.'s condition, plan of care and discharge to home with home PT tomorrow discussed and agreed.    5/11- pt BP dropped 86/48 in HD. pt c/o palpitation but no chest pain, feeling sob. Saturating 96% on 2L.  s/p IV bolus 250ml. ECG ordered.   spoke with cardiology dr. Arteaga and nephrology dr. Donahue.  to transfer to tele floor.     Updated condition to Hina daughter [phone], Full code as wishes

## 2021-05-15 NOTE — PROGRESS NOTE ADULT - SUBJECTIVE AND OBJECTIVE BOX
[facility] [place]ROLOGY- PROGRESS NOTE    Patient is a 89yo Female with ESRD on HD, Afib on Eliquis, HTN, Intradialytic hypotension req Midodrine prior to HD, failed Rt AVF due to steal syndrome s/p ligation, h/o COVID in March, s/p PPM 2 weeks ago p/w SOB and chest comfort. Pt a/w acute hypoxic respiratory distress 2/2 pulmonary edema requiring BIPAP. Pt now hypotensive and bradycardic. Nephrology consulted for ESRD status.       REVIEW OF SYSTEMS: + SOB improving., no CP, palpitations +weakness.      VITALS:  T(F): 98.7 (05-15-21 @ 09:45), Max: 98.7 (05-14-21 @ 23:00)  HR: 63 (05-15-21 @ 09:45)  BP: 146/43 (05-15-21 @ 09:45)  RR: 18 (05-15-21 @ 09:45)  SpO2: 100% (05-15-21 @ 09:45)  Wt(kg): --    05-14 @ 07:01  -  05-15 @ 07:00  --------------------------------------------------------  IN: 700 mL / OUT: 3200 mL / NET: -2500 mL      PHYSICAL EXAM:  Gen: NAD,   HEENT: anicteric;  Neck: no JVD  Cards: +S1/S2,   Resp: much improved today.  A few basilar crackles B  GI: soft, NT/ND, NABS  Extremities: 1+ LE edema B/L  Access: Rt IJ tunneled HD catheter- benign      LABS:  05-15    136  |  99  |  32<H>  ----------------------------<  126<H>  3.9   |  26  |  3.94<H>    Ca    8.2<L>      15 May 2021 06:22  Phos  3.9     05-15  Mg     2.3     05-15    TPro  6.0  /  Alb  2.5<L>  /  TBili  0.5  /  DBili      /  AST  13  /  ALT  16  /  AlkPhos  112  05-15    Creatinine Trend: 3.94 <--, 4.57 <--, 4.18 <--, 6.26 <--, 4.86 <--                        8.3    9.06  )-----------( 236      ( 15 May 2021 06:22 )             26.6     Urine Studies:

## 2021-05-15 NOTE — PROGRESS NOTE ADULT - SUBJECTIVE AND OBJECTIVE BOX
Patient is a 89y old  Female who presents with a chief complaint of Shortness of breath (14 May 2021 20:42)  Awake, alert, comfortable in bed in NAD. For HD again today. No cough or sob at rest    INTERVAL HPI/OVERNIGHT EVENTS:      VITAL SIGNS:  T(F): 98.7 (05-15-21 @ 09:45)  HR: 63 (05-15-21 @ 09:45)  BP: 146/43 (05-15-21 @ 09:45)  RR: 18 (05-15-21 @ 09:45)  SpO2: 100% (05-15-21 @ 09:45)  Wt(kg): --  I&O's Detail    14 May 2021 07:01  -  15 May 2021 07:00  --------------------------------------------------------  IN:    Other (mL): 700 mL  Total IN: 700 mL    OUT:    Other (mL): 3200 mL  Total OUT: 3200 mL    Total NET: -2500 mL              REVIEW OF SYSTEMS:    CONSTITUTIONAL:  No fevers, chills, sweats    HEENT:  Eyes:  No diplopia or blurred vision. ENT:  No earache, sore throat or runny nose.    CARDIOVASCULAR:  No pressure, squeezing, tightness, or heaviness about the chest; no palpitations.    RESPIRATORY:  Per HPI    GASTROINTESTINAL:  No abdominal pain, nausea, vomiting or diarrhea.    GENITOURINARY:  No dysuria, frequency or urgency.    NEUROLOGIC:  No paresthesias, fasciculations, seizures or weakness.    PSYCHIATRIC:  No disorder of thought or mood.      PHYSICAL EXAM:    Constitutional: Well developed and nourished  Eyes:Perrla  ENMT: normal  Neck:supple  Respiratory: good air entry  Cardiovascular: S1 S2 regular  Gastrointestinal: Soft, Non tender  Extremities: + edema  Vascular:normal  Neurological:Awake, alert,Ox3  Musculoskeletal:Normal      MEDICATIONS  (STANDING):  aMIOdarone    Tablet 200 milliGRAM(s) Oral daily  amLODIPine   Tablet 2.5 milliGRAM(s) Oral daily  apixaban 2.5 milliGRAM(s) Oral two times a day  atorvastatin 10 milliGRAM(s) Oral at bedtime  budesonide  80 MICROgram(s)/formoterol 4.5 MICROgram(s) Inhaler 2 Puff(s) Inhalation two times a day  chlorhexidine 2% Cloths 1 Application(s) Topical daily  diphenhydrAMINE   Injectable 25 milliGRAM(s) IV Push once  epoetin maria d-epbx (RETACRIT) Injectable 8000 Unit(s) IV Push <User Schedule>  insulin glargine Injectable (LANTUS) 5 Unit(s) SubCutaneous at bedtime  insulin lispro (ADMELOG) corrective regimen sliding scale   SubCutaneous Before meals and at bedtime  metoprolol tartrate 25 milliGRAM(s) Oral every 8 hours  midodrine. 10 milliGRAM(s) Oral <User Schedule>  polyethylene glycol 3350 17 Gram(s) Oral daily  senna 2 Tablet(s) Oral at bedtime    MEDICATIONS  (PRN):  ALBUTerol    90 MICROgram(s) HFA Inhaler 2 Puff(s) Inhalation every 6 hours PRN Shortness of Breath and/or Wheezing  aluminum hydroxide/magnesium hydroxide/simethicone Suspension 30 milliLiter(s) Oral every 6 hours PRN Dyspepsia  bisacodyl Suppository 10 milliGRAM(s) Rectal daily PRN Constipation      Allergies    No Known Allergies    Intolerances        LABS:                        8.3    9.06  )-----------( 236      ( 15 May 2021 06:22 )             26.6     05-15    136  |  99  |  32<H>  ----------------------------<  126<H>  3.9   |  26  |  3.94<H>    Ca    8.2<L>      15 May 2021 06:22  Phos  3.9     05-15  Mg     2.3     05-15    TPro  6.0  /  Alb  2.5<L>  /  TBili  0.5  /  DBili  x   /  AST  13  /  ALT  16  /  AlkPhos  112  05-15        ABG - ( 14 May 2021 15:59 )  pH, Arterial: 7.56  pH, Blood: x     /  pCO2: 32    /  pO2: 145   / HCO3: 29    / Base Excess: 6.3   /  SaO2: 99                    CAPILLARY BLOOD GLUCOSE      POCT Blood Glucose.: 138 mg/dL (15 May 2021 07:26)  POCT Blood Glucose.: 152 mg/dL (14 May 2021 21:36)  POCT Blood Glucose.: 286 mg/dL (14 May 2021 17:06)  POCT Blood Glucose.: 111 mg/dL (14 May 2021 11:00)        RADIOLOGY & ADDITIONAL TESTS:    CXR:    < from: Xray Chest 1 View-PORTABLE IMMEDIATE (Xray Chest 1 View-PORTABLE IMMEDIATE .) (05.13.21 @ 20:15) >  IMPRESSION:  Cardiomegaly with mild vascular congestion. Left pleural effusion..        Ct scan chest:    ekg;    echo:

## 2021-05-15 NOTE — PROGRESS NOTE ADULT - ASSESSMENT
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 Pt a/w acute hypoxic respiratory distress 2/2 pulmonary edema requiring BIPAP. Pt now hypotensive and bradycardic. Nephrology consulted for ESRD status.     1) ESRD: Will plan for daily dialysis for now, though I am not sure that fluid overload is solely responsible for pt's respiratory failure. Pulmonology consult appreciated. Continue Midodrine 10mg pre-HD and 2h into HD for hypotension thought to be due to dysautonomia.    2) Hyperkalemia- resolved with HD. c/w low K diet. Monitor lytes.   3) Hypotension- with bradycardia again, with very variable and fluctuating BPs. Hypotension had been limited HD. Will use midodrine as above. d/w cardiology.  Bradycardia- with recent PPM placement; resolved. Cards following. On amiodarone. Monitor BP/HR.   4) Anemia of renal disease: Hb improving. Will avoid IV iron due to elevated Ferritin. c/w Epogen 8k units IV tiw with HD. Monitor Hb.  5) Hyperphosphatemia: Phosphorus acceptable. No need for phos binders at this time. c/w low phos diet. Monitor serum calcium and phosphorus.      [facility] NEPHROLOGY  Brad Chen M.D.  Oneal Tim D.O.  Roselia Huitron M.D.  Milli Platt, PARAG, ANP-C  [phone]    [address] [place] [id number]

## 2021-05-16 LAB
ALBUMIN SERPL ELPH-MCNC: 2.9 G/DL — LOW (ref 3.5–5)
ALP SERPL-CCNC: 122 U/L — HIGH (ref 40–120)
ALT FLD-CCNC: 15 U/L DA — SIGNIFICANT CHANGE UP (ref 10–60)
ANION GAP SERPL CALC-SCNC: 10 MMOL/L — SIGNIFICANT CHANGE UP (ref 5–17)
AST SERPL-CCNC: 11 U/L — SIGNIFICANT CHANGE UP (ref 10–40)
BILIRUB SERPL-MCNC: 0.5 MG/DL — SIGNIFICANT CHANGE UP (ref 0.2–1.2)
BUN SERPL-MCNC: 25 MG/DL — HIGH (ref 7–18)
CALCIUM SERPL-MCNC: 8.7 MG/DL — SIGNIFICANT CHANGE UP (ref 8.4–10.5)
CHLORIDE SERPL-SCNC: 98 MMOL/L — SIGNIFICANT CHANGE UP (ref 96–108)
CO2 SERPL-SCNC: 29 MMOL/L — SIGNIFICANT CHANGE UP (ref 22–31)
CREAT SERPL-MCNC: 3.71 MG/DL — HIGH (ref 0.5–1.3)
GLUCOSE BLDC GLUCOMTR-MCNC: 128 MG/DL — HIGH (ref 70–99)
GLUCOSE BLDC GLUCOMTR-MCNC: 272 MG/DL — HIGH (ref 70–99)
GLUCOSE BLDC GLUCOMTR-MCNC: 283 MG/DL — HIGH (ref 70–99)
GLUCOSE BLDC GLUCOMTR-MCNC: 294 MG/DL — HIGH (ref 70–99)
GLUCOSE BLDC GLUCOMTR-MCNC: 295 MG/DL — HIGH (ref 70–99)
GLUCOSE SERPL-MCNC: 121 MG/DL — HIGH (ref 70–99)
HCT VFR BLD CALC: 29.8 % — LOW (ref 34.5–45)
HGB BLD-MCNC: 9.1 G/DL — LOW (ref 11.5–15.5)
MAGNESIUM SERPL-MCNC: 2.2 MG/DL — SIGNIFICANT CHANGE UP (ref 1.6–2.6)
MCHC RBC-ENTMCNC: 30.5 GM/DL — LOW (ref 32–36)
MCHC RBC-ENTMCNC: 31.1 PG — SIGNIFICANT CHANGE UP (ref 27–34)
MCV RBC AUTO: 101.7 FL — HIGH (ref 80–100)
NRBC # BLD: 0 /100 WBCS — SIGNIFICANT CHANGE UP (ref 0–0)
PHOSPHATE SERPL-MCNC: 3.3 MG/DL — SIGNIFICANT CHANGE UP (ref 2.5–4.5)
PLATELET # BLD AUTO: 238 K/UL — SIGNIFICANT CHANGE UP (ref 150–400)
POTASSIUM SERPL-MCNC: 3.9 MMOL/L — SIGNIFICANT CHANGE UP (ref 3.5–5.3)
POTASSIUM SERPL-SCNC: 3.9 MMOL/L — SIGNIFICANT CHANGE UP (ref 3.5–5.3)
PROT SERPL-MCNC: 6.6 G/DL — SIGNIFICANT CHANGE UP (ref 6–8.3)
RBC # BLD: 2.93 M/UL — LOW (ref 3.8–5.2)
RBC # FLD: 15.8 % — HIGH (ref 10.3–14.5)
SODIUM SERPL-SCNC: 137 MMOL/L — SIGNIFICANT CHANGE UP (ref 135–145)
WBC # BLD: 6.09 K/UL — SIGNIFICANT CHANGE UP (ref 3.8–10.5)
WBC # FLD AUTO: 6.09 K/UL — SIGNIFICANT CHANGE UP (ref 3.8–10.5)

## 2021-05-16 RX ORDER — OLANZAPINE 15 MG/1
2.5 TABLET, FILM COATED ORAL ONCE
Refills: 0 | Status: COMPLETED | OUTPATIENT
Start: 2021-05-16 | End: 2021-05-16

## 2021-05-16 RX ORDER — ERYTHROPOIETIN 10000 [IU]/ML
8000 INJECTION, SOLUTION INTRAVENOUS; SUBCUTANEOUS
Refills: 0 | Status: DISCONTINUED | OUTPATIENT
Start: 2021-05-16 | End: 2021-05-17

## 2021-05-16 RX ORDER — ERYTHROPOIETIN 10000 [IU]/ML
8000 INJECTION, SOLUTION INTRAVENOUS; SUBCUTANEOUS
Refills: 0 | Status: DISCONTINUED | OUTPATIENT
Start: 2021-05-16 | End: 2021-05-16

## 2021-05-16 RX ADMIN — BUDESONIDE AND FORMOTEROL FUMARATE DIHYDRATE 2 PUFF(S): 160; 4.5 AEROSOL RESPIRATORY (INHALATION) at 09:47

## 2021-05-16 RX ADMIN — Medication 25 MILLIGRAM(S): at 22:41

## 2021-05-16 RX ADMIN — CHLORHEXIDINE GLUCONATE 1 APPLICATION(S): 213 SOLUTION TOPICAL at 12:00

## 2021-05-16 RX ADMIN — Medication 25 MILLIGRAM(S): at 13:26

## 2021-05-16 RX ADMIN — AMIODARONE HYDROCHLORIDE 200 MILLIGRAM(S): 400 TABLET ORAL at 06:22

## 2021-05-16 RX ADMIN — Medication 3: at 17:22

## 2021-05-16 RX ADMIN — APIXABAN 2.5 MILLIGRAM(S): 2.5 TABLET, FILM COATED ORAL at 17:22

## 2021-05-16 RX ADMIN — INSULIN GLARGINE 5 UNIT(S): 100 INJECTION, SOLUTION SUBCUTANEOUS at 22:41

## 2021-05-16 RX ADMIN — Medication 3: at 22:42

## 2021-05-16 RX ADMIN — OLANZAPINE 2.5 MILLIGRAM(S): 15 TABLET, FILM COATED ORAL at 20:00

## 2021-05-16 RX ADMIN — APIXABAN 2.5 MILLIGRAM(S): 2.5 TABLET, FILM COATED ORAL at 06:22

## 2021-05-16 RX ADMIN — ATORVASTATIN CALCIUM 10 MILLIGRAM(S): 80 TABLET, FILM COATED ORAL at 22:42

## 2021-05-16 RX ADMIN — Medication 3: at 12:01

## 2021-05-16 RX ADMIN — AMLODIPINE BESYLATE 2.5 MILLIGRAM(S): 2.5 TABLET ORAL at 06:22

## 2021-05-16 RX ADMIN — POLYETHYLENE GLYCOL 3350 17 GRAM(S): 17 POWDER, FOR SOLUTION ORAL at 12:01

## 2021-05-16 RX ADMIN — SENNA PLUS 2 TABLET(S): 8.6 TABLET ORAL at 22:43

## 2021-05-16 NOTE — PROGRESS NOTE ADULT - SUBJECTIVE AND OBJECTIVE BOX
INTERVAL HPI/OVERNIGHT EVENTS: no acute events o/n       Vital Signs Last 24 Hrs  T(C): 36.7 (16 May 2021 11:17), Max: 37.2 (15 May 2021 21:08)  T(F): 98.1 (16 May 2021 11:17), Max: 99 (15 May 2021 21:08)  HR: 66 (16 May 2021 11:17) (55 - 66)  BP: 148/42 (16 May 2021 11:17) (144/48 - 159/51)  BP(mean): --  RR: 18 (16 May 2021 11:17) (18 - 20)  SpO2: 100% (16 May 2021 11:17) (96% - 100%)    PHYSICAL EXAMINATION:  GENERAL: NAD, comfortable   HEAD:  Atraumatic, Normocephalic  EYES:  conjunctiva and sclera clear  NECK: Supple, No JVD, Normal thyroid  CHEST/LUNG: Clear to auscultation. Clear to percussion bilaterally; No rales, rhonchi, wheezing, or rubs  HEART: Regular rate and rhythm; No murmurs, rubs, or gallops  ABDOMEN: Soft, Nontender, Nondistended; Bowel sounds present  NERVOUS SYSTEM:  Alert & Oriented X3,    EXTREMITIES:  2+ Peripheral Pulses, No clubbing, cyanosis, or edema  SKIN: warm dry                           9.1    6.09  )-----------( 238      ( 16 May 2021 06:42 )             29.8   05-16    137  |  98  |  25<H>  ----------------------------<  121<H>  3.9   |  29  |  3.71<H>    Ca    8.7      16 May 2021 06:42  Phos  3.3     05-16  Mg     2.2     05-16    TPro  6.6  /  Alb  2.9<L>  /  TBili  0.5  /  DBili  x   /  AST  11  /  ALT  15  /  AlkPhos  122<H>  05-16

## 2021-05-16 NOTE — PROGRESS NOTE ADULT - PROBLEM SELECTOR PLAN 4
Pt is on Eliquis at home  -Cont Eliquis, Amiodarone and Metoprolol   -Recent wireless pacemaker place 2 weeks ago  -card Dr. Arteaga  -will transfer to tele floor per dr. Arteaga  -outpt cardiology dr. Anthony gee [phone]

## 2021-05-16 NOTE — PROGRESS NOTE ADULT - SUBJECTIVE AND OBJECTIVE BOX
No chest pain, or SOB.  occasional palpitations (re: AFib).  poor sleep at night, states she needs to nap during the day "to avoid a mental breakdown".   Review of systems otherwise (-)    ALBUTerol    90 MICROgram(s) HFA Inhaler 2 Puff(s) Inhalation every 6 hours PRN  aMIOdarone    Tablet 200 milliGRAM(s) Oral daily  amLODIPine   Tablet 2.5 milliGRAM(s) Oral daily  apixaban 2.5 milliGRAM(s) Oral two times a day  atorvastatin 10 milliGRAM(s) Oral at bedtime  bisacodyl Suppository 10 milliGRAM(s) Rectal daily PRN  budesonide  80 MICROgram(s)/formoterol 4.5 MICROgram(s) Inhaler 2 Puff(s) Inhalation two times a day  chlorhexidine 2% Cloths 1 Application(s) Topical daily  diphenhydrAMINE   Injectable 25 milliGRAM(s) IV Push once  epoetin maria d-epbx (RETACRIT) Injectable 8000 Unit(s) IV Push <User Schedule>  insulin glargine Injectable (LANTUS) 5 Unit(s) SubCutaneous at bedtime  insulin lispro (ADMELOG) corrective regimen sliding scale   SubCutaneous Before meals and at bedtime  metoprolol tartrate 25 milliGRAM(s) Oral every 8 hours  midodrine. 10 milliGRAM(s) Oral <User Schedule>  polyethylene glycol 3350 17 Gram(s) Oral daily  senna 2 Tablet(s) Oral at bedtime                            9.1    6.09  )-----------( 238      ( 16 May 2021 06:42 )             29.8       05-16    137  |  98  |  25<H>  ----------------------------<  121<H>  3.9   |  29  |  3.71<H>    Ca    8.7      16 May 2021 06:42  Phos  3.3     05-16  Mg     2.2     05-16    TPro  6.6  /  Alb  2.9<L>  /  TBili  0.5  /  DBili  x   /  AST  11  /  ALT  15  /  AlkPhos  122<H>  05-16      T(C): 36.7 (05-16-21 @ 11:17), Max: 37.2 (05-15-21 @ 21:08)  HR: 66 (05-16-21 @ 11:17) (55 - 66)  BP: 148/42 (05-16-21 @ 11:17) (127/74 - 159/51)  RR: 18 (05-16-21 @ 11:17) (18 - 20)  SpO2: 100% (05-16-21 @ 11:17) (96% - 100%)  Wt(kg): --    I&O's Summary    15 May 2021 07:01  -  16 May 2021 07:00  --------------------------------------------------------  IN: 750 mL / OUT: 3600 mL / NET: -2850 mL    Gen: resting comfortably, no acute distress.  HEENT:  (-)icterus (-)pallor  CV: N S1 S2 1/6 SHANE (+)2 Pulses B/l  Resp:  Clear to ausculatation B/L, normal effort  GI: (+) BS Soft, NT, ND  Lymph:  (-)Edema, (-)obvious lymphadenopathy  Skin: Warm to touch, Normal turgor  Psych: Appropriate mood and affect      ASSESSMENT/PLAN: 	89y  Female wheel chair bound, from home with PMH of PAF on Eliquis, leadless PPM placed by Dr. Evangelina Mcclelland at [facility], ESRD on HD, DM, HTN presented with shortness of breath since few days.    - Keep net negative with HD  - Echo with normal LV function   - Palliative f/u  - On midodrine for BP support on HD, suspect she has some element of dysautonomia seems to be responding   - Cont eliquis and Amio for paroxysmal atrial fibrillation,  follow up with Dr. Evangelina Mcclelland for EP    Abdelrahman Avitia M.D.  Cardiac Electrophysiology  [phone]

## 2021-05-16 NOTE — PROGRESS NOTE ADULT - SUBJECTIVE AND OBJECTIVE BOX
Patient is a 89y old  Female who presents with a chief complaint of Shortness of breath (16 May 2021 09:58)  Awake, alert, comfortable out of bed to chair. Doing well on RA. No cough or sob at rest. S/p HD yest    INTERVAL HPI/OVERNIGHT EVENTS:      VITAL SIGNS:  T(F): 98 (05-16-21 @ 07:19)  HR: 59 (05-16-21 @ 07:19)  BP: 144/48 (05-16-21 @ 07:19)  RR: 18 (05-16-21 @ 07:19)  SpO2: 100% (05-16-21 @ 07:19)  Wt(kg): --  I&O's Detail    15 May 2021 07:01  -  16 May 2021 07:00  --------------------------------------------------------  IN:    Oral Fluid: 50 mL    Other (mL): 700 mL  Total IN: 750 mL    OUT:    Other (mL): 3500 mL    Voided (mL): 100 mL  Total OUT: 3600 mL    Total NET: -2850 mL              REVIEW OF SYSTEMS:    CONSTITUTIONAL:  No fevers, chills, sweats    HEENT:  Eyes:  No diplopia or blurred vision. ENT:  No earache, sore throat or runny nose.    CARDIOVASCULAR:  No pressure, squeezing, tightness, or heaviness about the chest; no palpitations.    RESPIRATORY:  Per HPI    GASTROINTESTINAL:  No abdominal pain, nausea, vomiting or diarrhea.    GENITOURINARY:  No dysuria, frequency or urgency.    NEUROLOGIC:  No paresthesias, fasciculations, seizures or weakness.    PSYCHIATRIC:  No disorder of thought or mood.      PHYSICAL EXAM:    Constitutional: Well developed and nourished  Eyes:Perrla  ENMT: normal  Neck:supple  Respiratory: Rales post  Cardiovascular: S1 S2 regular  Gastrointestinal: Soft, Non tender  Extremities: + edema  Vascular:normal  Neurological:Awake, alert,Ox3  Musculoskeletal:Normal      MEDICATIONS  (STANDING):  aMIOdarone    Tablet 200 milliGRAM(s) Oral daily  amLODIPine   Tablet 2.5 milliGRAM(s) Oral daily  apixaban 2.5 milliGRAM(s) Oral two times a day  atorvastatin 10 milliGRAM(s) Oral at bedtime  budesonide  80 MICROgram(s)/formoterol 4.5 MICROgram(s) Inhaler 2 Puff(s) Inhalation two times a day  chlorhexidine 2% Cloths 1 Application(s) Topical daily  diphenhydrAMINE   Injectable 25 milliGRAM(s) IV Push once  epoetin maria d-epbx (RETACRIT) Injectable 8000 Unit(s) IV Push <User Schedule>  insulin glargine Injectable (LANTUS) 5 Unit(s) SubCutaneous at bedtime  insulin lispro (ADMELOG) corrective regimen sliding scale   SubCutaneous Before meals and at bedtime  metoprolol tartrate 25 milliGRAM(s) Oral every 8 hours  midodrine. 10 milliGRAM(s) Oral <User Schedule>  polyethylene glycol 3350 17 Gram(s) Oral daily  senna 2 Tablet(s) Oral at bedtime    MEDICATIONS  (PRN):  ALBUTerol    90 MICROgram(s) HFA Inhaler 2 Puff(s) Inhalation every 6 hours PRN Shortness of Breath and/or Wheezing  bisacodyl Suppository 10 milliGRAM(s) Rectal daily PRN Constipation      Allergies    No Known Allergies    Intolerances        LABS:                        9.1    6.09  )-----------( 238      ( 16 May 2021 06:42 )             29.8     05-16    137  |  98  |  25<H>  ----------------------------<  121<H>  3.9   |  29  |  3.71<H>    Ca    8.7      16 May 2021 06:42  Phos  3.3     05-16  Mg     2.2     05-16    TPro  6.6  /  Alb  2.9<L>  /  TBili  0.5  /  DBili  x   /  AST  11  /  ALT  15  /  AlkPhos  122<H>  05-16        ABG - ( 14 May 2021 15:59 )  pH, Arterial: 7.56  pH, Blood: x     /  pCO2: 32    /  pO2: 145   / HCO3: 29    / Base Excess: 6.3   /  SaO2: 99                    CAPILLARY BLOOD GLUCOSE      POCT Blood Glucose.: 128 mg/dL (16 May 2021 07:51)  POCT Blood Glucose.: 235 mg/dL (15 May 2021 21:26)  POCT Blood Glucose.: 238 mg/dL (15 May 2021 16:51)  POCT Blood Glucose.: 283 mg/dL (15 May 2021 11:14)        RADIOLOGY & ADDITIONAL TESTS:    CXR:  < from: Xray Chest 1 View-PORTABLE IMMEDIATE (Xray Chest 1 View-PORTABLE IMMEDIATE .) (05.13.21 @ 20:15) >  IMPRESSION:  Cardiomegaly with mild vascular congestion. Left pleural effusion..      < end of copied text >    Ct scan chest:    ekg;    echo:

## 2021-05-16 NOTE — PROGRESS NOTE ADULT - ASSESSMENT
1. SOB   Likely 2nd to fluid overload.   BIPAP PRN   XR noted   Continue HD  Bronchodilators PRN   O2 Supplement - on NRM   F/U CXR   PFTs as OP  DVT and GI PPX     2. ESRD   On HD  Avoid nephrotoxic drugs  Monitor labs   Renal F.U     3. Morbid Obesity   R.O SHARON   PFTs and PSG's as OP     4. CHF   on HD  Monitor labs  I&Os  Cardio F/U     5. HTN   with fluctuating BP's.  Midodrine was given for hypotension during HD   Cardio and renal F/U   Monitor BP

## 2021-05-16 NOTE — PROGRESS NOTE ADULT - ASSESSMENT
Pt is a 89 y/o F, wheel chair bound, from home with PMH of Afib on Eliquis, ESRD on HD, DM, HTN presented with shortness of breath since few days. Pt also complaining of chest discomfort. As per pt's daughter Johnny batres, pt started to develop gradually shortness of breath since few days at rest. She mentioned that the pt was admitted recently to [facility] for shortness of breath and chest pain, leadless pacemaker was placed, discharged on april 29th. She has been regularly getting dialysis on Tuesday/thursday / saturday.  Patient admitted to ICU for SOB 2/2 fluid overload and was downgraded to medicine for management. Nephrology consulted, last HD 5/8. Cardiology consulted for management of SOB and Afib. Echo with EF>55%, mild to moderate mitral regurgitation and B/L pleural effusions.  PT evaluation done with recommendation for home PT, patient tis wheelchair bound and has own rolling walker.  5/10-Pt. seen and examined, noted A&O x 3 with slight dyspnea, reports feeling better.  Pt. scheduled for HD tomorrow after which she can be discharged.  Called dialysis to request early session.  Pt.'s daughter Hina called at [phone], updated re pt.'s condition, plan of care and discharge to home with home PT tomorrow discussed and agreed.    5/11- pt BP dropped 86/48 in HD. pt c/o palpitation but no chest pain, feeling sob. Saturating 96% on 2L.  s/p IV bolus 250ml. ECG ordered.   spoke with cardiology dr. Arteaga and nephrology dr. Donahue.  to transfer to tele floor.     Updated condition to Hina daughter [phone], Full code as wishes

## 2021-05-16 NOTE — PROGRESS NOTE ADULT - SUBJECTIVE AND OBJECTIVE BOX
[facility] NEPHROLOGY- PROGRESS NOTE    Patient is a 89yo Female with ESRD on HD, Afib on Eliquis, HTN, Intradialytic hypotension req Midodrine prior to HD, failed Rt AVF due to steal syndrome s/p ligation, h/o COVID in March, s/p PPM 2 weeks ago p/w SOB and chest comfort. Pt a/w acute hypoxic respiratory distress 2/2 pulmonary edema requiring BIPAP. Pt now hypotensive and bradycardic. Nephrology consulted for ESRD status.       REVIEW OF SYSTEMS: + SOB improving., no CP, palpitations +weakness.      VITALS:  T(F): 98 (05-16-21 @ 07:19), Max: 99 (05-15-21 @ 21:08)  HR: 59 (05-16-21 @ 07:19)  BP: 144/48 (05-16-21 @ 07:19)  RR: 18 (05-16-21 @ 07:19)  SpO2: 100% (05-16-21 @ 07:19)  Wt(kg): --    05-15 @ 07:01  -  05-16 @ 07:00  --------------------------------------------------------  IN: 750 mL / OUT: 3600 mL / NET: -2850 mL        PHYSICAL EXAM:  Gen: NAD,   HEENT: anicteric;  Neck: no JVD  Cards: +S1/S2,   Resp: much improved today.  A few basilar crackles B  GI: soft, NT/ND, NABS  Extremities: no LE edema B/L  Access: Rt IJ tunneled HD catheter- benign, RUE AVF without bruit/thrill      LABS:  05-16    137  |  98  |  25<H>  ----------------------------<  121<H>  3.9   |  29  |  3.71<H>    Ca    8.7      16 May 2021 06:42  Phos  3.3     05-16  Mg     2.2     05-16    TPro  6.6  /  Alb  2.9<L>  /  TBili  0.5  /  DBili      /  AST  11  /  ALT  15  /  AlkPhos  122<H>  05-16    Creatinine Trend: 3.71 <--, 3.94 <--, 4.57 <--, 4.18 <--, 6.26 <--, 4.86 <--                        9.1    6.09  )-----------( 238      ( 16 May 2021 06:42 )             29.8     Urine Studies:

## 2021-05-16 NOTE — PROGRESS NOTE ADULT - PROBLEM SELECTOR PLAN 3
Pt gets regular dialysis on T,T,S  -HD on 5/13, 5/14 and 5/15  -On midodrine for BP support on HD  -Nephrology Dr. Huitron/Gustavo  -Avoid nephrotoxic medications  next HD 5/17

## 2021-05-16 NOTE — PROGRESS NOTE ADULT - ASSESSMENT
Patient is a 89yo Female with ESRD on HD, Afib on Eliquis, HTN, Intradialytic hypotension req Midodrine prior to HD, failed Rt AVF due to steal syndrome s/p ligation, h/o COVID in March, s/p PPM 2 weeks ago p/w SOB and chest comfort. Pt a/w acute hypoxic respiratory distress 2/2 pulmonary edema requiring BIPAP. Pt now hypotensive and bradycardic. Nephrology consulted for ESRD status.     1) ESRD: Last HD on 5/15 with intradialytic hypotension for which UF goal reduced to 2.5L. Pulmonology consult appreciated. Continue Midodrine 10mg pre-HD and 2h into HD for hypotension thought to be due to dysautonomia.  2) Hyperkalemia- resolved with HD. c/w low K diet. Monitor lytes.   3) Hypotension- with bradycardia again, with very variable and fluctuating BPs. Hypotension had been limited HD. Will use midodrine as above. d/w cardiology.  Bradycardia- with recent PPM placement; resolved. Cards following. On amiodarone. Monitor BP/HR.   4) Anemia of renal disease: Hb improving. Will avoid IV iron due to elevated Ferritin. c/w Epogen 8k units IV tiw with HD. Monitor Hb.  5) Hyperphosphatemia: Phosphorus acceptable. No need for phos binders at this time. c/w low phos diet. Monitor serum calcium and phosphorus.      [facility] NEPHROLOGY  Brad Chen M.D.  Oneal Tim D.O.  Roselia Huitron M.D.  Milli Platt, MSN, ANP-C  [phone]    [address] [place] [address]   Patient is a 89yo Female with ESRD on HD, Afib on Eliquis, HTN, Intradialytic hypotension req Midodrine prior to HD, failed Rt AVF due to steal syndrome s/p ligation, h/o COVID in March, s/p PPM 2 weeks ago p/w SOB and chest comfort. Pt a/w acute hypoxic respiratory distress 2/2 pulmonary edema requiring BIPAP. Pt now hypotensive and bradycardic. Nephrology consulted for ESRD status.     1) ESRD: Last HD on 5/15 with intradialytic hypotension for which UF goal reduced to 2.5L. Plan for UF on 5/17. Pulmonology consult appreciated. Continue Midodrine 10mg pre-HD and 2h into HD for hypotension thought to be due to dysautonomia.  2) Hyperkalemia- resolved with HD. c/w low K diet. Monitor lytes.   3) Hypotension- with bradycardia again, with very variable and fluctuating BPs. Hypotension had been limited HD. Will use midodrine as above. d/w cardiology.  Bradycardia- with recent PPM placement; resolved. Cards following. On amiodarone. Monitor BP/HR.   4) Anemia of renal disease: Hb improving. Will avoid IV iron due to elevated Ferritin. c/w Epogen 8k units IV tiw with HD. Monitor Hb.  5) Hyperphosphatemia: Phosphorus acceptable. No need for phos binders at this time. c/w low phos diet. Monitor serum calcium and phosphorus.      [facility] NEPHROLOGY  Brad Chen M.D.  Oneal Tim D.O.  Roselia Huitron M.D.  Milli Platt, MSN, ANP-C  [phone]    [address] [place] [id number]

## 2021-05-17 ENCOUNTER — TRANSCRIPTION ENCOUNTER (OUTPATIENT)
Age: 86
End: 2021-05-17

## 2021-05-17 VITALS
RESPIRATION RATE: 19 BRPM | TEMPERATURE: 98 F | DIASTOLIC BLOOD PRESSURE: 48 MMHG | SYSTOLIC BLOOD PRESSURE: 148 MMHG | HEART RATE: 60 BPM | OXYGEN SATURATION: 100 %

## 2021-05-17 LAB
ALBUMIN SERPL ELPH-MCNC: 2.7 G/DL — LOW (ref 3.5–5)
ALP SERPL-CCNC: 103 U/L — SIGNIFICANT CHANGE UP (ref 40–120)
ALT FLD-CCNC: 14 U/L DA — SIGNIFICANT CHANGE UP (ref 10–60)
ANION GAP SERPL CALC-SCNC: 6 MMOL/L — SIGNIFICANT CHANGE UP (ref 5–17)
AST SERPL-CCNC: 8 U/L — LOW (ref 10–40)
BILIRUB SERPL-MCNC: 0.4 MG/DL — SIGNIFICANT CHANGE UP (ref 0.2–1.2)
BUN SERPL-MCNC: 40 MG/DL — HIGH (ref 7–18)
CALCIUM SERPL-MCNC: 8.4 MG/DL — SIGNIFICANT CHANGE UP (ref 8.4–10.5)
CHLORIDE SERPL-SCNC: 98 MMOL/L — SIGNIFICANT CHANGE UP (ref 96–108)
CO2 SERPL-SCNC: 28 MMOL/L — SIGNIFICANT CHANGE UP (ref 22–31)
CREAT SERPL-MCNC: 5.43 MG/DL — HIGH (ref 0.5–1.3)
GD1A GANGL IGG+IGM SER IA-ACNC: 11 IV — SIGNIFICANT CHANGE UP (ref 0–50)
GD1B GANGL IGG+IGM SER IA-ACNC: 10 IV — SIGNIFICANT CHANGE UP (ref 0–50)
GLUCOSE BLDC GLUCOMTR-MCNC: 126 MG/DL — HIGH (ref 70–99)
GLUCOSE BLDC GLUCOMTR-MCNC: 366 MG/DL — HIGH (ref 70–99)
GLUCOSE BLDC GLUCOMTR-MCNC: 368 MG/DL — HIGH (ref 70–99)
GLUCOSE SERPL-MCNC: 99 MG/DL — SIGNIFICANT CHANGE UP (ref 70–99)
GM1 ASIALO IGG+IGM SER IA-ACNC: SIGNIFICANT CHANGE UP (ref 0–50)
GM1 GANGL IGG+IGM SER-ACNC: 8 IV — SIGNIFICANT CHANGE UP (ref 0–50)
GM2 GANGL IGG+IGM SER IA-ACNC: 8 IV — SIGNIFICANT CHANGE UP (ref 0–50)
GQ1B GANGL IGG+IGM SER IA-ACNC: 207 IV — HIGH (ref 0–50)
HCT VFR BLD CALC: 26.3 % — LOW (ref 34.5–45)
HGB BLD-MCNC: 8.3 G/DL — LOW (ref 11.5–15.5)
MAGNESIUM SERPL-MCNC: 2.3 MG/DL — SIGNIFICANT CHANGE UP (ref 1.6–2.6)
MCHC RBC-ENTMCNC: 31.3 PG — SIGNIFICANT CHANGE UP (ref 27–34)
MCHC RBC-ENTMCNC: 31.6 GM/DL — LOW (ref 32–36)
MCV RBC AUTO: 99.2 FL — SIGNIFICANT CHANGE UP (ref 80–100)
NRBC # BLD: 0 /100 WBCS — SIGNIFICANT CHANGE UP (ref 0–0)
PHOSPHATE SERPL-MCNC: 4.4 MG/DL — SIGNIFICANT CHANGE UP (ref 2.5–4.5)
PLATELET # BLD AUTO: 230 K/UL — SIGNIFICANT CHANGE UP (ref 150–400)
POTASSIUM SERPL-MCNC: 4 MMOL/L — SIGNIFICANT CHANGE UP (ref 3.5–5.3)
POTASSIUM SERPL-SCNC: 4 MMOL/L — SIGNIFICANT CHANGE UP (ref 3.5–5.3)
PROT SERPL-MCNC: 5.8 G/DL — LOW (ref 6–8.3)
RBC # BLD: 2.65 M/UL — LOW (ref 3.8–5.2)
RBC # FLD: 15.4 % — HIGH (ref 10.3–14.5)
SODIUM SERPL-SCNC: 132 MMOL/L — LOW (ref 135–145)
WBC # BLD: 5.69 K/UL — SIGNIFICANT CHANGE UP (ref 3.8–10.5)
WBC # FLD AUTO: 5.69 K/UL — SIGNIFICANT CHANGE UP (ref 3.8–10.5)

## 2021-05-17 PROCEDURE — 99261: CPT

## 2021-05-17 PROCEDURE — 83735 ASSAY OF MAGNESIUM: CPT

## 2021-05-17 PROCEDURE — 80048 BASIC METABOLIC PNL TOTAL CA: CPT

## 2021-05-17 PROCEDURE — 83550 IRON BINDING TEST: CPT

## 2021-05-17 PROCEDURE — 86780 TREPONEMA PALLIDUM: CPT

## 2021-05-17 PROCEDURE — 85027 COMPLETE CBC AUTOMATED: CPT

## 2021-05-17 PROCEDURE — 80061 LIPID PANEL: CPT

## 2021-05-17 PROCEDURE — 97162 PT EVAL MOD COMPLEX 30 MIN: CPT

## 2021-05-17 PROCEDURE — 83605 ASSAY OF LACTIC ACID: CPT

## 2021-05-17 PROCEDURE — 86038 ANTINUCLEAR ANTIBODIES: CPT

## 2021-05-17 PROCEDURE — 82306 VITAMIN D 25 HYDROXY: CPT

## 2021-05-17 PROCEDURE — 86769 SARS-COV-2 COVID-19 ANTIBODY: CPT

## 2021-05-17 PROCEDURE — 93306 TTE W/DOPPLER COMPLETE: CPT

## 2021-05-17 PROCEDURE — 86140 C-REACTIVE PROTEIN: CPT

## 2021-05-17 PROCEDURE — 84100 ASSAY OF PHOSPHORUS: CPT

## 2021-05-17 PROCEDURE — 86334 IMMUNOFIX E-PHORESIS SERUM: CPT

## 2021-05-17 PROCEDURE — 94660 CPAP INITIATION&MGMT: CPT

## 2021-05-17 PROCEDURE — 87340 HEPATITIS B SURFACE AG IA: CPT

## 2021-05-17 PROCEDURE — 86901 BLOOD TYPING SEROLOGIC RH(D): CPT

## 2021-05-17 PROCEDURE — 71045 X-RAY EXAM CHEST 1 VIEW: CPT

## 2021-05-17 PROCEDURE — 87040 BLOOD CULTURE FOR BACTERIA: CPT

## 2021-05-17 PROCEDURE — 85610 PROTHROMBIN TIME: CPT

## 2021-05-17 PROCEDURE — 85652 RBC SED RATE AUTOMATED: CPT

## 2021-05-17 PROCEDURE — 83516 IMMUNOASSAY NONANTIBODY: CPT

## 2021-05-17 PROCEDURE — 99291 CRITICAL CARE FIRST HOUR: CPT

## 2021-05-17 PROCEDURE — 94640 AIRWAY INHALATION TREATMENT: CPT

## 2021-05-17 PROCEDURE — 83036 HEMOGLOBIN GLYCOSYLATED A1C: CPT

## 2021-05-17 PROCEDURE — 82962 GLUCOSE BLOOD TEST: CPT

## 2021-05-17 PROCEDURE — 93005 ELECTROCARDIOGRAM TRACING: CPT

## 2021-05-17 PROCEDURE — 85025 COMPLETE CBC W/AUTO DIFF WBC: CPT

## 2021-05-17 PROCEDURE — 83880 ASSAY OF NATRIURETIC PEPTIDE: CPT

## 2021-05-17 PROCEDURE — 83540 ASSAY OF IRON: CPT

## 2021-05-17 PROCEDURE — 36415 COLL VENOUS BLD VENIPUNCTURE: CPT

## 2021-05-17 PROCEDURE — 86850 RBC ANTIBODY SCREEN: CPT

## 2021-05-17 PROCEDURE — 94760 N-INVAS EAR/PLS OXIMETRY 1: CPT

## 2021-05-17 PROCEDURE — 84484 ASSAY OF TROPONIN QUANT: CPT

## 2021-05-17 PROCEDURE — 82728 ASSAY OF FERRITIN: CPT

## 2021-05-17 PROCEDURE — 82607 VITAMIN B-12: CPT

## 2021-05-17 PROCEDURE — 86900 BLOOD TYPING SEROLOGIC ABO: CPT

## 2021-05-17 PROCEDURE — 81001 URINALYSIS AUTO W/SCOPE: CPT

## 2021-05-17 PROCEDURE — 0225U NFCT DS DNA&RNA 21 SARSCOV2: CPT

## 2021-05-17 PROCEDURE — 80053 COMPREHEN METABOLIC PANEL: CPT

## 2021-05-17 PROCEDURE — 82803 BLOOD GASES ANY COMBINATION: CPT

## 2021-05-17 PROCEDURE — 85730 THROMBOPLASTIN TIME PARTIAL: CPT

## 2021-05-17 RX ORDER — ALBUTEROL 90 UG/1
2 AEROSOL, METERED ORAL
Qty: 3 | Refills: 0
Start: 2021-05-17 | End: 2021-06-15

## 2021-05-17 RX ORDER — MIDODRINE HYDROCHLORIDE 2.5 MG/1
2 TABLET ORAL
Qty: 0 | Refills: 0 | DISCHARGE

## 2021-05-17 RX ORDER — MIDODRINE HYDROCHLORIDE 2.5 MG/1
1 TABLET ORAL
Qty: 20 | Refills: 0
Start: 2021-05-17 | End: 2021-06-15

## 2021-05-17 RX ORDER — MIDODRINE HYDROCHLORIDE 2.5 MG/1
1 TABLET ORAL
Qty: 15 | Refills: 0
Start: 2021-05-17 | End: 2021-06-15

## 2021-05-17 RX ADMIN — AMLODIPINE BESYLATE 2.5 MILLIGRAM(S): 2.5 TABLET ORAL at 06:16

## 2021-05-17 RX ADMIN — CHLORHEXIDINE GLUCONATE 1 APPLICATION(S): 213 SOLUTION TOPICAL at 12:16

## 2021-05-17 RX ADMIN — APIXABAN 2.5 MILLIGRAM(S): 2.5 TABLET, FILM COATED ORAL at 06:16

## 2021-05-17 RX ADMIN — Medication 5: at 12:16

## 2021-05-17 RX ADMIN — Medication 25 MILLIGRAM(S): at 15:54

## 2021-05-17 RX ADMIN — AMIODARONE HYDROCHLORIDE 200 MILLIGRAM(S): 400 TABLET ORAL at 06:16

## 2021-05-17 NOTE — DISCHARGE NOTE NURSING/CASE MANAGEMENT/SOCIAL WORK - NSTRANSFERBELONGINGSRESP_GEN_A_NUR
Tremulousness from today following alcohol binge, chest pressure. yes Tremulousness from today following alcohol binge, chest pressure. Tremulousness from today following alcohol binge, chest pressure.

## 2021-05-17 NOTE — PROGRESS NOTE ADULT - PROBLEM SELECTOR PLAN 5
Possible CHF given elevated pro BNP and pulmonary edema  -elevation in proBNP CHF vs ESRD  -Echo with EF>55%  -Card Dr. Arteaga  -No further cardiac w/u indicated per cardiology
Pt takes Lantus 10 U at bedtime and sliding scale Humalog at home  Continue sliding scale Humalog  Lantus 5u  HBA1C 6.6
Pt takes Lantus 10 U at bedtime and sliding scale Humalog at home  Continue sliding scale Humalog  Lantus 5u  HBA1C 6.6
Possible CHF given elevated pro BNP and pulmonary edema  -elevation in proBNP CHF vs ESRD  -Echo with EF>55%  -Card Dr. Arteaga  -No further cardiac w/u indicated per cardiology
Pt takes Lantus 10 U at bedtime and sliding scale Humalog at home  Continue sliding scale Humalog  Lantus 5u  HBA1C 6.6
Possible CHF given elevated pro BNP and pulmonary edema  -elevation in proBNP CHF vs ESRD  -Echo with EF>55%  -Card Dr. Arteaga  -No further cardiac w/u indicated per cardiology
Pt takes Lantus 10 U at bedtime and sliding scale Humalog at home  Continue sliding scale Humalog  Lantus 5u  HBA1C 6.6

## 2021-05-17 NOTE — PROGRESS NOTE ADULT - PROBLEM SELECTOR PROBLEM 5
DM (diabetes mellitus)
CHF (congestive heart failure)
DM (diabetes mellitus)
CHF (congestive heart failure)
DM (diabetes mellitus)
DM (diabetes mellitus)
CHF (congestive heart failure)

## 2021-05-17 NOTE — PROGRESS NOTE ADULT - PROBLEM SELECTOR PLAN 7
Anemia  Likely anemia due to chronic kidney disease  Monitor cbc  Iron studies ACD, low total iron and saturation
Anemia  Likely anemia due to chronic kidney disease  Monitor cbc  Iron studies ACD, low total iron and saturation
Anemia  Hb 8.3/26.5 today  Likely anemia due to chronic kidney disease  Monitor cbc  Iron studies ACD, low total iron and saturation
Eliquis 2.5 BID for dvt ppx.
Anemia  Hb 8.3/26.5 today  Likely anemia due to chronic kidney disease  Monitor cbc  Iron studies ACD, low total iron and saturation
Eliquis 2.5 BID for dvt ppx.      Advanced care planning was discussed with patient and family.  Advanced care planning forms were reviewed and discussed as appropriate.  Differential diagnosis and plan of care discussed with patient after the evaluation.   Pain assessed and judicious use of narcotics when appropriate was discussed.  Importance of Fall prevention discussed.  Counseling on Smoking and Alcohol cessation was offered when appropriate.  Counseling on Diet, exercise, and medication compliance was done.   Approx 30 minutes spent.
Anemia  Hb 8.3/26.5 today  Likely anemia due to chronic kidney disease  Monitor cbc  Iron studies ACD, low total iron and saturation
Anemia  Hb 8.3/26.5 today  Likely anemia due to chronic kidney disease  Monitor cbc  Iron studies ACD, low total iron and saturation
Eliquis 2.5 BID for dvt ppx.      Advanced care planning was discussed with patient and family.  Advanced care planning forms were reviewed and discussed as appropriate.  Differential diagnosis and plan of care discussed with patient after the evaluation.   Pain assessed and judicious use of narcotics when appropriate was discussed.  Importance of Fall prevention discussed.  Counseling on Smoking and Alcohol cessation was offered when appropriate.  Counseling on Diet, exercise, and medication compliance was done.   Approx 30 minutes spent.
Eliquis 2.5 BID for dvt ppx.
Anemia  Hb 8.3/26.5 today  Likely anemia due to chronic kidney disease  Monitor cbc  Iron studies ACD, low total iron and saturation

## 2021-05-17 NOTE — PROGRESS NOTE ADULT - ASSESSMENT
1. SOB   Likely 2nd to fluid overload.   BIPAP PRN   XR noted   Continue HD  Bronchodilators PRN   O2 Supplement - on NRM   F/U CXR   PFTs as OP  DVT and GI PPX     2. ESRD   On HD  Avoid nephrotoxic drugs  Monitor labs   Renal F.U     3. Morbid Obesity   R.O SHARON   PFTs and PSG's as OP     4. CHF   on HD  Monitor labs  I&Os  Cardio F/U     5. HTN   with fluctuating BP's.  Midodrine given for hypotension during HD   Cardio and renal F/U   Monitor BP  D/C planning if tolerates HD.

## 2021-05-17 NOTE — PROGRESS NOTE ADULT - SUBJECTIVE AND OBJECTIVE BOX
YOLANDA BROWN  MR# [id number]  89yFemale        Patient is a 89y old  Female who presents with a chief complaint of Shortness of breath (17 May 2021 13:04)      INTERVAL HPI/OVERNIGHT EVENTS:  Patient seen and examined at bedside. No notations of chest pain, palpitation, SOB, orthopnea, nausea, vomiting or abdominal pain.    ALLERGIES  No Known Allergies      MEDICATIONS  ALBUTerol    90 MICROgram(s) HFA Inhaler 2 Puff(s) Inhalation every 6 hours PRN Shortness of Breath and/or Wheezing  aMIOdarone    Tablet 200 milliGRAM(s) Oral daily  amLODIPine   Tablet 2.5 milliGRAM(s) Oral daily  apixaban 2.5 milliGRAM(s) Oral two times a day  atorvastatin 10 milliGRAM(s) Oral at bedtime  bisacodyl Suppository 10 milliGRAM(s) Rectal daily PRN Constipation  chlorhexidine 2% Cloths 1 Application(s) Topical daily  diphenhydrAMINE   Injectable 25 milliGRAM(s) IV Push once  epoetin maria d-epbx (RETACRIT) Injectable 8000 Unit(s) IV Push <User Schedule>  insulin glargine Injectable (LANTUS) 5 Unit(s) SubCutaneous at bedtime  insulin lispro (ADMELOG) corrective regimen sliding scale   SubCutaneous Before meals and at bedtime  metoprolol tartrate 25 milliGRAM(s) Oral every 8 hours  midodrine. 10 milliGRAM(s) Oral <User Schedule>  polyethylene glycol 3350 17 Gram(s) Oral daily  senna 2 Tablet(s) Oral at bedtime              REVIEW OF SYSTEMS:  CONSTITUTIONAL: No fever, weight loss, or fatigue  EYES: No eye pain, visual disturbances, or discharge  ENT:  No difficulty hearing, tinnitus, vertigo; No sinus or throat pain  NECK: No pain or stiffness  RESPIRATORY: No cough, wheezing, chills or hemoptysis; No Shortness of Breath  CARDIOVASCULAR: No chest pain, palpitations, passing out, dizziness, or leg swelling  GASTROINTESTINAL: No abdominal or epigastric pain. No nausea, vomiting, or hematemesis; No diarrhea or constipation. No melena or hematochezia.  GENITOURINARY: No dysuria, frequency, hematuria, or incontinence  NEUROLOGICAL: No headaches, memory loss, loss of strength, numbness, or tremors  SKIN: No itching, burning, rashes, or lesions   LYMPH Nodes: No enlarged glands  ENDOCRINE: No heat or cold intolerance; No hair loss  MUSCULOSKELETAL: No joint pain or swelling; No muscle, back, or extremity pain  PSYCHIATRIC: No depression, anxiety, mood swings, or difficulty sleeping  HEME/LYMPH: No easy bruising, or bleeding gums  ALLERGY AND IMMUNOLOGIC: No hives or eczema	    [ ] All others negative	  [ ] Unable to obtain      T(C): 36.4 (05-17-21 @ 12:27), Max: 36.8 (05-16-21 @ 20:39)  T(F): 97.6 (05-17-21 @ 12:27), Max: 98.2 (05-16-21 @ 20:39)  HR: 56 (05-17-21 @ 12:27) (50 - 107)  BP: 141/43 (05-17-21 @ 12:27) (115/44 - 164/51)  RR: 17 (05-17-21 @ 12:27) (14 - 19)  SpO2: 100% (05-17-21 @ 12:27) (100% - 100%)  Wt(kg): --    I&O's Summary    17 May 2021 07:01  -  17 May 2021 14:01  --------------------------------------------------------  IN: 500 mL / OUT: 2000 mL / NET: -1500 mL          PHYSICAL EXAM:  A X O x  HEAD:  Atraumatic, Normocephalic  EYES: EOMI, PERRLA, conjunctiva and sclera clear  NECK: Supple, No JVD, Normal thyroid  Resp: CTAB, No crackles, wheezing,   CVS: Regular rate and rhythm; No discernable murmurs, rubs, or gallops  ABD: Soft, Nontender, Nondistended; Bowel sounds present  EXTREMITIES:  2+ Peripheral Pulses, No edema  LYMPH: No dicernable lymphadenopathy noted  GENERAL: NAD, well-groomed, well-developed      LABS:                        8.3    5.69  )-----------( 230      ( 17 May 2021 06:26 )             26.3     05-17    132<L>  |  98  |  40<H>  ----------------------------<  99  4.0   |  28  |  5.43<H>    Ca    8.4      17 May 2021 06:26  Phos  4.4     05-17  Mg     2.3     05-17    TPro  5.8<L>  /  Alb  2.7<L>  /  TBili  0.4  /  DBili  x   /  AST  8<L>  /  ALT  14  /  AlkPhos  103  05-17        CAPILLARY BLOOD GLUCOSE      POCT Blood Glucose.: 366 mg/dL (17 May 2021 11:22)  POCT Blood Glucose.: 126 mg/dL (17 May 2021 07:37)  POCT Blood Glucose.: 295 mg/dL (16 May 2021 22:29)  POCT Blood Glucose.: 283 mg/dL (16 May 2021 21:11)  POCT Blood Glucose.: 272 mg/dL (16 May 2021 16:39)      Troponins:  ProBNP:  Lipid Profile:   HgA1c:  TSH:           RADIOLOGY & ADDITIONAL TESTS:    Imaging Personally Reviewed:  [ ] YES  [ ] NO      Consultant(s) Notes Reviewed:  [x ] YES  [ ] NO    Care Discussed with Consultants/Other Providers [ x] YES  [ ] NO          PAST MEDICAL & SURGICAL HISTORY:  HTN (hypertension)    HLD (hyperlipidemia)    CKD (chronic kidney disease)    Afib    DM (diabetes mellitus)    Artificial pacemaker          Acute respiratory failure with hypoxia    Handoff    MEWS Score    HTN (hypertension)    HLD (hyperlipidemia)    CKD (chronic kidney disease)    Afib    DM (diabetes mellitus)    HTN (hypertension)    HLD (hyperlipidemia)    CKD (chronic kidney disease)    Afib    DM (diabetes mellitus)    Acute respiratory failure with hypoxia and hypercapnia    ESRD (end stage renal disease) on dialysis    Acute respiratory failure with hypoxia    Debility    Palliative care encounter    Acute respiratory failure with hypoxia and hypercapnia    Afib    CHF (congestive heart failure)    DM (diabetes mellitus)    Anemia    Prophylactic measure    Discharge planning issues    Hypotension    Artificial pacemaker    Artificial pacemaker    A- NOTE    ESRD (end stage renal disease)    CHF (congestive heart failure)    Afib    Diabetes mellitus    Anemia    [email or web address]

## 2021-05-17 NOTE — PROGRESS NOTE ADULT - PROBLEM SELECTOR PROBLEM 6
DM (diabetes mellitus)
Anemia
DM (diabetes mellitus)
Anemia
DM (diabetes mellitus)
Anemia
DM (diabetes mellitus)
Anemia
DM (diabetes mellitus)

## 2021-05-17 NOTE — PROGRESS NOTE ADULT - PROBLEM SELECTOR PLAN 8
Eliquis 2.5 BID for dvt ppx.
Pt. for discharge back to home with home PT likely after dialysis tomorrow 5/11  SW to reinstate OP HD at [facility]

## 2021-05-17 NOTE — PROGRESS NOTE ADULT - ASSESSMENT
Pt is a 89 y/o F, wheel chair bound, from home with PMH of Afib on Eliquis, ESRD on HD, DM, HTN presented with shortness of breath since few days. Pt also complaining of chest discomfort. As per pt's daughter Johnny batres, pt started to develop gradually shortness of breath since few days at rest. She mentioned that the pt was admitted recently to [facility] for shortness of breath and chest pain, leadless pacemaker was placed, discharged on april 29th. She has been regularly getting dialysis on Tuesday/thursday / saturday.  Patient admitted to ICU for SOB 2/2 fluid overload and was downgraded to medicine for management. Nephrology consulted, last HD 5/8. Cardiology consulted for management of SOB and Afib. Echo with EF>55%, mild to moderate mitral regurgitation and B/L pleural effusions.  PT evaluation done with recommendation for home PT, patient tis wheelchair bound and has own rolling walker.  5/10-Pt. seen and examined, noted A&O x 3 with slight dyspnea, reports feeling better.  Pt. scheduled for HD tomorrow after which she can be discharged.  Called dialysis to request early session.  Pt.'s daughter Hina called at [phone], updated re pt.'s condition, plan of care and discharge to home with home PT tomorrow discussed and agreed.    5/11- pt BP dropped 86/48 in HD. pt c/o palpitation but no chest pain, feeling sob. Saturating 96% on 2L.  s/p IV bolus 250ml. ECG ordered.   spoke with cardiology dr. Arteaga and nephrology dr. Donahue.  to transfer to tele floor.     Updated condition to Hina daughter [phone], Full code as wishes

## 2021-05-17 NOTE — PROGRESS NOTE ADULT - PROBLEM SELECTOR PROBLEM 7
Anemia
Prophylactic measure
Anemia
Prophylactic measure
Anemia
Prophylactic measure
Prophylactic measure
Anemia
Anemia

## 2021-05-17 NOTE — PROGRESS NOTE ADULT - PROBLEM SELECTOR PROBLEM 4
Afib
CHF (congestive heart failure)
Afib
CHF (congestive heart failure)
Afib
CHF (congestive heart failure)
Palliative care encounter
CHF (congestive heart failure)
Afib
Afib

## 2021-05-17 NOTE — PROGRESS NOTE ADULT - PROBLEM SELECTOR PROBLEM 3
ESRD (end stage renal disease) on dialysis
Afib
ESRD (end stage renal disease) on dialysis
Afib
ESRD (end stage renal disease) on dialysis
ESRD (end stage renal disease) on dialysis
Afib
Afib
Debility
ESRD (end stage renal disease) on dialysis

## 2021-05-17 NOTE — DISCHARGE NOTE NURSING/CASE MANAGEMENT/SOCIAL WORK - PATIENT PORTAL LINK FT
You can access the FollowMyHealth Patient Portal offered by [facility] by registering at the following website: [email or web address]. By joining [facility]’s FollowMyHealth portal, you will also be able to view your health information using other applications (apps) compatible with our system.

## 2021-05-17 NOTE — PROGRESS NOTE ADULT - ASSESSMENT
Patient is a 89yo Female with ESRD on HD, Afib on Eliquis, HTN, Intradialytic hypotension req Midodrine prior to HD, failed Rt AVF due to steal syndrome s/p ligation, h/o COVID in March, s/p PPM 2 weeks ago p/w SOB and chest comfort. Pt a/w acute hypoxic respiratory distress 2/2 pulmonary edema requiring BIPAP. Pt now hypotensive and bradycardic. Nephrology consulted for ESRD status.     1) ESRD: Last HD on 5/15. s/p PUF today with net 1.5L removed. Plan for next maintenance HD 5/18.   Continue Midodrine 10mg pre-HD and 2h into HD for hypotension thought to be due to dysautonomia.  2) Hyperkalemia- resolved with HD. c/w low K diet. Monitor lytes.   3) Hypotension- with bradycardia, with very variable and fluctuating BPs. Hypotension had been limited HD. Will use midodrine as above. d/w cardiology.  Bradycardia- with recent PPM placement; resolved. Cards following. On amiodarone. Monitor BP/HR.   4) Anemia of renal disease: Hb low. Will avoid IV iron due to elevated Ferritin. c/w Epogen 8k units IV tiw with HD. Monitor Hb.  5) Hyperphosphatemia: Phosphorus acceptable. No need for phos binders at this time. c/w low phos diet. Monitor serum calcium and phosphorus.      [facility] NEPHROLOGY  Brad Chen M.D.  Oneal Tim D.O.  Roselia Huitron M.D.  Milli Platt, MSN, ANP-C  [phone]    [address] [id number]

## 2021-05-17 NOTE — PROGRESS NOTE ADULT - PROVIDER SPECIALTY LIST ADULT
Cardiology
Internal Medicine
Pulmonology
Cardiology
Critical Care
Internal Medicine
Nephrology
Neurology
Pulmonology
Cardiology
Nephrology
Pulmonology
Cardiology
Cardiology
Critical Care
Nephrology
Critical Care
Nephrology
Internal Medicine
Internal Medicine
Palliative Care
Internal Medicine

## 2021-05-17 NOTE — PROGRESS NOTE ADULT - PROBLEM SELECTOR PROBLEM 2
ESRD (end stage renal disease) on dialysis
Acute respiratory failure with hypoxia
Acute respiratory failure with hypoxia and hypercapnia
Acute respiratory failure with hypoxia and hypercapnia
ESRD (end stage renal disease) on dialysis
Acute respiratory failure with hypoxia and hypercapnia
ESRD (end stage renal disease) on dialysis
Acute respiratory failure with hypoxia and hypercapnia
Acute respiratory failure with hypoxia and hypercapnia
ESRD (end stage renal disease) on dialysis
Acute respiratory failure with hypoxia and hypercapnia
Acute respiratory failure with hypoxia and hypercapnia

## 2021-05-17 NOTE — PROGRESS NOTE ADULT - SUBJECTIVE AND OBJECTIVE BOX
[facility] NEPHROLOGY- PROGRESS NOTE    Patient is a 89yo Female with ESRD on HD, Afib on Eliquis, HTN, Intradialytic hypotension req Midodrine prior to HD, failed Rt AVF due to steal syndrome s/p ligation, h/o COVID in March, s/p PPM 2 weeks ago p/w SOB and chest comfort. Pt a/w acute hypoxic respiratory distress 2/2 pulmonary edema requiring BIPAP. Pt now hypotensive and bradycardic. Nephrology consulted for ESRD status.       REVIEW OF SYSTEMS: + SOB resolved., no CP, palpitations +w    VITALS:  T(F): 97.6 (05-17-21 @ 12:27), Max: 98.2 (05-16-21 @ 20:39)  HR: 56 (05-17-21 @ 12:27)  BP: 141/43 (05-17-21 @ 12:27)  RR: 17 (05-17-21 @ 12:27)  SpO2: 100% (05-17-21 @ 12:27)  Wt(kg): --    05-17 @ 07:01  -  05-17 @ 14:12  --------------------------------------------------------  IN: 500 mL / OUT: 2000 mL / NET: -1500 mL        PHYSICAL EXAM:  Gen: NAD,   HEENT: anicteric;  Neck: no JVD  Cards: +S1/S2,   Resp: CTA ant  GI: soft, NT/ND, NABS  Extremities: no LE edema B/L  Access: Rt IJ tunneled HD catheter- benign, RUE AVF without bruit/thrill      LABS:  05-17    132<L>  |  98  |  40<H>  ----------------------------<  99  4.0   |  28  |  5.43<H>    Ca    8.4      17 May 2021 06:26  Phos  4.4     05-17  Mg     2.3     05-17    TPro  5.8<L>  /  Alb  2.7<L>  /  TBili  0.4  /  DBili      /  AST  8<L>  /  ALT  14  /  AlkPhos  103  05-17    Creatinine Trend: 5.43 <--, 3.71 <--, 3.94 <--, 4.57 <--, 4.18 <--, 6.26 <--                        8.3    5.69  )-----------( 230      ( 17 May 2021 06:26 )             26.3     Urine Studies:

## 2021-05-17 NOTE — PROGRESS NOTE ADULT - PROBLEM SELECTOR PLAN 4
Pt is on Eliquis at home  -Cont Eliquis, Amiodarone and Metoprolol   -Recent wireless pacemaker place 2 weeks ago  -card Dr. Arteaga  -will transfer to tele floor per dr. Arteaga  -outpt cardiology dr. Anthony gee [phone]

## 2021-05-17 NOTE — PROGRESS NOTE ADULT - SUBJECTIVE AND OBJECTIVE BOX
Pt is awake, alert, in HD today. Sat 100%. For D/C planning if tolerates HD.     INTERVAL HPI/OVERNIGHT EVENTS:      VITAL SIGNS:  T(F): 97.4 (05-17-21 @ 07:21)  HR: 107 (05-17-21 @ 09:04)  BP: 146/42 (05-17-21 @ 07:21)  RR: 18 (05-17-21 @ 07:21)  SpO2: 100% (05-17-21 @ 09:04)  Wt(kg): --  I&O's Detail      REVIEW OF SYSTEMS:    CONSTITUTIONAL:  No fevers, chills, sweats    HEENT:  Eyes:  No diplopia or blurred vision. ENT:  No earache, sore throat or runny nose.    CARDIOVASCULAR:  No pressure, squeezing, tightness, or heaviness about the chest; no palpitations.    RESPIRATORY:  Per HPI    GASTROINTESTINAL:  No abdominal pain, nausea, vomiting or diarrhea.    GENITOURINARY:  No dysuria, frequency or urgency.    NEUROLOGIC:  No paresthesias, fasciculations, seizures or weakness.    PSYCHIATRIC:  No disorder of thought or mood.      PHYSICAL EXAM:    Constitutional: Well developed and nourished  Eyes: Perrla  ENMT: normal  Neck: supple  Respiratory: good air entry  Cardiovascular: S1 S2 regular  Gastrointestinal: Soft, Non tender  Extremities: No edema  Vascular: normal  Neurological: Awake, alert   Musculoskeletal: Normal      MEDICATIONS  (STANDING):  aMIOdarone    Tablet 200 milliGRAM(s) Oral daily  amLODIPine   Tablet 2.5 milliGRAM(s) Oral daily  apixaban 2.5 milliGRAM(s) Oral two times a day  atorvastatin 10 milliGRAM(s) Oral at bedtime  chlorhexidine 2% Cloths 1 Application(s) Topical daily  diphenhydrAMINE   Injectable 25 milliGRAM(s) IV Push once  epoetin maria d-epbx (RETACRIT) Injectable 8000 Unit(s) IV Push <User Schedule>  insulin glargine Injectable (LANTUS) 5 Unit(s) SubCutaneous at bedtime  insulin lispro (ADMELOG) corrective regimen sliding scale   SubCutaneous Before meals and at bedtime  metoprolol tartrate 25 milliGRAM(s) Oral every 8 hours  midodrine. 10 milliGRAM(s) Oral <User Schedule>  polyethylene glycol 3350 17 Gram(s) Oral daily  senna 2 Tablet(s) Oral at bedtime    MEDICATIONS  (PRN):  ALBUTerol    90 MICROgram(s) HFA Inhaler 2 Puff(s) Inhalation every 6 hours PRN Shortness of Breath and/or Wheezing  bisacodyl Suppository 10 milliGRAM(s) Rectal daily PRN Constipation      Allergies    No Known Allergies    Intolerances        LABS:                        8.3    5.69  )-----------( 230      ( 17 May 2021 06:26 )             26.3     05-17    132<L>  |  98  |  40<H>  ----------------------------<  99  4.0   |  28  |  5.43<H>    Ca    8.4      17 May 2021 06:26  Phos  4.4     05-17  Mg     2.3     05-17    TPro  5.8<L>  /  Alb  2.7<L>  /  TBili  0.4  /  DBili  x   /  AST  8<L>  /  ALT  14  /  AlkPhos  103  05-17              CAPILLARY BLOOD GLUCOSE      POCT Blood Glucose.: 126 mg/dL (17 May 2021 07:37)  POCT Blood Glucose.: 295 mg/dL (16 May 2021 22:29)  POCT Blood Glucose.: 283 mg/dL (16 May 2021 21:11)  POCT Blood Glucose.: 272 mg/dL (16 May 2021 16:39)  POCT Blood Glucose.: 294 mg/dL (16 May 2021 11:41)        RADIOLOGY & ADDITIONAL TESTS:    CXR:    Ct scan chest:    ekg;    echo:

## 2021-05-17 NOTE — PROGRESS NOTE ADULT - SUBJECTIVE AND OBJECTIVE BOX
Patient denies CP, SOB Review of systems otherwise (-)    ALBUTerol    90 MICROgram(s) HFA Inhaler 2 Puff(s) Inhalation every 6 hours PRN  aMIOdarone    Tablet 200 milliGRAM(s) Oral daily  amLODIPine   Tablet 2.5 milliGRAM(s) Oral daily  apixaban 2.5 milliGRAM(s) Oral two times a day  atorvastatin 10 milliGRAM(s) Oral at bedtime  bisacodyl Suppository 10 milliGRAM(s) Rectal daily PRN  chlorhexidine 2% Cloths 1 Application(s) Topical daily  diphenhydrAMINE   Injectable 25 milliGRAM(s) IV Push once  epoetin maria d-epbx (RETACRIT) Injectable 8000 Unit(s) IV Push <User Schedule>  insulin glargine Injectable (LANTUS) 5 Unit(s) SubCutaneous at bedtime  insulin lispro (ADMELOG) corrective regimen sliding scale   SubCutaneous Before meals and at bedtime  metoprolol tartrate 25 milliGRAM(s) Oral every 8 hours  midodrine. 10 milliGRAM(s) Oral <User Schedule>  polyethylene glycol 3350 17 Gram(s) Oral daily  senna 2 Tablet(s) Oral at bedtime                            8.3    5.69  )-----------( 230      ( 17 May 2021 06:26 )             26.3       Hemoglobin: 8.3 g/dL (05-17 @ 06:26)  Hemoglobin: 9.1 g/dL (05-16 @ 06:42)  Hemoglobin: 8.3 g/dL (05-15 @ 06:22)  Hemoglobin: 8.4 g/dL (05-14 @ 07:15)      05-17    132<L>  |  98  |  40<H>  ----------------------------<  99  4.0   |  28  |  5.43<H>    Ca    8.4      17 May 2021 06:26  Phos  4.4     05-17  Mg     2.3     05-17    TPro  5.8<L>  /  Alb  2.7<L>  /  TBili  0.4  /  DBili  x   /  AST  8<L>  /  ALT  14  /  AlkPhos  103  05-17    Creatinine Trend: 5.43<--, 3.71<--, 3.94<--, 4.57<--, 4.18<--, 6.26<--    COAGS:           T(C): 36.4 (05-17-21 @ 12:27), Max: 36.8 (05-16-21 @ 20:39)  HR: 56 (05-17-21 @ 12:27) (50 - 107)  BP: 141/43 (05-17-21 @ 12:27) (115/44 - 164/51)  RR: 17 (05-17-21 @ 12:27) (14 - 19)  SpO2: 100% (05-17-21 @ 12:27) (100% - 100%)  Wt(kg): --    I&O's Summary    17 May 2021 07:01  -  17 May 2021 13:06  --------------------------------------------------------  IN: 500 mL / OUT: 2000 mL / NET: -1500 mL        HEENT:  (-)icterus (-)pallor  CV: N S1 S2 1/6 SHANE (+)2 Pulses B/l  Resp:  Clear to ausculatation B/L, normal effort  GI: (+) BS Soft, NT, ND  Lymph:  (-)Edema, (-)obvious lymphadenopathy  Skin: Warm to touch, Normal turgor  Psych: Appropriate mood and affect      TELEMETRY: 	 sinus julius      ASSESSMENT/PLAN: 	89y  Female wheel chair bound, from home with PMH of PAF on Eliquis, leadless PPM placed by Dr. Evangelina Mcclelland at [facility], ESRD on HD, DM, HTN presented with shortness of breath since few days.    - Keep net negative with HD  - Echo with normal LV function   - Palliative f/u  - On midodrine for BP support on HD, suspect she has some element of dysautonomia seems to be responding   - Cont eliquis and Amio follow up with Dr. Evangelina Mcclelland for EP      Christopher Arteaga MD, [facility]  BEEPER [phone]

## 2021-05-17 NOTE — PROGRESS NOTE ADULT - PROBLEM SELECTOR PROBLEM 1
Hypotension
Acute respiratory failure with hypoxia and hypercapnia
ESRD (end stage renal disease) on dialysis
Hypotension
Acute respiratory failure with hypoxia and hypercapnia
Hypotension
Acute respiratory failure with hypoxia and hypercapnia
Hypotension
Hypotension
Acute respiratory failure with hypoxia and hypercapnia
Hypotension
Hypotension

## 2021-05-17 NOTE — PROGRESS NOTE ADULT - NSICDXPILOT_GEN_ALL_CORE
[place]

## 2021-05-17 NOTE — PROGRESS NOTE ADULT - PROBLEM SELECTOR PLAN 9
pt to transfer to tele unit for Afib.   SW to reinstate OP HD at [facility] when pt stable.
pt to transfer to tele unit for Afib.   SW to reinstate OP HD at [facility] when pt stable.
pt to transfer to tele unit for Afib.   SW to reinstate OP HD at [facility] when pt stable.    Advanced care planning was discussed with patient and family.  Advanced care planning forms were reviewed and discussed as appropriate.  Differential diagnosis and plan of care discussed with patient after the evaluation.   Pain assessed and judicious use of narcotics when appropriate was discussed.  Importance of Fall prevention discussed.  Counseling on Smoking and Alcohol cessation was offered when appropriate.  Counseling on Diet, exercise, and medication compliance was done.   Approx 30 minutes spent.
pt to transfer to tele unit for Afib.   SW to reinstate OP HD at [facility] when pt stable.    Advanced care planning was discussed with patient and family.  Advanced care planning forms were reviewed and discussed as appropriate.  Differential diagnosis and plan of care discussed with patient after the evaluation.   Pain assessed and judicious use of narcotics when appropriate was discussed.  Importance of Fall prevention discussed.  Counseling on Smoking and Alcohol cessation was offered when appropriate.  Counseling on Diet, exercise, and medication compliance was done.   Approx 30 minutes spent.
pt to transfer to tele unit for Afib.   SW to reinstate OP HD at [facility] when pt stable.
pt to transfer to tele unit for Afib.   SW to reinstate OP HD at [facility] when pt stable.      Advanced care planning was discussed with patient and family.  Advanced care planning forms were reviewed and discussed as appropriate.  Differential diagnosis and plan of care discussed with patient after the evaluation.   Pain assessed and judicious use of narcotics when appropriate was discussed.  Importance of Fall prevention discussed.  Counseling on Smoking and Alcohol cessation was offered when appropriate.  Counseling on Diet, exercise, and medication compliance was done.   Approx 30 minutes spent.
pt to transfer to tele unit for Afib.   SW to reinstate OP HD at [facility] when pt stable.

## 2021-06-06 NOTE — CHART NOTE - NSCHARTNOTEFT_GEN_A_CORE
Abnormal high GQ1b antibody noted. This is  not the cause of anemia and heart failure symptoms but requires follow up when acute issues have resolved. Contacted primary MD with results

## 2021-07-03 NOTE — PROGRESS NOTE ADULT - ATTENDING COMMENTS
In the next few weeks you may receive a [employer] survey regarding your most recent clinic visit with us.  Please take a few moments out of your day to accurately evaluate your visit.  We strive to provide you with the best medical care. Thank you for your time and we look forward to your next visit.     Results: If you recently had testing performed and your results are normal, we will notify you of your results in a letter. If we need to contact you regarding any abnormal results, we will make 2 attempts to reach you at the number you have listed during your office visit today. If we are unable to reach you, a letter with your results and any further instructions will be mailed to your home.    Refills: If you need a refill of your medication, please contact your pharmacy FIRST. You may have refills on file with them; if not, they will contact our office to refill the prescription on your behalf.     [facility] Hours:  Monday - Thursday: 7:00 am - 6:00 pm  Friday: 7:00 am - 5:00 pm  Saturday: 7:00 am - noon    Please do not hesitate to call our office with any questions or concerns AT [phone].      
Pt is a 89 y/o F, wheel chair bound, from home with PMH of Afib on Eliquis, ESRD on HD, DM, HTN presented with shortness of breath since few days.    Assessment:    1.Acute hypoxic respiratory failure  2.Pulmonary edema  3.ESRD  4.Mild Hyponatremia  5.Anemia  6.Prediabetic   7.Hyponatremia    Plan  -S/p HD yesterday, not in distress   -This morning off Bipap support  -Reported with episodes of bradycardia during HD  -Cardiology consult  -Check Echo  -hemodynamic support, off vasopressors this morning  -Trial of NC oxygen  -HD as per Neph, will likely need midodrine prior to HD  -Adjust antihypertensives given hypotension history during HD and borderline BP  -Oral diet if remains comfortable off bipap  -Palliative care consult as patient with episodes of hypotension during HD, ?tolerating HD  -PT evaluation
Pt is a 89 y/o F, wheel chair bound, from home with PMH of Afib on Eliquis, ESRD on HD, DM, HTN presented with shortness of breath since few days.    Assessment:    1.Acute hypoxic respiratory failure  2.Pulmonary edema  3.ESRD  4.Mild Hyponatremia  5.Anemia  6.Prediabetic   7.Hyponatremia      Plan  -emergent dialysis   -monitor blood sugar   -hold eliquis due to drop in H/H.. no obvious source  -transfuse 1 unit PRBC during HD  -hemodynamic support  -alternate bipap with O2 supp  -HD as per Neph  -monitor serial cbc  -Neph to correct Na
Pt is a 89 y/o F, wheel chair bound, from home with PMH of Afib on Eliquis, ESRD on HD, DM, HTN presented with shortness of breath since few days.    Assessment:    1.Acute hypoxic respiratory failure  2.Pulmonary edema  3.ESRD  4.Mild Hyponatremia  5.Anemia  6.Prediabetic   7.Hyponatremia    Plan  -Remains hemodynamically stable, off vasopressors   -Did not require any further bipap support  -Cardiology consult appreciated   -Trial of NC oxygen  -HD as per Neph, will likely need midodrine prior to HD  -Cont. amiodarone for rate control  -Oral diet if remains comfortable off bipap  -Palliative care consult as patient with episodes of hypotension during HD, ?tolerating HD  -PT evaluation  -Transfer to medical service
no
Advanced care planning was discussed with patient and family.  Advanced care planning forms were reviewed and discussed as appropriate.  Differential diagnosis and plan of care discussed with patient after the evaluation.   Pain assessed and judicious use of narcotics when appropriate was discussed.  Importance of Fall prevention discussed.  Counseling on Smoking and Alcohol cessation was offered when appropriate.  Counseling on Diet, exercise, and medication compliance was done.   Approx 30 minutes spent.
Advanced care planning was discussed with patient and family.  Advanced care planning forms were reviewed and discussed as appropriate.  Differential diagnosis and plan of care discussed with patient after the evaluation.   Pain assessed and judicious use of narcotics when appropriate was discussed.  Importance of Fall prevention discussed.  Counseling on Smoking and Alcohol cessation was offered when appropriate.  Counseling on Diet, exercise, and medication compliance was done.   Approx 30 minutes spent.
symptomatic during HD again yesterday  meds to be adjusted    Advanced care planning was discussed with patient and family.  Advanced care planning forms were reviewed and discussed as appropriate.  Differential diagnosis and plan of care discussed with patient after the evaluation.   Pain assessed and judicious use of narcotics when appropriate was discussed.  Importance of Fall prevention discussed.  Counseling on Smoking and Alcohol cessation was offered when appropriate.  Counseling on Diet, exercise, and medication compliance was done.   Approx 30 minutes spent.
pt symptomatic during HD again today  cards f/u  palliative f/u  need goc discussion with family again    Advanced care planning was discussed with patient and family.  Advanced care planning forms were reviewed and discussed as appropriate.  Differential diagnosis and plan of care discussed with patient after the evaluation.   Pain assessed and judicious use of narcotics when appropriate was discussed.  Importance of Fall prevention discussed.  Counseling on Smoking and Alcohol cessation was offered when appropriate.  Counseling on Diet, exercise, and medication compliance was done.   Approx 30 minutes spent.
dc planning after HD tomorrow    Advanced care planning was discussed with patient and family.  Advanced care planning forms were reviewed and discussed as appropriate.  Differential diagnosis and plan of care discussed with patient after the evaluation.   Pain assessed and judicious use of narcotics when appropriate was discussed.  Importance of Fall prevention discussed.  Counseling on Smoking and Alcohol cessation was offered when appropriate.  Counseling on Diet, exercise, and medication compliance was done.   Approx 30 minutes spent.
Advanced care planning was discussed with patient and family.  Advanced care planning forms were reviewed and discussed as appropriate.  Differential diagnosis and plan of care discussed with patient after the evaluation.   Pain assessed and judicious use of narcotics when appropriate was discussed.  Importance of Fall prevention discussed.  Counseling on Smoking and Alcohol cessation was offered when appropriate.  Counseling on Diet, exercise, and medication compliance was done.   Approx 30 minutes spent.

## 2021-07-22 ENCOUNTER — INPATIENT (INPATIENT)
Facility: HOSPITAL | Age: 86
LOS: 11 days | Discharge: HOME | DRG: 640 | End: 2021-08-03
Attending: HOSPITALIST | Admitting: HOSPITALIST
Payer: MEDICARE

## 2021-07-22 VITALS
SYSTOLIC BLOOD PRESSURE: 203 MMHG | RESPIRATION RATE: 24 BRPM | WEIGHT: 179.9 LBS | DIASTOLIC BLOOD PRESSURE: 72 MMHG | OXYGEN SATURATION: 99 % | HEART RATE: 70 BPM

## 2021-07-22 DIAGNOSIS — J96.01 ACUTE RESPIRATORY FAILURE WITH HYPOXIA: ICD-10-CM

## 2021-07-22 DIAGNOSIS — Z29.9 ENCOUNTER FOR PROPHYLACTIC MEASURES, UNSPECIFIED: ICD-10-CM

## 2021-07-22 DIAGNOSIS — E87.70 FLUID OVERLOAD, UNSPECIFIED: ICD-10-CM

## 2021-07-22 DIAGNOSIS — E11.9 TYPE 2 DIABETES MELLITUS WITHOUT COMPLICATIONS: ICD-10-CM

## 2021-07-22 DIAGNOSIS — R06.03 ACUTE RESPIRATORY DISTRESS: ICD-10-CM

## 2021-07-22 DIAGNOSIS — I50.32 CHRONIC DIASTOLIC (CONGESTIVE) HEART FAILURE: ICD-10-CM

## 2021-07-22 DIAGNOSIS — E78.5 HYPERLIPIDEMIA, UNSPECIFIED: ICD-10-CM

## 2021-07-22 DIAGNOSIS — N18.6 END STAGE RENAL DISEASE: ICD-10-CM

## 2021-07-22 DIAGNOSIS — Z02.9 ENCOUNTER FOR ADMINISTRATIVE EXAMINATIONS, UNSPECIFIED: ICD-10-CM

## 2021-07-22 DIAGNOSIS — I48.91 UNSPECIFIED ATRIAL FIBRILLATION: ICD-10-CM

## 2021-07-22 DIAGNOSIS — I10 ESSENTIAL (PRIMARY) HYPERTENSION: ICD-10-CM

## 2021-07-22 DIAGNOSIS — D64.9 ANEMIA, UNSPECIFIED: ICD-10-CM

## 2021-07-22 DIAGNOSIS — Z71.89 OTHER SPECIFIED COUNSELING: ICD-10-CM

## 2021-07-22 LAB
A1C WITH ESTIMATED AVERAGE GLUCOSE RESULT: 7.8 % — HIGH (ref 4–5.6)
ALBUMIN SERPL ELPH-MCNC: 3.6 G/DL — SIGNIFICANT CHANGE UP (ref 3.5–5)
ALP SERPL-CCNC: 168 U/L — HIGH (ref 40–120)
ALT FLD-CCNC: 22 U/L DA — SIGNIFICANT CHANGE UP (ref 10–60)
ANION GAP SERPL CALC-SCNC: 13 MMOL/L — SIGNIFICANT CHANGE UP (ref 5–17)
APTT BLD: 31.4 SEC — SIGNIFICANT CHANGE UP (ref 27.5–35.5)
AST SERPL-CCNC: 18 U/L — SIGNIFICANT CHANGE UP (ref 10–40)
BASE EXCESS BLDA CALC-SCNC: -4.9 MMOL/L — LOW (ref -2–3)
BASE EXCESS BLDV CALC-SCNC: -3.1 MMOL/L — SIGNIFICANT CHANGE UP
BASOPHILS # BLD AUTO: 0.05 K/UL — SIGNIFICANT CHANGE UP (ref 0–0.2)
BASOPHILS NFR BLD AUTO: 0.4 % — SIGNIFICANT CHANGE UP (ref 0–2)
BILIRUB SERPL-MCNC: 0.4 MG/DL — SIGNIFICANT CHANGE UP (ref 0.2–1.2)
BLOOD GAS COMMENTS ARTERIAL: SIGNIFICANT CHANGE UP
BLOOD GAS COMMENTS, VENOUS: SIGNIFICANT CHANGE UP
BUN SERPL-MCNC: 41 MG/DL — HIGH (ref 7–18)
CALCIUM SERPL-MCNC: 9 MG/DL — SIGNIFICANT CHANGE UP (ref 8.4–10.5)
CHLORIDE SERPL-SCNC: 99 MMOL/L — SIGNIFICANT CHANGE UP (ref 96–108)
CHOLEST SERPL-MCNC: 122 MG/DL — SIGNIFICANT CHANGE UP
CO2 SERPL-SCNC: 23 MMOL/L — SIGNIFICANT CHANGE UP (ref 22–31)
CREAT SERPL-MCNC: 4.91 MG/DL — HIGH (ref 0.5–1.3)
EOSINOPHIL # BLD AUTO: 0.06 K/UL — SIGNIFICANT CHANGE UP (ref 0–0.5)
EOSINOPHIL NFR BLD AUTO: 0.5 % — SIGNIFICANT CHANGE UP (ref 0–6)
ESTIMATED AVERAGE GLUCOSE: 177 MG/DL — HIGH (ref 68–114)
GLUCOSE BLDC GLUCOMTR-MCNC: 219 MG/DL — HIGH (ref 70–99)
GLUCOSE BLDC GLUCOMTR-MCNC: 338 MG/DL — HIGH (ref 70–99)
GLUCOSE SERPL-MCNC: 296 MG/DL — HIGH (ref 70–99)
GRAM STN FLD: SIGNIFICANT CHANGE UP
HCO3 BLDA-SCNC: 23 MMOL/L — SIGNIFICANT CHANGE UP (ref 21–28)
HCO3 BLDV-SCNC: 27 MMOL/L — SIGNIFICANT CHANGE UP (ref 22–29)
HCT VFR BLD CALC: 32.8 % — LOW (ref 34.5–45)
HDLC SERPL-MCNC: 85 MG/DL — SIGNIFICANT CHANGE UP
HGB BLD-MCNC: 10.3 G/DL — LOW (ref 11.5–15.5)
HOROWITZ INDEX BLDA+IHG-RTO: 100 — SIGNIFICANT CHANGE UP
HOROWITZ INDEX BLDV+IHG-RTO: 40 — SIGNIFICANT CHANGE UP
IMM GRANULOCYTES NFR BLD AUTO: 0.5 % — SIGNIFICANT CHANGE UP (ref 0–1.5)
INR BLD: 1.31 RATIO — HIGH (ref 0.88–1.16)
LACTATE SERPL-SCNC: 1 MMOL/L — SIGNIFICANT CHANGE UP (ref 0.7–2)
LIPID PNL WITH DIRECT LDL SERPL: 34 MG/DL — SIGNIFICANT CHANGE UP
LYMPHOCYTES # BLD AUTO: 0.69 K/UL — LOW (ref 1–3.3)
LYMPHOCYTES # BLD AUTO: 5.2 % — LOW (ref 13–44)
MAGNESIUM SERPL-MCNC: 2.3 MG/DL — SIGNIFICANT CHANGE UP (ref 1.6–2.6)
MAGNESIUM SERPL-MCNC: 2.5 MG/DL — SIGNIFICANT CHANGE UP (ref 1.6–2.6)
MCHC RBC-ENTMCNC: 29.3 PG — SIGNIFICANT CHANGE UP (ref 27–34)
MCHC RBC-ENTMCNC: 31.4 GM/DL — LOW (ref 32–36)
MCV RBC AUTO: 93.4 FL — SIGNIFICANT CHANGE UP (ref 80–100)
MONOCYTES # BLD AUTO: 1.44 K/UL — HIGH (ref 0–0.9)
MONOCYTES NFR BLD AUTO: 10.8 % — SIGNIFICANT CHANGE UP (ref 2–14)
NEUTROPHILS # BLD AUTO: 11.01 K/UL — HIGH (ref 1.8–7.4)
NEUTROPHILS NFR BLD AUTO: 82.6 % — HIGH (ref 43–77)
NON HDL CHOLESTEROL: 37 MG/DL — SIGNIFICANT CHANGE UP
NRBC # BLD: 0 /100 WBCS — SIGNIFICANT CHANGE UP (ref 0–0)
NT-PROBNP SERPL-SCNC: 4225 PG/ML — HIGH (ref 0–450)
PCO2 BLDA: 54 MMHG — HIGH (ref 32–35)
PCO2 BLDV: 70 MMHG — HIGH (ref 39–42)
PH BLDA: 7.24 — LOW (ref 7.35–7.45)
PH BLDV: 7.19 — LOW (ref 7.32–7.43)
PHOSPHATE SERPL-MCNC: 4.3 MG/DL — SIGNIFICANT CHANGE UP (ref 2.5–4.5)
PLATELET # BLD AUTO: 185 K/UL — SIGNIFICANT CHANGE UP (ref 150–400)
PO2 BLDA: 474 MMHG — HIGH (ref 83–108)
PO2 BLDV: 47 MMHG — SIGNIFICANT CHANGE UP
POTASSIUM SERPL-MCNC: 5 MMOL/L — SIGNIFICANT CHANGE UP (ref 3.5–5.3)
POTASSIUM SERPL-SCNC: 5 MMOL/L — SIGNIFICANT CHANGE UP (ref 3.5–5.3)
PROCALCITONIN SERPL-MCNC: 1.69 NG/ML — HIGH (ref 0.02–0.1)
PROT SERPL-MCNC: 7.5 G/DL — SIGNIFICANT CHANGE UP (ref 6–8.3)
PROTHROM AB SERPL-ACNC: 15.4 SEC — HIGH (ref 10.6–13.6)
RAPID RVP RESULT: DETECTED
RBC # BLD: 3.51 M/UL — LOW (ref 3.8–5.2)
RBC # FLD: 14.7 % — HIGH (ref 10.3–14.5)
RV+EV RNA SPEC QL NAA+PROBE: DETECTED
SAO2 % BLDA: 99 % — SIGNIFICANT CHANGE UP
SAO2 % BLDV: 77.2 % — SIGNIFICANT CHANGE UP
SARS-COV-2 RNA SPEC QL NAA+PROBE: SIGNIFICANT CHANGE UP
SODIUM SERPL-SCNC: 135 MMOL/L — SIGNIFICANT CHANGE UP (ref 135–145)
SPECIMEN SOURCE: SIGNIFICANT CHANGE UP
TRIGL SERPL-MCNC: 15 MG/DL — SIGNIFICANT CHANGE UP
TROPONIN I SERPL-MCNC: <0.015 NG/ML — SIGNIFICANT CHANGE UP (ref 0–0.04)
TSH SERPL-MCNC: 2.01 UU/ML — SIGNIFICANT CHANGE UP (ref 0.34–4.82)
WBC # BLD: 13.31 K/UL — HIGH (ref 3.8–10.5)
WBC # FLD AUTO: 13.31 K/UL — HIGH (ref 3.8–10.5)

## 2021-07-22 PROCEDURE — 99223 1ST HOSP IP/OBS HIGH 75: CPT

## 2021-07-22 PROCEDURE — 12345: CPT | Mod: NC,GC

## 2021-07-22 PROCEDURE — 99291 CRITICAL CARE FIRST HOUR: CPT

## 2021-07-22 PROCEDURE — 93010 ELECTROCARDIOGRAM REPORT: CPT

## 2021-07-22 PROCEDURE — 71045 X-RAY EXAM CHEST 1 VIEW: CPT | Mod: 26

## 2021-07-22 RX ORDER — INSULIN LISPRO 100/ML
VIAL (ML) SUBCUTANEOUS EVERY 6 HOURS
Refills: 0 | Status: DISCONTINUED | OUTPATIENT
Start: 2021-07-22 | End: 2021-07-24

## 2021-07-22 RX ORDER — METOPROLOL TARTRATE 50 MG
25 TABLET ORAL EVERY 8 HOURS
Refills: 0 | Status: DISCONTINUED | OUTPATIENT
Start: 2021-07-22 | End: 2021-07-23

## 2021-07-22 RX ORDER — AMLODIPINE BESYLATE 2.5 MG/1
1 TABLET ORAL
Qty: 0 | Refills: 0 | DISCHARGE

## 2021-07-22 RX ORDER — MIDODRINE HYDROCHLORIDE 2.5 MG/1
10 TABLET ORAL ONCE
Refills: 0 | Status: COMPLETED | OUTPATIENT
Start: 2021-07-23 | End: 2021-07-23

## 2021-07-22 RX ORDER — FUROSEMIDE 40 MG
80 TABLET ORAL ONCE
Refills: 0 | Status: COMPLETED | OUTPATIENT
Start: 2021-07-22 | End: 2021-07-22

## 2021-07-22 RX ORDER — FUROSEMIDE 40 MG
80 TABLET ORAL DAILY
Refills: 0 | Status: DISCONTINUED | OUTPATIENT
Start: 2021-07-22 | End: 2021-07-23

## 2021-07-22 RX ORDER — MIDODRINE HYDROCHLORIDE 2.5 MG/1
10 TABLET ORAL
Refills: 0 | Status: DISCONTINUED | OUTPATIENT
Start: 2021-07-22 | End: 2021-07-28

## 2021-07-22 RX ORDER — VANCOMYCIN HCL 1 G
1000 VIAL (EA) INTRAVENOUS ONCE
Refills: 0 | Status: COMPLETED | OUTPATIENT
Start: 2021-07-22 | End: 2021-07-22

## 2021-07-22 RX ORDER — CEFEPIME 1 G/1
500 INJECTION, POWDER, FOR SOLUTION INTRAMUSCULAR; INTRAVENOUS EVERY 24 HOURS
Refills: 0 | Status: DISCONTINUED | OUTPATIENT
Start: 2021-07-22 | End: 2021-07-23

## 2021-07-22 RX ORDER — ERYTHROPOIETIN 10000 [IU]/ML
4000 INJECTION, SOLUTION INTRAVENOUS; SUBCUTANEOUS
Refills: 0 | Status: DISCONTINUED | OUTPATIENT
Start: 2021-07-22 | End: 2021-07-26

## 2021-07-22 RX ORDER — CEFEPIME 1 G/1
2000 INJECTION, POWDER, FOR SOLUTION INTRAMUSCULAR; INTRAVENOUS ONCE
Refills: 0 | Status: COMPLETED | OUTPATIENT
Start: 2021-07-22 | End: 2021-07-22

## 2021-07-22 RX ORDER — NITROGLYCERIN 6.5 MG
0.4 CAPSULE, EXTENDED RELEASE ORAL ONCE
Refills: 0 | Status: COMPLETED | OUTPATIENT
Start: 2021-07-22 | End: 2021-07-22

## 2021-07-22 RX ORDER — INSULIN LISPRO 100/ML
VIAL (ML) SUBCUTANEOUS
Refills: 0 | Status: DISCONTINUED | OUTPATIENT
Start: 2021-07-22 | End: 2021-07-22

## 2021-07-22 RX ORDER — ROSUVASTATIN CALCIUM 5 MG/1
1 TABLET ORAL
Qty: 0 | Refills: 0 | DISCHARGE

## 2021-07-22 RX ORDER — AMIODARONE HYDROCHLORIDE 400 MG/1
200 TABLET ORAL DAILY
Refills: 0 | Status: DISCONTINUED | OUTPATIENT
Start: 2021-07-22 | End: 2021-08-03

## 2021-07-22 RX ORDER — ALBUTEROL 90 UG/1
2 AEROSOL, METERED ORAL EVERY 6 HOURS
Refills: 0 | Status: DISCONTINUED | OUTPATIENT
Start: 2021-07-22 | End: 2021-07-27

## 2021-07-22 RX ORDER — INSULIN GLARGINE 100 [IU]/ML
5 INJECTION, SOLUTION SUBCUTANEOUS AT BEDTIME
Refills: 0 | Status: DISCONTINUED | OUTPATIENT
Start: 2021-07-22 | End: 2021-07-22

## 2021-07-22 RX ORDER — CHLORHEXIDINE GLUCONATE 213 G/1000ML
1 SOLUTION TOPICAL
Refills: 0 | Status: DISCONTINUED | OUTPATIENT
Start: 2021-07-22 | End: 2021-08-03

## 2021-07-22 RX ORDER — ASCORBIC ACID 60 MG
500 TABLET,CHEWABLE ORAL DAILY
Refills: 0 | Status: DISCONTINUED | OUTPATIENT
Start: 2021-07-22 | End: 2021-08-03

## 2021-07-22 RX ORDER — ZINC SULFATE TAB 220 MG (50 MG ZINC EQUIVALENT) 220 (50 ZN) MG
220 TAB ORAL DAILY
Refills: 0 | Status: DISCONTINUED | OUTPATIENT
Start: 2021-07-22 | End: 2021-08-03

## 2021-07-22 RX ORDER — LINEZOLID 600 MG/300ML
600 INJECTION, SOLUTION INTRAVENOUS EVERY 12 HOURS
Refills: 0 | Status: DISCONTINUED | OUTPATIENT
Start: 2021-07-22 | End: 2021-07-23

## 2021-07-22 RX ORDER — APIXABAN 2.5 MG/1
2.5 TABLET, FILM COATED ORAL EVERY 12 HOURS
Refills: 0 | Status: DISCONTINUED | OUTPATIENT
Start: 2021-07-22 | End: 2021-08-03

## 2021-07-22 RX ORDER — ATORVASTATIN CALCIUM 80 MG/1
40 TABLET, FILM COATED ORAL AT BEDTIME
Refills: 0 | Status: DISCONTINUED | OUTPATIENT
Start: 2021-07-22 | End: 2021-07-27

## 2021-07-22 RX ORDER — AMLODIPINE BESYLATE 2.5 MG/1
2.5 TABLET ORAL DAILY
Refills: 0 | Status: DISCONTINUED | OUTPATIENT
Start: 2021-07-22 | End: 2021-07-22

## 2021-07-22 RX ADMIN — ERYTHROPOIETIN 4000 UNIT(S): 10000 INJECTION, SOLUTION INTRAVENOUS; SUBCUTANEOUS at 12:51

## 2021-07-22 RX ADMIN — Medication 0.4 MILLIGRAM(S): at 01:20

## 2021-07-22 RX ADMIN — Medication 5: at 08:20

## 2021-07-22 RX ADMIN — Medication 250 MILLIGRAM(S): at 03:56

## 2021-07-22 RX ADMIN — LINEZOLID 300 MILLIGRAM(S): 600 INJECTION, SOLUTION INTRAVENOUS at 21:49

## 2021-07-22 RX ADMIN — Medication 25 MILLIGRAM(S): at 06:08

## 2021-07-22 RX ADMIN — MIDODRINE HYDROCHLORIDE 10 MILLIGRAM(S): 2.5 TABLET ORAL at 10:11

## 2021-07-22 RX ADMIN — LINEZOLID 300 MILLIGRAM(S): 600 INJECTION, SOLUTION INTRAVENOUS at 08:21

## 2021-07-22 RX ADMIN — APIXABAN 2.5 MILLIGRAM(S): 2.5 TABLET, FILM COATED ORAL at 17:06

## 2021-07-22 RX ADMIN — Medication 80 MILLIGRAM(S): at 01:30

## 2021-07-22 RX ADMIN — CEFEPIME 100 MILLIGRAM(S): 1 INJECTION, POWDER, FOR SOLUTION INTRAMUSCULAR; INTRAVENOUS at 03:54

## 2021-07-22 RX ADMIN — APIXABAN 2.5 MILLIGRAM(S): 2.5 TABLET, FILM COATED ORAL at 06:08

## 2021-07-22 RX ADMIN — Medication 25 MILLIGRAM(S): at 21:49

## 2021-07-22 RX ADMIN — Medication 2: at 17:06

## 2021-07-22 RX ADMIN — AMIODARONE HYDROCHLORIDE 200 MILLIGRAM(S): 400 TABLET ORAL at 06:08

## 2021-07-22 RX ADMIN — ATORVASTATIN CALCIUM 40 MILLIGRAM(S): 80 TABLET, FILM COATED ORAL at 21:49

## 2021-07-22 RX ADMIN — Medication 1: at 23:29

## 2021-07-22 RX ADMIN — Medication 3: at 18:08

## 2021-07-22 RX ADMIN — Medication 25 MILLIGRAM(S): at 15:34

## 2021-07-22 NOTE — PROGRESS NOTE ADULT - FAMILY/CHILD(REN)
Daughter Belkis ([phone])` at bedside, discussed GOC, plan of care. wants aggressive treatment for now.

## 2021-07-22 NOTE — ED ADULT NURSE NOTE - NSIMPLEMENTINTERV_GEN_ALL_ED
Implemented All Fall Risk Interventions:  [place] to call system. Call bell, personal items and telephone within reach. Instruct patient to call for assistance. Room bathroom lighting operational. Non-slip footwear when patient is off stretcher. Physically safe environment: no spills, clutter or unnecessary equipment. Stretcher in lowest position, wheels locked, appropriate side rails in place. Provide visual cue, wrist band, yellow gown, etc. Monitor gait and stability. Monitor for mental status changes and reorient to person, place, and time. Review medications for side effects contributing to fall risk. Reinforce activity limits and safety measures with patient and family.

## 2021-07-22 NOTE — H&P ADULT - PROBLEM SELECTOR PLAN 2
HD Monday Tuesday Thursday Saturday  Oliguric  Dialysis as per nephrology  BNP 4225 (vs 11k in may)  Takes midodrine 10mg 30 minutes prior to HD  midodrine PRN ordered for 30mins prior to hd, with instructions to hold if SBP >120  Nephro consulted Dr. Huitron

## 2021-07-22 NOTE — ED PROVIDER NOTE - OBJECTIVE STATEMENT
89 year old female PMH afib on eliquis, ESRD on HD TTS (got it yesterday), HTN, DM, PPM coming in with SOB, cough, and fluid overload. states today she initially had a mild cough but symptoms worsened throughout the day. states was drinking tea and then vomited up liquid. denies all other complaints.

## 2021-07-22 NOTE — CONSULT NOTE ADULT - ATTENDING COMMENTS
Chart reviewed and pt examined at the bedside. Agree with ID NP PE, assessment, and plan.
Patient is a 89 y/o female with PMH of ESRD on HD, frequent admissions ( 5-6 in last few months ) for fluid overload, HTN, DM, AFIB on eliquis, PPM on home supplemental oxygen admitted overnight with SOB and fluid overload. The patient had emergent HD today and the session only removed 1.5L. She came off non invasive positive pressure ventilation to low flow nasal canula oxygen but her tachypnea and increased work of breathing lead to her medical attending to request an ICU consult for admission due to anticipated need for reinstitution of bipap. The patient has a normal LV on echo with normal diastolic function and only mild - moderate MR. There has been an ABG - 7.24/54/474 on bipap with FiO2 100%. CXR shows increased interstitial markings bilaterally. BUN/ creatinine 41/4.9. Dx- ESRD with fluid overload. Will have another session of HD tomorrow, nephrology follow up, palliative follow up, supplemental oxygen vs noninvasive positive pressure ventilation. I reviewed all laboratory and roentgenographic data and any previous medical record from [facility] include 234 beds at the [facility] that existed. I also discussed the case with the ED attending or referring physician.

## 2021-07-22 NOTE — H&P ADULT - HISTORY OF PRESENT ILLNESS
89 F, wheel chair bound, from home with PMH of Afib on Eliquis, ESRD on HD(Monday, Tuesday, Thursday, Saturday), DM on insulin, HTN, Home O2 3L presented with shortness of breath, vomiting x1 day. Pt was drinking tea from home when she had a vomiting episode. Her daughter at bedside describes "all this fluid came out of her mouth." As per daughter, pt has been drinking a lot of fluid over the past day. No new medications, no hospitalizations or ER visits in the interim as per daughter. No fever, no chest pain, no palpitations, no diarrhea, no dysuria. Pt is oliguric. Pt had COVID in March 2021, and as such daughter reports she did not yet get vaccinated.

## 2021-07-22 NOTE — PROGRESS NOTE ADULT - PROBLEM SELECTOR PLAN 6
6.6 a1c in 5/2021  A1C 7.8 this admission  Takes lantus 10 and humalog sliding scale at home  Start lantus 5 for now as pt is npo due to bipap with sliding scale  fs q6 while npo, switch to achs when off bipap

## 2021-07-22 NOTE — PROGRESS NOTE ADULT - ASSESSMENT
89 F, wheel chair bound, from home with PMH of Afib on Eliquis, ESRD on HD(Monday, Tuesday, Thursday, Saturday), DM on insulin, HTN, Home O2 3L presented with shortness of breath, vomiting x1 day admitted for SOB due to fluid overload vs pna.  89 F, wheel chair bound, from home with PMH of Afib on Eliquis, ESRD on HD(Monday, Tuesday, Thursday, Saturday), DM on insulin, HTN, Home O2 3L presented with shortness of breath, vomiting x1 day admitted for SOB due to fluid overload vs pna. on Tele monitor, BIPAP, unable to wean off this time. ABG shows 7.43--23, RT following. Nephrology consulted. scheduled for HD today.   ICU consulted for close monitoring with BIPAP. Will reassess after HD per ICU team. Palliative consulted.

## 2021-07-22 NOTE — ED ADULT NURSE NOTE - ED STAT RN HANDOFF DETAILS
report given to EBENEZER Ladd for cont. care, pt on BIPAP sleeping, on cont. cardiac monitor, waiting for bed.

## 2021-07-22 NOTE — ED PROVIDER NOTE - CLINICAL SUMMARY MEDICAL DECISION MAKING FREE TEXT BOX
89 year old female with SOB, fluid overload. hypoxic on RA, hypertensive. PE as above.  placed on bipap on arrival. labs, cxr, lasix, nitro, admission

## 2021-07-22 NOTE — PROGRESS NOTE ADULT - SUBJECTIVE AND OBJECTIVE BOX
NP Note discussed with  Primary Attending    INTERVAL HPI/OVERNIGHT EVENTS: no new complaints    MEDICATIONS  (STANDING):  aMIOdarone    Tablet 200 milliGRAM(s) Oral daily  apixaban 2.5 milliGRAM(s) Oral every 12 hours  atorvastatin 40 milliGRAM(s) Oral at bedtime  cefepime   IVPB 500 milliGRAM(s) IV Intermittent every 24 hours  epoetin maria d-epbx (RETACRIT) Injectable 4000 Unit(s) IV Push <User Schedule>  insulin glargine Injectable (LANTUS) 5 Unit(s) SubCutaneous at bedtime  insulin lispro (ADMELOG) corrective regimen sliding scale   SubCutaneous Before meals and at bedtime  linezolid  IVPB 600 milliGRAM(s) IV Intermittent every 12 hours  metoprolol tartrate 25 milliGRAM(s) Oral every 8 hours    MEDICATIONS  (PRN):  ALBUTerol    90 MICROgram(s) HFA Inhaler 2 Puff(s) Inhalation every 6 hours PRN Shortness of Breath and/or Wheezing  bisacodyl Suppository 10 milliGRAM(s) Rectal daily PRN Constipation  midodrine. 10 milliGRAM(s) Oral <User Schedule> PRN DIALYSIS      __________________________________________________  REVIEW OF SYSTEMS:    CONSTITUTIONAL: No fever,   EYES: no acute visual disturbances  NECK: No pain or stiffness  RESPIRATORY: No cough; No shortness of breath  CARDIOVASCULAR: No chest pain, no palpitations  GASTROINTESTINAL: No pain. No nausea or vomiting; No diarrhea   NEUROLOGICAL: No headache or numbness, no tremors  MUSCULOSKELETAL: No joint pain, no muscle pain  GENITOURINARY: no dysuria, no frequency, no hesitancy  PSYCHIATRY: no depression , no anxiety  ALL OTHER  ROS negative        Vital Signs Last 24 Hrs  T(C): 36.7 (22 Jul 2021 10:03), Max: 36.8 (22 Jul 2021 00:48)  T(F): 98 (22 Jul 2021 10:03), Max: 98.3 (22 Jul 2021 00:48)  HR: 60 (22 Jul 2021 10:03) (59 - 76)  BP: 164/50 (22 Jul 2021 10:03) (143/58 - 203/72)  BP(mean): --  RR: 22 (22 Jul 2021 10:03) (14 - 29)  SpO2: 100% (22 Jul 2021 10:03) (99% - 100%)    ________________________________________________  PHYSICAL EXAM:  GENERAL: NAD  HEENT: Normocephalic;  conjunctivae and sclerae clear; moist mucous membranes;   NECK : supple  CHEST/LUNG: Clear to auscultation bilaterally with good air entry   HEART: S1 S2  regular; no murmurs, gallops or rubs  ABDOMEN: Soft, Nontender, Nondistended; Bowel sounds present  EXTREMITIES: no cyanosis; no edema; no calf tenderness  SKIN: warm and dry; no rash  NERVOUS SYSTEM:  Awake and alert; Oriented  to place, person and time ; no new deficits    _________________________________________________  LABS:                        10.3   13.31 )-----------( 185      ( 22 Jul 2021 00:43 )             32.8     07-22    135  |  99  |  41<H>  ----------------------------<  296<H>  5.0   |  23  |  4.91<H>    Ca    9.0      22 Jul 2021 00:43  Phos  4.3     07-22  Mg     2.5     07-22    TPro  7.5  /  Alb  3.6  /  TBili  0.4  /  DBili  x   /  AST  18  /  ALT  22  /  AlkPhos  168<H>  07-22    PT/INR - ( 22 Jul 2021 00:43 )   PT: 15.4 sec;   INR: 1.31 ratio         PTT - ( 22 Jul 2021 00:43 )  PTT:31.4 sec    CAPILLARY BLOOD GLUCOSE  398 (22 Jul 2021 08:05)      POCT Blood Glucose.: 338 mg/dL (22 Jul 2021 11:39)  POCT Blood Glucose.: 330 mg/dL (22 Jul 2021 00:25)        RADIOLOGY & ADDITIONAL TESTS:    Imaging  Reviewed:  YES    Consultant(s) Notes Reviewed:   YES      Plan of care was discussed with patient and /or primary care giver; all questions and concerns were addressed  NP Note discussed with  Primary Attending    INTERVAL HPI/OVERNIGHT EVENTS: seen at bedside, unable to have full conversation with using BIPAP. pt denies pain on exam.   MEDICATIONS  (STANDING):  aMIOdarone    Tablet 200 milliGRAM(s) Oral daily  apixaban 2.5 milliGRAM(s) Oral every 12 hours  atorvastatin 40 milliGRAM(s) Oral at bedtime  cefepime   IVPB 500 milliGRAM(s) IV Intermittent every 24 hours  epoetin maria d-epbx (RETACRIT) Injectable 4000 Unit(s) IV Push <User Schedule>  insulin glargine Injectable (LANTUS) 5 Unit(s) SubCutaneous at bedtime  insulin lispro (ADMELOG) corrective regimen sliding scale   SubCutaneous Before meals and at bedtime  linezolid  IVPB 600 milliGRAM(s) IV Intermittent every 12 hours  metoprolol tartrate 25 milliGRAM(s) Oral every 8 hours    MEDICATIONS  (PRN):  ALBUTerol    90 MICROgram(s) HFA Inhaler 2 Puff(s) Inhalation every 6 hours PRN Shortness of Breath and/or Wheezing  bisacodyl Suppository 10 milliGRAM(s) Rectal daily PRN Constipation  midodrine. 10 milliGRAM(s) Oral <User Schedule> PRN DIALYSIS      __________________________________________________  REVIEW OF SYSTEMS:  Limited ROS but denies N/V, chest pain or sob.     Vital Signs Last 24 Hrs  T(C): 36.7 (22 Jul 2021 10:03), Max: 36.8 (22 Jul 2021 00:48)  T(F): 98 (22 Jul 2021 10:03), Max: 98.3 (22 Jul 2021 00:48)  HR: 60 (22 Jul 2021 10:03) (59 - 76)  BP: 164/50 (22 Jul 2021 10:03) (143/58 - 203/72)  BP(mean): --  RR: 22 (22 Jul 2021 10:03) (14 - 29)  SpO2: 100% (22 Jul 2021 10:03) (99% - 100%)    ________________________________________________  PHYSICAL EXAM:  GENERAL: NAD, on BIPAP  HEENT: Normocephalic;  conjunctivae and sclerae clear; moist mucous membranes;   NECK : supple  CHEST/LUNG: Rhonchi b/l, no wheezing. eupneic on BIPAP  HEART: S1 S2  regular; no murmurs, gallops or rubs  ABDOMEN: Soft, Nontender, Nondistended; Bowel sounds present  EXTREMITIES: no cyanosis; lower leg pitting edema +1,  no calf tenderness  SKIN: warm and dry; no rash  NERVOUS SYSTEM:  Awake and alert; Oriented  to self.     _________________________________________________  LABS:                        10.3   13.31 )-----------( 185      ( 22 Jul 2021 00:43 )             32.8     07-22    135  |  99  |  41<H>  ----------------------------<  296<H>  5.0   |  23  |  4.91<H>    Ca    9.0      22 Jul 2021 00:43  Phos  4.3     07-22  Mg     2.5     07-22    TPro  7.5  /  Alb  3.6  /  TBili  0.4  /  DBili  x   /  AST  18  /  ALT  22  /  AlkPhos  168<H>  07-22    PT/INR - ( 22 Jul 2021 00:43 )   PT: 15.4 sec;   INR: 1.31 ratio         PTT - ( 22 Jul 2021 00:43 )  PTT:31.4 sec    CAPILLARY BLOOD GLUCOSE  398 (22 Jul 2021 08:05)      POCT Blood Glucose.: 338 mg/dL (22 Jul 2021 11:39)  POCT Blood Glucose.: 330 mg/dL (22 Jul 2021 00:25)        RADIOLOGY & ADDITIONAL TESTS:    Imaging  Reviewed:  YES    Consultant(s) Notes Reviewed:   YES      Plan of care was discussed with patient and /or primary care giver; all questions and concerns were addressed  NP Note discussed with  Primary Attending    INTERVAL HPI/OVERNIGHT EVENTS: seen at bedside, unable to have full conversation with using BIPAP. pt denies pain on exam.   MEDICATIONS  (STANDING):  aMIOdarone    Tablet 200 milliGRAM(s) Oral daily  apixaban 2.5 milliGRAM(s) Oral every 12 hours  atorvastatin 40 milliGRAM(s) Oral at bedtime  cefepime   IVPB 500 milliGRAM(s) IV Intermittent every 24 hours  epoetin maria d-epbx (RETACRIT) Injectable 4000 Unit(s) IV Push <User Schedule>  insulin glargine Injectable (LANTUS) 5 Unit(s) SubCutaneous at bedtime  insulin lispro (ADMELOG) corrective regimen sliding scale   SubCutaneous Before meals and at bedtime  linezolid  IVPB 600 milliGRAM(s) IV Intermittent every 12 hours  metoprolol tartrate 25 milliGRAM(s) Oral every 8 hours    MEDICATIONS  (PRN):  ALBUTerol    90 MICROgram(s) HFA Inhaler 2 Puff(s) Inhalation every 6 hours PRN Shortness of Breath and/or Wheezing  bisacodyl Suppository 10 milliGRAM(s) Rectal daily PRN Constipation  midodrine. 10 milliGRAM(s) Oral <User Schedule> PRN DIALYSIS      __________________________________________________  REVIEW OF SYSTEMS:  Limited ROS but denies N/V, chest pain or sob.     Vital Signs Last 24 Hrs  T(C): 36.7 (22 Jul 2021 10:03), Max: 36.8 (22 Jul 2021 00:48)  T(F): 98 (22 Jul 2021 10:03), Max: 98.3 (22 Jul 2021 00:48)  HR: 60 (22 Jul 2021 10:03) (59 - 76)  BP: 164/50 (22 Jul 2021 10:03) (143/58 - 203/72)  BP(mean): --  RR: 22 (22 Jul 2021 10:03) (14 - 29)  SpO2: 100% (22 Jul 2021 10:03) (99% - 100%)    ________________________________________________  PHYSICAL EXAM:  GENERAL: NAD, on BIPAP  HEENT: Normocephalic;  conjunctivae and sclerae clear; moist mucous membranes;   NECK : supple  CHEST/LUNG: Rhonchi b/l, no wheezing. eupneic on BIPAP  HEART: S1 S2  regular; no murmurs, gallops or rubs  ABDOMEN: Soft, Nontender, globular ; Bowel sounds present  EXTREMITIES: no cyanosis; lower leg pitting edema +1,  no calf tenderness  SKIN: warm and dry; no rash  NERVOUS SYSTEM:  Awake and alert; Oriented  to self.     _________________________________________________  LABS:                        10.3   13.31 )-----------( 185      ( 22 Jul 2021 00:43 )             32.8     07-22    135  |  99  |  41<H>  ----------------------------<  296<H>  5.0   |  23  |  4.91<H>    Ca    9.0      22 Jul 2021 00:43  Phos  4.3     07-22  Mg     2.5     07-22    TPro  7.5  /  Alb  3.6  /  TBili  0.4  /  DBili  x   /  AST  18  /  ALT  22  /  AlkPhos  168<H>  07-22    PT/INR - ( 22 Jul 2021 00:43 )   PT: 15.4 sec;   INR: 1.31 ratio         PTT - ( 22 Jul 2021 00:43 )  PTT:31.4 sec    CAPILLARY BLOOD GLUCOSE  398 (22 Jul 2021 08:05)      POCT Blood Glucose.: 338 mg/dL (22 Jul 2021 11:39)  POCT Blood Glucose.: 330 mg/dL (22 Jul 2021 00:25)        RADIOLOGY & ADDITIONAL TESTS:    Imaging  Reviewed:  YES    Consultant(s) Notes Reviewed:   YES      Plan of care was discussed with patient and /or primary care giver; all questions and concerns were addressed

## 2021-07-22 NOTE — H&P ADULT - PROBLEM SELECTOR PLAN 7
HB 10.3 on admission  Baseline Hb 9  Anemia panel in 5/2021 shows low iron, likely ACD given pt's ESRD

## 2021-07-22 NOTE — CONSULT NOTE ADULT - SUBJECTIVE AND OBJECTIVE BOX
INTERVAL HPI/OVERNIGHT EVENTS: *** Patient was seen and examined at bed side.     PRESSORS: [ ] YES [ ] NO  WHICH:    ANTIBIOTICS:                  DATE STARTED:  ANTIBIOTICS:                  DATE STARTED:  ANTIBIOTICS:                  DATE STARTED:    Antimicrobial:  cefepime   IVPB 500 milliGRAM(s) IV Intermittent every 24 hours  linezolid  IVPB 600 milliGRAM(s) IV Intermittent every 12 hours    Cardiovascular:  aMIOdarone    Tablet 200 milliGRAM(s) Oral daily  metoprolol tartrate 25 milliGRAM(s) Oral every 8 hours  midodrine. 10 milliGRAM(s) Oral <User Schedule> PRN    Pulmonary:  ALBUTerol    90 MICROgram(s) HFA Inhaler 2 Puff(s) Inhalation every 6 hours PRN    Hematalogic:  apixaban 2.5 milliGRAM(s) Oral every 12 hours    Other:  ascorbic acid 500 milliGRAM(s) Oral daily  atorvastatin 40 milliGRAM(s) Oral at bedtime  bisacodyl Suppository 10 milliGRAM(s) Rectal daily PRN  epoetin maria d-epbx (RETACRIT) Injectable 4000 Unit(s) IV Push <User Schedule>  insulin glargine Injectable (LANTUS) 5 Unit(s) SubCutaneous at bedtime  insulin lispro (ADMELOG) corrective regimen sliding scale   SubCutaneous Before meals and at bedtime    ALBUTerol    90 MICROgram(s) HFA Inhaler 2 Puff(s) Inhalation every 6 hours PRN  aMIOdarone    Tablet 200 milliGRAM(s) Oral daily  apixaban 2.5 milliGRAM(s) Oral every 12 hours  ascorbic acid 500 milliGRAM(s) Oral daily  atorvastatin 40 milliGRAM(s) Oral at bedtime  bisacodyl Suppository 10 milliGRAM(s) Rectal daily PRN  cefepime   IVPB 500 milliGRAM(s) IV Intermittent every 24 hours  epoetin maria d-epbx (RETACRIT) Injectable 4000 Unit(s) IV Push <User Schedule>  insulin glargine Injectable (LANTUS) 5 Unit(s) SubCutaneous at bedtime  insulin lispro (ADMELOG) corrective regimen sliding scale   SubCutaneous Before meals and at bedtime  linezolid  IVPB 600 milliGRAM(s) IV Intermittent every 12 hours  metoprolol tartrate 25 milliGRAM(s) Oral every 8 hours  midodrine. 10 milliGRAM(s) Oral <User Schedule> PRN    Drug Dosing Weight  Height (cm): 157.4 (06 May 2021 17:00)  Weight (kg): 81.556 (22 Jul 2021 02:46)  BMI (kg/m2): 32.9 (22 Jul 2021 02:46)  BSA (m2): 1.83 (22 Jul 2021 02:46)    CENTRAL LINE: [ ] YES [ ] NO  LOCATION:   DATE INSERTED:  REMOVE: [ ] YES [ ] NO  EXPLAIN:    STUBBS: [ ] YES [ ] NO    DATE INSERTED:  REMOVE:  [ ] YES [ ] NO  EXPLAIN:    A-LINE:  [ ] YES [ ] NO  LOCATION:   DATE INSERTED:  REMOVE:  [ ] YES [ ] NO  EXPLAIN:        ICU Vital Signs Last 24 Hrs  T(C): 36.6 (22 Jul 2021 15:45), Max: 36.8 (22 Jul 2021 00:48)  T(F): 97.8 (22 Jul 2021 15:45), Max: 98.3 (22 Jul 2021 00:48)  HR: 63 (22 Jul 2021 15:45) (59 - 76)  BP: 136/43 (22 Jul 2021 15:45) (136/43 - 203/72)  BP(mean): --  ABP: --  ABP(mean): --  RR: 20 (22 Jul 2021 15:45) (14 - 29)  SpO2: 99% (22 Jul 2021 15:45) (99% - 100%)      ABG - ( 22 Jul 2021 08:30 )  pH, Arterial: 7.24  pH, Blood: x     /  pCO2: 54    /  pO2: 474   / HCO3: 23    / Base Excess: -4.9  /  SaO2: 99                          PHYSICAL EXAM:    GENERAL:NAD, well-groomed, well-developed  HEAD:  Atraumatic, Normocephalic  EYES: EOMI, PERRLA, conjunctiva and sclera clear  ENMT: No tonsillar erythema, exudates, or enlargement; Moist mucous membranes, Good dentition, No lesions  NECK: Supple, normal appearance, No JVD; Normal thyroid; Trachea midline  NERVOUS SYSTEM: Alert & Oriented X3, Good concentration; Motor Strength 5/5 B/L upper and lower extremities; DTRs 2+ intact and symmetric  CHEST/LUNG: No chest deformity;Normal percussion bilaterally; No rales, rhonchi, wheezing   HEART: Regular rate and rhythm; No murmurs, rubs, or gallops  ABDOMEN: Soft, Nontender, Nondistended; Bowel sounds present  EXTREMITIES: 2+ Peripheral Pulses, No clubbing, cyanosis, or edema  LYMPH: No lymphadenopathy noted  SKIN:No rashes or lesions; Good capillary refill      LABS:  CBC Full  -  ( 22 Jul 2021 00:43 )  WBC Count : 13.31 K/uL  RBC Count : 3.51 M/uL  Hemoglobin : 10.3 g/dL  Hematocrit : 32.8 %  Platelet Count - Automated : 185 K/uL  Mean Cell Volume : 93.4 fl  Mean Cell Hemoglobin : 29.3 pg  Mean Cell Hemoglobin Concentration : 31.4 gm/dL  Auto Neutrophil # : 11.01 K/uL  Auto Lymphocyte # : 0.69 K/uL  Auto Monocyte # : 1.44 K/uL  Auto Eosinophil # : 0.06 K/uL  Auto Basophil # : 0.05 K/uL  Auto Neutrophil % : 82.6 %  Auto Lymphocyte % : 5.2 %  Auto Monocyte % : 10.8 %  Auto Eosinophil % : 0.5 %  Auto Basophil % : 0.4 %    07-22    135  |  99  |  41<H>  ----------------------------<  296<H>  5.0   |  23  |  4.91<H>    Ca    9.0      22 Jul 2021 00:43  Phos  4.3     07-22  Mg     2.5     07-22    TPro  7.5  /  Alb  3.6  /  TBili  0.4  /  DBili  x   /  AST  18  /  ALT  22  /  AlkPhos  168<H>  07-22    PT/INR - ( 22 Jul 2021 00:43 )   PT: 15.4 sec;   INR: 1.31 ratio         PTT - ( 22 Jul 2021 00:43 )  PTT:31.4 sec        RADIOLOGY & ADDITIONAL STUDIES REVIEWED:  ***    GOALS OF CARE DISCUSSION WITH PATIENT/FAMILY/PROXY: full code/DNR/DNI    CRITICAL CARE TIME SPENT: 35 minutes Patient is a 89y old  Female who presents with a chief complaint of SOB (22 Jul 2021 17:11)    HPI:  89 F, wheel chair bound, from home with PMH of Afib on Eliquis, ESRD on HD(Monday, Tuesday, Thursday, Saturday), DM on insulin, HTN, Home O2 3L presented with shortness of breath, vomiting x1 day. Pt was drinking tea from home when she had a vomiting episode. Her daughter at bedside describes "all this fluid came out of her mouth." As per daughter, pt has been drinking a lot of fluid over the past day. No new medications, no hospitalizations or ER visits in the interim as per daughter. No fever, no chest pain, no palpitations, no diarrhea, no dysuria. Pt is oliguric. Pt had COVID in March 2021, and as such daughter reports she did not yet get vaccinated. (22 Jul 2021 03:15)    Interval History: History as above. Patient was placed on BiPAP overnight and had HD session this morning. Patient was placed on 3L O2 and saturating well but was tachypneic still and having increased work of breathing. Patient only had about 1.274L fluid removed during HD despite being given midodrine. Patient was also given 80mg IV lasix in ED. Patient noted to be enterovirus positive. COVID negative. Lactate normal. Troponin negative x1. Patient denies any other concerns or complaints at this time. Of note, patient has been admitted to [facility] and was in ICU earlier this year due to fluid overload and also required pressor support to maintain blood pressure during HD session.     Allergies    No Known Allergies    MEDICATIONS  (STANDING):  aMIOdarone    Tablet 200 milliGRAM(s) Oral daily  apixaban 2.5 milliGRAM(s) Oral every 12 hours  ascorbic acid 500 milliGRAM(s) Oral daily  atorvastatin 40 milliGRAM(s) Oral at bedtime  cefepime   IVPB 500 milliGRAM(s) IV Intermittent every 24 hours  epoetin maria d-epbx (RETACRIT) Injectable 4000 Unit(s) IV Push <User Schedule>  insulin glargine Injectable (LANTUS) 5 Unit(s) SubCutaneous at bedtime  insulin lispro (ADMELOG) corrective regimen sliding scale   SubCutaneous Before meals and at bedtime  linezolid  IVPB 600 milliGRAM(s) IV Intermittent every 12 hours  metoprolol tartrate 25 milliGRAM(s) Oral every 8 hours    MEDICATIONS  (PRN):  ALBUTerol    90 MICROgram(s) HFA Inhaler 2 Puff(s) Inhalation every 6 hours PRN Shortness of Breath and/or Wheezing  bisacodyl Suppository 10 milliGRAM(s) Rectal daily PRN Constipation  midodrine. 10 milliGRAM(s) Oral <User Schedule> PRN DIALYSIS    Drug Dosing Weight  Height (cm): 157.4 (06 May 2021 17:00)  Weight (kg): 81.556 (22 Jul 2021 02:46)  BMI (kg/m2): 32.9 (22 Jul 2021 02:46)  BSA (m2): 1.83 (22 Jul 2021 02:46)    PAST MEDICAL & SURGICAL HISTORY:  HTN (hypertension)    HLD (hyperlipidemia)    CKD (chronic kidney disease)    Afib    DM (diabetes mellitus)    Artificial pacemaker      FAMILY HISTORY: No pertinent past medical history    SOCIAL HISTORY: Denies smoking, alcohol intake and illicit drug use    ADVANCE DIRECTIVES: Full Code    REVIEW OF SYSTEMS:  CONSTITUTIONAL: No fever  EYES: No visual disturbances   ENMT:  No difficulty hearing   NECK: No pain   RESPIRATORY: Cough; no wheezing; shortness of breath  CARDIOVASCULAR: No chest pain   GASTROINTESTINAL: No abdominal pain. No nausea or vomiting; No diarrhea or constipation   GENITOURINARY: No dysuria  NEUROLOGICAL: Headache   SKIN: No itching   MUSCULOSKELETAL: No joint pain or swelling; No muscle, back, or extremity pain  PSYCHIATRIC: No depression or anxiety   HEME/LYMPH: No easy bruising, or bleeding gums  ALLERGY AND IMMUNOLOGIC: No hives or eczema      ICU Vital Signs Last 24 Hrs  T(C): 36.6 (22 Jul 2021 15:45), Max: 36.8 (22 Jul 2021 00:48)  T(F): 97.8 (22 Jul 2021 15:45), Max: 98.3 (22 Jul 2021 00:48)  HR: 63 (22 Jul 2021 15:45) (59 - 76)  BP: 136/43 (22 Jul 2021 15:45) (136/43 - 203/72)  RR: 20 (22 Jul 2021 15:45) (14 - 29)  SpO2: 99% (22 Jul 2021 15:45) (99% - 100%)      ABG - ( 22 Jul 2021 08:30 )  pH, Arterial: 7.24  pH, Blood: x     /  pCO2: 54    /  pO2: 474   / HCO3: 23    / Base Excess: -4.9  /  SaO2: 99          PHYSICAL EXAM:  GENERAL: Patient seen resting in bed and in moderate respiratory distress on nasal cannula; obese female  HEAD: Atraumatic, Normocephalic  EYES: PERRLA, conjunctiva and sclera clear  ENMT: Moist mucous membranes, no lesions  NECK: Supple  NERVOUS SYSTEM: Alert & Oriented X3, Good concentration  CHEST/LUNG: Crackles present to percussion   HEART: Regular rate and rhythm; No murmurs  ABDOMEN: Soft, Nontender, Nondistended; Bowel sounds present  EXTREMITIES: No clubbing, cyanosis, or edema  LYMPH: No lymphadenopathy noted  SKIN: No rashes or lesions    LABS:  CBC Full  -  ( 22 Jul 2021 00:43 )  WBC Count : 13.31 K/uL  RBC Count : 3.51 M/uL  Hemoglobin : 10.3 g/dL  Hematocrit : 32.8 %  Platelet Count - Automated : 185 K/uL  Mean Cell Volume : 93.4 fl  Mean Cell Hemoglobin : 29.3 pg  Mean Cell Hemoglobin Concentration : 31.4 gm/dL  Auto Neutrophil # : 11.01 K/uL  Auto Lymphocyte # : 0.69 K/uL  Auto Monocyte # : 1.44 K/uL  Auto Eosinophil # : 0.06 K/uL  Auto Basophil # : 0.05 K/uL  Auto Neutrophil % : 82.6 %  Auto Lymphocyte % : 5.2 %  Auto Monocyte % : 10.8 %  Auto Eosinophil % : 0.5 %  Auto Basophil % : 0.4 %    07-22    135  |  99  |  41<H>  ----------------------------<  296<H>  5.0   |  23  |  4.91<H>    Ca    9.0      22 Jul 2021 00:43  Phos  4.3     07-22  Mg     2.5     07-22    TPro  7.5  /  Alb  3.6  /  TBili  0.4  /  DBili  x   /  AST  18  /  ALT  22  /  AlkPhos  168<H>  07-22    CAPILLARY BLOOD GLUCOSE  398 (22 Jul 2021 08:05)      POCT Blood Glucose.: 219 mg/dL (22 Jul 2021 16:47)    PT/INR - ( 22 Jul 2021 00:43 )   PT: 15.4 sec;   INR: 1.31 ratio         PTT - ( 22 Jul 2021 00:43 )  PTT:31.4 sec    CARDIAC MARKERS ( 22 Jul 2021 00:43 )  <0.015 ng/mL / x     / x     / x     / x          RADIOLOGY: < from: Xray Chest 1 View- PORTABLE-Urgent (Xray Chest 1 View- PORTABLE-Urgent .) (07.22.21 @ 01:01) >  IMPRESSION:  Mild congestive changes.    < end of copied text >     Patient is a 89y old  Female who presents with a chief complaint of SOB (22 Jul 2021 17:11)    HPI:  89 F, wheel chair bound, from home with PMH of Afib on Eliquis, ESRD on HD(Monday, Tuesday, Thursday, Saturday), DM on insulin, HTN, Home O2 3L presented with shortness of breath, vomiting x1 day. Pt was drinking tea from home when she had a vomiting episode. Her daughter at bedside describes "all this fluid came out of her mouth." As per daughter, pt has been drinking a lot of fluid over the past day. No new medications, no hospitalizations or ER visits in the interim as per daughter. No fever, no chest pain, no palpitations, no diarrhea, no dysuria. Pt is oliguric. Pt had COVID in March 2021, and as such daughter reports she did not yet get vaccinated. (22 Jul 2021 03:15)    Interval History: History as above. Patient was placed on BiPAP overnight and had HD session this morning. Patient was placed on 3L O2 and saturating well but was tachypneic still and having increased work of breathing. Patient only had about 1.274L fluid removed during HD despite being given midodrine. Patient was also given 80mg IV lasix in ED. Patient noted to be enterovirus positive. COVID negative. Lactate normal. Troponin negative x1. Patient denies any other concerns or complaints at this time. Of note, patient has been admitted to [facility] and was in ICU earlier this year due to fluid overload and also required pressor support to maintain blood pressure during HD session.     Allergies    No Known Allergies    MEDICATIONS  (STANDING):  aMIOdarone    Tablet 200 milliGRAM(s) Oral daily  apixaban 2.5 milliGRAM(s) Oral every 12 hours  ascorbic acid 500 milliGRAM(s) Oral daily  atorvastatin 40 milliGRAM(s) Oral at bedtime  cefepime   IVPB 500 milliGRAM(s) IV Intermittent every 24 hours  epoetin maria d-epbx (RETACRIT) Injectable 4000 Unit(s) IV Push <User Schedule>  insulin glargine Injectable (LANTUS) 5 Unit(s) SubCutaneous at bedtime  insulin lispro (ADMELOG) corrective regimen sliding scale   SubCutaneous Before meals and at bedtime  linezolid  IVPB 600 milliGRAM(s) IV Intermittent every 12 hours  metoprolol tartrate 25 milliGRAM(s) Oral every 8 hours    MEDICATIONS  (PRN):  ALBUTerol    90 MICROgram(s) HFA Inhaler 2 Puff(s) Inhalation every 6 hours PRN Shortness of Breath and/or Wheezing  bisacodyl Suppository 10 milliGRAM(s) Rectal daily PRN Constipation  midodrine. 10 milliGRAM(s) Oral <User Schedule> PRN DIALYSIS    Drug Dosing Weight  Height (cm): 157.4 (06 May 2021 17:00)  Weight (kg): 81.556 (22 Jul 2021 02:46)  BMI (kg/m2): 32.9 (22 Jul 2021 02:46)  BSA (m2): 1.83 (22 Jul 2021 02:46)    PAST MEDICAL & SURGICAL HISTORY:  HTN (hypertension)    HLD (hyperlipidemia)    CKD (chronic kidney disease)    Afib    DM (diabetes mellitus)    Artificial pacemaker      FAMILY HISTORY: No pertinent past medical history    SOCIAL HISTORY: Denies smoking, alcohol intake and illicit drug use    ADVANCE DIRECTIVES: Full Code    REVIEW OF SYSTEMS:  CONSTITUTIONAL: No fever  EYES: No visual disturbances   ENMT:  No difficulty hearing   NECK: No pain   RESPIRATORY: Cough; no wheezing; shortness of breath  CARDIOVASCULAR: No chest pain   GASTROINTESTINAL: No abdominal pain. No nausea or vomiting; No diarrhea or constipation   GENITOURINARY: No dysuria  NEUROLOGICAL: Headache   SKIN: No itching   MUSCULOSKELETAL: No joint pain or swelling; No muscle, back, or extremity pain  PSYCHIATRIC: No depression or anxiety   HEME/LYMPH: No easy bruising, or bleeding gums  ALLERGY AND IMMUNOLOGIC: No hives or eczema      ICU Vital Signs Last 24 Hrs  T(C): 36.6 (22 Jul 2021 15:45), Max: 36.8 (22 Jul 2021 00:48)  T(F): 97.8 (22 Jul 2021 15:45), Max: 98.3 (22 Jul 2021 00:48)  HR: 63 (22 Jul 2021 15:45) (59 - 76)  BP: 136/43 (22 Jul 2021 15:45) (136/43 - 203/72)  RR: 20 (22 Jul 2021 15:45) (14 - 29)  SpO2: 99% (22 Jul 2021 15:45) (99% - 100%)      ABG - ( 22 Jul 2021 08:30 )  pH, Arterial: 7.24  pH, Blood: x     /  pCO2: 54    /  pO2: 474   / HCO3: 23    / Base Excess: -4.9  /  SaO2: 99          PHYSICAL EXAM:  GENERAL: Patient seen resting in bed and in moderate respiratory distress on nasal cannula; obese female  HEAD: Atraumatic, Normocephalic  EYES: PERRLA, conjunctiva and sclera clear  ENMT: Moist mucous membranes, no lesions  NECK: Supple  NERVOUS SYSTEM: Alert & Oriented X3, Good concentration  CHEST/LUNG: Crackles present to percussion; right sided permacath noted  HEART: Regular rate and rhythm; No murmurs  ABDOMEN: Soft, Nontender, Nondistended; Bowel sounds present  EXTREMITIES: No clubbing, cyanosis, or edema  LYMPH: No lymphadenopathy noted  SKIN: No rashes or lesions    LABS:  CBC Full  -  ( 22 Jul 2021 00:43 )  WBC Count : 13.31 K/uL  RBC Count : 3.51 M/uL  Hemoglobin : 10.3 g/dL  Hematocrit : 32.8 %  Platelet Count - Automated : 185 K/uL  Mean Cell Volume : 93.4 fl  Mean Cell Hemoglobin : 29.3 pg  Mean Cell Hemoglobin Concentration : 31.4 gm/dL  Auto Neutrophil # : 11.01 K/uL  Auto Lymphocyte # : 0.69 K/uL  Auto Monocyte # : 1.44 K/uL  Auto Eosinophil # : 0.06 K/uL  Auto Basophil # : 0.05 K/uL  Auto Neutrophil % : 82.6 %  Auto Lymphocyte % : 5.2 %  Auto Monocyte % : 10.8 %  Auto Eosinophil % : 0.5 %  Auto Basophil % : 0.4 %    07-22    135  |  99  |  41<H>  ----------------------------<  296<H>  5.0   |  23  |  4.91<H>    Ca    9.0      22 Jul 2021 00:43  Phos  4.3     07-22  Mg     2.5     07-22    TPro  7.5  /  Alb  3.6  /  TBili  0.4  /  DBili  x   /  AST  18  /  ALT  22  /  AlkPhos  168<H>  07-22    CAPILLARY BLOOD GLUCOSE  398 (22 Jul 2021 08:05)      POCT Blood Glucose.: 219 mg/dL (22 Jul 2021 16:47)    PT/INR - ( 22 Jul 2021 00:43 )   PT: 15.4 sec;   INR: 1.31 ratio         PTT - ( 22 Jul 2021 00:43 )  PTT:31.4 sec    CARDIAC MARKERS ( 22 Jul 2021 00:43 )  <0.015 ng/mL     RADIOLOGY: < from: Xray Chest 1 View- PORTABLE-Urgent (Xray Chest 1 View- PORTABLE-Urgent .) (07.22.21 @ 01:01) >  IMPRESSION:  Mild congestive changes.    < end of copied text >

## 2021-07-22 NOTE — H&P ADULT - ASSESSMENT
89 F, wheel chair bound, from home with PMH of Afib on Eliquis, ESRD on HD(Monday, Tuesday, Thursday, Saturday), DM on insulin, HTN, Home O2 3L presented with shortness of breath, vomiting x1 day admitted for SOB due to fluid overload vs pna

## 2021-07-22 NOTE — H&P ADULT - NSHPPHYSICALEXAM_GEN_ALL_CORE
General - Moderate distress, sitting up in bed with BIPAP  Eyes - PERRLA, EOM intact  ENT - Nonicteric sclerae, PERRLA, EOMI. Oropharynx clear. Moist mucous membranes. Conjunctivae appear well perfused.   Neck - No noticeable or palpable swelling, redness or rash around throat or on face  Lymph Nodes - No lymphadenopathy  Cardiovascular - RRR no m/r/g, no JVD, no carotid bruits  Lungs - (+) b/l diffuse ronchi,   Skin - No rashes, skin warm and dry, no erythematous areas (+) R permacath in R anterior chest  Abdomen - Normal bowel sounds, abdomen soft and nontender, (+) obese abdomen  Genito Urinary – Genital exam not performed since complaints not related.  Rectal – Rectal exam not performed since no symptoms indicated blood loss.  Extremeties - (+) b/l LE nonpitting edema  Musculo Skeletal - 5/5 strength, normal range of motion, no swollen or erythematous joints.  Neurological – Alert and oriented x 3, CN 2-12 grossly intact.

## 2021-07-22 NOTE — ED PROVIDER NOTE - CHILD ABUSE FACILITY
Left message to return call.        MD Gema Quiroga RN             Please see below and be sure that Patient knows, Dr. Carson agrees a new medication would be better than a higher dose and will see her sooner.    Previous Messages    ----- Message -----   From: Gisele Carson MD   Sent: 12/22/2020  11:42 AM CST   To: Brii Jiménez MD     Hi, Sorry, I saw this just today.   I agree,   I can see her earlier and offer her other options as otezla is not working.   Thanks you   Gisele Carson MD   ----- Message -----   From: Brii Jiménez MD   Sent: 12/10/2020   1:07 PM CST   To: Gisele Carson MD     I have Sheree on Otezla 30 mg only once daily based on her creatinine clearance.  She called me today asking if she could increase her dose in order to treat joint pain.  I wanted to reach out to you to see if you are thinking that she could also have psoriatic arthritis? She tells me she is in so much pain today that she cannot button her own clothing.  It appears that the dosing is exactly the same for either diagnosis for Otezla.  Is there a biologic or other treatment you would be interested in trying for her joints?  We already tried her on Cosyntex and she did not tolerate it due to severe throat/swallowing pain.  It is very likely that most of your joint treatments would also manage her skin.              H

## 2021-07-22 NOTE — PROGRESS NOTE ADULT - PROBLEM SELECTOR PLAN 3
Possible CHF given elevated pro BNP and pulmonary edema  -elevation in proBNP, fluid overload likely due to CHF vs ESRD  -Echo with EF>55%  -seen by dr. Arteaga (card) last admission 5/2021 Possible CHF given elevated pro BNP and pulmonary edema  -proBNP 4225 ( 11K in May 2021)  -fluid overload likely due to CHF vs ESRD  -Echo with EF>55% in May 2021  -seen by dr. Arteaga (card) last admission

## 2021-07-22 NOTE — ED ADULT NURSE NOTE - CHPI ED NUR SYMPTOMS POS
CHEST CONGESTION/SHORTNESS OF BREATH with bilateral pedal edema/CHEST CONGESTION/SHORTNESS OF BREATH

## 2021-07-22 NOTE — CONSULT NOTE ADULT - SUBJECTIVE AND OBJECTIVE BOX
[facility] NEPHROLOGY- CONSULTATION NOTE    Patient is a 89 yo Female with ESRD on HD (MTTS @ [facility]; Nephrologist Dr. Goldberg), Afib on Eliquis, HTN, Intradialytic hypotension req Midodrine prior to HD, failed Rt AVF due to steal syndrome s/p ligation, h/o COVID s/p PPM p/w SOB x 1 day. Pt a/w Entero/ Rhino virus, acute respiratory distress requiring BIPAP with concerns for fluid overload. Nephrology consulted for ESRD status.     Unable to obtain history from patient; pt on BIPAP.   Last HD 7/20 @ [facility]. Spoke with pt's daughter Cece [phone]. Pt with increased fluid intake with coughing and excessive mucus production.       PAST MEDICAL & SURGICAL HISTORY:  HTN (hypertension)    HLD (hyperlipidemia)    CKD (chronic kidney disease)    Afib    DM (diabetes mellitus)    Artificial pacemaker      No Known Allergies    Home Medications Reviewed  Hospital Medications:   MEDICATIONS  (STANDING):Reviewed       REVIEW OF SYSTEMS: Unable to obtain; pt on BiPAP    VITALS:  T(F): 97.6 (07-22-21 @ 10:57), Max: 98.3 (07-22-21 @ 00:48)  HR: 66 (07-22-21 @ 11:00)  BP: 154/60 (07-22-21 @ 10:57)  RR: 21 (07-22-21 @ 10:57)  SpO2: 100% (07-22-21 @ 11:00)  Wt(kg): --      Weight (kg): 81.556 (07-22 @ 02:46)      PHYSICAL EXAM:  Gen: NAD  HEENT: anicteric; on BIPAP  Neck: no JVD  Cards: RRR, +S1/S2,   Resp: decreased BS at bases  GI: soft, NT/ND, NABS  Extremities: +trace LE edema B/L  Derm: no rashes  Neuro: no gross deficits  Access: Rt IJ tunneled HD catheter- benign    LABS:  07-22    135  |  99  |  41<H>  ----------------------------<  296<H>  5.0   |  23  |  4.91<H>    Ca    9.0      22 Jul 2021 00:43  Phos  4.3     07-22  Mg     2.5     07-22    TPro  7.5  /  Alb  3.6  /  TBili  0.4  /  DBili      /  AST  18  /  ALT  22  /  AlkPhos  168<H>  07-22    Creatinine Trend: 4.91 <--                        10.3   13.31 )-----------( 185      ( 22 Jul 2021 00:43 )             32.8     Urine Studies:         [facility] NEPHROLOGY- CONSULTATION NOTE    Patient is a 89 yo Female with ESRD on HD (MTTS @ [facility]; Nephrologist Dr. Goldberg), Afib on Eliquis, HTN, Intradialytic hypotension req Midodrine prior to HD, failed Rt AVF due to steal syndrome s/p ligation, h/o COVID s/p PPM p/w SOB x 1 day. Pt a/w Entero/ Rhino virus, acute respiratory distress requiring BIPAP with concerns for fluid overload. Nephrology consulted for ESRD status.     Unable to obtain history from patient; pt on BIPAP.   Last HD 7/20 @ [facility]. Spoke with pt's daughter Cece [phone]. Pt with increased fluid intake with coughing and excessive mucus production.       PAST MEDICAL & SURGICAL HISTORY:  HTN (hypertension)    HLD (hyperlipidemia)    CKD (chronic kidney disease)    Afib    DM (diabetes mellitus)    Artificial pacemaker      No Known Allergies    Home Medications Reviewed  Hospital Medications:   MEDICATIONS  (STANDING):Reviewed       REVIEW OF SYSTEMS: Unable to obtain; pt on BiPAP    VITALS:  T(F): 97.6 (07-22-21 @ 10:57), Max: 98.3 (07-22-21 @ 00:48)  HR: 66 (07-22-21 @ 11:00)  BP: 154/60 (07-22-21 @ 10:57)  RR: 21 (07-22-21 @ 10:57)  SpO2: 100% (07-22-21 @ 11:00)  Wt(kg): --      Weight (kg): 81.556 (07-22 @ 02:46)      PHYSICAL EXAM:  Gen: NAD  HEENT: anicteric; on BIPAP  Neck: no JVD  Cards: RRR, +S1/S2,   Resp: decreased BS at bases  GI: soft, NT/ND, NABS  Extremities: +trace LE edema B/L  Derm: no rashes  Neuro: no gross deficits  Access: Rt IJ tunneled HD catheter- benign    LABS:  07-22    135  |  99  |  41<H>  ----------------------------<  296<H>  5.0   |  23  |  4.91<H>    Ca    9.0      22 Jul 2021 00:43  Phos  4.3     07-22  Mg     2.5     07-22    TPro  7.5  /  Alb  3.6  /  TBili  0.4  /  DBili      /  AST  18  /  ALT  22  /  AlkPhos  168<H>  07-22    Creatinine Trend: 4.91 <--                        10.3   13.31 )-----------( 185      ( 22 Jul 2021 00:43 )             32.8     Urine Studies:    < from: Xray Chest 1 View- PORTABLE-Urgent (Xray Chest 1 View- PORTABLE-Urgent .) (07.22.21 @ 01:01) >    EXAM:  XR CHEST PORTABLE URGENT 1V                            PROCEDURE DATE:  07/22/2021          INTERPRETATION:  Chest one view    HISTORY: Shortness of breath    COMPARISON STUDY: 5/4/2021    Frontal expiratory view of the chest shows the heartto be similar in size. Micra pacemaker and right dialysis catheter are again noted.    The lungs show mild basilar congestion and there is no evidence of pneumothorax nor pleural effusion.    IMPRESSION:  Mild congestive changes.    Thank you for thecourtesy of this referral.    --- End of Report ---    < end of copied text >

## 2021-07-22 NOTE — CONSULT NOTE ADULT - SUBJECTIVE AND OBJECTIVE BOX
HPI/course of hospital    Patient is a 89 year of female with PMH of Afib on Eliquis, ESRD on HD (Monday, Tuesday, Thursday, Saturday), DM on insulin, HTN, presented with SOB. Per daughter, patient was having fluid accumulation in her lungs, increased SOB and "mucus" built up in throat. Pt was having difficulty talking due to mucus (white-yellow sputum) and SOB at home. Pt is using 3L of O2 NC at all time and mostly bedbound at home. Pt had multiple hospitalizations over the last year (recent ICU discharge from 5/18/21).  Pt is placed on CPAP since admission and received cefepime and vanco to cover MRSA.     At the time of ID consult, patient was dialyzed this afternoon and placed on CPAP. Pt reports feeling tired and continues to have SOB with intermittent cough. Pt denies fever, chills, cardiac, GI or  complaints.       PAST MEDICAL & SURGICAL HISTORY:  HTN (hypertension)  HLD (hyperlipidemia)  CKD (chronic kidney disease)  Afib  DM (diabetes mellitus)  Artificial pacemaker      MEDS:  ALBUTerol    90 MICROgram(s) HFA Inhaler 2 Puff(s) Inhalation every 6 hours PRN  aMIOdarone    Tablet 200 milliGRAM(s) Oral daily  apixaban 2.5 milliGRAM(s) Oral every 12 hours  ascorbic acid 500 milliGRAM(s) Oral daily  atorvastatin 40 milliGRAM(s) Oral at bedtime  bisacodyl Suppository 10 milliGRAM(s) Rectal daily PRN  cefepime   IVPB 500 milliGRAM(s) IV Intermittent every 24 hours (D1)  epoetin maria d-epbx (RETACRIT) Injectable 4000 Unit(s) IV Push <User Schedule>  insulin glargine Injectable (LANTUS) 5 Unit(s) SubCutaneous at bedtime  insulin lispro (ADMELOG) corrective regimen sliding scale   SubCutaneous Before meals and at bedtime  linezolid  IVPB 600 milliGRAM(s) IV Intermittent every 12 hours (D1)  metoprolol tartrate 25 milliGRAM(s) Oral every 8 hours  midodrine. 10 milliGRAM(s) Oral <User Schedule> PRN      ALLERGIES  No Known Allergies      SOCIAL HISTORY: Emigrated from [place] at the age of 8. Denies history of smoking cigarette or use of illicit drugs.   Pt was drinking 1/2 bottles of wine per day "long ago"     FAMILY HISTORY: Not pertinent to patient's clinical presentation      ROS:    General: no fever or chills 	    Skin: right small buttock breakdown per daughter  	  HEENT: no complaints    Respiratory and Thorax: reports cough and SOB  	  Cardiovascular: no CP    Gastrointestinal:	no N, V, diarrhea    Genitourinary: no urinary tract complaints    Musculoskeletal:	 mostly bedbound at home    Neurological: no complaints	    Endocrine: DM	    PHYSICAL EXAM:    Vital Signs Last 24 Hrs  T(C): 36.6 (22 Jul 2021 15:45), Max: 36.8 (22 Jul 2021 00:48)  T(F): 97.8 (22 Jul 2021 15:45), Max: 98.3 (22 Jul 2021 00:48)  HR: 63 (22 Jul 2021 15:45) (59 - 76)  BP: 136/43 (22 Jul 2021 15:45) (136/43 - 203/72)  BP(mean): --  RR: 20 (22 Jul 2021 15:45) (14 - 29)  SpO2: 99% (22 Jul 2021 15:45) (99% - 100%)      Gen: WD obese  female in NAD     HEENT: NC/AT, conj. clear; on CPAP    Neck: supple    Chest/Thorax: rhonchi throughout on auscultation; upper right chest shiley catheter present     Cardiovascular: distanced heart sounds    Gastrointestinal: soft, obese, BS present; umbilical hernia; striae present    Genitourinary: no meza     Extremities: 1+ edema on bilateral lower extremities; stasis changes     Neurological: A+O x3, Cr n grossly intact; toes downgoing; reflexes intact    Skin: right small buttock breakdown     Psychiatric: affect appropriate    LABS/DIAGNOSTIC TESTS:                          10.3   13.31 )-----------( 185      ( 22 Jul 2021 00:43 )             32.8     WBC Count: 13.31 K/uL (07-22 @ 00:43)      07-22    135  |  99  |  41<H>  ----------------------------<  296<H>  5.0   |  23  |  4.91<H>    Ca    9.0      22 Jul 2021 00:43  Phos  4.3     07-22  Mg     2.5     07-22    TPro  7.5  /  Alb  3.6  /  TBili  0.4  /  DBili  x   /  AST  18  /  ALT  22  /  AlkPhos  168<H>  07-22          LIVER FUNCTIONS - ( 22 Jul 2021 00:43 )  Alb: 3.6 g/dL / Pro: 7.5 g/dL / ALK PHOS: 168 U/L / ALT: 22 U/L DA / AST: 18 U/L / GGT: x             PT/INR - ( 22 Jul 2021 00:43 )   PT: 15.4 sec;   INR: 1.31 ratio         PTT - ( 22 Jul 2021 00:43 )  PTT:31.4 sec    LACTATE:Lactate, Blood: 1.0 mmol/L (07-22 @ 01:32)    Respiratory Viral Panel with COVID-19 by NATE (07.22.21 @ 01:54)    Rapid RVP Result: Detected    SARS-CoV-2: NotDetec: This Respiratory Panel uses polymerase chain reaction (PCR) to detect for  adenovirus; coronavirus (HKU1, NL63, 229E, OC43); human metapneumovirus  (hMPV); human enterovirus/rhinovirus (Entero/RV); influenza A; influenza  A/H1; influenza A/H3; influenza A/H1-2009; influenza B; parainfluenza  viruses 1, 2, 3, 4; respiratory syncytial virus; Mycoplasma pneumoniae;  Chlamydophila pneumoniae; and SARS-CoV-2.    Entero/Rhinovirus (RapRVP): Detected        ABG - ABG - ( 22 Jul 2021 08:30 )  pH, Arterial: 7.24  pH, Blood: x     /  pCO2: 54    /  pO2: 474   / HCO3: 23    / Base Excess: -4.9  /  SaO2: 99          RADIOLOGY    < from: Xray Chest 1 View- PORTABLE-Urgent (Xray Chest 1 View- PORTABLE-Urgent .) (07.22.21 @ 01:01) >  EXAM:  XR CHEST PORTABLE URGENT 1V                            PROCEDURE DATE:  07/22/2021          INTERPRETATION:  Chest one view    HISTORY: Shortness of breath    COMPARISON STUDY: 5/4/2021    Frontal expiratory view of the chest shows the heartto be similar in size. Micra pacemaker and right dialysis catheter are again noted.    The lungs show mild basilar congestion and there is no evidence of pneumothorax nor pleural effusion.    IMPRESSION:  Mild congestive changes.    < end of copied text >                 HPI/course of hospital    Patient is a 89 year of female with PMH of Afib on Eliquis, ESRD on HD (Monday, Tuesday, Thursday, Saturday), DM on insulin, HTN, presented with SOB. Per daughter, patient was having fluid accumulation in her lungs, increased SOB and "mucus" built up in throat. Pt was having difficulty talking due to mucus (white-yellow sputum) and SOB at home. Pt is using 3L of O2 NC at all time and mostly bedbound at home. Pt had multiple hospitalizations over the last year (recent ICU discharge from 5/18/21).  Pt is placed on CPAP since admission and received cefepime and vanco to cover MRSA.     At the time of ID consult, patient was dialyzed this afternoon and is on CPAP. Pt reports feeling tired and continues to have SOB with intermittent cough. Pt denies fever, chills, cardiac, GI or  complaints.       PAST MEDICAL & SURGICAL HISTORY:  HTN (hypertension)  HLD (hyperlipidemia)  CKD (chronic kidney disease)  Afib  DM (diabetes mellitus)  Artificial pacemaker      MEDS:  ALBUTerol    90 MICROgram(s) HFA Inhaler 2 Puff(s) Inhalation every 6 hours PRN  aMIOdarone    Tablet 200 milliGRAM(s) Oral daily  apixaban 2.5 milliGRAM(s) Oral every 12 hours  ascorbic acid 500 milliGRAM(s) Oral daily  atorvastatin 40 milliGRAM(s) Oral at bedtime  bisacodyl Suppository 10 milliGRAM(s) Rectal daily PRN  cefepime   IVPB 500 milliGRAM(s) IV Intermittent every 24 hours (D1)  epoetin maria d-epbx (RETACRIT) Injectable 4000 Unit(s) IV Push <User Schedule>  insulin glargine Injectable (LANTUS) 5 Unit(s) SubCutaneous at bedtime  insulin lispro (ADMELOG) corrective regimen sliding scale   SubCutaneous Before meals and at bedtime  linezolid  IVPB 600 milliGRAM(s) IV Intermittent every 12 hours (D1)  metoprolol tartrate 25 milliGRAM(s) Oral every 8 hours  midodrine. 10 milliGRAM(s) Oral <User Schedule> PRN      ALLERGIES  No Known Allergies      SOCIAL HISTORY: Emigrated from [place] at the age of 8. Denies history of smoking cigarette or use of illicit drugs.   Pt was drinking 1/2 bottles of wine per day "long ago"     FAMILY HISTORY: Not pertinent to patient's clinical presentation      ROS:    General: no fever or chills 	    Skin: right small buttock breakdown per daughter  	  HEENT: no complaints    Respiratory and Thorax: reports cough and SOB  	  Cardiovascular: no CP    Gastrointestinal:	no N, V, diarrhea    Genitourinary: no urinary tract complaints    Musculoskeletal:	 mostly bedbound at home    Neurological: no complaints	    Endocrine: DM	    PHYSICAL EXAM:    Vital Signs Last 24 Hrs  T(C): 36.6 (22 Jul 2021 15:45), Max: 36.8 (22 Jul 2021 00:48)  T(F): 97.8 (22 Jul 2021 15:45), Max: 98.3 (22 Jul 2021 00:48)  HR: 63 (22 Jul 2021 15:45) (59 - 76)  BP: 136/43 (22 Jul 2021 15:45) (136/43 - 203/72)  BP(mean): --  RR: 20 (22 Jul 2021 15:45) (14 - 29)  SpO2: 99% (22 Jul 2021 15:45) (99% - 100%)      Gen: WD obese  female in NAD     HEENT: NC/AT, conj. clear; on CPAP    Neck: supple    Chest/Thorax: rhonchi throughout on auscultation; upper right chest shiley catheter present     Cardiovascular: distanced heart sounds    Gastrointestinal: soft, obese, BS present; umbilical hernia; striae present    Genitourinary: no meza     Extremities: 1+ edema on bilateral lower extremities; stasis changes     Neurological: A+O x3, Cr n grossly intact; toes downgoing; reflexes intact    Skin: right small buttock breakdown     Psychiatric: affect appropriate    LABS/DIAGNOSTIC TESTS:                          10.3   13.31 )-----------( 185      ( 22 Jul 2021 00:43 )             32.8     WBC Count: 13.31 K/uL (07-22 @ 00:43)      07-22    135  |  99  |  41<H>  ----------------------------<  296<H>  5.0   |  23  |  4.91<H>    Ca    9.0      22 Jul 2021 00:43  Phos  4.3     07-22  Mg     2.5     07-22    TPro  7.5  /  Alb  3.6  /  TBili  0.4  /  DBili  x   /  AST  18  /  ALT  22  /  AlkPhos  168<H>  07-22          LIVER FUNCTIONS - ( 22 Jul 2021 00:43 )  Alb: 3.6 g/dL / Pro: 7.5 g/dL / ALK PHOS: 168 U/L / ALT: 22 U/L DA / AST: 18 U/L / GGT: x             PT/INR - ( 22 Jul 2021 00:43 )   PT: 15.4 sec;   INR: 1.31 ratio         PTT - ( 22 Jul 2021 00:43 )  PTT:31.4 sec    LACTATE:Lactate, Blood: 1.0 mmol/L (07-22 @ 01:32)    Respiratory Viral Panel with COVID-19 by NATE (07.22.21 @ 01:54)    Rapid RVP Result: Detected    SARS-CoV-2: NotDetec: This Respiratory Panel uses polymerase chain reaction (PCR) to detect for  adenovirus; coronavirus (HKU1, NL63, 229E, OC43); human metapneumovirus  (hMPV); human enterovirus/rhinovirus (Entero/RV); influenza A; influenza  A/H1; influenza A/H3; influenza A/H1-2009; influenza B; parainfluenza  viruses 1, 2, 3, 4; respiratory syncytial virus; Mycoplasma pneumoniae;  Chlamydophila pneumoniae; and SARS-CoV-2.    Entero/Rhinovirus (RapRVP): Detected        ABG - ABG - ( 22 Jul 2021 08:30 )  pH, Arterial: 7.24  pH, Blood: x     /  pCO2: 54    /  pO2: 474   / HCO3: 23    / Base Excess: -4.9  /  SaO2: 99          RADIOLOGY    < from: Xray Chest 1 View- PORTABLE-Urgent (Xray Chest 1 View- PORTABLE-Urgent .) (07.22.21 @ 01:01) >  EXAM:  XR CHEST PORTABLE URGENT 1V                            PROCEDURE DATE:  07/22/2021          INTERPRETATION:  Chest one view    HISTORY: Shortness of breath    COMPARISON STUDY: 5/4/2021    Frontal expiratory view of the chest shows the heartto be similar in size. Micra pacemaker and right dialysis catheter are again noted.    The lungs show mild basilar congestion and there is no evidence of pneumothorax nor pleural effusion.    IMPRESSION:  Mild congestive changes.    < end of copied text >                 HPI/course of hospital (daughter at the bedside at the time of the ID consult):    Patient is a 89 year of female with PMH of Afib, ESRD on HD (Monday, Tuesday, Thursday, Saturday), DM, HTN, presented with SOB and sensation of "mucus" build up in throat. Pt was having difficulty talking due to mucus (white-yellow sputum) and SOB at home. + episode of vomiting PTA. Pt on 3L of O2 NC at all times and mostly bedbound at home. Pt has had multiple hospitalizations over the last year (recent ICU discharge from 5/18/21).  Pt on BiPAP since admission and received cefepime and vanco of concern for pneumonia.     At the time of ID consult, patient was dialyzed this afternoon and is on BiPAP. Pt reports feeling tired and continues to have SOB with intermittent cough. Pt denies fever, chills, cardiac, GI or  complaints.       PAST MEDICAL & SURGICAL HISTORY:  HTN (hypertension)  HLD (hyperlipidemia)  CKD (chronic kidney disease)  Afib  DM (diabetes mellitus)  pacemaker  R upper chest permacath (fistula not working as per daughter)      MEDS:  ALBUTerol    90 MICROgram(s) HFA Inhaler 2 Puff(s) Inhalation every 6 hours PRN  aMIOdarone    Tablet 200 milliGRAM(s) Oral daily  apixaban 2.5 milliGRAM(s) Oral every 12 hours  ascorbic acid 500 milliGRAM(s) Oral daily  atorvastatin 40 milliGRAM(s) Oral at bedtime  bisacodyl Suppository 10 milliGRAM(s) Rectal daily PRN  cefepime   IVPB 500 milliGRAM(s) IV Intermittent every 24 hours (D1)  epoetin maria d-epbx (RETACRIT) Injectable 4000 Unit(s) IV Push <User Schedule>  insulin glargine Injectable (LANTUS) 5 Unit(s) SubCutaneous at bedtime  insulin lispro (ADMELOG) corrective regimen sliding scale   SubCutaneous Before meals and at bedtime  linezolid  IVPB 600 milliGRAM(s) IV Intermittent every 12 hours (D1)  metoprolol tartrate 25 milliGRAM(s) Oral every 8 hours  midodrine. 10 milliGRAM(s) Oral <User Schedule> PRN      ALLERGIES  No Known Allergies      SOCIAL HISTORY: Emigrated from [place] at the age of 8. Denies history of smoking cigarettes or use of illicit drugs.   Pt was drinking 1/2 bottle of wine per day "long ago"     FAMILY HISTORY: Not pertinent to patient's clinical presentation      ROS:    General: no fever or chills 	    Skin: right small buttock breakdown per daughter  	  HEENT: no complaints    Respiratory and Thorax: reports cough and SOB  	  Cardiovascular: no CP    Gastrointestinal:	no N, V, diarrhea    Genitourinary: no urinary tract complaints    Musculoskeletal:	 mostly bedbound at home    Neurological: no complaints	    Endocrine: DM	      PHYSICAL EXAM:    Vital Signs Last 24 Hrs  T(C): 36.6 (22 Jul 2021 15:45), Max: 36.8 (22 Jul 2021 00:48)  T(F): 97.8 (22 Jul 2021 15:45), Max: 98.3 (22 Jul 2021 00:48)  HR: 63 (22 Jul 2021 15:45) (59 - 76)  BP: 136/43 (22 Jul 2021 15:45) (136/43 - 203/72)  BP(mean): --  RR: 20 (22 Jul 2021 15:45) (14 - 29)  SpO2: 99% (22 Jul 2021 15:45) (99% - 100%)      Gen: WD obese  female on BiPAP     HEENT: NC/AT, conj. clear; on CPAP    Neck: supple    Chest/Thorax: rhonchi throughout on auscultation; upper right chest permacath present with no surrounding erythema    Cardiovascular: distant heart sounds; cd not hear any murmurs    Gastrointestinal: soft, obese, BS present; umbilical hernia; striae present    Genitourinary: no meza     Extremities: 1+ edema on bilateral lower extremities; stasis changes bilat. distal LE    Neurological: Alert and responsive; detailed exam limited; ; toes downgoing; reflexes intact    Skin: did not turn pt over due to BiPAP presence        LABS/DIAGNOSTIC TESTS:                          10.3   13.31 )-----------( 185      ( 22 Jul 2021 00:43 )             32.8     WBC Count: 13.31 K/uL (07-22 @ 00:43)      07-22    135  |  99  |  41<H>  ----------------------------<  296<H>  5.0   |  23  |  4.91<H>    Ca    9.0      22 Jul 2021 00:43  Phos  4.3     07-22  Mg     2.5     07-22    TPro  7.5  /  Alb  3.6  /  TBili  0.4  /  DBili  x   /  AST  18  /  ALT  22  /  AlkPhos  168<H>  07-22          LIVER FUNCTIONS - ( 22 Jul 2021 00:43 )  Alb: 3.6 g/dL / Pro: 7.5 g/dL / ALK PHOS: 168 U/L / ALT: 22 U/L DA / AST: 18 U/L / GGT: x             PT/INR - ( 22 Jul 2021 00:43 )   PT: 15.4 sec;   INR: 1.31 ratio         PTT - ( 22 Jul 2021 00:43 )  PTT:31.4 sec    LACTATE:Lactate, Blood: 1.0 mmol/L (07-22 @ 01:32)    Respiratory Viral Panel with COVID-19 by NATE (07.22.21 @ 01:54)    Rapid RVP Result: Detected    SARS-CoV-2: NotDetec: This Respiratory Panel uses polymerase chain reaction (PCR) to detect for  adenovirus; coronavirus (HKU1, NL63, 229E, OC43); human metapneumovirus  (hMPV); human enterovirus/rhinovirus (Entero/RV); influenza A; influenza  A/H1; influenza A/H3; influenza A/H1-2009; influenza B; parainfluenza  viruses 1, 2, 3, 4; respiratory syncytial virus; Mycoplasma pneumoniae;  Chlamydophila pneumoniae; and SARS-CoV-2.    Entero/Rhinovirus (RapRVP): Detected        ABG - ABG - ( 22 Jul 2021 08:30 )  pH, Arterial: 7.24  pH, Blood: x     /  pCO2: 54    /  pO2: 474   / HCO3: 23    / Base Excess: -4.9  /  SaO2: 99    COVID-19 Heath Domain Antibody (05.05.21 @ 10:55)   COVID-19 Heath Domain Antibody Result: 14.20: Roche ECLIA Total AB ([employer])   NOTE: This result index represents a total antibody measurement, which   includes IgG, IgA and IgM.   Measures Receptor Binding Domain of the Heath Protein   Negative <= 0.79 U/mL   Positive >= 0.80 U/mL U/mL   COVID-19 Heath Domain AB Interp: Positive:           RADIOLOGY    < from: Xray Chest 1 View- PORTABLE-Urgent (Xray Chest 1 View- PORTABLE-Urgent .) (07.22.21 @ 01:01) >  EXAM:  XR CHEST PORTABLE URGENT 1V                            PROCEDURE DATE:  07/22/2021          INTERPRETATION:  Chest one view    HISTORY: Shortness of breath    COMPARISON STUDY: 5/4/2021    Frontal expiratory view of the chest shows the heartto be similar in size. Micra pacemaker and right dialysis catheter are again noted.    The lungs show mild basilar congestion and there is no evidence of pneumothorax nor pleural effusion.    IMPRESSION:  Mild congestive changes.                     HPI/course of hospital (daughter at the bedside at the time of the ID consult):    Patient is a 89 year of female with PMH of Afib, ESRD on HD (Monday, Tuesday, Thursday, Saturday), DM, HTN, presented with SOB and sensation of "mucus" build up in throat. Pt was having difficulty talking due to mucus (white-yellow sputum) and SOB at home. + episode of vomiting PTA. Pt on 3L of O2 NC at all times and mostly bedbound at home. Pt has had multiple hospitalizations over the last year (recent ICU discharge from 5/18/21).  Pt on BiPAP since admission and on antibiotics  b/o concern for pneumonia.     At the time of the ID consult, patient just returned from dialysis. She is currently on BiPAP. Pt reports feeling tired and continues to have SOB with intermittent cough. Pt denies fever, chills, cardiac, GI or  complaints.       PAST MEDICAL & SURGICAL HISTORY:  HTN (hypertension)  HLD (hyperlipidemia)  CKD (chronic kidney disease)  Afib  DM (diabetes mellitus)  pacemaker  R upper chest permacath (fistula not working as per daughter)      MEDS:  ALBUTerol    90 MICROgram(s) HFA Inhaler 2 Puff(s) Inhalation every 6 hours PRN  aMIOdarone    Tablet 200 milliGRAM(s) Oral daily  apixaban 2.5 milliGRAM(s) Oral every 12 hours  ascorbic acid 500 milliGRAM(s) Oral daily  atorvastatin 40 milliGRAM(s) Oral at bedtime  bisacodyl Suppository 10 milliGRAM(s) Rectal daily PRN  cefepime   IVPB 500 milliGRAM(s) IV Intermittent every 24 hours (D1)  epoetin maria d-epbx (RETACRIT) Injectable 4000 Unit(s) IV Push <User Schedule>  insulin glargine Injectable (LANTUS) 5 Unit(s) SubCutaneous at bedtime  insulin lispro (ADMELOG) corrective regimen sliding scale   SubCutaneous Before meals and at bedtime  linezolid  IVPB 600 milliGRAM(s) IV Intermittent every 12 hours (D1)  metoprolol tartrate 25 milliGRAM(s) Oral every 8 hours  midodrine. 10 milliGRAM(s) Oral <User Schedule> PRN  received vanco x1      ALLERGIES  No Known Allergies      SOCIAL HISTORY: Emigrated from [place] at the age of 8. Denies history of smoking cigarettes or use of illicit drugs.   Pt was drinking 1/2 bottle of wine per day "long ago"     FAMILY HISTORY: Not pertinent to patient's clinical presentation      ROS:    General: no fever or chills 	    Skin: right small buttock breakdown per daughter  	  HEENT: no complaints    Respiratory and Thorax: reports cough and SOB  	  Cardiovascular: no CP    Gastrointestinal:	no N, V, diarrhea    Genitourinary: no urinary tract complaints    Musculoskeletal:	 mostly bedbound at home    Neurological: no complaints	    Endocrine: DM	      PHYSICAL EXAM:    Vital Signs Last 24 Hrs  T(C): 36.6 (22 Jul 2021 15:45), Max: 36.8 (22 Jul 2021 00:48)  T(F): 97.8 (22 Jul 2021 15:45), Max: 98.3 (22 Jul 2021 00:48)  HR: 63 (22 Jul 2021 15:45) (59 - 76)  BP: 136/43 (22 Jul 2021 15:45) (136/43 - 203/72)  BP(mean): --  RR: 20 (22 Jul 2021 15:45) (14 - 29)  SpO2: 99% (22 Jul 2021 15:45) (99% - 100%)      Gen: WD obese  female on BiPAP     HEENT: NC/AT, conj. clear; on CPAP    Neck: supple    Chest/Thorax: rhonchi throughout on auscultation; upper right chest permacath present with no surrounding erythema    Cardiovascular: distant heart sounds; cd not hear any murmurs    Gastrointestinal: soft, obese, BS present; umbilical hernia; striae present    Genitourinary: no meza     Extremities: 1+ edema on bilateral lower extremities; stasis changes bilat. distal LE    Neurological: Alert and responsive; detailed exam limited; ; toes downgoing; reflexes intact    Skin: did not turn pt over due to BiPAP presence        LABS/DIAGNOSTIC TESTS:                          10.3   13.31 )-----------( 185      ( 22 Jul 2021 00:43 )             32.8     WBC Count: 13.31 K/uL (07-22 @ 00:43)      07-22    135  |  99  |  41<H>  ----------------------------<  296<H>  5.0   |  23  |  4.91<H>    Ca    9.0      22 Jul 2021 00:43  Phos  4.3     07-22  Mg     2.5     07-22    TPro  7.5  /  Alb  3.6  /  TBili  0.4  /  DBili  x   /  AST  18  /  ALT  22  /  AlkPhos  168<H>  07-22          LIVER FUNCTIONS - ( 22 Jul 2021 00:43 )  Alb: 3.6 g/dL / Pro: 7.5 g/dL / ALK PHOS: 168 U/L / ALT: 22 U/L DA / AST: 18 U/L / GGT: x             PT/INR - ( 22 Jul 2021 00:43 )   PT: 15.4 sec;   INR: 1.31 ratio         PTT - ( 22 Jul 2021 00:43 )  PTT:31.4 sec    LACTATE:Lactate, Blood: 1.0 mmol/L (07-22 @ 01:32)    Respiratory Viral Panel with COVID-19 by NATE (07.22.21 @ 01:54)    Rapid RVP Result: Detected    SARS-CoV-2: NotDetec: This Respiratory Panel uses polymerase chain reaction (PCR) to detect for  adenovirus; coronavirus (HKU1, NL63, 229E, OC43); human metapneumovirus  (hMPV); human enterovirus/rhinovirus (Entero/RV); influenza A; influenza  A/H1; influenza A/H3; influenza A/H1-2009; influenza B; parainfluenza  viruses 1, 2, 3, 4; respiratory syncytial virus; Mycoplasma pneumoniae;  Chlamydophila pneumoniae; and SARS-CoV-2.    Entero/Rhinovirus (RapRVP): Detected        ABG - ABG - ( 22 Jul 2021 08:30 )  pH, Arterial: 7.24  pH, Blood: x     /  pCO2: 54    /  pO2: 474   / HCO3: 23    / Base Excess: -4.9  /  SaO2: 99    COVID-19 Heath Domain Antibody (05.05.21 @ 10:55)   COVID-19 Heath Domain Antibody Result: 14.20: Roche ECLIA Total AB (G[facility])   NOTE: This result index represents a total antibody measurement, which   includes IgG, IgA and IgM.   Measures Receptor Binding Domain of the Heath Protein   Negative <= 0.79 U/mL   Positive >= 0.80 U/mL U/mL   COVID-19 Heath Domain AB Interp: Positive:           RADIOLOGY    < from: Xray Chest 1 View- PORTABLE-Urgent (Xray Chest 1 View- PORTABLE-Urgent .) (07.22.21 @ 01:01) >  EXAM:  XR CHEST PORTABLE URGENT 1V                            PROCEDURE DATE:  07/22/2021          INTERPRETATION:  Chest one view    HISTORY: Shortness of breath    COMPARISON STUDY: 5/4/2021    Frontal expiratory view of the chest shows the heartto be similar in size. Micra pacemaker and right dialysis catheter are again noted.    The lungs show mild basilar congestion and there is no evidence of pneumothorax nor pleural effusion.    IMPRESSION:  Mild congestive changes.

## 2021-07-22 NOTE — H&P ADULT - CONVERSATION DETAILS
Spoke with daughter to follow up regarding GOC conversation from previous admission. As per daughter Belkis at bedside: "That's between my sister Cece and my brothers. This is too much for me right now. I can't talk about this." Pt without comment regarding her GOC at this time.     Pt to remain full code.

## 2021-07-22 NOTE — H&P ADULT - ATTENDING COMMENTS
Patient is a 89 year old female with a PMH of HTN, HLD, DM, ESRD on HD (Tuesday/Thursday/Saturday), Atrial Fibrillation (on Eliquis) who was BIBEMS due to shortness of breath.  Patient states that on the day of current presentation, she began to experience a non-productive cough as well as progressively worsening shortness of breath for which EMS was called.    T(C): 36.8 (07-22-21 @ 00:48), Max: 36.8 (07-22-21 @ 00:48)  T(F): 98.3 (07-22-21 @ 00:48), Max: 98.3 (07-22-21 @ 00:48)  HR: 65 (07-22-21 @ 01:56) (65 - 76)  BP: 162/59 (07-22-21 @ 01:15) (162/59 - 203/72)  RR: 29 (07-22-21 @ 01:56) (24 - 29)  SpO2: 100% (07-22-21 @ 01:56) (99% - 100%)  Wt(kg): --    P/E: As above MAR    A/P:    Shortness of Breath likely due to Fluid Overload:  -Pro-BNP= 4,225 (Baseline= 11,203 on 5/4/2021)  -As patient still produces urine, will administer additional Lasix 80mg Once NOW  -Strict I&O, Fluid Restrictions, Low Sodium diet, Daily Weights  -Albuterol MDI Q2H PRN  -Will continue to provide supplemental oxygen as necessary  -Will ask Nephrology to see patient to immediately resume HD  -Given patient's advanced age, multitude of comorbidities, current functional status, frequent hospitalizations, would be appropriate to ask Palliative Care to again speak with patient's NOK to further discuss goals of care    ESRD on HD:  -Will ask Nephrology to see patient to immediately resume HD    Sepsis due to Healthcare Associated Pneumonia (HCAP):  -SIRS Criteria= 2 (WBC>12,000, RR>20) + possible source of infection (?Lungs)  -Will send Procalcitonin  -Tylenol PRN for fever  -Will empirically initiate patient on Zyvox and Cefepime, for now    Atypical Chest Pain r/o ACS:  -HEART Score= 5 (Non-specific repolarization disturbance, >=65, >=3 risk factors or history of atherosclerotic disease), Moderate Score, Risk of MACE of 12-16.6%.  -VICKY Score= 2 points (), 8% risk at 14 days of: all-cause mortality, new or recurrent MI, or severe recurrent ischemia requiring urgent revascularization.  -Troponin= <0.015 (Baseline= <0.015 on 5/13/2021)  -TTE (5/5/2021): LVEF= >55%, Mild to moderate mitral regurgitation, Segmental wall motion could not be assessed  -Will continue to trend troponin with simultaneous acquisition of EKG  -As per Cardio note (5/17/2021) " On midodrine for BP support on HD, suspect she has some element of dysautonomia seems to be responding, Cont eliquis and Amio..."    Nausea and Vomiting:  -Zofran PRN for nausea  -Reglan PRN for vomiting  -Will continue to closely monitor EKG for evidence of QT prolongation    DM:  -Hemoglobin A1c= 6.6% on 5/4/2021  -Blood Glucose Monitoring ACHS  -Regular Insulin Sliding Scale ACHS  -Carb Controlled, Heart Healthy, Renal (dialysis) diet as tolerated    Hypertensive Urgency:  -As per Cardio note (5/17/2021) " On midodrine for BP support on HD, suspect she has some element of dysautonomia seems to be responding, Cont eliquis and Amio..."  -Will resume patient's home medications  -Vital Signs Q2H    Atrial Fibrillation:  -Will resume patient's home medications    Normocytic Anemia:  -Will send Retic Count    Elevated Alk Phos:  -Will send GGT    GI/DVT PPx:  -Eliquis  -Pepcid Patient is a 89 year old female with a PMH of HTN, HLD, DM, ESRD on HD (Tuesday/Thursday/Saturday), Atrial Fibrillation (on Eliquis) who was BIBEMS due to shortness of breath.  Patient states that on the day of current presentation, she began to experience a non-productive cough as well as progressively worsening shortness of breath for which EMS was called.    T(C): 36.8 (07-22-21 @ 00:48), Max: 36.8 (07-22-21 @ 00:48)  T(F): 98.3 (07-22-21 @ 00:48), Max: 98.3 (07-22-21 @ 00:48)  HR: 65 (07-22-21 @ 01:56) (65 - 76)  BP: 162/59 (07-22-21 @ 01:15) (162/59 - 203/72)  RR: 29 (07-22-21 @ 01:56) (24 - 29)  SpO2: 100% (07-22-21 @ 01:56) (99% - 100%)  Wt(kg): --    P/E: As above MAR    A/P:    Shortness of Breath likely due to Fluid Overload:  -Pro-BNP= 4,225 (Baseline= 11,203 on 5/4/2021)  -As patient still produces urine, will administer additional Lasix 80mg Once NOW  -Strict I&O, Fluid Restrictions, Low Sodium diet, Daily Weights  -Albuterol MDI Q2H PRN  -Will continue to provide supplemental oxygen as necessary  -Will ask Nephrology to see patient to immediately resume HD  -Given patient's advanced age, multitude of comorbidities, current functional status and frequent hospitalizations, would be appropriate to ask Palliative Care to again see patient and NOK to further discuss goals of care    ESRD on HD:  -Will ask Nephrology to see patient to immediately resume HD    Sepsis due to Healthcare Associated Pneumonia (HCAP):  -SIRS Criteria= 2 (WBC>12,000, RR>20) + possible source of infection (?Lungs)  -Will send Procalcitonin  -Tylenol PRN for fever  -Will empirically initiate patient on Zyvox and Cefepime, for now    Atypical Chest Pain r/o ACS:  -HEART Score= 5 (Non-specific repolarization disturbance, >=65, >=3 risk factors or history of atherosclerotic disease), Moderate Score, Risk of MACE of 12-16.6%.  -VICKY Score= 2 points (Age>=65, >=3 CAD risk factors), 8% risk at 14 days of: all-cause mortality, new or recurrent MI, or severe recurrent ischemia requiring urgent revascularization.  -Troponin= <0.015 (Baseline= <0.015 on 5/13/2021)  -TTE (5/5/2021): LVEF= >55%, Mild to moderate mitral regurgitation, Segmental wall motion could not be assessed  -Will continue to trend troponin with simultaneous acquisition of EKG  -As per Cardio note (5/17/2021) " On midodrine for BP support on HD, suspect she has some element of dysautonomia seems to be responding, Cont eliquis and Amio..."    Nausea and Vomiting:  -Zofran PRN for nausea  -Reglan PRN for vomiting  -Will continue to closely monitor EKG for evidence of QT prolongation    DM:  -Hemoglobin A1c= 6.6% on 5/4/2021  -Blood Glucose Monitoring ACHS  -Regular Insulin Sliding Scale ACHS  -Carb Controlled, Heart Healthy, Renal (dialysis) diet as tolerated    Hypertensive Urgency:  -As per Cardio note (5/17/2021) " On midodrine for BP support on HD, suspect she has some element of dysautonomia seems to be responding, Cont eliquis and Amio..."  -Will resume patient's home medications  -Vital Signs Q2H    HLD:  -Lipid Panel with AM labs  -Will resume patient's home medication    Atrial Fibrillation:  -Will resume patient's home medications    Normocytic Anemia:  -Will send Retic Count    Elevated Alk Phos:  -Will send GGT    GI/DVT PPx:  -Eliquis  -Pepcid

## 2021-07-22 NOTE — ED ADULT NURSE REASSESSMENT NOTE - NS ED NURSE REASSESS COMMENT FT1
0710 Pt received from EBENEZER Garcia, in bed, AOX3, no s/s of any distress noted. IV line in place, IV fluids infusing as per orders, no signs of infiltration noted. VS WNL. Call bell in reach, bed in lowest position. Safety maintained, hourly rounding performed. Pt on BIPAP and cardiac monitor. Family at bed side. Will continue to monitor.

## 2021-07-22 NOTE — H&P ADULT - PROBLEM SELECTOR PLAN 4
f/u A1c   6.6 a1c in 5/2021  Takes lantus 10 and humalog sliding scale at home  Start lantus 5 for now as pt is npo due to bipap with sliding scale  fs q6 while npo, switch to achs when off bipap

## 2021-07-22 NOTE — H&P ADULT - PROBLEM SELECTOR PLAN 1
P/w respiratory distress placed on BIPAP in ED  c/w BIPAP for now, attempt to wean off in AM  ABG in AM if unable to wean off BIPAP  Given Vanc and Cefepime 2g in ED  DOSE vanc based on trough dose prior to dialysis - please order VT when HD schedule is known  Send MRSA swab, if swab negative, dc vanc, if positive please order Vanc  C/w Cefepime dialysis dose 500mg q24  CXR without infiltrates. Diffuse ronchi  Send sputum culture, procalcitonin  f/u blood culture  ID consulted - Dr. Doll P/w respiratory distress placed on BIPAP in ED  c/w BIPAP for now, attempt to wean off in AM  ABG in AM if unable to wean off BIPAP  Given Vanc and Cefepime 2g in ED  Send MRSA swab, if swab negative, dc vanc, if positive please order Vanc  Start Cefepime dialysis dose 500mg q24 + Linezolid 600mg IV q12  CXR without infiltrates. Diffuse ronchi  Send sputum culture, procalcitonin  f/u blood culture  ID consulted - Dr. Doll

## 2021-07-22 NOTE — ED PROVIDER NOTE - CONSTITUTIONAL, MLM
normal... ill appearing, awake, alert, oriented to person, place, time/situation and in moderate resp distress apparent distress.

## 2021-07-22 NOTE — ED ADULT NURSE REASSESSMENT NOTE - NS ED NURSE REASSESS COMMENT FT1
1450: Report given to EBENEZER Vasquez in [facility]. Pt to be sent to [address] [place] [address] after dialysis.

## 2021-07-22 NOTE — CONSULT NOTE ADULT - ASSESSMENT
Patient is a 89 year old female, wheel chair bound, from home, with PMH of Afib on Eliquis, ESRD on HD(Monday, Tuesday, Thursday, Saturday), DM on insulin, HTN, COPD on home O2 3L, who presented to the ED due to SOB. In ED, patient placed on BiPAP and had HD session Patient is a 89 year old female, wheel chair bound, from home, with PMH of Afib on Eliquis, ESRD on HD(Monday, Tuesday, Thursday, Saturday), DM on insulin, HTN, COPD on home O2 3L, who presented to the ED due to SOB. In ED, patient placed on BiPAP and had HD session with about 1.274L fluid removed and blood pressure noting to drop to 60s despite being given midodrine. Lactate normal. Troponin negative x1. Enterovirus positive. Given dose of lasix 80mg IV in ED.     Assessment:     Plan:  Neuro:    Cardiovascular:    Pulmonary:     Infectious Diseases:    Gastrointestinal:    Renal:    Heme/onc:     Skin/ catheter:     Prophylaxis:     Goals of Care:     Dispo:    Patient is a 89 year old female, wheel chair bound, from home, with PMH of Afib on Eliquis, ESRD on HD(Monday, Tuesday, Thursday, Saturday), DM on insulin, HTN, COPD on home O2 3L, who presented to the ED due to SOB. In ED, patient placed on BiPAP and had HD session with about 1.274L fluid removed and blood pressure noting to drop to 60s despite being given midodrine. Lactate normal. Troponin negative x1. Enterovirus positive. Given dose of lasix 80mg IV in ED.     Assessment:   - Acute Hypoxic Respiratory Failure  - Fluid Overload 2/2 ESRD  - Respiratory Acidosis   - Enterovirus infection   - ESRD on HD   - Obesity  - COPD  - DM  - Anemia   - HTN   - Afib     Plan:  Neuro:  - AAO x3   - no issues     Cardiovascular:  #HTN  - patient on metoprolol and amiodarone at home  - continue meds with hold parameters  - monitor BP closely   - continue midodrine prior to HD sessions     #Afib  - continue home med of amiodarone and eliquis   - monitor HR     #HLD  -continue statin     Pulmonary:   #Acute Hypoxic Respiratory Failure   - likely secondary to fluid overload 2/2 ESRD  - placed on BiPAP in ED; appears to be tachypneic off of BiPAP after HD   - will continue with continuous BiPAP  - start lasix 80mg IV daily with HOLD parameters  - monitor O2 sats   - f/u ABG in AM   - f/u repeat CXR in AM     #COPD   - patient and daughter states that she has "a little bit of COPD" but not on any meds at home  - continue BiPAP; taper off as tolerated  - no wheezing presently  - continue albuterol PRN   - monitor O2 sats     Infectious Diseases:  - noted to be enterovirus positive in ED   - empirically started on cefepime and zyvox in the ED  - f/u blood and sputum cx  - f/u MRSA swab  - ID, Dr. Doll consulted     Gastrointestinal:  - keep NPO while on BiPAP    Renal:  #ESRD  - patient with ESRD on HD Mon/Tue/Thu/Sat  - patient had HD session this morning with only 1.274L removed due to blood pressure going down to 60s   - plan for HD session tomorrow  - nephDr. Tomy you consulted   - given dose of lasix 80mg IV in ED; will continue with lasix 80mg IV for now   - monitor BMP daily   - avoid excessive fluids   - right sided permacath; patient used to have fistula on RUE     #Respiratory Acidosis  - pH of 7.24 and pCO2 of 54 noted on ABG  - continue BiPAP  - f/u repeat ABG in AM    Heme/onc:   #Anemia  - Hgb of 10.3 noted in ED  - appears stable  - no signs of bleeding noted  - likely 2/2 ESRD  - monitor CBC daily     #Leukocytosis  - noted to have WBC of 13K in ED  - low concern for bacterial infection; may be due to enterovirus infection   - monitor CBC daily     Endo:   #DM  - history of DM on insulin at home  - A1c of 7.8  - monitor BS q6 while NPO     Skin/ catheter:   - right sided permacath     Prophylaxis:   eliquis for AC     Goals of Care:   Full Code     Dispo: Transfer to ICU for continuous BiPAP      Patient is a 89 year old female, wheel chair bound, from home, with PMH of Afib on Eliquis, ESRD on HD(Monday, Tuesday, Thursday, Saturday), DM on insulin, HTN, COPD on home O2 3L, who presented to the ED due to SOB. In ED, patient placed on BiPAP and had HD session with about 1.274L fluid removed and blood pressure noting to drop to 60s despite being given midodrine. Lactate normal. Troponin negative x1. Enterovirus positive. Given dose of lasix 80mg IV in ED.     Assessment:   - Acute Hypoxic Respiratory Failure  - Fluid Overload 2/2 ESRD  - Respiratory Acidosis   - Enterovirus infection   - ESRD on HD   - Obesity  - COPD  - DM  - Anemia   - HTN   - Afib     Plan:  Neuro:  - AAO x3   - no issues     Cardiovascular:  #HTN  - patient on metoprolol and amiodarone at home  - continue meds with hold parameters  - monitor BP closely   - continue midodrine prior to HD sessions     #Afib  - continue home med of amiodarone and eliquis   - monitor HR     #HLD  -continue statin     Pulmonary:   #Acute Hypoxic Respiratory Failure   - likely secondary to fluid overload 2/2 ESRD  - placed on BiPAP in ED; appears to be tachypneic off of BiPAP after HD   - will continue with continuous BiPAP  - start lasix 80mg IV daily with HOLD parameters  - monitor O2 sats   - f/u ABG in AM   - f/u repeat CXR in AM     #COPD   - patient and daughter states that she has "a little bit of COPD" but not on any meds at home  - continue BiPAP; taper off as tolerated  - no wheezing presently  - continue albuterol PRN   - monitor O2 sats     Infectious Diseases:  - noted to be enterovirus positive in ED   - empirically started on cefepime and zyvox in the ED  - f/u blood and sputum cx  - f/u MRSA swab  - ID, Dr. Doll consulted     Gastrointestinal:  - keep NPO while on BiPAP    Renal:  #ESRD  - patient with ESRD on HD Mon/Tue/Thu/Sat  - patient had HD session this morning with only 1.274L removed due to blood pressure going down to 60s   - plan for HD session tomorrow  - nephDr. Tomy you consulted   - given dose of lasix 80mg IV in ED; will continue with lasix 80mg IV for now   - monitor BMP daily   - avoid excessive fluids   - right sided permacath; patient used to have fistula on RUE     #Respiratory Acidosis  - pH of 7.24 and pCO2 of 54 noted on ABG  - continue BiPAP  - f/u repeat ABG in AM    Heme/onc:   #Anemia  - Hgb of 10.3 noted in ED  - appears stable  - no signs of bleeding noted  - likely 2/2 ESRD  - monitor CBC daily     #Leukocytosis  - noted to have WBC of 13K in ED  - low concern for bacterial infection; may be due to enterovirus infection   - monitor CBC daily     Endo:   #DM  - history of DM on insulin at home  - A1c of 7.8  - monitor BS q6 while NPO     Skin/ catheter:   - right sided permacath     Prophylaxis:   eliquis for AC     Goals of Care:   Full Code   - Palliative, Dr. Wiley consulted    Dispo: Transfer to ICU for continuous BiPAP

## 2021-07-22 NOTE — CONSULT NOTE ADULT - ASSESSMENT
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 Pt a/w Entero/ Rhino virus, acute respiratory distress requiring BIPAP with concerns for fluid overload. Nephrology consulted for ESRD status.     NOTE INCOMPLETE_ FULL NOTE TO FOLLOW    1) ESRD: Last HD on 7/20 @ [facility]. Pt with fluid overload req BiPAP; plan for urgent HD this am. UF as tolerated. Monitor electrolytes.    3) Hypotension- with bradycardia. Hold antihypertensive medications. Pt started on pressors to aid in fluid removal. Pressors as per ICU. Bradycardia- with recent PPM placement; recc Cards consult. Monitor BP/HR  4) Anemia of renal disease: Hb decreasing. Pt on A/C for Afib. Check FOBT. Repeat hgb 8.0. Will hold prbc transfusion since pt fluid overloaded with no signs of gross bleeding. Check iron studies and ferritin in am. Will give Epogen 8k units IV tiw with HD. Monitor Hb.  5) Hyperphosphatemia: Phosphorus acceptable. No need for phos binder while NPO. Monitor serum calcium and phosphorus.      [facility] NEPHROLOGY  Brad Chen M.D.  Oneal Tim D.O.  Roselia Huitron M.D.  Milli Platt, MSN, ANP-C  [phone]    [address] [place] [address]   Patient is a 89yo Female with ESRD on HD, Afib on Eliquis, HTN, Intradialytic hypotension req Midodrine prior to HD, failed Rt AVF due to steal syndrome s/p ligation, h/o COVID s/p PPM p/w SOB x 1 day. Pt a/w Entero/ Rhino virus, acute respiratory distress requiring BIPAP with concerns for fluid overload. Nephrology consulted for ESRD status.     1) ESRD: s/p HD earlier today with intradialytic hypotension (ehsan BP 64/42), with net 1274ml. Plan for additional HD session on 7/23 for increased fluid removal.  Monitor electrolytes.  2) HTN 2/2 CKD- BP acceptable on Metoprolol 25mg PO q8hrs. Continue with Midodrine 10mg PO prior to HD to aid in fluid removal. Monitor BP  3) Anemia of renal disease: Hb acceptable. Will give Epogen 4k units IV tiw with HD. Monitor Hb.  4) Hyperphosphatemia: Phosphorus acceptable. No need for phos binder while NPO. Monitor serum calcium and phosphorus.      [facility] NEPHROLOGY  Brad Chen M.D.  Oneal Tim D.O.  Roselia Huitron M.D.  Milli Platt, MSN, ANP-C  [phone]    [address] [place] [address]

## 2021-07-22 NOTE — CONSULT NOTE ADULT - ASSESSMENT
Patient is a 89 year of female with PMH of Afib on Eliquis, ESRD on HD (Monday, Tuesday, Thursday, Saturday), DM on insulin, HTN, presented with SOB. Pt is using 3L of NC at all time and mostly bedbound at home. Daughter notes that pt has an intermittent cough, having increased SOB and difficulty speaking due to "mucus" built up in her throat. On admission, patient had elevated WBC (13.3) and tested positive for Entero/Rhino Virus. Chest Xray with mild congestive changes. Pt currently on CPAP and was dialyzed this afternoon. Pt had multiple hospitalizations over the last year (recent ICU discharge from 5/18/21) hence was started cefepime and vanco to cover MRSA in ED. Pt has no evidence of ongoing infection that require antimicrobial treatment at this time.     -- d/c cefepime and vanco  Patient is a 89 year of female with PMH of Afib, ESRD on HD, DM, HTN, PPM, chronic 02 at home presented with increasing SOB, cough, and fluid overload. On admission, patient had elevated WBC (13.3) and was given IV antibiotics for possible pneumonia. Pt's CXR with congestive changes and no infiltrate. Pt's RVP positive for Entero/Rhino virus. Worsening respiratory status exacerbated by virus and fluid overload. No clear evidence for pneumonia and would consider stopping cefepime and vancomycin.     Discussed above with medical team, including Dr. Harris.    Patient is a 89 year of female with PMH of Afib, ESRD on HD, DM, HTN, PPM, chronic 02 at home presented with increasing SOB, cough, and fluid overload. On admission, patient had elevated WBC (13.3) and was given IV antibiotics for possible pneumonia. Pt's CXR with congestive changes and no infiltrate. Pt's RVP positive for Entero/Rhino virus. Worsening respiratory status exacerbated by virus and fluid overload. No clear evidence for pneumonia and would consider stopping antibiotics.     Discussed above with medical team, including Dr. Harris.

## 2021-07-22 NOTE — ED ADULT NURSE NOTE - OBJECTIVE STATEMENT
brought in by ambulance due to shortness of breath today , tachypneic, crackles on breathing, seen and examined by Dr Polo. placed on BIPAP by RT as ordered, well tolerated by patient. On dialysis 3x week , with access right upper chest covered with dressing.

## 2021-07-23 LAB
ALBUMIN SERPL ELPH-MCNC: 3 G/DL — LOW (ref 3.5–5)
ALP SERPL-CCNC: 153 U/L — HIGH (ref 40–120)
ALT FLD-CCNC: 21 U/L DA — SIGNIFICANT CHANGE UP (ref 10–60)
ANION GAP SERPL CALC-SCNC: 10 MMOL/L — SIGNIFICANT CHANGE UP (ref 5–17)
ANISOCYTOSIS BLD QL: SLIGHT — SIGNIFICANT CHANGE UP
AST SERPL-CCNC: 18 U/L — SIGNIFICANT CHANGE UP (ref 10–40)
BASE EXCESS BLDA CALC-SCNC: -0.7 MMOL/L — SIGNIFICANT CHANGE UP (ref -2–3)
BASOPHILS # BLD AUTO: 0 K/UL — SIGNIFICANT CHANGE UP (ref 0–0.2)
BASOPHILS NFR BLD AUTO: 0 % — SIGNIFICANT CHANGE UP (ref 0–2)
BILIRUB SERPL-MCNC: 0.5 MG/DL — SIGNIFICANT CHANGE UP (ref 0.2–1.2)
BLOOD GAS COMMENTS ARTERIAL: SIGNIFICANT CHANGE UP
BUN SERPL-MCNC: 32 MG/DL — HIGH (ref 7–18)
CALCIUM SERPL-MCNC: 8.8 MG/DL — SIGNIFICANT CHANGE UP (ref 8.4–10.5)
CHLORIDE SERPL-SCNC: 98 MMOL/L — SIGNIFICANT CHANGE UP (ref 96–108)
CO2 SERPL-SCNC: 26 MMOL/L — SIGNIFICANT CHANGE UP (ref 22–31)
COVID-19 SPIKE DOMAIN AB INTERP: POSITIVE
COVID-19 SPIKE DOMAIN ANTIBODY RESULT: >250 U/ML — HIGH
CREAT SERPL-MCNC: 4.23 MG/DL — HIGH (ref 0.5–1.3)
EOSINOPHIL # BLD AUTO: 0.13 K/UL — SIGNIFICANT CHANGE UP (ref 0–0.5)
EOSINOPHIL NFR BLD AUTO: 1 % — SIGNIFICANT CHANGE UP (ref 0–6)
GLUCOSE SERPL-MCNC: 219 MG/DL — HIGH (ref 70–99)
HCO3 BLDA-SCNC: 27 MMOL/L — SIGNIFICANT CHANGE UP (ref 21–28)
HCT VFR BLD CALC: 32 % — LOW (ref 34.5–45)
HGB BLD-MCNC: 10.2 G/DL — LOW (ref 11.5–15.5)
HOROWITZ INDEX BLDA+IHG-RTO: 36 — SIGNIFICANT CHANGE UP
HYPOCHROMIA BLD QL: SLIGHT — SIGNIFICANT CHANGE UP
HYPOSEGMENTATION: PRESENT — SIGNIFICANT CHANGE UP
LYMPHOCYTES # BLD AUTO: 0.76 K/UL — LOW (ref 1–3.3)
LYMPHOCYTES # BLD AUTO: 6 % — LOW (ref 13–44)
MACROCYTES BLD QL: SLIGHT — SIGNIFICANT CHANGE UP
MAGNESIUM SERPL-MCNC: 2.4 MG/DL — SIGNIFICANT CHANGE UP (ref 1.6–2.6)
MANUAL SMEAR VERIFICATION: SIGNIFICANT CHANGE UP
MCHC RBC-ENTMCNC: 30.2 PG — SIGNIFICANT CHANGE UP (ref 27–34)
MCHC RBC-ENTMCNC: 31.9 GM/DL — LOW (ref 32–36)
MCV RBC AUTO: 94.7 FL — SIGNIFICANT CHANGE UP (ref 80–100)
MONOCYTES # BLD AUTO: 1.4 K/UL — HIGH (ref 0–0.9)
MONOCYTES NFR BLD AUTO: 11 % — SIGNIFICANT CHANGE UP (ref 2–14)
MRSA PCR RESULT.: SIGNIFICANT CHANGE UP
MYELOCYTES NFR BLD: 1 % — HIGH (ref 0–0)
NEUTROPHILS # BLD AUTO: 10.05 K/UL — HIGH (ref 1.8–7.4)
NEUTROPHILS NFR BLD AUTO: 67 % — SIGNIFICANT CHANGE UP (ref 43–77)
NEUTS BAND # BLD: 12 % — HIGH (ref 0–8)
NRBC # BLD: 0 /100 — SIGNIFICANT CHANGE UP (ref 0–0)
PCO2 BLDA: 56 MMHG — HIGH (ref 32–35)
PH BLDA: 7.29 — LOW (ref 7.35–7.45)
PHOSPHATE SERPL-MCNC: 4 MG/DL — SIGNIFICANT CHANGE UP (ref 2.5–4.5)
PLAT MORPH BLD: NORMAL — SIGNIFICANT CHANGE UP
PLATELET # BLD AUTO: 155 K/UL — SIGNIFICANT CHANGE UP (ref 150–400)
PLATELET COUNT - ESTIMATE: NORMAL — SIGNIFICANT CHANGE UP
PO2 BLDA: 83 MMHG — SIGNIFICANT CHANGE UP (ref 83–108)
POIKILOCYTOSIS BLD QL AUTO: SLIGHT — SIGNIFICANT CHANGE UP
POTASSIUM SERPL-MCNC: 4.3 MMOL/L — SIGNIFICANT CHANGE UP (ref 3.5–5.3)
POTASSIUM SERPL-SCNC: 4.3 MMOL/L — SIGNIFICANT CHANGE UP (ref 3.5–5.3)
PROT SERPL-MCNC: 7.2 G/DL — SIGNIFICANT CHANGE UP (ref 6–8.3)
RBC # BLD: 3.38 M/UL — LOW (ref 3.8–5.2)
RBC # FLD: 15.2 % — HIGH (ref 10.3–14.5)
RBC BLD AUTO: ABNORMAL
S AUREUS DNA NOSE QL NAA+PROBE: SIGNIFICANT CHANGE UP
SAO2 % BLDA: 97 % — SIGNIFICANT CHANGE UP
SARS-COV-2 IGG+IGM SERPL QL IA: >250 U/ML — HIGH
SARS-COV-2 IGG+IGM SERPL QL IA: POSITIVE
SODIUM SERPL-SCNC: 134 MMOL/L — LOW (ref 135–145)
VARIANT LYMPHS # BLD: 2 % — SIGNIFICANT CHANGE UP (ref 0–6)
WBC # BLD: 12.72 K/UL — HIGH (ref 3.8–10.5)
WBC # FLD AUTO: 12.72 K/UL — HIGH (ref 3.8–10.5)

## 2021-07-23 PROCEDURE — 99233 SBSQ HOSP IP/OBS HIGH 50: CPT

## 2021-07-23 PROCEDURE — 71045 X-RAY EXAM CHEST 1 VIEW: CPT | Mod: 26

## 2021-07-23 RX ORDER — SODIUM CHLORIDE 9 MG/ML
250 INJECTION INTRAMUSCULAR; INTRAVENOUS; SUBCUTANEOUS ONCE
Refills: 0 | Status: COMPLETED | OUTPATIENT
Start: 2021-07-23 | End: 2021-07-23

## 2021-07-23 RX ORDER — IPRATROPIUM/ALBUTEROL SULFATE 18-103MCG
3 AEROSOL WITH ADAPTER (GRAM) INHALATION EVERY 6 HOURS
Refills: 0 | Status: DISCONTINUED | OUTPATIENT
Start: 2021-07-23 | End: 2021-07-27

## 2021-07-23 RX ADMIN — Medication 3 MILLILITER(S): at 12:15

## 2021-07-23 RX ADMIN — AMIODARONE HYDROCHLORIDE 200 MILLIGRAM(S): 400 TABLET ORAL at 05:43

## 2021-07-23 RX ADMIN — Medication 80 MILLIGRAM(S): at 05:44

## 2021-07-23 RX ADMIN — CHLORHEXIDINE GLUCONATE 1 APPLICATION(S): 213 SOLUTION TOPICAL at 05:44

## 2021-07-23 RX ADMIN — ATORVASTATIN CALCIUM 40 MILLIGRAM(S): 80 TABLET, FILM COATED ORAL at 21:50

## 2021-07-23 RX ADMIN — APIXABAN 2.5 MILLIGRAM(S): 2.5 TABLET, FILM COATED ORAL at 17:07

## 2021-07-23 RX ADMIN — Medication 1: at 17:11

## 2021-07-23 RX ADMIN — CEFEPIME 100 MILLIGRAM(S): 1 INJECTION, POWDER, FOR SOLUTION INTRAMUSCULAR; INTRAVENOUS at 03:14

## 2021-07-23 RX ADMIN — APIXABAN 2.5 MILLIGRAM(S): 2.5 TABLET, FILM COATED ORAL at 05:42

## 2021-07-23 RX ADMIN — MIDODRINE HYDROCHLORIDE 10 MILLIGRAM(S): 2.5 TABLET ORAL at 17:07

## 2021-07-23 RX ADMIN — LINEZOLID 300 MILLIGRAM(S): 600 INJECTION, SOLUTION INTRAVENOUS at 05:42

## 2021-07-23 RX ADMIN — Medication 1: at 12:10

## 2021-07-23 RX ADMIN — Medication 2: at 07:15

## 2021-07-23 RX ADMIN — SODIUM CHLORIDE 500 MILLILITER(S): 9 INJECTION INTRAMUSCULAR; INTRAVENOUS; SUBCUTANEOUS at 23:30

## 2021-07-23 RX ADMIN — Medication 25 MILLIGRAM(S): at 05:43

## 2021-07-23 RX ADMIN — Medication 3 MILLILITER(S): at 15:58

## 2021-07-23 RX ADMIN — Medication 3 MILLILITER(S): at 21:48

## 2021-07-23 NOTE — PROGRESS NOTE ADULT - ASSESSMENT
Patient is a 89 year of female with PMH of Afib, ESRD on HD, DM, HTN, PPM, chronic 02 at home presented with increasing SOB, cough, and fluid overload. On admission, patient was given IV antibiotics for possible pneumonia. Pt's CXR with congestive changes and no infiltrate. Pt's RVP positive for Entero/Rhino virus.  Patient transferred to the ICU. No clear evidence for pneumonia and antibiotics stopped yesterday. WBC decreasing. Pt's acute hypoxic respiratory failure likely due to virus and fluid overload.    Discussed above with ICU team, including Dr. Correa.  Please call again if needed.       Patient is a 89 year of female with PMH of Afib, ESRD on HD, DM, HTN, PPM, chronic 02 at home presented with increasing SOB, cough, and fluid overload. On admission, patient was given IV antibiotics for possible pneumonia. Pt's CXR with congestive changes and no infiltrate. Pt's RVP positive for Entero/Rhino virus.  Patient transferred to the ICU. No clear evidence for pneumonia and antibiotics stopped yesterday. WBC decreasing. Pt's acute hypoxic respiratory failure ikely due to virus and fluid overload in patient with underlying COPD.    Discussed above with ICU team, including Dr. Correa.  Please call again if needed.

## 2021-07-23 NOTE — ADVANCED PRACTICE NURSE CONSULT - ASSESSMENT
This is a 89yr old female patient admitted for Hypervolemia, presenting with the following:  -There is a Stage 1 Pressure Injury to the Bilateral Heels, as evident by non-blanchable erythema  -There is an intact DTI to the L. Gluteus without drainage; but with surrounding tissue blanchable erythema  -There is a DTI (x2) to the L. Gluteus with epidermal separation, red tissue, scant drainage, and surrounding tissue blanchable erythema

## 2021-07-23 NOTE — PROGRESS NOTE ADULT - ASSESSMENT
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 Pt a/w Entero/ Rhino virus, acute respiratory distress requiring BIPAP with concerns for fluid overload. Nephrology consulted for ESRD status.     1) ESRD: Last HD 7/22, with intradialytic hypotension (ehsan BP 64/42), with net 1274ml. Plan for additional HD session today and maintenance HD 7/24. Monitor electrolytes.  2) HTN 2/2 CKD- BP acceptable. Metoprolol 25mg PO q8hrs held.  Continue with Midodrine 10mg PO prior to HD to aid in fluid removal. Monitor BP  3) Anemia of renal disease: Hb acceptable. c/w Epogen 4k units IV tiw with HD. Monitor Hb.  4) Hyperphosphatemia: Phosphorus acceptable. No need for phos binder while NPO. Monitor serum calcium and phosphorus.      [facility] NEPHROLOGY  Brad Chen M.D.  Oneal Tim D.O.  Roselia Huitron M.D.  Milli Platt, MSN, ANP-C  [phone]    [address] [place] [address]

## 2021-07-23 NOTE — PROGRESS NOTE ADULT - SUBJECTIVE AND OBJECTIVE BOX
Subjective/Objective: patient transferred to the ICU yesterday. Patient is arousable; BiPAP cont.  Antibiotics d/c'd yesterday.         MEDS  ALBUTerol    90 MICROgram(s) HFA Inhaler 2 Puff(s) Inhalation every 6 hours PRN  albuterol/ipratropium for Nebulization 3 milliLiter(s) Nebulizer every 6 hours  aMIOdarone    Tablet 200 milliGRAM(s) Oral daily  apixaban 2.5 milliGRAM(s) Oral every 12 hours  ascorbic acid 500 milliGRAM(s) Oral daily  atorvastatin 40 milliGRAM(s) Oral at bedtime  bisacodyl Suppository 10 milliGRAM(s) Rectal daily PRN  chlorhexidine 2% Cloths 1 Application(s) Topical <User Schedule>  epoetin maria d-epbx (RETACRIT) Injectable 4000 Unit(s) IV Push <User Schedule>  insulin lispro (ADMELOG) corrective regimen sliding scale   SubCutaneous every 6 hours  midodrine. 10 milliGRAM(s) Oral <User Schedule> PRN  midodrine. 10 milliGRAM(s) Oral once  zinc sulfate 220 milliGRAM(s) Oral daily      PHYSICAL EXAM: limited, as pt is arousable and cont. on BiPAP    Vital Signs Last 24 Hrs  T(C): 36.1 (23 Jul 2021 05:20), Max: 36.7 (22 Jul 2021 14:35)  T(F): 97 (23 Jul 2021 05:20), Max: 98 (22 Jul 2021 14:35)  HR: 59 (23 Jul 2021 09:00) (54 - 71)  BP: 134/40 (23 Jul 2021 09:00) (96/75 - 154/60)  BP(mean): 64 (23 Jul 2021 09:00) (63 - 97)  RR: 18 (23 Jul 2021 09:00) (16 - 34)  SpO2: 100% (23 Jul 2021 09:00) (93% - 100%)    GEN: WD obese w female with BiPAP who is arousable and in NAD    HEENT: NC/AT    CHEST/Respiratory: rhonchi bilat.    Cardiovascular: distant heart sounds; cd not hear any murmurs    ABD: BS present; obese; soft and non-tender; + umbilical hernia    Genitourinary: Primafit in place    Extremities: no rash; no edema noted LE    Neurological: arousable; poorly communicative          LABS/DIAGNOSTIC TESTS                        10.2   12.72 )-----------( 155      ( 23 Jul 2021 07:08 )             32.0       WBC Count: 12.72 K/uL (07-23 @ 07:08)  WBC Count: 13.31 K/uL (07-22 @ 00:43)      07-23    134<L>  |  98  |  32<H>  ----------------------------<  219<H>  4.3   |  26  |  4.23<H>    Ca    8.8      23 Jul 2021 07:08  Phos  4.0     07-23  Mg     2.4     07-23    TPro  7.2  /  Alb  3.0<L>  /  TBili  0.5  /  DBili  x   /  AST  18  /  ALT  21  /  AlkPhos  153<H>  07-23          PT/INR - ( 22 Jul 2021 00:43 )   PT: 15.4 sec;   INR: 1.31 ratio         PTT - ( 22 Jul 2021 00:43 )  PTT:31.4 sec      CULTURES      Culture - Sputum (collected 07-22-21 @ 22:04)  Source: .Sputum Sputum  Gram Stain (07-22-21 @ 23:46):    No polymorphonuclear leukocytes per low power field    Few Squamous epithelial cells per low power field    Few Gram Negative Rods per oil power field    Few Gram positive cocci in pairs per oil power field    Few Gram Positive Rods per oil power field    Culture - Blood (collected 07-22-21 @ 06:27)  Source: .Blood Blood-Peripheral  Preliminary Report (07-23-21 @ 07:01):    No growth to date.    Culture - Blood (collected 07-22-21 @ 06:27)  Source: .Blood Blood-Peripheral  Preliminary Report (07-23-21 @ 07:01):    No growth to date.

## 2021-07-23 NOTE — ADVANCED PRACTICE NURSE CONSULT - RECOMMEDATIONS
-Clean all wounds with normal saline and apply skin prep to the surrounding skin  -Apply TRIAD Moisture Barrier Cream to the Bilateral Gluteal areas b.i.d. PRN  -Elevate/float the patients heels using heel protectors and reposition the patient Q 2hrs using wedges or pillows

## 2021-07-23 NOTE — PROGRESS NOTE ADULT - ASSESSMENT
Patient is a 89 year old female, wheel chair bound, from home, with PMH of Afib on Eliquis, ESRD on HD(Monday, Tuesday, Thursday, Saturday), DM on insulin, HTN, COPD on home O2 3L, who presented to the ED due to SOB. In ED, patient placed on BiPAP and had HD session with about 1.274L fluid removed and blood pressure noting to drop to 60s despite being given midodrine. Admitted to ICU Acute Hypoxic Respiratory Failure.     Assessment:   - Acute Hypoxic Respiratory Failure  - Fluid Overload 2/2 ESRD  - Respiratory Acidosis   - Enterovirus infection   - ESRD on HD   - Obesity  - COPD  - DM  - Anemia   - HTN   - Afib     Plan:  Neuro:  - AAO x3   - no issues     Cardiovascular:  #HTN  - hold metoprolol   - continue meds with hold parameters  - monitor BP closely   - continue midodrine prior to HD sessions     #Afib  - continue home med of amiodarone and eliquis   - monitor HR     #HLD  -continue statin     Pulmonary:   #Acute Hypoxic Respiratory Failure   - likely secondary to fluid overload 2/2 ESRD  - placed on BiPAP in ED; appeared to be saturating well on 3L NC (upper 90's)   - will take patient off BIPAP   - will stop  lasix 80mg IV daily, due to increase in venodilation   - monitor O2 sats   - f/u ABG in AM   - f/u repeat CXR in AM     #COPD   - patient and daughter states that she has "a little bit of COPD" but not on any meds at home  - continue BiPAP; taper off as tolerated  - no wheezing presently  - continue albuterol PRN   - monitor O2 sats     Infectious Diseases:  - noted to be enterovirus positive in ED   - empirically started on cefepime and zyvox in the ED  - f/u blood and sputum cx  - f/u MRSA swab  - ID, Dr. Doll consulted     Gastrointestinal:  - keep NPO while on BiPAP    Renal:  #ESRD  - patient with ESRD on HD Mon/Tue/Thu/Sat  - patient had HD session this morning with only 1.274L removed due to blood pressure going down to 60s   - plan for HD session tomorrow  - nephro, Dr. Huitron consulted   - given dose of lasix 80mg IV in ED; will continue with lasix 80mg IV for now   - monitor BMP daily   - avoid excessive fluids   - right sided permacath; patient used to have fistula on RUE     #Respiratory Acidosis  - pH of 7.24 and pCO2 of 54 noted on ABG  - continue BiPAP  - f/u repeat ABG in AM    Heme/onc:   #Anemia  - Hgb of 10.3 noted in ED  - appears stable  - no signs of bleeding noted  - likely 2/2 ESRD  - monitor CBC daily     #Leukocytosis  - noted to have WBC of 13K in ED, now at 12K afebrile   - enterovirus infection   - monitor CBC   - Discontinue Antibiotics     Endo:   #DM  - history of DM on insulin at home  - A1c of 7.8  - monitor BS q6 while NPO     Skin/ catheter:   - right sided permacath    Patient is a 89 year old female, wheel chair bound, from home, with PMH of Afib on Eliquis, ESRD on HD(Monday, Tuesday, Thursday, Saturday), DM on insulin, HTN, COPD on home O2 3L, who presented to the ED due to SOB. In ED, patient placed on BiPAP and had HD session with about 1.274L fluid removed and blood pressure noting to drop to 60s despite being given midodrine. Admitted to ICU Acute Hypoxic Respiratory Failure.     Assessment:   - Acute Hypoxic Respiratory Failure  - Fluid Overload 2/2 ESRD   - Enterovirus infection   - ESRD on HD   - Obesity  - COPD  - DM  - Anemia   - HTN   - Afib     Plan:  Neuro:  - AAO x3   - no issues     Cardiovascular:  #HTN  - hold metoprolol   - continue meds with hold parameters  - monitor BP closely   - continue midodrine prior to HD sessions     #Afib  - continue home med of amiodarone and eliquis   - monitor HR     #HLD  -continue statin     Pulmonary:   #Acute Hypoxic Respiratory Failure   - likely secondary to fluid overload 2/2 ESRD  - placed on BiPAP in ED; appeared to be saturating well on 3L NC (upper 90's)   - will take patient off BIPAP   - will stop  lasix 80mg IV daily  - monitor O2 sats     #COPD   - patient and daughter states that she has "a little bit of COPD" but not on any meds at home  - no wheezing presently  - continue albuterol PRN   - monitor O2 sats     Infectious Diseases:  - noted to be enterovirus positive in ED   - empirically started on cefepime and zyvox in the ED  - Per ID stop all antibiotics     Gastrointestinal:  - keep NPO while on BiPAP    Renal:  #ESRD  - patient with ESRD on HD Mon/Tue/Thu/Sat  - patient had HD session this morning with only 1.274L removed due to blood pressure going down to 60s   - plan for HD session today to remove increased fluid   - nephroDr. Huitron consulted   - monitor BMP daily   - avoid excessive fluids         Heme/onc:   #Anemia  - Hgb of 10.3 noted in ED, now Hgb 10.2   - appears stable  - no signs of bleeding noted  - likely 2/2 ESRD  - monitor CBC daily     #Leukocytosis  - noted to have WBC of 13K in ED, now at 12K afebrile   - enterovirus infection   - monitor CBC   - Discontinue Antibiotics     Endo:   #DM  - history of DM on insulin at home  - A1c of 7.8  - monitor BS q6 while NPO     Skin/ catheter:   - right sided permacath

## 2021-07-23 NOTE — CHART NOTE - NSCHARTNOTEFT_GEN_A_CORE
Spoke to Agustin Hoffman in person, he brought a copy of the MOLST form. Given update on mother's condition and dialysis settings. The son has stated the daughter Cece   [phone] is the health care proxy and should be contacted first for any updates or changes in the condition of her mother.

## 2021-07-23 NOTE — CHART NOTE - NSCHARTNOTEFT_GEN_A_CORE
Spoke to patient's son named Agustin Hoffman, he stated that he was the health proxy for his mother and that he had a MOLST form. Gave son a brief update regarding his mother's condition. Patient's son said he will visit her before dialysis today.

## 2021-07-23 NOTE — CHART NOTE - NSCHARTNOTEFT_GEN_A_CORE
Patient's daughter Cece called regarding the condition of her mother. She was updated. Patient's daughter says her mother's dialysis settings are very specific, requiring a lower blood flow rate. She was reassured. Dr. Huitron made aware.

## 2021-07-23 NOTE — PROGRESS NOTE ADULT - ATTENDING COMMENTS
Assessment:  1. Acute on chronic hypoxic respiratory failure  2. Enterovirus infection  3. Volume overload  4. ESRD   5. Atrial fibrillation  6. Class 1 obesity  7. Diabetes mellitus  8. Chronic anemia    Plan  - Respiratory status likely due to viral bronchitis and component of volume overload  - Cont. bipap support  - Add bronchodilators   - Given episodes of hypotension, will d/c metoprolol for now  - Cont. amiodarone for rate control  - D/c antibiotics as no evidence of pneumonia on CXR   - Plan for HD again today   - Hemodynamically stable, may need midodrine predialysis   - Monitor fingerstick glucose  - Once off NIV, can start oral diet  - Aspiration precautions  - Palliative follow up   - Prognosis is guarded

## 2021-07-23 NOTE — PROGRESS NOTE ADULT - SUBJECTIVE AND OBJECTIVE BOX
INTERVAL HPI/OVERNIGHT EVENTS:   89 yo F, wheel chair bound, from home with PMH of Afib on Eliquis, ESRD on HD(Monday, Tuesday, Thursday, Saturday), DM on insulin, HTN, Home O2 3L presented with shortness of breath, vomiting x1 day.  Patient was placed on BiPAP overnight and had HD session yesterday morning. Patient was placed on 3L O2 and saturating well but was tachypneic still and having increased work of breathing. Patient was admitted to ICU due to Acute Hypoxic Respiratory failure. Overnight, patient was on 3L NC saturating in upper 90's. However, patient felt "she was running out of air" and was ptu back on BIPAP.   PRESSORS: [ ] YES [ ] NO  WHICH:    ANTIBIOTICS:                  DATE STARTED:  ANTIBIOTICS:                  DATE STARTED:  ANTIBIOTICS:                  DATE STARTED:    Antimicrobial:    Cardiovascular:  aMIOdarone    Tablet 200 milliGRAM(s) Oral daily  midodrine. 10 milliGRAM(s) Oral <User Schedule> PRN  midodrine. 10 milliGRAM(s) Oral once    Pulmonary:  ALBUTerol    90 MICROgram(s) HFA Inhaler 2 Puff(s) Inhalation every 6 hours PRN  albuterol/ipratropium for Nebulization 3 milliLiter(s) Nebulizer every 6 hours    Hematalogic:  apixaban 2.5 milliGRAM(s) Oral every 12 hours    Other:  ascorbic acid 500 milliGRAM(s) Oral daily  atorvastatin 40 milliGRAM(s) Oral at bedtime  bisacodyl Suppository 10 milliGRAM(s) Rectal daily PRN  chlorhexidine 2% Cloths 1 Application(s) Topical <User Schedule>  epoetin maria d-epbx (RETACRIT) Injectable 4000 Unit(s) IV Push <User Schedule>  insulin lispro (ADMELOG) corrective regimen sliding scale   SubCutaneous every 6 hours  zinc sulfate 220 milliGRAM(s) Oral daily      Drug Dosing Weight  Height (cm): 165 (23 Jul 2021 10:00)  Weight (kg): 82 (22 Jul 2021 18:45)  BMI (kg/m2): 30.1 (23 Jul 2021 10:00)  BSA (m2): 1.89 (23 Jul 2021 10:00)    CENTRAL LINE: [ ] YES [ ] NO  LOCATION:   DATE INSERTED:  REMOVE: [ ] YES [ ] NO  EXPLAIN:    STUBBS: [ ] YES [ ] NO    DATE INSERTED:  REMOVE:  [ ] YES [ ] NO  EXPLAIN:    A-LINE:  [ ] YES [ ] NO  LOCATION:   DATE INSERTED:  REMOVE:  [ ] YES [ ] NO  EXPLAIN:    PMH/Social Hx/Fam Hx -reviewed admission note, no change since admission  PAST MEDICAL & SURGICAL HISTORY:  HTN (hypertension)    HLD (hyperlipidemia)    CKD (chronic kidney disease)    Afib    DM (diabetes mellitus)    Artificial pacemaker      Heart faliure: acute [ ] chronic [ ] acute or chronic [ ] diastolic [ ] systolic [ ] combied systolic and diastolic[ ]  ANGI: ATN[ ] renal medullary necrosis [ ] CKD I [ ]CKDII [ ]CKD III [ ]CKD IV [ ]CKD V [ ]Other pathological lesions [ ]  Abdominal Nutrition Status: malnutrition [ ] cachexia [ ] morbid obesity/BMI=40 [ ] Supplement ordered [___________]     T(C): 36.1 (07-23-21 @ 05:20), Max: 36.7 (07-22-21 @ 14:35)  HR: 59 (07-23-21 @ 09:00)  BP: 134/40 (07-23-21 @ 09:00)  BP(mean): 64 (07-23-21 @ 09:00)  ABP: --  ABP(mean): --  RR: 18 (07-23-21 @ 09:00)  SpO2: 100% (07-23-21 @ 09:00)  Wt(kg): --    ABG - ( 23 Jul 2021 04:17 )  pH, Arterial: 7.29  pH, Blood: x     /  pCO2: 56    /  pO2: 83    / HCO3: 27    / Base Excess: -0.7  /  SaO2: 97                    07-22 @ 07:01  -  07-23 @ 07:00  --------------------------------------------------------  IN: 1850 mL / OUT: 2284 mL / NET: -434 mL            PHYSICAL EXAM:    GENERAL: [ ]NAD, [ ]well-groomed, [ ]well-developed  HEAD:  [ ]Atraumatic, [ ]Normocephalic  EYES: [ ]EOMI, [ ]PERRLA, [ ]conjunctiva and sclera clear  ENMT: [ ]No tonsillar erythema, exudates, or enlargement; [ ]Moist mucous membranes, [ ]Good dentition, [ ]No lesions  NECK: [ ]Supple, normal appearance, [ ]No JVD; [ ]Normal thyroid; [ ]Trachea midline  NERVOUS SYSTEM:  [ ]Alert & Oriented X3, [ ]Good concentration; [ ]Motor Strength 5/5 B/L upper and lower extremities; [ ]DTRs 2+ intact and symmetric  CHEST/LUNG: [ ]No chest deformity; [ ]Normal percussion bilaterally; [ ]No rales, rhonchi, wheezing; [ ]Crackles at bases  HEART: [ ]Regular rate and rhythm; [ ]No murmurs, rubs, or gallops  ABDOMEN: [ ]Soft, Nontender, Nondistended; [ ]Bowel sounds present  EXTREMITIES:  [ ]2+ Peripheral Pulses, [ ]No clubbing, cyanosis, or edema [ ]Bilat lower extremity edema  LYMPH: [ ]No lymphadenopathy noted  SKIN: [ ]No rashes or lesions; [ ]Good capillary refill      LABS:  CBC Full  -  ( 23 Jul 2021 07:08 )  WBC Count : 12.72 K/uL  RBC Count : 3.38 M/uL  Hemoglobin : 10.2 g/dL  Hematocrit : 32.0 %  Platelet Count - Automated : 155 K/uL  Mean Cell Volume : 94.7 fl  Mean Cell Hemoglobin : 30.2 pg  Mean Cell Hemoglobin Concentration : 31.9 gm/dL  Auto Neutrophil # : 10.05 K/uL  Auto Lymphocyte # : 0.76 K/uL  Auto Monocyte # : 1.40 K/uL  Auto Eosinophil # : 0.13 K/uL  Auto Basophil # : 0.00 K/uL  Auto Neutrophil % : 67.0 %  Auto Lymphocyte % : 6.0 %  Auto Monocyte % : 11.0 %  Auto Eosinophil % : 1.0 %  Auto Basophil % : 0.0 %    07-23    134<L>  |  98  |  32<H>  ----------------------------<  219<H>  4.3   |  26  |  4.23<H>    Ca    8.8      23 Jul 2021 07:08  Phos  4.0     07-23  Mg     2.4     07-23    TPro  7.2  /  Alb  3.0<L>  /  TBili  0.5  /  DBili  x   /  AST  18  /  ALT  21  /  AlkPhos  153<H>  07-23    PT/INR - ( 22 Jul 2021 00:43 )   PT: 15.4 sec;   INR: 1.31 ratio         PTT - ( 22 Jul 2021 00:43 )  PTT:31.4 sec    Culture Results:   No growth to date. (07-22 @ 06:27)  Culture Results:   No growth to date. (07-22 @ 06:27)      RADIOLOGY & ADDITIONAL STUDIES REVIEWED:      [ ]GOALS OF CARE DISCUSSION WITH PATIENT/FAMILY/PROXY:    CRITICAL CARE TIME SPENT: 35 minutes INTERVAL HPI/OVERNIGHT EVENTS:   89 yo F, wheel chair bound, from home with PMH of Afib on Eliquis, ESRD on HD(Monday, Tuesday, Thursday, Saturday), DM on insulin, HTN, Home O2 3L presented with shortness of breath, vomiting x1 day.  Patient was placed on BiPAP overnight and had HD session yesterday morning. Patient was placed on 3L O2 and saturating well but was tachypneic still and having increased work of breathing. Patient was admitted to ICU due to Acute Hypoxic Respiratory failure. Overnight, patient was on 3L NC saturating in upper 90's. However, patient felt "she was running out of air" and was put back on BIPAP.   PRESSORS: [ ] YES [X ] NO  WHICH:    ANTIBIOTICS:                  DATE STARTED:  ANTIBIOTICS:                  DATE STARTED:  ANTIBIOTICS:                  DATE STARTED:    Antimicrobial:    Cardiovascular:  aMIOdarone    Tablet 200 milliGRAM(s) Oral daily  midodrine. 10 milliGRAM(s) Oral <User Schedule> PRN  midodrine. 10 milliGRAM(s) Oral once    Pulmonary:  ALBUTerol    90 MICROgram(s) HFA Inhaler 2 Puff(s) Inhalation every 6 hours PRN  albuterol/ipratropium for Nebulization 3 milliLiter(s) Nebulizer every 6 hours    Hematalogic:  apixaban 2.5 milliGRAM(s) Oral every 12 hours    Other:  ascorbic acid 500 milliGRAM(s) Oral daily  atorvastatin 40 milliGRAM(s) Oral at bedtime  bisacodyl Suppository 10 milliGRAM(s) Rectal daily PRN  chlorhexidine 2% Cloths 1 Application(s) Topical <User Schedule>  epoetin maria d-epbx (RETACRIT) Injectable 4000 Unit(s) IV Push <User Schedule>  insulin lispro (ADMELOG) corrective regimen sliding scale   SubCutaneous every 6 hours  zinc sulfate 220 milliGRAM(s) Oral daily      Drug Dosing Weight  Height (cm): 165 (23 Jul 2021 10:00)  Weight (kg): 82 (22 Jul 2021 18:45)  BMI (kg/m2): 30.1 (23 Jul 2021 10:00)  BSA (m2): 1.89 (23 Jul 2021 10:00)    CENTRAL LINE: [ ] YES [X ] NO  LOCATION:   DATE INSERTED:  REMOVE: [ ] YES [ ] NO  EXPLAIN:    STUBBS: [ ] YES [X ] NO    DATE INSERTED:  REMOVE:  [ ] YES [ ] NO  EXPLAIN:    A-LINE:  [ ] YES [X ] NO  LOCATION:   DATE INSERTED:  REMOVE:  [ ] YES [ ] NO  EXPLAIN:    PMH/Social Hx/Fam Hx -reviewed admission note, no change since admission  PAST MEDICAL & SURGICAL HISTORY:  HTN (hypertension)    HLD (hyperlipidemia)    CKD (chronic kidney disease)    Afib    DM (diabetes mellitus)    Artificial pacemaker    T(C): 36.1 (07-23-21 @ 05:20), Max: 36.7 (07-22-21 @ 14:35)  HR: 59 (07-23-21 @ 09:00)  BP: 134/40 (07-23-21 @ 09:00)  BP(mean): 64 (07-23-21 @ 09:00)  ABP: --  ABP(mean): --  RR: 18 (07-23-21 @ 09:00)  SpO2: 100% (07-23-21 @ 09:00)  Wt(kg): --    ABG - ( 23 Jul 2021 04:17 )  pH, Arterial: 7.29  pH, Blood: x     /  pCO2: 56    /  pO2: 83    / HCO3: 27    / Base Excess: -0.7  /  SaO2: 97                    07-22 @ 07:01  -  07-23 @ 07:00  --------------------------------------------------------  IN: 1850 mL / OUT: 2284 mL / NET: -434 mL            PHYSICAL EXAM:    GENERAL: NAD, well-groomed, well-developed on BIPAP machine   HEAD:  Atraumatic, Normocephalic  EYES: conjunctiva and sclera clear  ENMT: No tonsillar erythema, exudates, or enlargement; Moist mucous membranes,   NECK: Supple, normal appearance, No JVD; Trachea midline  NERVOUS SYSTEM:  Alert & Oriented X3, Good concentration; Motor Strength 5/5 B/L upper and lower extremities  CHEST/LUNG: No chest deformity; No rales, rhonchi, wheezing  HEART: Regular rate and rhythm; No murmurs, rubs, or gallops  ABDOMEN: Soft, Nontender, Nondistended; Bowel sounds present, reducible umbilical hernia   EXTREMITIES: 2+ Peripheral Pulses,  LYMPH: [ ]No lymphadenopathy noted  SKIN: On left arm, ecchymosis noted on forearm, right sided permacath       LABS:  CBC Full  -  ( 23 Jul 2021 07:08 )  WBC Count : 12.72 K/uL  RBC Count : 3.38 M/uL  Hemoglobin : 10.2 g/dL  Hematocrit : 32.0 %  Platelet Count - Automated : 155 K/uL  Mean Cell Volume : 94.7 fl  Mean Cell Hemoglobin : 30.2 pg  Mean Cell Hemoglobin Concentration : 31.9 gm/dL  Auto Neutrophil # : 10.05 K/uL  Auto Lymphocyte # : 0.76 K/uL  Auto Monocyte # : 1.40 K/uL  Auto Eosinophil # : 0.13 K/uL  Auto Basophil # : 0.00 K/uL  Auto Neutrophil % : 67.0 %  Auto Lymphocyte % : 6.0 %  Auto Monocyte % : 11.0 %  Auto Eosinophil % : 1.0 %  Auto Basophil % : 0.0 %    07-23    134<L>  |  98  |  32<H>  ----------------------------<  219<H>  4.3   |  26  |  4.23<H>    Ca    8.8      23 Jul 2021 07:08  Phos  4.0     07-23  Mg     2.4     07-23    TPro  7.2  /  Alb  3.0<L>  /  TBili  0.5  /  DBili  x   /  AST  18  /  ALT  21  /  AlkPhos  153<H>  07-23    PT/INR - ( 22 Jul 2021 00:43 )   PT: 15.4 sec;   INR: 1.31 ratio         PTT - ( 22 Jul 2021 00:43 )  PTT:31.4 sec    Culture Results:   No growth to date. (07-22 @ 06:27)  Culture Results:   No growth to date. (07-22 @ 06:27)      RADIOLOGY & ADDITIONAL STUDIES REVIEWED:      GOALS OF CARE DISCUSSION WITH PATIENT/FAMILY/PROXY:    CRITICAL CARE TIME SPENT: 35 minutes

## 2021-07-24 LAB
ALBUMIN SERPL ELPH-MCNC: 2.7 G/DL — LOW (ref 3.5–5)
ALP SERPL-CCNC: 145 U/L — HIGH (ref 40–120)
ALT FLD-CCNC: 22 U/L DA — SIGNIFICANT CHANGE UP (ref 10–60)
ANION GAP SERPL CALC-SCNC: 11 MMOL/L — SIGNIFICANT CHANGE UP (ref 5–17)
ANION GAP SERPL CALC-SCNC: 15 MMOL/L — SIGNIFICANT CHANGE UP (ref 5–17)
AST SERPL-CCNC: 20 U/L — SIGNIFICANT CHANGE UP (ref 10–40)
BASE EXCESS BLDV CALC-SCNC: -3.7 MMOL/L — SIGNIFICANT CHANGE UP
BILIRUB SERPL-MCNC: 0.5 MG/DL — SIGNIFICANT CHANGE UP (ref 0.2–1.2)
BLOOD GAS COMMENTS, VENOUS: SIGNIFICANT CHANGE UP
BUN SERPL-MCNC: 15 MG/DL — SIGNIFICANT CHANGE UP (ref 7–18)
BUN SERPL-MCNC: 25 MG/DL — HIGH (ref 7–18)
CALCIUM SERPL-MCNC: 8.9 MG/DL — SIGNIFICANT CHANGE UP (ref 8.4–10.5)
CALCIUM SERPL-MCNC: 8.9 MG/DL — SIGNIFICANT CHANGE UP (ref 8.4–10.5)
CHLORIDE SERPL-SCNC: 102 MMOL/L — SIGNIFICANT CHANGE UP (ref 96–108)
CHLORIDE SERPL-SCNC: 98 MMOL/L — SIGNIFICANT CHANGE UP (ref 96–108)
CO2 SERPL-SCNC: 22 MMOL/L — SIGNIFICANT CHANGE UP (ref 22–31)
CO2 SERPL-SCNC: 26 MMOL/L — SIGNIFICANT CHANGE UP (ref 22–31)
CREAT SERPL-MCNC: 2.46 MG/DL — HIGH (ref 0.5–1.3)
CREAT SERPL-MCNC: 3.63 MG/DL — HIGH (ref 0.5–1.3)
CULTURE RESULTS: SIGNIFICANT CHANGE UP
GLUCOSE BLDC GLUCOMTR-MCNC: 262 MG/DL — HIGH (ref 70–99)
GLUCOSE SERPL-MCNC: 198 MG/DL — HIGH (ref 70–99)
GLUCOSE SERPL-MCNC: 239 MG/DL — HIGH (ref 70–99)
HCO3 BLDV-SCNC: 24 MMOL/L — SIGNIFICANT CHANGE UP (ref 22–29)
HCT VFR BLD CALC: 30.2 % — LOW (ref 34.5–45)
HGB BLD-MCNC: 9.2 G/DL — LOW (ref 11.5–15.5)
HOROWITZ INDEX BLDV+IHG-RTO: 40 — SIGNIFICANT CHANGE UP
MAGNESIUM SERPL-MCNC: 2.1 MG/DL — SIGNIFICANT CHANGE UP (ref 1.6–2.6)
MAGNESIUM SERPL-MCNC: 2.2 MG/DL — SIGNIFICANT CHANGE UP (ref 1.6–2.6)
MCHC RBC-ENTMCNC: 29.1 PG — SIGNIFICANT CHANGE UP (ref 27–34)
MCHC RBC-ENTMCNC: 30.5 GM/DL — LOW (ref 32–36)
MCV RBC AUTO: 95.6 FL — SIGNIFICANT CHANGE UP (ref 80–100)
NRBC # BLD: 0 /100 WBCS — SIGNIFICANT CHANGE UP (ref 0–0)
PCO2 BLDV: 50 MMHG — HIGH (ref 39–42)
PH BLDV: 7.28 — LOW (ref 7.32–7.43)
PHOSPHATE SERPL-MCNC: 2.1 MG/DL — LOW (ref 2.5–4.5)
PHOSPHATE SERPL-MCNC: 4 MG/DL — SIGNIFICANT CHANGE UP (ref 2.5–4.5)
PLATELET # BLD AUTO: 256 K/UL — SIGNIFICANT CHANGE UP (ref 150–400)
PO2 BLDV: 35 MMHG — SIGNIFICANT CHANGE UP
POTASSIUM SERPL-MCNC: 3.4 MMOL/L — LOW (ref 3.5–5.3)
POTASSIUM SERPL-MCNC: 3.5 MMOL/L — SIGNIFICANT CHANGE UP (ref 3.5–5.3)
POTASSIUM SERPL-SCNC: 3.4 MMOL/L — LOW (ref 3.5–5.3)
POTASSIUM SERPL-SCNC: 3.5 MMOL/L — SIGNIFICANT CHANGE UP (ref 3.5–5.3)
PROT SERPL-MCNC: 6.8 G/DL — SIGNIFICANT CHANGE UP (ref 6–8.3)
RBC # BLD: 3.16 M/UL — LOW (ref 3.8–5.2)
RBC # FLD: 15.3 % — HIGH (ref 10.3–14.5)
SAO2 % BLDV: 64.9 % — SIGNIFICANT CHANGE UP
SODIUM SERPL-SCNC: 135 MMOL/L — SIGNIFICANT CHANGE UP (ref 135–145)
SODIUM SERPL-SCNC: 139 MMOL/L — SIGNIFICANT CHANGE UP (ref 135–145)
SPECIMEN SOURCE: SIGNIFICANT CHANGE UP
WBC # BLD: 12.97 K/UL — HIGH (ref 3.8–10.5)
WBC # FLD AUTO: 12.97 K/UL — HIGH (ref 3.8–10.5)

## 2021-07-24 PROCEDURE — 36556 INSERT NON-TUNNEL CV CATH: CPT | Mod: GC

## 2021-07-24 PROCEDURE — 71045 X-RAY EXAM CHEST 1 VIEW: CPT | Mod: 26

## 2021-07-24 RX ORDER — PHENYLEPHRINE HYDROCHLORIDE 10 MG/ML
0.4 INJECTION INTRAVENOUS
Qty: 40 | Refills: 0 | Status: DISCONTINUED | OUTPATIENT
Start: 2021-07-24 | End: 2021-07-25

## 2021-07-24 RX ORDER — ACETAMINOPHEN 500 MG
650 TABLET ORAL ONCE
Refills: 0 | Status: DISCONTINUED | OUTPATIENT
Start: 2021-07-24 | End: 2021-07-24

## 2021-07-24 RX ORDER — PHENYLEPHRINE HYDROCHLORIDE 10 MG/ML
0.5 INJECTION INTRAVENOUS
Qty: 40 | Refills: 0 | Status: DISCONTINUED | OUTPATIENT
Start: 2021-07-24 | End: 2021-07-24

## 2021-07-24 RX ORDER — PHENYLEPHRINE HYDROCHLORIDE 10 MG/ML
0.5 INJECTION INTRAVENOUS
Qty: 160 | Refills: 0 | Status: DISCONTINUED | OUTPATIENT
Start: 2021-07-24 | End: 2021-07-24

## 2021-07-24 RX ORDER — ALBUMIN HUMAN 25 %
100 VIAL (ML) INTRAVENOUS ONCE
Refills: 0 | Status: DISCONTINUED | OUTPATIENT
Start: 2021-07-24 | End: 2021-07-24

## 2021-07-24 RX ORDER — ALBUMIN HUMAN 25 %
50 VIAL (ML) INTRAVENOUS ONCE
Refills: 0 | Status: COMPLETED | OUTPATIENT
Start: 2021-07-24 | End: 2021-07-24

## 2021-07-24 RX ORDER — INSULIN LISPRO 100/ML
VIAL (ML) SUBCUTANEOUS
Refills: 0 | Status: DISCONTINUED | OUTPATIENT
Start: 2021-07-24 | End: 2021-08-03

## 2021-07-24 RX ORDER — SODIUM CHLORIDE 9 MG/ML
10 INJECTION INTRAMUSCULAR; INTRAVENOUS; SUBCUTANEOUS
Refills: 0 | Status: DISCONTINUED | OUTPATIENT
Start: 2021-07-24 | End: 2021-08-03

## 2021-07-24 RX ORDER — DILTIAZEM HCL 120 MG
5 CAPSULE, EXT RELEASE 24 HR ORAL ONCE
Refills: 0 | Status: COMPLETED | OUTPATIENT
Start: 2021-07-24 | End: 2021-07-24

## 2021-07-24 RX ORDER — DIGOXIN 250 MCG
250 TABLET ORAL ONCE
Refills: 0 | Status: COMPLETED | OUTPATIENT
Start: 2021-07-24 | End: 2021-07-24

## 2021-07-24 RX ORDER — CHLORHEXIDINE GLUCONATE 213 G/1000ML
1 SOLUTION TOPICAL
Refills: 0 | Status: DISCONTINUED | OUTPATIENT
Start: 2021-07-24 | End: 2021-07-24

## 2021-07-24 RX ADMIN — Medication 1: at 17:26

## 2021-07-24 RX ADMIN — Medication 5 MILLIGRAM(S): at 03:30

## 2021-07-24 RX ADMIN — Medication 50 MILLILITER(S): at 11:46

## 2021-07-24 RX ADMIN — Medication 3 MILLILITER(S): at 08:14

## 2021-07-24 RX ADMIN — Medication 3 MILLILITER(S): at 15:02

## 2021-07-24 RX ADMIN — Medication 2: at 06:04

## 2021-07-24 RX ADMIN — CHLORHEXIDINE GLUCONATE 1 APPLICATION(S): 213 SOLUTION TOPICAL at 05:48

## 2021-07-24 RX ADMIN — ERYTHROPOIETIN 4000 UNIT(S): 10000 INJECTION, SOLUTION INTRAVENOUS; SUBCUTANEOUS at 12:22

## 2021-07-24 RX ADMIN — PHENYLEPHRINE HYDROCHLORIDE 15.4 MICROGRAM(S)/KG/MIN: 10 INJECTION INTRAVENOUS at 00:50

## 2021-07-24 RX ADMIN — PHENYLEPHRINE HYDROCHLORIDE 15.4 MICROGRAM(S)/KG/MIN: 10 INJECTION INTRAVENOUS at 11:04

## 2021-07-24 RX ADMIN — Medication 50 MILLILITER(S): at 11:33

## 2021-07-24 RX ADMIN — APIXABAN 2.5 MILLIGRAM(S): 2.5 TABLET, FILM COATED ORAL at 05:47

## 2021-07-24 RX ADMIN — Medication 250 MICROGRAM(S): at 02:59

## 2021-07-24 RX ADMIN — Medication 3 MILLILITER(S): at 20:26

## 2021-07-24 RX ADMIN — PHENYLEPHRINE HYDROCHLORIDE 12.3 MICROGRAM(S)/KG/MIN: 10 INJECTION INTRAVENOUS at 18:01

## 2021-07-24 RX ADMIN — Medication 3 MILLILITER(S): at 03:04

## 2021-07-24 RX ADMIN — MIDODRINE HYDROCHLORIDE 10 MILLIGRAM(S): 2.5 TABLET ORAL at 10:49

## 2021-07-24 RX ADMIN — AMIODARONE HYDROCHLORIDE 200 MILLIGRAM(S): 400 TABLET ORAL at 05:47

## 2021-07-24 RX ADMIN — PHENYLEPHRINE HYDROCHLORIDE 7.69 MICROGRAM(S)/KG/MIN: 10 INJECTION INTRAVENOUS at 09:51

## 2021-07-24 RX ADMIN — ATORVASTATIN CALCIUM 40 MILLIGRAM(S): 80 TABLET, FILM COATED ORAL at 21:09

## 2021-07-24 RX ADMIN — Medication 3: at 10:48

## 2021-07-24 RX ADMIN — APIXABAN 2.5 MILLIGRAM(S): 2.5 TABLET, FILM COATED ORAL at 17:28

## 2021-07-24 NOTE — PROGRESS NOTE ADULT - ASSESSMENT
Patient is a 89 year old female, wheel chair bound, from home, with PMH of Afib on Eliquis, ESRD on HD(Monday, Tuesday, Thursday, Saturday), DM on insulin, HTN, COPD on home O2 3L, who presented to the ED due to SOB. In ED, patient placed on BiPAP and had HD session with about 1.274L fluid removed and blood pressure noting to drop to 60s despite being given midodrine. Admitted to ICU Acute Hypoxic Respiratory Failure.     Assessment:   - Acute Hypoxic Respiratory Failure  - Fluid Overload 2/2 ESRD   - Enterovirus infection   - ESRD on HD   - Obesity  - COPD  - DM  - Anemia   - HTN   - Afib     Plan:  Neuro:  - AAO x3   - no issues     Cardiovascular:  #HTN  - hold metoprolol   - continue meds with hold parameters  - monitor BP closely   - continue midodrine prior to HD sessions     #Afib  - continue home med of amiodarone and eliquis   - monitor HR     #HLD  -continue statin     Pulmonary:   #Acute Hypoxic Respiratory Failure   - likely secondary to fluid overload 2/2 ESRD  - placed on BiPAP in ED; appeared to be saturating well on 3L NC (upper 90's)   - will take patient off BIPAP   - will stop  lasix 80mg IV daily  - monitor O2 sats     #COPD   - patient and daughter states that she has "a little bit of COPD" but not on any meds at home  - no wheezing presently  - continue albuterol PRN   - monitor O2 sats     Infectious Diseases:  - noted to be enterovirus positive in ED   - empirically started on cefepime and zyvox in the ED  - Per ID stop all antibiotics     Gastrointestinal:  - keep NPO while on BiPAP    Renal:  #ESRD  - patient with ESRD on HD Mon/Tue/Thu/Sat  - patient had HD session this morning with only 1.274L removed due to blood pressure going down to 60s   - plan for HD session today to remove increased fluid   - nephroDr. Huitron consulted   - monitor BMP daily   - avoid excessive fluids     Heme/onc:   #Anemia  - Hgb of 10.3 noted in ED, now Hgb 10.2   - appears stable  - no signs of bleeding noted  - likely 2/2 ESRD  - monitor CBC daily     #Leukocytosis  - noted to have WBC of 13K in ED, now at 12K afebrile   - enterovirus infection   - monitor CBC   - Discontinue Antibiotics     Endo:   #DM  - history of DM on insulin at home  - A1c of 7.8  - monitor BS q6 while NPO     Skin/ catheter:   - right sided permacath   - [facility]   Patient is a 89 year old female, wheel chair bound, from home, with PMH of Afib on Eliquis, ESRD on HD(Monday, Tuesday, Thursday, Saturday), DM on insulin, HTN, COPD on home O2 3L, who presented to the ED due to SOB. In ED, patient placed on BiPAP and had HD session with about 1.274L fluid removed and blood pressure noting to drop to 60s despite being given midodrine. Admitted to ICU Acute Hypoxic Respiratory Failure. Overnight patient became hypotensive, after dialysis. Central line was placed Left IJ. Patient noted to have atrial fibrillation 1 dose digoxin given.     Assessment:   - Acute Hypoxic Respiratory Failure  - Fluid Overload 2/2 ESRD   - Enterovirus infection   - ESRD on HD   - Obesity  - COPD  - DM  - Anemia   - HTN   - Afib     Plan:  Neuro:  - AAO x3   - no issues     Cardiovascular:  - patient hypotensive after dialysis, phenylephrine drip started   #HTN  - hold metoprolol   - continue meds with hold parameters  - monitor BP closely   - continue midodrine prior to HD sessions   - will give albumin 100ml   #Afib  - had an episode last night  - 1 dose digoxin 250mg PO  given  - continue home med of amiodarone and eliquis   - monitor HR     #HLD  -continue statin     Pulmonary:   #Acute Hypoxic Respiratory Failure   - likely secondary to fluid overload 2/2 ESRD  - placed on BiPAP in ED; appeared to be saturating well on 4L NC (upper 90's)   - patient off BIPAP   - monitor O2 sats     #COPD   - patient and daughter states that she has "a little bit of COPD" but not on any meds at home just home oxygen at 3L NC   - no wheezing presently  - continue albuterol PRN   - monitor O2 sats     Infectious Diseases:  - noted to be enterovirus positive in ED   - empirically started on cefepime and zyvox in the ED  - Per ID stop all antibiotics   - nted to have bandemia 12%     Gastrointestinal:  - start on renal diet, off BIPAP    Renal:  #ESRD  - patient with ESRD on HD Mon/Tue/Thu/Sat  -  plan for HD session today to remove increased fluid   - Albumin 100mL   - nephDr. Tomy you consulted   - monitor BMP daily   - avoid excessive fluids     Heme/onc:   #Anemia  - Hgb of 9.2 prior Hgb 10.2   - appears stable  - no signs of bleeding noted  - likely 2/2 ESRD  - monitor CBC daily     #Leukocytosis  - noted to have WBC of 13K in ED, now at 12K afebrile   - enterovirus infection   - monitor CBC   - Discontinue Antibiotics     Endo:   #DM  - history of DM on insulin at home  - A1c of 7.8  - monitor BS q6 while NPO     Skin/ catheter:   - right sided permacath   - Left IJ central line

## 2021-07-24 NOTE — PROGRESS NOTE ADULT - SUBJECTIVE AND OBJECTIVE BOX
INTERVAL HPI/OVERNIGHT EVENTS: No acute events ov    Antimicrobial:    Cardiovascular:  aMIOdarone    Tablet 200 milliGRAM(s) Oral daily  midodrine. 10 milliGRAM(s) Oral <User Schedule> PRN  phenylephrine    Infusion 0.5 MICROgram(s)/kG/Min IV Continuous <Continuous>    Pulmonary:  ALBUTerol    90 MICROgram(s) HFA Inhaler 2 Puff(s) Inhalation every 6 hours PRN  albuterol/ipratropium for Nebulization 3 milliLiter(s) Nebulizer every 6 hours    Hematalogic:  apixaban 2.5 milliGRAM(s) Oral every 12 hours    Other:  ascorbic acid 500 milliGRAM(s) Oral daily  atorvastatin 40 milliGRAM(s) Oral at bedtime  bisacodyl Suppository 10 milliGRAM(s) Rectal daily PRN  chlorhexidine 2% Cloths 1 Application(s) Topical <User Schedule>  epoetin maria d-epbx (RETACRIT) Injectable 4000 Unit(s) IV Push <User Schedule>  insulin lispro (ADMELOG) corrective regimen sliding scale   SubCutaneous every 6 hours  zinc sulfate 220 milliGRAM(s) Oral daily    ALBUTerol    90 MICROgram(s) HFA Inhaler 2 Puff(s) Inhalation every 6 hours PRN  albuterol/ipratropium for Nebulization 3 milliLiter(s) Nebulizer every 6 hours  aMIOdarone    Tablet 200 milliGRAM(s) Oral daily  apixaban 2.5 milliGRAM(s) Oral every 12 hours  ascorbic acid 500 milliGRAM(s) Oral daily  atorvastatin 40 milliGRAM(s) Oral at bedtime  bisacodyl Suppository 10 milliGRAM(s) Rectal daily PRN  chlorhexidine 2% Cloths 1 Application(s) Topical <User Schedule>  epoetin maria d-epbx (RETACRIT) Injectable 4000 Unit(s) IV Push <User Schedule>  insulin lispro (ADMELOG) corrective regimen sliding scale   SubCutaneous every 6 hours  midodrine. 10 milliGRAM(s) Oral <User Schedule> PRN  phenylephrine    Infusion 0.5 MICROgram(s)/kG/Min IV Continuous <Continuous>  zinc sulfate 220 milliGRAM(s) Oral daily    Drug Dosing Weight  Height (cm): 165 (23 Jul 2021 10:00)  Weight (kg): 82 (22 Jul 2021 18:45)  BMI (kg/m2): 30.1 (23 Jul 2021 10:00)  BSA (m2): 1.89 (23 Jul 2021 10:00)    ICU Vital Signs Last 24 Hrs  T(C): 36.5 (24 Jul 2021 00:00), Max: 36.7 (23 Jul 2021 15:10)  T(F): 97.7 (24 Jul 2021 00:00), Max: 98 (23 Jul 2021 15:10)  HR: 120 (24 Jul 2021 01:30) (51 - 125)  BP: 118/65 (24 Jul 2021 01:30) (68/34 - 162/59)  BP(mean): 75 (24 Jul 2021 01:30) (42 - 123)  ABP: --  ABP(mean): --  RR: 20 (24 Jul 2021 01:30) (13 - 34)  SpO2: 96% (24 Jul 2021 01:00) (89% - 100%)      ABG - ( 23 Jul 2021 04:17 )  pH, Arterial: 7.29  pH, Blood: x     /  pCO2: 56    /  pO2: 83    / HCO3: 27    / Base Excess: -0.7  /  SaO2: 97                    07-22 @ 07:01  -  07-23 @ 07:00  --------------------------------------------------------  IN: 1850 mL / OUT: 2284 mL / NET: -434 mL              PHYSICAL EXAM:    GENERAL: NAD, well-groomed, well-developed on BIPAP machine   HEAD:  Atraumatic, Normocephalic  EYES: conjunctiva and sclera clear  ENMT: No tonsillar erythema, exudates, or enlargement; Moist mucous membranes,   NECK: Supple, normal appearance, No JVD; Trachea midline  NERVOUS SYSTEM:  Alert & Oriented X3, Good concentration; Motor Strength 5/5 B/L upper and lower extremities  CHEST/LUNG: No chest deformity; No rales, rhonchi, wheezing  HEART: Regular rate and rhythm; No murmurs, rubs, or gallops  ABDOMEN: Soft, Nontender, Nondistended; Bowel sounds present, reducible umbilical hernia   EXTREMITIES: 2+ Peripheral Pulses,  LYMPH: No lymphadenopathy noted  SKIN: On left arm, ecchymosis noted on forearm, right sided permacath     LABS:  CBC Full  -  ( 23 Jul 2021 07:08 )  WBC Count : 12.72 K/uL  RBC Count : 3.38 M/uL  Hemoglobin : 10.2 g/dL  Hematocrit : 32.0 %  Platelet Count - Automated : 155 K/uL  Mean Cell Volume : 94.7 fl  Mean Cell Hemoglobin : 30.2 pg  Mean Cell Hemoglobin Concentration : 31.9 gm/dL  Auto Neutrophil # : 10.05 K/uL  Auto Lymphocyte # : 0.76 K/uL  Auto Monocyte # : 1.40 K/uL  Auto Eosinophil # : 0.13 K/uL  Auto Basophil # : 0.00 K/uL  Auto Neutrophil % : 67.0 %  Auto Lymphocyte % : 6.0 %  Auto Monocyte % : 11.0 %  Auto Eosinophil % : 1.0 %  Auto Basophil % : 0.0 %    07-23    134<L>  |  98  |  32<H>  ----------------------------<  219<H>  4.3   |  26  |  4.23<H>    Ca    8.8      23 Jul 2021 07:08  Phos  4.0     07-23  Mg     2.4     07-23    TPro  7.2  /  Alb  3.0<L>  /  TBili  0.5  /  DBili  x   /  AST  18  /  ALT  21  /  AlkPhos  153<H>  07-23        Culture Results:   Normal Respiratory Gretchen present (07-22 @ 22:04)  Culture Results:   No growth to date. (07-22 @ 06:27)  Culture Results:   No growth to date. (07-22 @ 06:27)      RADIOLOGY & ADDITIONAL STUDIES REVIEWED:  Yes    CRITICAL CARE TIME SPENT: 35 minutes INTERVAL HPI/OVERNIGHT EVENTS: Patient noted to have hypotension after hemodialysis, despite of gentle IV hydration. Central line was placed.    Antimicrobial:    Cardiovascular:  aMIOdarone    Tablet 200 milliGRAM(s) Oral daily  midodrine. 10 milliGRAM(s) Oral <User Schedule> PRN  phenylephrine    Infusion 0.5 MICROgram(s)/kG/Min IV Continuous <Continuous>    Pulmonary:  ALBUTerol    90 MICROgram(s) HFA Inhaler 2 Puff(s) Inhalation every 6 hours PRN  albuterol/ipratropium for Nebulization 3 milliLiter(s) Nebulizer every 6 hours    Hematalogic:  apixaban 2.5 milliGRAM(s) Oral every 12 hours    Other:  ascorbic acid 500 milliGRAM(s) Oral daily  atorvastatin 40 milliGRAM(s) Oral at bedtime  bisacodyl Suppository 10 milliGRAM(s) Rectal daily PRN  chlorhexidine 2% Cloths 1 Application(s) Topical <User Schedule>  epoetin maria d-epbx (RETACRIT) Injectable 4000 Unit(s) IV Push <User Schedule>  insulin lispro (ADMELOG) corrective regimen sliding scale   SubCutaneous every 6 hours  zinc sulfate 220 milliGRAM(s) Oral daily    ALBUTerol    90 MICROgram(s) HFA Inhaler 2 Puff(s) Inhalation every 6 hours PRN  albuterol/ipratropium for Nebulization 3 milliLiter(s) Nebulizer every 6 hours  aMIOdarone    Tablet 200 milliGRAM(s) Oral daily  apixaban 2.5 milliGRAM(s) Oral every 12 hours  ascorbic acid 500 milliGRAM(s) Oral daily  atorvastatin 40 milliGRAM(s) Oral at bedtime  bisacodyl Suppository 10 milliGRAM(s) Rectal daily PRN  chlorhexidine 2% Cloths 1 Application(s) Topical <User Schedule>  epoetin maria d-epbx (RETACRIT) Injectable 4000 Unit(s) IV Push <User Schedule>  insulin lispro (ADMELOG) corrective regimen sliding scale   SubCutaneous every 6 hours  midodrine. 10 milliGRAM(s) Oral <User Schedule> PRN  phenylephrine    Infusion 0.5 MICROgram(s)/kG/Min IV Continuous <Continuous>  zinc sulfate 220 milliGRAM(s) Oral daily    Drug Dosing Weight  Height (cm): 165 (23 Jul 2021 10:00)  Weight (kg): 82 (22 Jul 2021 18:45)  BMI (kg/m2): 30.1 (23 Jul 2021 10:00)  BSA (m2): 1.89 (23 Jul 2021 10:00)    ICU Vital Signs Last 24 Hrs  T(C): 36.5 (24 Jul 2021 00:00), Max: 36.7 (23 Jul 2021 15:10)  T(F): 97.7 (24 Jul 2021 00:00), Max: 98 (23 Jul 2021 15:10)  HR: 120 (24 Jul 2021 01:30) (51 - 125)  BP: 118/65 (24 Jul 2021 01:30) (68/34 - 162/59)  BP(mean): 75 (24 Jul 2021 01:30) (42 - 123)  ABP: --  ABP(mean): --  RR: 20 (24 Jul 2021 01:30) (13 - 34)  SpO2: 96% (24 Jul 2021 01:00) (89% - 100%)      ABG - ( 23 Jul 2021 04:17 )  pH, Arterial: 7.29  pH, Blood: x     /  pCO2: 56    /  pO2: 83    / HCO3: 27    / Base Excess: -0.7  /  SaO2: 97                    07-22 @ 07:01  -  07-23 @ 07:00  --------------------------------------------------------  IN: 1850 mL / OUT: 2284 mL / NET: -434 mL              PHYSICAL EXAM:    GENERAL: NAD, well-groomed, well-developed on BIPAP machine   HEAD:  Atraumatic, Normocephalic  EYES: conjunctiva and sclera clear  ENMT: No tonsillar erythema, exudates, or enlargement; Moist mucous membranes,   NECK: Supple, normal appearance, No JVD; Trachea midline  NERVOUS SYSTEM:  Alert & Oriented X3, Good concentration; Motor Strength 5/5 B/L upper and lower extremities  CHEST/LUNG: No chest deformity; No rales, rhonchi, wheezing  HEART: Regular rate and rhythm; No murmurs, rubs, or gallops  ABDOMEN: Soft, Nontender, Nondistended; Bowel sounds present, reducible umbilical hernia   EXTREMITIES: 2+ Peripheral Pulses,  LYMPH: No lymphadenopathy noted  SKIN: On left arm, ecchymosis noted on forearm, right sided permacath     LABS:  CBC Full  -  ( 23 Jul 2021 07:08 )  WBC Count : 12.72 K/uL  RBC Count : 3.38 M/uL  Hemoglobin : 10.2 g/dL  Hematocrit : 32.0 %  Platelet Count - Automated : 155 K/uL  Mean Cell Volume : 94.7 fl  Mean Cell Hemoglobin : 30.2 pg  Mean Cell Hemoglobin Concentration : 31.9 gm/dL  Auto Neutrophil # : 10.05 K/uL  Auto Lymphocyte # : 0.76 K/uL  Auto Monocyte # : 1.40 K/uL  Auto Eosinophil # : 0.13 K/uL  Auto Basophil # : 0.00 K/uL  Auto Neutrophil % : 67.0 %  Auto Lymphocyte % : 6.0 %  Auto Monocyte % : 11.0 %  Auto Eosinophil % : 1.0 %  Auto Basophil % : 0.0 %    07-23    134<L>  |  98  |  32<H>  ----------------------------<  219<H>  4.3   |  26  |  4.23<H>    Ca    8.8      23 Jul 2021 07:08  Phos  4.0     07-23  Mg     2.4     07-23    TPro  7.2  /  Alb  3.0<L>  /  TBili  0.5  /  DBili  x   /  AST  18  /  ALT  21  /  AlkPhos  153<H>  07-23        Culture Results:   Normal Respiratory Gretchen present (07-22 @ 22:04)  Culture Results:   No growth to date. (07-22 @ 06:27)  Culture Results:   No growth to date. (07-22 @ 06:27)      RADIOLOGY & ADDITIONAL STUDIES REVIEWED:  Yes    CRITICAL CARE TIME SPENT: 35 minutes INTERVAL HPI/OVERNIGHT EVENTS: Patient noted to have hypotension after hemodialysis, despite of gentle IV hydration. Central line was placed. Patient was started on phenylephrine.     Antimicrobial:    Cardiovascular:  aMIOdarone    Tablet 200 milliGRAM(s) Oral daily  midodrine. 10 milliGRAM(s) Oral <User Schedule> PRN  phenylephrine    Infusion 0.5 MICROgram(s)/kG/Min IV Continuous <Continuous>    Pulmonary:  ALBUTerol    90 MICROgram(s) HFA Inhaler 2 Puff(s) Inhalation every 6 hours PRN  albuterol/ipratropium for Nebulization 3 milliLiter(s) Nebulizer every 6 hours    Hematalogic:  apixaban 2.5 milliGRAM(s) Oral every 12 hours    Other:  ascorbic acid 500 milliGRAM(s) Oral daily  atorvastatin 40 milliGRAM(s) Oral at bedtime  bisacodyl Suppository 10 milliGRAM(s) Rectal daily PRN  chlorhexidine 2% Cloths 1 Application(s) Topical <User Schedule>  epoetin maria d-epbx (RETACRIT) Injectable 4000 Unit(s) IV Push <User Schedule>  insulin lispro (ADMELOG) corrective regimen sliding scale   SubCutaneous every 6 hours  zinc sulfate 220 milliGRAM(s) Oral daily    ALBUTerol    90 MICROgram(s) HFA Inhaler 2 Puff(s) Inhalation every 6 hours PRN  albuterol/ipratropium for Nebulization 3 milliLiter(s) Nebulizer every 6 hours  aMIOdarone    Tablet 200 milliGRAM(s) Oral daily  apixaban 2.5 milliGRAM(s) Oral every 12 hours  ascorbic acid 500 milliGRAM(s) Oral daily  atorvastatin 40 milliGRAM(s) Oral at bedtime  bisacodyl Suppository 10 milliGRAM(s) Rectal daily PRN  chlorhexidine 2% Cloths 1 Application(s) Topical <User Schedule>  epoetin maria d-epbx (RETACRIT) Injectable 4000 Unit(s) IV Push <User Schedule>  insulin lispro (ADMELOG) corrective regimen sliding scale   SubCutaneous every 6 hours  midodrine. 10 milliGRAM(s) Oral <User Schedule> PRN  phenylephrine    Infusion 0.5 MICROgram(s)/kG/Min IV Continuous <Continuous>  zinc sulfate 220 milliGRAM(s) Oral daily    Drug Dosing Weight  Height (cm): 165 (23 Jul 2021 10:00)  Weight (kg): 82 (22 Jul 2021 18:45)  BMI (kg/m2): 30.1 (23 Jul 2021 10:00)  BSA (m2): 1.89 (23 Jul 2021 10:00)    ICU Vital Signs Last 24 Hrs  T(C): 36.5 (24 Jul 2021 00:00), Max: 36.7 (23 Jul 2021 15:10)  T(F): 97.7 (24 Jul 2021 00:00), Max: 98 (23 Jul 2021 15:10)  HR: 120 (24 Jul 2021 01:30) (51 - 125)  BP: 118/65 (24 Jul 2021 01:30) (68/34 - 162/59)  BP(mean): 75 (24 Jul 2021 01:30) (42 - 123)  ABP: --  ABP(mean): --  RR: 20 (24 Jul 2021 01:30) (13 - 34)  SpO2: 96% (24 Jul 2021 01:00) (89% - 100%)      ABG - ( 23 Jul 2021 04:17 )  pH, Arterial: 7.29  pH, Blood: x     /  pCO2: 56    /  pO2: 83    / HCO3: 27    / Base Excess: -0.7  /  SaO2: 97                    07-22 @ 07:01  -  07-23 @ 07:00  --------------------------------------------------------  IN: 1850 mL / OUT: 2284 mL / NET: -434 mL              PHYSICAL EXAM:    GENERAL: NAD, well-groomed, well-developed on Nasal Canula    HEAD:  Atraumatic, Normocephalic  EYES: conjunctiva and sclera clear  ENMT: No tonsillar erythema, exudates, or enlargement; Moist mucous membranes,   NECK: Supple, normal appearance, No JVD; Trachea midline, r. permacath, Lt. IJ line   NERVOUS SYSTEM:  Alert & Oriented X3, Good concentration; Motor Strength 5/5 B/L upper and lower extremities  CHEST/LUNG: No chest deformity; No rales, rhonchi, wheezing  HEART: Regular rate and rhythm; No murmurs, rubs, or gallops  ABDOMEN: Soft, Nontender, Nondistended; Bowel sounds present, reducible umbilical hernia   EXTREMITIES: 2+ Peripheral Pulses,  LYMPH: No lymphadenopathy noted  SKIN: On left arm, ecchymosis noted on forearm, right sided permacath, Left Internal jugular central line     LABS:  CBC Full  -  ( 23 Jul 2021 07:08 )  WBC Count : 12.72 K/uL  RBC Count : 3.38 M/uL  Hemoglobin : 10.2 g/dL  Hematocrit : 32.0 %  Platelet Count - Automated : 155 K/uL  Mean Cell Volume : 94.7 fl  Mean Cell Hemoglobin : 30.2 pg  Mean Cell Hemoglobin Concentration : 31.9 gm/dL  Auto Neutrophil # : 10.05 K/uL  Auto Lymphocyte # : 0.76 K/uL  Auto Monocyte # : 1.40 K/uL  Auto Eosinophil # : 0.13 K/uL  Auto Basophil # : 0.00 K/uL  Auto Neutrophil % : 67.0 %  Auto Lymphocyte % : 6.0 %  Auto Monocyte % : 11.0 %  Auto Eosinophil % : 1.0 %  Auto Basophil % : 0.0 %    07-23    134<L>  |  98  |  32<H>  ----------------------------<  219<H>  4.3   |  26  |  4.23<H>    Ca    8.8      23 Jul 2021 07:08  Phos  4.0     07-23  Mg     2.4     07-23    TPro  7.2  /  Alb  3.0<L>  /  TBili  0.5  /  DBili  x   /  AST  18  /  ALT  21  /  AlkPhos  153<H>  07-23        Culture Results:   Normal Respiratory Gretchen present (07-22 @ 22:04)  Culture Results:   No growth to date. (07-22 @ 06:27)  Culture Results:   No growth to date. (07-22 @ 06:27)      RADIOLOGY & ADDITIONAL STUDIES REVIEWED:  Yes    CRITICAL CARE TIME SPENT: 35 minutes

## 2021-07-24 NOTE — PROGRESS NOTE ADULT - ASSESSMENT
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 Pt a/w Entero/ Rhino virus, acute respiratory distress requiring BIPAP with concerns for fluid overload. Nephrology consulted for ESRD status.     1) ESRD: Last HD yesterday tolerated, but then pt hypotensive afterward. Pt is now on phenylephrine and has acceptable BP.  Pt still with volume overload.  Will try HD again today, with phenylephrine and trial of IV albumin immediately prior to HD. Monitor hemodynamics carefully. Monitor electrolytes.  2) HTN 2/2 CKD- BP acceptable. Metoprolol 25mg PO q8hrs held.  Continue with Midodrine 10mg PO prior to HD to aid in fluid removal. Monitor BP  3) Anemia of renal disease: Hb acceptable. c/w Epogen 4k units IV tiw with HD. Monitor Hb.  4) Hyperphosphatemia: Phosphorus acceptable. No need for phos binder while NPO. Monitor serum calcium and phosphorus.      [facility] NEPHROLOGY  Brad Chen M.D.  Oneal Tim D.O.  Roselia Huitron M.D.  Milli Platt, MSN, ANP-C  [phone]    [address] [id number]

## 2021-07-24 NOTE — PROGRESS NOTE ADULT - ATTENDING COMMENTS
Patient is a 89 year old female, wheel chair bound, from home, with PMH of Afib on Eliquis, ESRD on HD(Monday, Tuesday, Thursday, Saturday), DM on insulin, HTN, COPD on home O2 3L, who presented to the ED due to SOB. In ED, patient placed on BiPAP and had HD session with about 1.274L fluid removed and blood pressure noting to drop to 60s despite being given midodrine. Admitted to ICU Acute Hypoxic Respiratory Failure. Overnight patient became hypotensive, after dialysis. Central line was placed Left IJ. Patient noted to have atrial fibrillation 1 dose digoxin given.         Assessment:  -  Acute on chronic hypoxic respiratory failure  -  Enterovirus infection  -  Volume overload  -  ESRD on HD  -  Atrial fibrillation  -  Class 1 obesity  -  Diabetes mellitus uncontrol   -  Chronic anemia    Plan  - Respiratory status likely due to viral bronchitis and component of volume overload  - Cont. bipap support as needed   - Add bronchodilators   - Given episodes of hypotension, will d/c metoprolol for now  - Cont. amiodarone for rate control  - Eliquis   - D/c antibiotics as no evidence of pneumonia on CXR   - HD again today   - Albumin 100 gm during dailysis   - Hemodynamically stable, may need midodrine predialysis   - Monitor fingerstick glucose  - Once off NIV, can start oral diet  - Aspiration precautions  - Palliative follow up   - Discussed with ID and Neph   - Prognosis is guarded .

## 2021-07-24 NOTE — PROCEDURE NOTE - NSPOSTPRCRAD_GEN_A_CORE
----- Message from Marques Beltre sent at 7/20/2017  1:41 PM CDT -----  Contact: Pt  Pt called to schedule a hospital f/u and next avail is in September and pt accepted but I felt she may need to be seen sooner so I sent the message, please contact pt at [phone]   central line located in the superior vena cava/depth of insertion/line adjusted to depth of insertion/post-procedure radiography performed

## 2021-07-24 NOTE — PROGRESS NOTE ADULT - SUBJECTIVE AND OBJECTIVE BOX
[facility] NEPHROLOGY- PROGRESS NOTE    Patient is a 89yo Female with ESRD on HD, Afib on Eliquis, HTN, Intradialytic hypotension req Midodrine prior to HD, failed Rt AVF due to steal syndrome s/p ligation, h/o COVID s/p PPM p/w SOB x 1 day. Pt a/w Entero/ Rhino virus, acute respiratory distress requiring BIPAP with concerns for fluid overload. Nephrology consulted for ESRD status.     Yesterday after dialysis, pt had hypotension.  A central line was placed and pt was started on pheylephrine.    Hospital Medications: Medications reviewed.  REVIEW OF SYSTEMS: Limited due to BiPAP; pt states SOB is improving and denies any pain    ICU Vital Signs Last 24 Hrs  T(C): 37 (24 Jul 2021 10:55), Max: 37 (24 Jul 2021 10:55)  T(F): 98.6 (24 Jul 2021 10:55), Max: 98.6 (24 Jul 2021 10:55)  HR: 75 (24 Jul 2021 10:55) (51 - 131)  BP: 144/33 (24 Jul 2021 10:30) (68/34 - 170/56)  BP(mean): 63 (24 Jul 2021 10:30) (42 - 123)  RR: 24 (24 Jul 2021 10:55) (14 - 45)  SpO2: 99% (24 Jul 2021 10:55) (87% - 100%)          PHYSICAL EXAM:  Gen: NAD  HEENT: anicteric; on BIPAP  Neck: no JVD  Cards: RRR, +S1/S2,   Resp: coarse BS b/l  GI: soft, NT/ND, NABS  Extremities: No LE edema B/L  Access: Rt IJ tunneled HD catheter- benign      LABS:  07-24    135  |  98  |  25<H>  ----------------------------<  239<H>  3.5   |  22  |  3.63<H>    Ca    8.9      24 Jul 2021 06:30  Phos  4.0     07-24  Mg     2.2     07-24    TPro  6.8  /  Alb  2.7<L>  /  TBili  0.5  /  DBili      /  AST  20  /  ALT  22  /  AlkPhos  145<H>  07-24    Creatinine Trend: 3.63 <--, 4.23 <--, 4.91 <--                        9.2    12.97 )-----------( 256      ( 24 Jul 2021 06:30 )             30.2

## 2021-07-24 NOTE — DIETITIAN INITIAL EVALUATION ADULT. - PERTINENT LABORATORY DATA
07-24 Na135 mmol/L Glu 239 mg/dL<H> K+ 3.5 mmol/L Cr  3.63 mg/dL<H> BUN 25 mg/dL<H>   07-24 Phos 4.0 mg/dL   07-24 Alb 2.7 g/dL<L>       07-22 Chol 122 mg/dL LDL --    HDL 85 mg/dL Trig 15 mg/dL  07-22-21 @ 10:17 HgbA1C 7.8 [4.0 - 5.6]

## 2021-07-24 NOTE — DIETITIAN INITIAL EVALUATION ADULT. - DIET TYPE
mechanical soft/consistent carbohydrate (evening snack)/renal replacement pts:no protein restr,no conc K & phos, low sodium

## 2021-07-24 NOTE — DIETITIAN INITIAL EVALUATION ADULT. - PERTINENT MEDS FT
MEDICATIONS:  ALBUTerol    90 MICROgram(s) HFA Inhaler 2 every 6 hours PRN  albuterol/ipratropium for Nebulization 3 every 6 hours  aMIOdarone    Tablet 200 daily  apixaban 2.5 every 12 hours  ascorbic acid 500 daily  atorvastatin 40 at bedtime  bisacodyl Suppository 10 daily PRN  chlorhexidine 2% Cloths 1 <User Schedule>  epoetin maria d-epbx (RETACRIT) Injectable 4000 <User Schedule>  insulin lispro (ADMELOG) corrective regimen sliding scale  every 6 hours  midodrine. 10 <User Schedule> PRN  phenylephrine    Infusion 0.501 <Continuous>  sodium chloride 0.9% lock flush 10 every 1 hour PRN  zinc sulfate 220 daily

## 2021-07-24 NOTE — DIETITIAN INITIAL EVALUATION ADULT. - PROBLEM SELECTOR PLAN 1
P/w respiratory distress placed on BIPAP in ED  c/w BIPAP for now, attempt to wean off in AM  ABG in AM if unable to wean off BIPAP  Given Vanc and Cefepime 2g in ED  Send MRSA swab, if swab negative, dc vanc, if positive please order Vanc  Start Cefepime dialysis dose 500mg q24 + Linezolid 600mg IV q12  CXR without infiltrates. Diffuse ronchi  Send sputum culture, procalcitonin  f/u blood culture  ID consulted - Dr. Doll

## 2021-07-24 NOTE — DIETITIAN INITIAL EVALUATION ADULT. - OTHER INFO
Pt visited in ICU . Pt is on O2. Alert and verbal. D/W Daughter ( Hina ) at bedside. Per Daughter , Pt appetite  usually good . But prior to Hospitalization Po intake Poor x ~ 2 days. Pt had cold and too much mucus so family was giving  Chicken Broth.  Pt ate 0 % for Lunch , Post Dialysis.  Denies chewing or swallowing difficulty. Pt is a slow eater. H/O HD  x 2-3 years. Pt Reports Dislikes Nepro likes Glucerna shakes. Pt Reports Ht 5 feet 3 inches. .4 lb  BMI 32. Pt with Stage 1 PU @ Bilateral Heels, DTI @ L Gluteus. AT home pt was on Regular consistency  and thin Liquids.

## 2021-07-25 LAB
ALBUMIN SERPL ELPH-MCNC: 2.6 G/DL — LOW (ref 3.5–5)
ALP SERPL-CCNC: 106 U/L — SIGNIFICANT CHANGE UP (ref 40–120)
ALT FLD-CCNC: 13 U/L DA — SIGNIFICANT CHANGE UP (ref 10–60)
ANION GAP SERPL CALC-SCNC: 10 MMOL/L — SIGNIFICANT CHANGE UP (ref 5–17)
AST SERPL-CCNC: 13 U/L — SIGNIFICANT CHANGE UP (ref 10–40)
BILIRUB SERPL-MCNC: 0.5 MG/DL — SIGNIFICANT CHANGE UP (ref 0.2–1.2)
BLD GP AB SCN SERPL QL: SIGNIFICANT CHANGE UP
BUN SERPL-MCNC: 26 MG/DL — HIGH (ref 7–18)
CALCIUM SERPL-MCNC: 8.8 MG/DL — SIGNIFICANT CHANGE UP (ref 8.4–10.5)
CHLORIDE SERPL-SCNC: 103 MMOL/L — SIGNIFICANT CHANGE UP (ref 96–108)
CO2 SERPL-SCNC: 26 MMOL/L — SIGNIFICANT CHANGE UP (ref 22–31)
CREAT SERPL-MCNC: 3.87 MG/DL — HIGH (ref 0.5–1.3)
GLUCOSE BLDC GLUCOMTR-MCNC: 277 MG/DL — HIGH (ref 70–99)
GLUCOSE BLDC GLUCOMTR-MCNC: 287 MG/DL — HIGH (ref 70–99)
GLUCOSE SERPL-MCNC: 268 MG/DL — HIGH (ref 70–99)
HCT VFR BLD CALC: 23.4 % — LOW (ref 34.5–45)
HCT VFR BLD CALC: 24.9 % — LOW (ref 34.5–45)
HGB BLD-MCNC: 7.2 G/DL — LOW (ref 11.5–15.5)
HGB BLD-MCNC: 7.7 G/DL — LOW (ref 11.5–15.5)
MAGNESIUM SERPL-MCNC: 2.4 MG/DL — SIGNIFICANT CHANGE UP (ref 1.6–2.6)
MCHC RBC-ENTMCNC: 29.7 PG — SIGNIFICANT CHANGE UP (ref 27–34)
MCHC RBC-ENTMCNC: 29.8 PG — SIGNIFICANT CHANGE UP (ref 27–34)
MCHC RBC-ENTMCNC: 30.8 GM/DL — LOW (ref 32–36)
MCHC RBC-ENTMCNC: 30.9 GM/DL — LOW (ref 32–36)
MCV RBC AUTO: 96.1 FL — SIGNIFICANT CHANGE UP (ref 80–100)
MCV RBC AUTO: 96.7 FL — SIGNIFICANT CHANGE UP (ref 80–100)
NRBC # BLD: 0 /100 WBCS — SIGNIFICANT CHANGE UP (ref 0–0)
NRBC # BLD: 0 /100 WBCS — SIGNIFICANT CHANGE UP (ref 0–0)
PHOSPHATE SERPL-MCNC: 2.6 MG/DL — SIGNIFICANT CHANGE UP (ref 2.5–4.5)
PLATELET # BLD AUTO: 160 K/UL — SIGNIFICANT CHANGE UP (ref 150–400)
PLATELET # BLD AUTO: 177 K/UL — SIGNIFICANT CHANGE UP (ref 150–400)
POTASSIUM SERPL-MCNC: 3.7 MMOL/L — SIGNIFICANT CHANGE UP (ref 3.5–5.3)
POTASSIUM SERPL-SCNC: 3.7 MMOL/L — SIGNIFICANT CHANGE UP (ref 3.5–5.3)
PROT SERPL-MCNC: 6 G/DL — SIGNIFICANT CHANGE UP (ref 6–8.3)
RBC # BLD: 2.42 M/UL — LOW (ref 3.8–5.2)
RBC # BLD: 2.59 M/UL — LOW (ref 3.8–5.2)
RBC # FLD: 15.8 % — HIGH (ref 10.3–14.5)
RBC # FLD: 15.8 % — HIGH (ref 10.3–14.5)
SODIUM SERPL-SCNC: 139 MMOL/L — SIGNIFICANT CHANGE UP (ref 135–145)
WBC # BLD: 5.86 K/UL — SIGNIFICANT CHANGE UP (ref 3.8–10.5)
WBC # BLD: 6.21 K/UL — SIGNIFICANT CHANGE UP (ref 3.8–10.5)
WBC # FLD AUTO: 5.86 K/UL — SIGNIFICANT CHANGE UP (ref 3.8–10.5)
WBC # FLD AUTO: 6.21 K/UL — SIGNIFICANT CHANGE UP (ref 3.8–10.5)

## 2021-07-25 RX ORDER — METOPROLOL TARTRATE 50 MG
2.5 TABLET ORAL ONCE
Refills: 0 | Status: COMPLETED | OUTPATIENT
Start: 2021-07-25 | End: 2021-07-25

## 2021-07-25 RX ORDER — METOPROLOL TARTRATE 50 MG
25 TABLET ORAL
Refills: 0 | Status: DISCONTINUED | OUTPATIENT
Start: 2021-07-25 | End: 2021-07-27

## 2021-07-25 RX ADMIN — Medication 25 MILLIGRAM(S): at 17:39

## 2021-07-25 RX ADMIN — Medication 3 MILLILITER(S): at 20:15

## 2021-07-25 RX ADMIN — Medication 3 MILLILITER(S): at 08:24

## 2021-07-25 RX ADMIN — APIXABAN 2.5 MILLIGRAM(S): 2.5 TABLET, FILM COATED ORAL at 17:04

## 2021-07-25 RX ADMIN — Medication 3 MILLILITER(S): at 14:31

## 2021-07-25 RX ADMIN — Medication 2.5 MILLIGRAM(S): at 17:39

## 2021-07-25 RX ADMIN — Medication 3: at 06:15

## 2021-07-25 RX ADMIN — Medication 500 MILLIGRAM(S): at 11:09

## 2021-07-25 RX ADMIN — APIXABAN 2.5 MILLIGRAM(S): 2.5 TABLET, FILM COATED ORAL at 06:16

## 2021-07-25 RX ADMIN — Medication 4: at 17:04

## 2021-07-25 RX ADMIN — ATORVASTATIN CALCIUM 40 MILLIGRAM(S): 80 TABLET, FILM COATED ORAL at 22:01

## 2021-07-25 RX ADMIN — Medication 4: at 11:08

## 2021-07-25 RX ADMIN — AMIODARONE HYDROCHLORIDE 200 MILLIGRAM(S): 400 TABLET ORAL at 06:16

## 2021-07-25 RX ADMIN — CHLORHEXIDINE GLUCONATE 1 APPLICATION(S): 213 SOLUTION TOPICAL at 06:16

## 2021-07-25 RX ADMIN — ZINC SULFATE TAB 220 MG (50 MG ZINC EQUIVALENT) 220 MILLIGRAM(S): 220 (50 ZN) TAB at 11:10

## 2021-07-25 NOTE — PROGRESS NOTE ADULT - ASSESSMENT
Patient is a 89yo Female with ESRD on HD, Afib on Eliquis, HTN, Intradialytic hypotension req Midodrine prior to HD, failed Rt AVF due to steal syndrome s/p ligation, h/o COVID s/p PPM p/w SOB x 1 day. Pt a/w Entero/ Rhino virus, acute respiratory distress requiring BIPAP with concerns for fluid overload. Nephrology consulted for ESRD status.     1) ESRD: Last HD 7/23 tolerated, but then pt hypotensive afterward. Pt was placed on phenylephrine and HD was tried again on 7/24, with phenylephrine and IV albumin immediately prior to HD.  HD was tolerated. Monitor hemodynamics carefully. Monitor electrolytes.  Plan for next HD tomorrow 7/26.  2) HTN 2/2 CKD- BP acceptable. Metoprolol 25mg PO q8hrs held.  Continue with Midodrine 10mg PO prior to HD to aid in fluid removal. Monitor BP  3) Anemia of renal disease: Hb acceptable. c/w Epogen 4k units IV tiw with HD. Monitor Hb.  4) Hyperphosphatemia: Phosphorus acceptable. No need for phos binder while NPO. Monitor serum calcium and phosphorus.      [facility] NEPHROLOGY  Brad Chen M.D.  Oneal Tim D.O.  Roselia Huitron M.D.  Milli Platt, MSN, ANP-C  [phone]    [id number] [place] [address]

## 2021-07-25 NOTE — PROGRESS NOTE ADULT - SUBJECTIVE AND OBJECTIVE BOX
Full note to follow.    HD yesterday well-tolerated.  Plan for HD tomorrow with UF as tolerated. [facility] NEPHROLOGY- PROGRESS NOTE    Patient is a 89yo Female with ESRD on HD, Afib on Eliquis, HTN, Intradialytic hypotension req Midodrine prior to HD, failed Rt AVF due to steal syndrome s/p ligation, h/o COVID s/p PPM p/w SOB x 1 day. Pt a/w Entero/ Rhino virus, acute respiratory distress requiring BIPAP with concerns for fluid overload. Nephrology consulted for ESRD status.     Friday 7/23 after dialysis, pt had hypotension.  A central line was placed and pt was started on pheylephrine.  HD yesterday well-tolerated.    Hospital Medications: Medications reviewed.  REVIEW OF SYSTEMS: No CP, abd pain.  SOB improving.    VITALS:  T(F): 99.1 (07-25-21 @ 19:30), Max: 99.1 (07-25-21 @ 19:30)  HR: 105 (07-25-21 @ 19:30)  BP: 138/78 (07-25-21 @ 19:30)  RR: 18 (07-25-21 @ 19:30)  SpO2: 95% (07-25-21 @ 19:30)  Wt(kg): --    07-24 @ 07:01  -  07-25 @ 07:00  --------------------------------------------------------  IN: 1024.9 mL / OUT: 1800 mL / NET: -775.1 mL    07-25 @ 07:01  -  07-25 @ 21:10  --------------------------------------------------------  IN: 400 mL / OUT: 0 mL / NET: 400 mL    PHYSICAL EXAM:  Gen: NAD  HEENT: anicteric; MMM  Neck: no JVD  Cards: RRR, +S1/S2,   Resp: coarse BS b/l with occasional wheeze  GI: soft, NT/ND, NABS  Extremities: No LE edema B/L  Access: Rt IJ tunneled HD catheter- benign      LABS:  07-25    139  |  103  |  26<H>  ----------------------------<  268<H>  3.7   |  26  |  3.87<H>    Ca    8.8      25 Jul 2021 06:11  Phos  2.6     07-25  Mg     2.4     07-25    TPro  6.0  /  Alb  2.6<L>  /  TBili  0.5  /  DBili      /  AST  13  /  ALT  13  /  AlkPhos  106  07-25    Creatinine Trend: 3.87 <--, 2.46 <--, 3.63 <--, 4.23 <--, 4.91 <--                        7.7    6.21  )-----------( 160      ( 25 Jul 2021 11:38 )             24.9

## 2021-07-25 NOTE — PROGRESS NOTE ADULT - ASSESSMENT
Patient is a 89 year old female, wheel chair bound, from home, with PMH of Afib on Eliquis, ESRD on HD(Monday, Tuesday, Thursday, Saturday), DM on insulin, HTN, COPD on home O2 3L, who presented to the ED due to SOB. In ED, patient placed on BiPAP and had HD session with about 1.274L fluid removed and blood pressure noting to drop to 60s despite being given midodrine. Admitted to ICU Acute Hypoxic Respiratory Failure. Overnight patient became hypotensive, after dialysis. Central line was placed Left IJ. Patient noted to have atrial fibrillation 1 dose digoxin given.     Assessment:   - Acute Hypoxic Respiratory Failure  - Fluid Overload 2/2 ESRD   - Enterovirus infection   - ESRD on HD   - Obesity  - COPD  - DM  - Anemia   - HTN   - Afib     Plan:  Neuro:  - AAO x3   - no issues     Cardiovascular:  - patient hypotensive after dialysis, phenylephrine drip started   #HTN  - hold metoprolol   - continue meds with hold parameters  - monitor BP closely   - continue midodrine prior to HD sessions   - will give albumin 100ml   #Afib  - had an episode last night  - 1 dose digoxin 250mg PO  given  - continue home med of amiodarone and eliquis   - monitor HR     #HLD  -continue statin     Pulmonary:   #Acute Hypoxic Respiratory Failure   - likely secondary to fluid overload 2/2 ESRD  - placed on BiPAP in ED; appeared to be saturating well on 4L NC (upper 90's)   - patient off BIPAP   - monitor O2 sats     #COPD   - patient and daughter states that she has "a little bit of COPD" but not on any meds at home just home oxygen at 3L NC   - no wheezing presently  - continue albuterol PRN   - monitor O2 sats     Infectious Diseases:  - noted to be enterovirus positive in ED   - empirically started on cefepime and zyvox in the ED  - Per ID stop all antibiotics   - nted to have bandemia 12%     Gastrointestinal:  - start on renal diet, off BIPAP    Renal:  #ESRD  - patient with ESRD on HD Mon/Tue/Thu/Sat  -  plan for HD session today to remove increased fluid   - Albumin 100mL   - nephDr. Tomy you consulted   - monitor BMP daily   - avoid excessive fluids     Heme/onc:   #Anemia  - Hgb of 9.2 prior Hgb 10.2   - appears stable  - no signs of bleeding noted  - likely 2/2 ESRD  - monitor CBC daily     #Leukocytosis  - noted to have WBC of 13K in ED, now at 12K afebrile   - enterovirus infection   - monitor CBC   - Discontinue Antibiotics     Endo:   #DM  - history of DM on insulin at home  - A1c of 7.8  - monitor BS q6 while NPO     Skin/ catheter:   - right sided permacath   - Left IJ central line    Patient is a 89 year old female, wheel chair bound, from home, with PMH of Afib on Eliquis, ESRD on HD(Monday, Tuesday, Thursday, Saturday), DM on insulin, HTN, COPD on home O2 3L, who presented to the ED due to SOB. In ED, patient placed on BiPAP and had HD session with about 1.274L fluid removed and blood pressure noting to drop to 60s despite being given midodrine. Admitted to ICU Acute Hypoxic Respiratory Failure. Overnight patient became hypotensive, after dialysis. Central line was placed Left IJ. Patient noted to have atrial fibrillation 1 dose digoxin given.     Assessment:   - Acute Hypoxic Respiratory Failure  - Fluid Overload 2/2 ESRD   - Enterovirus infection   - ESRD on HD   - Obesity  - COPD  - DM  - Anemia   - HTN   - Afib     Plan:  Neuro:  - AAO x3   - no issues     Cardiovascular:  - patient hypotensive after dialysis, phenylephrine drip started   #HTN  - hold metoprolol   - continue meds with hold parameters  - monitor BP closely   - continue midodrine prior to HD sessions   - will give albumin 100ml   #Afib  - had an episode last night  - 1 dose digoxin 250mg PO  given  - continue home med of amiodarone and eliquis   - monitor HR     #HLD  -continue statin     Pulmonary:   #Acute Hypoxic Respiratory Failure   - likely secondary to fluid overload 2/2 ESRD  - placed on BiPAP in ED; appeared to be saturating well on 4L NC (upper 90's)   - patient off BIPAP   - monitor O2 sats     #COPD   - patient and daughter states that she has "a little bit of COPD" but not on any meds at home just home oxygen at 3L NC   - no wheezing presently  - continue albuterol PRN   - monitor O2 sats     Infectious Diseases:  - noted to be enterovirus positive in ED   - empirically started on cefepime and zyvox in the ED  - Per ID stop all antibiotics   - nted to have bandemia 12%     Gastrointestinal:  - start on renal diet, off BIPAP    Renal:  #ESRD  - patient with ESRD on HD Mon/Tue/Thu/Sat  -  plan for HD session today to remove increased fluid   - Albumin 100mL   - nephDr. Tomy you consulted   - monitor BMP daily   - avoid excessive fluids     Heme/onc:   #Anemia  - Hgb of 9.2 prior Hgb 10.2   - appears stable  - no signs of bleeding noted  - likely 2/2 ESRD  - monitor CBC daily     #Leukocytosis  - noted to have WBC of 13K in ED, now at 12K afebrile   - enterovirus infection   - monitor CBC   - Discontinue Antibiotics     Endo:   #DM  - history of DM on insulin at home  - A1c of 7.8  - ACHS SSI    Skin/ catheter:   - right sided permacath   - Left IJ central line    Patient is a 89 year old female, wheel chair bound, from home, with PMH of Afib on Eliquis, ESRD on HD(Monday, Tuesday, Thursday, Saturday), DM on insulin, HTN, COPD on home O2 3L, who presented to the ED due to SOB. In ED, patient placed on BiPAP and had HD session with about 1.274L fluid removed and blood pressure noting to drop to 60s despite being given midodrine. Admitted to ICU Acute Hypoxic Respiratory Failure. Overnight patient became hypotensive, after dialysis. Central line was placed Left IJ. Patient noted to have atrial fibrillation 1 dose digoxin given.     Assessment:   - Acute Hypoxic Respiratory Failure  - Fluid Overload 2/2 ESRD   - Enterovirus infection   - ESRD on HD   - Obesity  - COPD  - DM  - Anemia   - HTN   - Afib     Plan:  Neuro:  - AAO x3   - no issues     Cardiovascular:  - patient hypotensive after dialysis, phenylephrine drip started   #HTN  - hold metoprolol   - continue meds with hold parameters  - monitor BP closely   - continue midodrine prior to HD sessions   - will give albumin 100ml   #Afib  - had an episode last night  - 1 dose digoxin 250mg PO  given  - continue home med of amiodarone and eliquis   - monitor HR   #HLD  -continue statin     Pulmonary:   #Acute Hypoxic Respiratory Failure   - likely secondary to fluid overload 2/2 ESRD  - placed on BiPAP in ED; appeared to be saturating well on 4L NC (upper 90's)   - patient off BIPAP   - monitor O2 sats     #COPD   - patient and daughter states that she has "a little bit of COPD" but not on any meds at home just home oxygen at 3L NC   - no wheezing presently  - continue albuterol PRN   - monitor O2 sats     Infectious Diseases:  - noted to be enterovirus positive in ED   - empirically started on cefepime and zyvox in the ED  - Per ID stop all antibiotics   - nted to have bandemia 12%     Gastrointestinal:  - start on renal diet, off BIPAP    Renal:  #ESRD  - patient with ESRD on HD Mon/Tue/Thu/Sat  -  plan for HD session today to remove increased fluid   - Albumin 100mL   - nephDr. Tomy you consulted   - monitor BMP daily   - avoid excessive fluids     Heme/onc:   #Anemia  - Hgb of 9.2 prior Hgb 10.2   - appears stable  - no signs of bleeding noted  - likely 2/2 ESRD  -   - monitor CBC daily     #Leukocytosis  - noted to have WBC of 13K in ED, now at 12K afebrile   - enterovirus infection   - monitor CBC   - Discontinue Antibiotics     Endo:   #DM  - history of DM on insulin at home  - A1c of 7.8  - ACHS SSI    Skin/ catheter:   - right sided permacath   - Left IJ central line    Patient is a 89 year old female, wheel chair bound, from home, with PMH of Afib on Eliquis, ESRD on HD(Monday, Tuesday, Thursday, Saturday), DM on insulin, HTN, COPD on home O2 3L, who presented to the ED due to SOB. In ED, patient placed on BiPAP and had HD session with about 1.274L fluid removed and blood pressure noting to drop to 60s despite being given midodrine. Admitted to ICU Acute Hypoxic Respiratory Failure. Overnight patient became hypotensive, after dialysis. Central line was placed Left IJ. Patient noted to have atrial fibrillation 1 dose digoxin given.     Assessment:   - Acute Hypoxic Respiratory Failure  - Fluid Overload 2/2 ESRD   - Enterovirus infection   - ESRD on HD   - Obesity  - COPD  - DM  - Anemia   - HTN   - Afib     Plan:  Neuro:  - AAO x3   - no issues     Cardiovascular:  - patient hypotensive after dialysis, phenylephrine drip started   - phenylephrine stopped 2a.m.  #HTN  - hold metoprolol   - continue meds with hold parameters  - monitor BP closely   - continue midodrine prior to HD sessions   - will give albumin 100ml   #Afib  - had an episode last night  - 1 dose digoxin 250mg PO  given  - continue home med of amiodarone and eliquis   - monitor HR   #HLD  -continue statin     Pulmonary:   #Acute Hypoxic Respiratory Failure   - likely secondary to fluid overload 2/2 ESRD  - placed on BiPAP in ED; appeared to be saturating well on 4L NC (upper 90's)   - patient off BIPAP   - monitor O2 sats     #COPD   - patient and daughter states that she has "a little bit of COPD" but not on any meds at home just home oxygen at 3L NC   - no wheezing presently  - continue albuterol PRN   - monitor O2 sats     Infectious Diseases:  - noted to be enterovirus positive in ED   - empirically started on cefepime and zyvox in the ED  - Per ID stop all antibiotics   -  have bandemia 12%     Gastrointestinal:  - start on renal diet, off BIPAP    Renal:  #ESRD  - patient with ESRD on HD Mon/Tue/Thu/Sat  -  plan for HD session today to remove increased fluid   - Albumin 100mL   - Dr. Tomy pacheco consulted   - monitor BMP daily   - avoid excessive fluids     Heme/onc:   #Anemia  - Hgb of 7.7 prior Hgb 9.2  - Stat repeat CBC  - no signs of bleeding noted  - likely 2/2 ESRD  - monitor CBC daily     #Leukocytosis  - noted to have WBC of 13K in ED, now at 12K afebrile   - enterovirus infection   - monitor CBC     Endo:   #DM  - history of DM on insulin at home  - A1c of 7.8  - ACHS SSI    Skin/ catheter:   - right sided permacath   - Left IJ central line     Dispo:  downgrade to medicine

## 2021-07-25 NOTE — PROGRESS NOTE ADULT - SUBJECTIVE AND OBJECTIVE BOX
INTERVAL HPI/OVERNIGHT EVENTS: No events overnight. On nasal cannula 3lit. Phenylephrine for hypotension.     PRESSORS: [ x] YES [ ] NO  WHICH: Phenylephrine     ANTIBIOTICS:                      Antimicrobial:    Cardiovascular:  aMIOdarone    Tablet 200 milliGRAM(s) Oral daily  midodrine. 10 milliGRAM(s) Oral <User Schedule> PRN  phenylephrine    Infusion 0.4 MICROgram(s)/kG/Min IV Continuous <Continuous>    Pulmonary:  ALBUTerol    90 MICROgram(s) HFA Inhaler 2 Puff(s) Inhalation every 6 hours PRN  albuterol/ipratropium for Nebulization 3 milliLiter(s) Nebulizer every 6 hours    Hematalogic:  apixaban 2.5 milliGRAM(s) Oral every 12 hours    Other:  ascorbic acid 500 milliGRAM(s) Oral daily  atorvastatin 40 milliGRAM(s) Oral at bedtime  bisacodyl Suppository 10 milliGRAM(s) Rectal daily PRN  chlorhexidine 2% Cloths 1 Application(s) Topical <User Schedule>  epoetin maria d-epbx (RETACRIT) Injectable 4000 Unit(s) IV Push <User Schedule>  insulin lispro (ADMELOG) corrective regimen sliding scale   SubCutaneous three times a day before meals  sodium chloride 0.9% lock flush 10 milliLiter(s) IV Push every 1 hour PRN  zinc sulfate 220 milliGRAM(s) Oral daily    ALBUTerol    90 MICROgram(s) HFA Inhaler 2 Puff(s) Inhalation every 6 hours PRN  albuterol/ipratropium for Nebulization 3 milliLiter(s) Nebulizer every 6 hours  aMIOdarone    Tablet 200 milliGRAM(s) Oral daily  apixaban 2.5 milliGRAM(s) Oral every 12 hours  ascorbic acid 500 milliGRAM(s) Oral daily  atorvastatin 40 milliGRAM(s) Oral at bedtime  bisacodyl Suppository 10 milliGRAM(s) Rectal daily PRN  chlorhexidine 2% Cloths 1 Application(s) Topical <User Schedule>  epoetin maria d-epbx (RETACRIT) Injectable 4000 Unit(s) IV Push <User Schedule>  insulin lispro (ADMELOG) corrective regimen sliding scale   SubCutaneous three times a day before meals  midodrine. 10 milliGRAM(s) Oral <User Schedule> PRN  phenylephrine    Infusion 0.4 MICROgram(s)/kG/Min IV Continuous <Continuous>  sodium chloride 0.9% lock flush 10 milliLiter(s) IV Push every 1 hour PRN  zinc sulfate 220 milliGRAM(s) Oral daily    Drug Dosing Weight  Height (cm): 165 (23 Jul 2021 10:00)  Weight (kg): 82 (22 Jul 2021 18:45)  BMI (kg/m2): 30.1 (23 Jul 2021 10:00)  BSA (m2): 1.89 (23 Jul 2021 10:00)    CENTRAL LINE: [x ] YES [ ] NO  LOCATION:   DATE INSERTED:  REMOVE: [ ] YES [ ] NO  EXPLAIN:    STUBBS: [ x] YES [ ] NO    DATE INSERTED:  REMOVE:  [ ] YES [ ] NO  EXPLAIN:    A-LINE:  [ ] YES [ x] NO  LOCATION:   DATE INSERTED:  REMOVE:  [ ] YES [ ] NO  EXPLAIN:    PMH -reviewed admission note, no change since admission    ICU Vital Signs Last 24 Hrs  T(C): 36.8 (24 Jul 2021 23:12), Max: 37.9 (24 Jul 2021 19:01)  T(F): 98.3 (24 Jul 2021 23:12), Max: 100.3 (24 Jul 2021 19:01)  HR: 74 (25 Jul 2021 00:00) (66 - 131)  BP: 127/31 (25 Jul 2021 00:00) (68/34 - 170/56)  BP(mean): 57 (25 Jul 2021 00:00) (42 - 121)  ABP: --  ABP(mean): --  RR: 19 (25 Jul 2021 00:00) (16 - 45)  SpO2: 100% (25 Jul 2021 00:00) (83% - 100%)      ABG - ( 23 Jul 2021 04:17 )  pH, Arterial: 7.29  pH, Blood: x     /  pCO2: 56    /  pO2: 83    / HCO3: 27    / Base Excess: -0.7  /  SaO2: 97                    07-23 @ 07:01  -  07-24 @ 07:00  --------------------------------------------------------  IN: 1092.1 mL / OUT: 1950 mL / NET: -857.9 mL            PHYSICAL EXAM:    GENERAL: NAD, well-groomed, well-developed on Nasal Canula    HEAD:  Atraumatic, Normocephalic  EYES: conjunctiva and sclera clear  ENMT: No tonsillar erythema, exudates, or enlargement; Moist mucous membranes,   NECK: Supple, normal appearance, No JVD; Trachea midline, r. permacath, Lt. IJ line   NERVOUS SYSTEM:  Alert & Oriented X3, Good concentration; Motor Strength 5/5 B/L upper and lower extremities  CHEST/LUNG: No chest deformity; No rales, rhonchi, wheezing  HEART: Regular rate and rhythm; No murmurs, rubs, or gallops  ABDOMEN: Soft, Nontender, Nondistended; Bowel sounds present, reducible umbilical hernia   EXTREMITIES: 2+ Peripheral Pulses,  LYMPH: No lymphadenopathy noted  SKIN: On left arm, ecchymosis noted on forearm, right sided permacath, Left Internal jugular central line       LABS:  CBC Full  -  ( 24 Jul 2021 06:30 )  WBC Count : 12.97 K/uL  RBC Count : 3.16 M/uL  Hemoglobin : 9.2 g/dL  Hematocrit : 30.2 %  Platelet Count - Automated : 256 K/uL  Mean Cell Volume : 95.6 fl  Mean Cell Hemoglobin : 29.1 pg  Mean Cell Hemoglobin Concentration : 30.5 gm/dL  Auto Neutrophil # : x  Auto Lymphocyte # : x  Auto Monocyte # : x  Auto Eosinophil # : x  Auto Basophil # : x  Auto Neutrophil % : x  Auto Lymphocyte % : x  Auto Monocyte % : x  Auto Eosinophil % : x  Auto Basophil % : x    07-24    139  |  102  |  15  ----------------------------<  198<H>  3.4<L>   |  26  |  2.46<H>    Ca    8.9      24 Jul 2021 16:57  Phos  2.1     07-24  Mg     2.1     07-24    TPro  6.8  /  Alb  2.7<L>  /  TBili  0.5  /  DBili  x   /  AST  20  /  ALT  22  /  AlkPhos  145<H>  07-24        Culture Results:   Normal Respiratory Gretchen present (07-22 @ 22:04)  Culture Results:   No growth to date. (07-22 @ 06:27)  Culture Results:   No growth to date. (07-22 @ 06:27)      RADIOLOGY & ADDITIONAL STUDIES REVIEWED:  ***    GOALS OF CARE DISCUSSION WITH PATIENT/FAMILY/PROXY:    CRITICAL CARE TIME SPENT: 35 minutes INTERVAL HPI/OVERNIGHT EVENTS: No events overnight. On nasal cannula 3lit. Phenylephrine for hypotension, stopped at 2a.m.    PRESSORS: [ x] YES [ ] NO  WHICH: Phenylephrine     ANTIBIOTICS:                      Antimicrobial:    Cardiovascular:  aMIOdarone    Tablet 200 milliGRAM(s) Oral daily  midodrine. 10 milliGRAM(s) Oral <User Schedule> PRN  phenylephrine    Infusion 0.4 MICROgram(s)/kG/Min IV Continuous <Continuous>    Pulmonary:  ALBUTerol    90 MICROgram(s) HFA Inhaler 2 Puff(s) Inhalation every 6 hours PRN  albuterol/ipratropium for Nebulization 3 milliLiter(s) Nebulizer every 6 hours    Hematalogic:  apixaban 2.5 milliGRAM(s) Oral every 12 hours    Other:  ascorbic acid 500 milliGRAM(s) Oral daily  atorvastatin 40 milliGRAM(s) Oral at bedtime  bisacodyl Suppository 10 milliGRAM(s) Rectal daily PRN  chlorhexidine 2% Cloths 1 Application(s) Topical <User Schedule>  epoetin maria d-epbx (RETACRIT) Injectable 4000 Unit(s) IV Push <User Schedule>  insulin lispro (ADMELOG) corrective regimen sliding scale   SubCutaneous three times a day before meals  sodium chloride 0.9% lock flush 10 milliLiter(s) IV Push every 1 hour PRN  zinc sulfate 220 milliGRAM(s) Oral daily    ALBUTerol    90 MICROgram(s) HFA Inhaler 2 Puff(s) Inhalation every 6 hours PRN  albuterol/ipratropium for Nebulization 3 milliLiter(s) Nebulizer every 6 hours  aMIOdarone    Tablet 200 milliGRAM(s) Oral daily  apixaban 2.5 milliGRAM(s) Oral every 12 hours  ascorbic acid 500 milliGRAM(s) Oral daily  atorvastatin 40 milliGRAM(s) Oral at bedtime  bisacodyl Suppository 10 milliGRAM(s) Rectal daily PRN  chlorhexidine 2% Cloths 1 Application(s) Topical <User Schedule>  epoetin maria d-epbx (RETACRIT) Injectable 4000 Unit(s) IV Push <User Schedule>  insulin lispro (ADMELOG) corrective regimen sliding scale   SubCutaneous three times a day before meals  midodrine. 10 milliGRAM(s) Oral <User Schedule> PRN  phenylephrine    Infusion 0.4 MICROgram(s)/kG/Min IV Continuous <Continuous>  sodium chloride 0.9% lock flush 10 milliLiter(s) IV Push every 1 hour PRN  zinc sulfate 220 milliGRAM(s) Oral daily    Drug Dosing Weight  Height (cm): 165 (23 Jul 2021 10:00)  Weight (kg): 82 (22 Jul 2021 18:45)  BMI (kg/m2): 30.1 (23 Jul 2021 10:00)  BSA (m2): 1.89 (23 Jul 2021 10:00)    CENTRAL LINE: [x ] YES [ ] NO  LOCATION:   DATE INSERTED:  REMOVE: [ ] YES [ ] NO  EXPLAIN:    STUBBS: [ x] YES [ ] NO    DATE INSERTED:  REMOVE:  [ ] YES [ ] NO  EXPLAIN:    A-LINE:  [ ] YES [ x] NO  LOCATION:   DATE INSERTED:  REMOVE:  [ ] YES [ ] NO  EXPLAIN:    PMH -reviewed admission note, no change since admission    ICU Vital Signs Last 24 Hrs  T(C): 36.8 (24 Jul 2021 23:12), Max: 37.9 (24 Jul 2021 19:01)  T(F): 98.3 (24 Jul 2021 23:12), Max: 100.3 (24 Jul 2021 19:01)  HR: 74 (25 Jul 2021 00:00) (66 - 131)  BP: 127/31 (25 Jul 2021 00:00) (68/34 - 170/56)  BP(mean): 57 (25 Jul 2021 00:00) (42 - 121)  ABP: --  ABP(mean): --  RR: 19 (25 Jul 2021 00:00) (16 - 45)  SpO2: 100% (25 Jul 2021 00:00) (83% - 100%)      ABG - ( 23 Jul 2021 04:17 )  pH, Arterial: 7.29  pH, Blood: x     /  pCO2: 56    /  pO2: 83    / HCO3: 27    / Base Excess: -0.7  /  SaO2: 97                    07-23 @ 07:01  -  07-24 @ 07:00  --------------------------------------------------------  IN: 1092.1 mL / OUT: 1950 mL / NET: -857.9 mL            PHYSICAL EXAM:    GENERAL: NAD, well-groomed, well-developed on Nasal Canula    HEAD:  Atraumatic, Normocephalic  EYES: conjunctiva and sclera clear  ENMT: No tonsillar erythema, exudates, or enlargement; Moist mucous membranes,   NECK: Supple, normal appearance, No JVD; Trachea midline, r. permacath, Lt. IJ line   NERVOUS SYSTEM:  Alert & Oriented X3, Good concentration; Motor Strength 5/5 B/L upper and lower extremities  CHEST/LUNG: No chest deformity; No rales, rhonchi, wheezing  HEART: Regular rate and rhythm; No murmurs, rubs, or gallops  ABDOMEN: Soft, Nontender, Nondistended; Bowel sounds present, reducible umbilical hernia   EXTREMITIES: 2+ Peripheral Pulses,  LYMPH: No lymphadenopathy noted  SKIN: On left arm, ecchymosis noted on forearm, right sided permacath, Left Internal jugular central line       LABS:  CBC Full  -  ( 24 Jul 2021 06:30 )  WBC Count : 12.97 K/uL  RBC Count : 3.16 M/uL  Hemoglobin : 9.2 g/dL  Hematocrit : 30.2 %  Platelet Count - Automated : 256 K/uL  Mean Cell Volume : 95.6 fl  Mean Cell Hemoglobin : 29.1 pg  Mean Cell Hemoglobin Concentration : 30.5 gm/dL  Auto Neutrophil # : x  Auto Lymphocyte # : x  Auto Monocyte # : x  Auto Eosinophil # : x  Auto Basophil # : x  Auto Neutrophil % : x  Auto Lymphocyte % : x  Auto Monocyte % : x  Auto Eosinophil % : x  Auto Basophil % : x    07-24    139  |  102  |  15  ----------------------------<  198<H>  3.4<L>   |  26  |  2.46<H>    Ca    8.9      24 Jul 2021 16:57  Phos  2.1     07-24  Mg     2.1     07-24    TPro  6.8  /  Alb  2.7<L>  /  TBili  0.5  /  DBili  x   /  AST  20  /  ALT  22  /  AlkPhos  145<H>  07-24        Culture Results:   Normal Respiratory Gretchen present (07-22 @ 22:04)  Culture Results:   No growth to date. (07-22 @ 06:27)  Culture Results:   No growth to date. (07-22 @ 06:27)      RADIOLOGY & ADDITIONAL STUDIES REVIEWED:  ***    GOALS OF CARE DISCUSSION WITH PATIENT/FAMILY/PROXY:    CRITICAL CARE TIME SPENT: 35 minutes

## 2021-07-25 NOTE — PROGRESS NOTE ADULT - ATTENDING COMMENTS
Patient is a 89 year old female, wheel chair bound, from home, with PMH of Afib on Eliquis, ESRD on HD(Monday, Tuesday, Thursday, Saturday), DM on insulin, HTN, COPD on home O2 3L, who presented to the ED due to SOB. In ED, patient placed on BiPAP and had HD session with about 1.274L fluid removed and blood pressure noting to drop to 60s despite being given midodrine. Admitted to ICU Acute Hypoxic Respiratory Failure. Overnight patient became hypotensive, after dialysis. Central line was placed Left IJ. Patient noted to have atrial fibrillation 1 dose digoxin given.         Assessment:  -  Acute on chronic hypoxic respiratory failure  -  Enterovirus infection  -  Volume overload  -  ESRD on HD  -  Atrial fibrillation  -  Class 1 obesity  -  Diabetes mellitus uncontrol   -  Chronic anemia    Plan  - Respiratory status likely due to viral bronchitis and component of volume overload  - Cont. bipap support as needed   - Add bronchodilators   - Off pressors  - Cont. amiodarone for rate control  - Eliquis   - D/c antibiotics as no evidence of pneumonia on CXR   - HD again today   - Albumin 100 gm during dailysis   - Hemodynamically stable, may need midodrine predialysis   - Monitor fingerstick glucose  - Once off NIV, can start oral diet  - Aspiration precautions  - Palliative follow up   - Discussed with ID and Neph   - Prognosis is guarded .

## 2021-07-25 NOTE — CHART NOTE - NSCHARTNOTEFT_GEN_A_CORE
<Hospital course>    <ICU course>    Patient is stable for downgrade to floor under care of  ------------- for further management , covering resident ---------- was informed.    <Things to follow up> 89 year old female, PMH of Afib on Eliquis, ESRD on HD(Monday, Tuesday, Thursday, Saturday), DM on insulin, HTN, COPD on home O2 3L, who presented to the ED due to SOB. In ED, patient placed on BiPAP and had HD session with about 1.274L fluid removed and blood pressure noting to drop to 60s despite being given midodrine. Admitted to ICU Acute Hypoxic Respiratory Failure. 7/24 pt had LIJ central line plaement after dialysis became hypotensive and phenylephrine.     <ICU course>    Patient is stable for downgrade to floor under care of  ------------- for further management , covering resident ---------- was informed.    <Things to follow up> <Hospital Course> 89 year old female, PMH of Afib on Eliquis, ESRD on HD(Monday, Tuesday, Thursday, Saturday), DM on insulin, HTN, COPD on home O2 3L, who presented to the ED due to SOB. In ED, patient placed on BiPAP and had HD session with about 1.274L fluid removed and blood pressure noting to drop to 60s despite being given midodrine. Admitted to ICU Acute Hypoxic Respiratory Failure. 7/24 pt had LIJ central line placement after dialysis became hypotensive and phenylephrine.     <ICU course> Patient arrived to the ICU on 7/22 for AHRF 2/2 fluid overload.    Patient is stable for downgrade to floor under care of  ------------- for further management , covering resident ---------- was informed.    <Things to follow up> <Hospital Course> 89 year old female, PMH of Afib on Eliquis, ESRD on HD(Monday, Tuesday, Thursday, Saturday), DM on insulin, HTN, COPD on home O2 3L, who presented to the ED due to SOB. In ED, patient placed on BiPAP and had HD session with about 1.274L fluid removed and blood pressure noting to drop to 60s despite being given midodrine. Admitted to ICU Acute Hypoxic Respiratory Failure.     <ICU course> Patient arrived to the ICU on 7/22 for AHRF 2/2 fluid overload.    7/24 pt had LIJ central line placement after dialysis became hypotensive and phenylephrine.     Patient is stable for downgrade to floor under care of  ------------- for further management , covering resident ---------- was informed.    <Things to follow up> <Hospital Course> 89 year old female, PMH of Afib on Eliquis, ESRD on HD(Monday, Tuesday, Thursday, Saturday), DM on insulin, HTN, COPD on home O2 3L, who presented to the ED due to SOB. In ED, patient placed on BiPAP and had HD session with about 1.274L fluid removed and blood pressure noting to drop to 60s despite being given midodrine. Admitted to ICU Acute Hypoxic Respiratory Failure.     <ICU course> Patient arrived to the ICU on 7/22 for AHRF 2/2 fluid overload. Patient had HD and BIPAP was tapered off. Patient hypotensive during HD, given midodrine pre-HD and pressor support. Patient can resume HD on the floors. Patient off pressors since 2 a.m. Patient on amioderone for AF.    7/24 pt had LIJ central line placement after dialysis became hypotensive and phenylephrine.     Patient is stable for downgrade to floor under care of Dr. Brush for further management , covering resident ___ was informed.    <Things to follow up> <Hospital Course> 89 year old female, PMH of Afib on Eliquis, ESRD on HD(Monday, Tuesday, Thursday, Saturday), DM on insulin, HTN, COPD on home O2 3L, who presented to the ED due to SOB. In ED, patient placed on BiPAP and had HD session with about 1.274L fluid removed and blood pressure noting to drop to 60s despite being given midodrine. Admitted to ICU Acute Hypoxic Respiratory Failure.     <ICU course> Patient arrived to the ICU on 7/22 for AHRF 2/2 fluid overload. Patient had HD and BIPAP was tapered off. Patient hypotensive during HD, given midodrine pre-HD and pressor support. Patient can resume HD on the floors. Patient off pressors since 2 a.m. Patient on amioderone for AF.    7/24 pt had LIJ central line placement after dialysis became hypotensive and phenylephrine.     Patient is stable for downgrade to floor under care of Dr. Brush for further management , covering resident Dr. Duran was informed.    <Things to follow up>

## 2021-07-26 DIAGNOSIS — E11.65 TYPE 2 DIABETES MELLITUS WITH HYPERGLYCEMIA: ICD-10-CM

## 2021-07-26 DIAGNOSIS — E11.21 TYPE 2 DIABETES MELLITUS WITH DIABETIC NEPHROPATHY: ICD-10-CM

## 2021-07-26 DIAGNOSIS — E78.5 HYPERLIPIDEMIA, UNSPECIFIED: ICD-10-CM

## 2021-07-26 DIAGNOSIS — D64.9 ANEMIA, UNSPECIFIED: ICD-10-CM

## 2021-07-26 DIAGNOSIS — I10 ESSENTIAL (PRIMARY) HYPERTENSION: ICD-10-CM

## 2021-07-26 DIAGNOSIS — I48.91 UNSPECIFIED ATRIAL FIBRILLATION: ICD-10-CM

## 2021-07-26 DIAGNOSIS — J44.9 CHRONIC OBSTRUCTIVE PULMONARY DISEASE, UNSPECIFIED: ICD-10-CM

## 2021-07-26 LAB
ALBUMIN SERPL ELPH-MCNC: 2.6 G/DL — LOW (ref 3.5–5)
ALP SERPL-CCNC: 117 U/L — SIGNIFICANT CHANGE UP (ref 40–120)
ALT FLD-CCNC: 18 U/L DA — SIGNIFICANT CHANGE UP (ref 10–60)
ANION GAP SERPL CALC-SCNC: 11 MMOL/L — SIGNIFICANT CHANGE UP (ref 5–17)
AST SERPL-CCNC: 12 U/L — SIGNIFICANT CHANGE UP (ref 10–40)
BILIRUB SERPL-MCNC: 0.6 MG/DL — SIGNIFICANT CHANGE UP (ref 0.2–1.2)
BUN SERPL-MCNC: 38 MG/DL — HIGH (ref 7–18)
CALCIUM SERPL-MCNC: 9 MG/DL — SIGNIFICANT CHANGE UP (ref 8.4–10.5)
CHLORIDE SERPL-SCNC: 101 MMOL/L — SIGNIFICANT CHANGE UP (ref 96–108)
CO2 SERPL-SCNC: 24 MMOL/L — SIGNIFICANT CHANGE UP (ref 22–31)
CREAT SERPL-MCNC: 5.7 MG/DL — HIGH (ref 0.5–1.3)
GLUCOSE BLDC GLUCOMTR-MCNC: 171 MG/DL — HIGH (ref 70–99)
GLUCOSE BLDC GLUCOMTR-MCNC: 186 MG/DL — HIGH (ref 70–99)
GLUCOSE BLDC GLUCOMTR-MCNC: 348 MG/DL — HIGH (ref 70–99)
GLUCOSE BLDC GLUCOMTR-MCNC: 389 MG/DL — HIGH (ref 70–99)
GLUCOSE SERPL-MCNC: 294 MG/DL — HIGH (ref 70–99)
HCT VFR BLD CALC: 26.8 % — LOW (ref 34.5–45)
HGB BLD-MCNC: 8.3 G/DL — LOW (ref 11.5–15.5)
MAGNESIUM SERPL-MCNC: 2.4 MG/DL — SIGNIFICANT CHANGE UP (ref 1.6–2.6)
MCHC RBC-ENTMCNC: 29.6 PG — SIGNIFICANT CHANGE UP (ref 27–34)
MCHC RBC-ENTMCNC: 31 GM/DL — LOW (ref 32–36)
MCV RBC AUTO: 95.7 FL — SIGNIFICANT CHANGE UP (ref 80–100)
NRBC # BLD: 0 /100 WBCS — SIGNIFICANT CHANGE UP (ref 0–0)
PHOSPHATE SERPL-MCNC: 2.6 MG/DL — SIGNIFICANT CHANGE UP (ref 2.5–4.5)
PLATELET # BLD AUTO: 177 K/UL — SIGNIFICANT CHANGE UP (ref 150–400)
POTASSIUM SERPL-MCNC: 4.4 MMOL/L — SIGNIFICANT CHANGE UP (ref 3.5–5.3)
POTASSIUM SERPL-SCNC: 4.4 MMOL/L — SIGNIFICANT CHANGE UP (ref 3.5–5.3)
PROT SERPL-MCNC: 6.6 G/DL — SIGNIFICANT CHANGE UP (ref 6–8.3)
RBC # BLD: 2.8 M/UL — LOW (ref 3.8–5.2)
RBC # FLD: 15.7 % — HIGH (ref 10.3–14.5)
SODIUM SERPL-SCNC: 136 MMOL/L — SIGNIFICANT CHANGE UP (ref 135–145)
WBC # BLD: 7.74 K/UL — SIGNIFICANT CHANGE UP (ref 3.8–10.5)
WBC # FLD AUTO: 7.74 K/UL — SIGNIFICANT CHANGE UP (ref 3.8–10.5)

## 2021-07-26 PROCEDURE — 71045 X-RAY EXAM CHEST 1 VIEW: CPT | Mod: 26

## 2021-07-26 PROCEDURE — 99233 SBSQ HOSP IP/OBS HIGH 50: CPT | Mod: GC

## 2021-07-26 RX ORDER — MIDODRINE HYDROCHLORIDE 2.5 MG/1
10 TABLET ORAL ONCE
Refills: 0 | Status: COMPLETED | OUTPATIENT
Start: 2021-07-26 | End: 2021-07-26

## 2021-07-26 RX ORDER — POLYETHYLENE GLYCOL 3350 17 G/17G
17 POWDER, FOR SOLUTION ORAL DAILY
Refills: 0 | Status: DISCONTINUED | OUTPATIENT
Start: 2021-07-26 | End: 2021-08-03

## 2021-07-26 RX ORDER — DEXTROSE 50 % IN WATER 50 %
25 SYRINGE (ML) INTRAVENOUS ONCE
Refills: 0 | Status: DISCONTINUED | OUTPATIENT
Start: 2021-07-26 | End: 2021-08-03

## 2021-07-26 RX ORDER — MINERAL OIL
133 OIL (ML) MISCELLANEOUS ONCE
Refills: 0 | Status: DISCONTINUED | OUTPATIENT
Start: 2021-07-26 | End: 2021-07-26

## 2021-07-26 RX ORDER — ERYTHROPOIETIN 10000 [IU]/ML
8000 INJECTION, SOLUTION INTRAVENOUS; SUBCUTANEOUS
Refills: 0 | Status: DISCONTINUED | OUTPATIENT
Start: 2021-07-26 | End: 2021-07-30

## 2021-07-26 RX ORDER — GLUCAGON INJECTION, SOLUTION 0.5 MG/.1ML
1 INJECTION, SOLUTION SUBCUTANEOUS ONCE
Refills: 0 | Status: DISCONTINUED | OUTPATIENT
Start: 2021-07-26 | End: 2021-08-03

## 2021-07-26 RX ORDER — INSULIN LISPRO 100/ML
3 VIAL (ML) SUBCUTANEOUS
Refills: 0 | Status: DISCONTINUED | OUTPATIENT
Start: 2021-07-26 | End: 2021-07-28

## 2021-07-26 RX ORDER — SENNA PLUS 8.6 MG/1
2 TABLET ORAL AT BEDTIME
Refills: 0 | Status: DISCONTINUED | OUTPATIENT
Start: 2021-07-26 | End: 2021-08-03

## 2021-07-26 RX ORDER — SODIUM CHLORIDE 9 MG/ML
1000 INJECTION, SOLUTION INTRAVENOUS
Refills: 0 | Status: DISCONTINUED | OUTPATIENT
Start: 2021-07-26 | End: 2021-08-03

## 2021-07-26 RX ORDER — DEXTROSE 50 % IN WATER 50 %
15 SYRINGE (ML) INTRAVENOUS ONCE
Refills: 0 | Status: DISCONTINUED | OUTPATIENT
Start: 2021-07-26 | End: 2021-08-03

## 2021-07-26 RX ORDER — DEXTROSE 50 % IN WATER 50 %
12.5 SYRINGE (ML) INTRAVENOUS ONCE
Refills: 0 | Status: DISCONTINUED | OUTPATIENT
Start: 2021-07-26 | End: 2021-08-03

## 2021-07-26 RX ORDER — INSULIN GLARGINE 100 [IU]/ML
8 INJECTION, SOLUTION SUBCUTANEOUS AT BEDTIME
Refills: 0 | Status: DISCONTINUED | OUTPATIENT
Start: 2021-07-26 | End: 2021-07-27

## 2021-07-26 RX ADMIN — MIDODRINE HYDROCHLORIDE 10 MILLIGRAM(S): 2.5 TABLET ORAL at 12:53

## 2021-07-26 RX ADMIN — CHLORHEXIDINE GLUCONATE 1 APPLICATION(S): 213 SOLUTION TOPICAL at 06:27

## 2021-07-26 RX ADMIN — AMIODARONE HYDROCHLORIDE 200 MILLIGRAM(S): 400 TABLET ORAL at 06:27

## 2021-07-26 RX ADMIN — SENNA PLUS 2 TABLET(S): 8.6 TABLET ORAL at 22:25

## 2021-07-26 RX ADMIN — MIDODRINE HYDROCHLORIDE 10 MILLIGRAM(S): 2.5 TABLET ORAL at 15:58

## 2021-07-26 RX ADMIN — Medication 500 MILLIGRAM(S): at 12:20

## 2021-07-26 RX ADMIN — Medication 3 MILLILITER(S): at 21:11

## 2021-07-26 RX ADMIN — Medication 25 MILLIGRAM(S): at 06:27

## 2021-07-26 RX ADMIN — INSULIN GLARGINE 8 UNIT(S): 100 INJECTION, SOLUTION SUBCUTANEOUS at 22:25

## 2021-07-26 RX ADMIN — ATORVASTATIN CALCIUM 40 MILLIGRAM(S): 80 TABLET, FILM COATED ORAL at 22:25

## 2021-07-26 RX ADMIN — Medication 4: at 12:15

## 2021-07-26 RX ADMIN — POLYETHYLENE GLYCOL 3350 17 GRAM(S): 17 POWDER, FOR SOLUTION ORAL at 14:17

## 2021-07-26 RX ADMIN — ZINC SULFATE TAB 220 MG (50 MG ZINC EQUIVALENT) 220 MILLIGRAM(S): 220 (50 ZN) TAB at 12:16

## 2021-07-26 RX ADMIN — Medication 5: at 08:14

## 2021-07-26 RX ADMIN — Medication 3 MILLILITER(S): at 14:40

## 2021-07-26 RX ADMIN — APIXABAN 2.5 MILLIGRAM(S): 2.5 TABLET, FILM COATED ORAL at 06:27

## 2021-07-26 NOTE — CONSULT NOTE ADULT - PROBLEM SELECTOR RECOMMENDATION 9
hyperglycemic  decent aic as out pt- 7.8%  start lantus 8 units   add admelog 3 ac tid   fsg ac and hs  aim fsg 140- <200 due to age and comorbidities   d/w hs

## 2021-07-26 NOTE — PROGRESS NOTE ADULT - PROBLEM SELECTOR PLAN 3
- had an episodes of tachy (110-140)  - 1 dose digoxin 250mg PO given  - continue home med of amiodarone and eliquis   - monitor HR   - monitor on telemetry - patient and daughter states that she has "a little bit of COPD" but not on any meds at home just home oxygen at 3L NC   - no wheezing presently  - bilateral congestion  - continue albuterol PRN   - monitor O2 sats

## 2021-07-26 NOTE — CONSULT NOTE ADULT - ASSESSMENT
89 F, wheel chair bound, from home with PMH of Afib on Eliquis, ESRD on HD(Monday, Tuesday, Thursday, Saturday), DM on insulin, HTN, Home O2 3L presented with shortness of breath, vomiting x1 day.   Found to have uncont dm. Pt admits to taking basal bolus insulin given by her daughter. Does not recall fsg or if she has hypos. Currently eating well .

## 2021-07-26 NOTE — PROGRESS NOTE ADULT - PROBLEM SELECTOR PROBLEM 5
Type 2 diabetes mellitus with diabetic nephropathy, unspecified whether long term insulin use Hypertension, unspecified type

## 2021-07-26 NOTE — PROGRESS NOTE ADULT - PROBLEM SELECTOR PLAN 4
- hold metoprolol   - continue meds with hold parameters  - monitor BP closely   - continue midodrine prior to HD sessions   - will give albumin 100ml - had an episodes of tachy (110-140)  - 1 dose digoxin 250mg PO given  - continue home med of amiodarone and eliquis   - monitor HR   - monitor on telemetry

## 2021-07-26 NOTE — PROGRESS NOTE ADULT - PROBLEM SELECTOR PLAN 8
RISK                                                       Points  [] Previous VTE                                           3  [] Thrombophilia                                        2  [] Lower limb paralysis                               2   [] Current Cancer                                       2   [x] Immobilization > 24 hrs                         1  [] ICU/CCU stay > 24 hours                        1  [x] Age > 60                                             1    Hx of Afib  Eliquis 2.5 mg q12 - lipid panel: wnl  - continue statin

## 2021-07-26 NOTE — PROGRESS NOTE ADULT - PROBLEM SELECTOR PROBLEM 6
Anemia, unspecified type Type 2 diabetes mellitus with diabetic nephropathy, unspecified whether long term insulin use

## 2021-07-26 NOTE — PROGRESS NOTE ADULT - PROBLEM SELECTOR PLAN 5
- history of DM on insulin at home  - A1c of 7.8  - [facility] SSI - hold metoprolol   - continue meds with hold parameters  - monitor BP closely   - continue midodrine prior to HD sessions   - will give albumin 100ml

## 2021-07-26 NOTE — PROGRESS NOTE ADULT - SUBJECTIVE AND OBJECTIVE BOX
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 Pt a/w Entero/ Rhino virus, acute respiratory distress requiring BIPAP with concerns for fluid overload. Nephrology consulted for ESRD status.     Hospital Medications: Medications reviewed.  REVIEW OF SYSTEMS: No CP, abd pain.  SOB improving. +constipated    VITALS:  T(F): 98.2 (07-26-21 @ 14:30), Max: 99.4 (07-26-21 @ 05:15)  HR: 118 (07-26-21 @ 14:30)  BP: 106/69 (07-26-21 @ 14:30)  RR: 18 (07-26-21 @ 14:30)  SpO2: 93% (07-26-21 @ 14:30)  Wt(kg): --    07-25 @ 07:01  -  07-26 @ 07:00  --------------------------------------------------------  IN: 400 mL / OUT: 0 mL / NET: 400 mL      PHYSICAL EXAM:  Gen: NAD  HEENT: anicteric; MMM  Neck: no JVD  Cards: RRR, +S1/S2,   Resp: coarse BS b/l   GI: soft, NT/ND, NABS  Extremities: No LE edema B/L  Access: Rt IJ tunneled HD catheter- benign      LABS:  07-26    136  |  101  |  38<H>  ----------------------------<  294<H>  4.4   |  24  |  5.70<H>    Ca    9.0      26 Jul 2021 04:31  Phos  2.6     07-26  Mg     2.4     07-26    TPro  6.6  /  Alb  2.6<L>  /  TBili  0.6  /  DBili      /  AST  12  /  ALT  18  /  AlkPhos  117  07-26    Creatinine Trend: 5.70 <--, 3.87 <--, 2.46 <--, 3.63 <--, 4.23 <--, 4.91 <--                        8.3    7.74  )-----------( 076 ( 26 Jul 2021 04:31 )             26.8     Urine Studies:

## 2021-07-26 NOTE — PROGRESS NOTE ADULT - SUBJECTIVE AND OBJECTIVE BOX
PGY-1 Progress Note discussed with attending    PAGER #: [[phone]] TILL 5:00 PM  PLEASE CONTACT ON CALL TEAM:  - On Call Team (Please refer to Sheri) FROM 5:00 PM - 8:30PM  - Nightfloat Team FROM 8:30 -7:30 AM    CHIEF COMPLAINT & BRIEF HOSPITAL COURSE:        INTERVAL HPI/OVERNIGHT EVENTS:     Pt. examined at bedside AAO x 2 with noted work of breathing. Complains of SOB. O2 was changed from 3 to 5 LPM (O2 inc. from 92 to 95%). Noted bilateral rhonchi and congestion of both lung fields. Also complains of constipation. She was given water tap enema. Pt. refused to undergo HD unless constipation is resolved. Informed Attending (Dr. Harris) and Nephro (Dr. Huitron) informed about this.    REVIEW OF SYSTEMS:  CONSTITUTIONAL: No fever, weight loss, or fatigue  RESPIRATORY: No cough, wheezing, chills or hemoptysis; No shortness of breath  CARDIOVASCULAR: No chest pain, palpitations, dizziness, or leg swelling  GASTROINTESTINAL: No abdominal pain. No nausea, vomiting, or hematemesis; No diarrhea or constipation. No melena or hematochezia.  GENITOURINARY: No dysuria or hematuria, urinary frequency  NEUROLOGICAL: No headaches, memory loss, loss of strength, numbness, or tremors  SKIN: No itching, burning, rashes, or lesions     MEDICATIONS  (STANDING):  albuterol/ipratropium for Nebulization 3 milliLiter(s) Nebulizer every 6 hours  aMIOdarone    Tablet 200 milliGRAM(s) Oral daily  apixaban 2.5 milliGRAM(s) Oral every 12 hours  ascorbic acid 500 milliGRAM(s) Oral daily  atorvastatin 40 milliGRAM(s) Oral at bedtime  chlorhexidine 2% Cloths 1 Application(s) Topical <User Schedule>  dextrose 40% Gel 15 Gram(s) Oral once  dextrose 5%. 1000 milliLiter(s) (50 mL/Hr) IV Continuous <Continuous>  dextrose 5%. 1000 milliLiter(s) (100 mL/Hr) IV Continuous <Continuous>  dextrose 50% Injectable 25 Gram(s) IV Push once  dextrose 50% Injectable 12.5 Gram(s) IV Push once  dextrose 50% Injectable 25 Gram(s) IV Push once  epoetin maria d-epbx (RETACRIT) Injectable 4000 Unit(s) IV Push <User Schedule>  glucagon  Injectable 1 milliGRAM(s) IntraMuscular once  insulin glargine Injectable (LANTUS) 8 Unit(s) SubCutaneous at bedtime  insulin lispro (ADMELOG) corrective regimen sliding scale   SubCutaneous three times a day before meals  insulin lispro Injectable (ADMELOG) 3 Unit(s) SubCutaneous three times a day before meals  metoprolol tartrate 25 milliGRAM(s) Oral two times a day  polyethylene glycol 3350 17 Gram(s) Oral daily  senna 2 Tablet(s) Oral at bedtime  zinc sulfate 220 milliGRAM(s) Oral daily    MEDICATIONS  (PRN):  ALBUTerol    90 MICROgram(s) HFA Inhaler 2 Puff(s) Inhalation every 6 hours PRN Shortness of Breath and/or Wheezing  bisacodyl Suppository 10 milliGRAM(s) Rectal daily PRN Constipation  midodrine. 10 milliGRAM(s) Oral <User Schedule> PRN DIALYSIS  sodium chloride 0.9% lock flush 10 milliLiter(s) IV Push every 1 hour PRN Pre/post blood products, medications, blood draw, and to maintain line patency      Vital Signs Last 24 Hrs  T(C): 36.5 (26 Jul 2021 10:49), Max: 37.4 (26 Jul 2021 05:15)  T(F): 97.7 (26 Jul 2021 10:49), Max: 99.4 (26 Jul 2021 05:15)  HR: 110 (26 Jul 2021 10:49) (73 - 141)  BP: 171/79 (26 Jul 2021 10:49) (110/50 - 171/79)  BP(mean): 62 (25 Jul 2021 18:00) (56 - 112)  RR: 18 (26 Jul 2021 10:49) (18 - 34)  SpO2: 100% (26 Jul 2021 10:49) (92% - 100%)    PHYSICAL EXAMINATION:  GENERAL: NAD, well built  HEAD:  Atraumatic, Normocephalic  EYES:  conjunctiva and sclera clear  NECK: Supple, No JVD, Normal thyroid  CHEST/LUNG: Clear to auscultation. Clear to percussion bilaterally; No rales, rhonchi, wheezing, or rubs  HEART: Regular rate and rhythm; No murmurs, rubs, or gallops  ABDOMEN: Soft, Nontender, Nondistended; Bowel sounds present, no pain or masses on palpation  NERVOUS SYSTEM:  Alert & Oriented X3  : voiding well  EXTREMITIES:  2+ Peripheral Pulses, No clubbing, cyanosis, or edema  SKIN: warm dry                          8.3    7.74  )-----------( 177      ( 26 Jul 2021 04:31 )             26.8     07-26    136  |  101  |  38<H>  ----------------------------<  294<H>  4.4   |  24  |  5.70<H>    Ca    9.0      26 Jul 2021 04:31  Phos  2.6     07-26  Mg     2.4     07-26    TPro  6.6  /  Alb  2.6<L>  /  TBili  0.6  /  DBili  x   /  AST  12  /  ALT  18  /  AlkPhos  117  07-26    LIVER FUNCTIONS - ( 26 Jul 2021 04:31 )  Alb: 2.6 g/dL / Pro: 6.6 g/dL / ALK PHOS: 117 U/L / ALT: 18 U/L DA / AST: 12 U/L / GGT: x                   I&O's Summary    25 Jul 2021 07:01  -  26 Jul 2021 07:00  --------------------------------------------------------  IN: 400 mL / OUT: 0 mL / NET: 400 mL            CAPILLARY BLOOD GLUCOSE      RADIOLOGY & ADDITIONAL TESTS:                   PGY-1 Progress Note discussed with attending    PAGER #: [[phone]] TILL 5:00 PM  PLEASE CONTACT ON CALL TEAM:  - On Call Team (Please refer to Sheri) FROM 5:00 PM - 8:30PM  - Nightfloat Team FROM 8:30 -7:30 AM    CHIEF COMPLAINT & BRIEF HOSPITAL COURSE:    Patient is a 89 year old female, PMH of Afib on Eliquis, ESRD on HD(Monday, Tuesday, Thursday, Saturday), DM on insulin, HTN, COPD on home O2 3L, who presented to the ED due to SOB. In ED, patient placed on BiPAP and had HD session with about 1.274L fluid removed and blood pressure noting to drop to 60s despite being given midodrine. Admitted to ICU Acute Hypoxic Respiratory Failure.     ICU course. Patient arrived to the ICU on 7/22 for AHRF 2/2 fluid overload. Patient had HD and BIPAP was tapered off. Patient hypotensive during HD, given midodrine pre-HD and pressor support. Patient can resume HD on the floors. Patient off pressors since 2 a.m. Patient on amiodarone for AF.    7/24 pt had [facility] central line placement after dialysis became hypotensive and phenylephrine.    In the alfredo. Pt. was stable and down-graded to 5 South. Scheduled for another HD on 7/26/21. She still has episodes of SOB and was hooked to 5LPM. She also had episode of tahcycardia and is on Telemetry monitoring.    INTERVAL HPI/OVERNIGHT EVENTS:     Pt. examined at bedside AAO x 2 with noted work of breathing. Complains of SOB. O2 was changed from 3 to 5 LPM (O2 inc. from 92 to 95%). Noted bilateral rhonchi and congestion of both lung fields. Also complains of constipation. She was given water tap enema. Pt. refused to undergo HD unless constipation is resolved. Informed Attending (Dr. Harris) and Nephro (Dr. Huitron) informed about this.    REVIEW OF SYSTEMS:  CONSTITUTIONAL: No fever, weight loss, or fatigue  RESPIRATORY: (+) shortness of breath. No cough, wheezing, chills or hemoptysis;  CARDIOVASCULAR: No chest pain, palpitations, dizziness, or leg swelling  GASTROINTESTINAL: (+) constipation. No abdominal pain. No nausea, vomiting, or hematemesis; No diarrhea. No melena or hematochezia.  GENITOURINARY: No dysuria or hematuria, urinary frequency  NEUROLOGICAL: No headaches, memory loss, loss of strength, numbness, or tremors  SKIN: No itching, burning, rashes, or lesions     MEDICATIONS  (STANDING):  albuterol/ipratropium for Nebulization 3 milliLiter(s) Nebulizer every 6 hours  aMIOdarone    Tablet 200 milliGRAM(s) Oral daily  apixaban 2.5 milliGRAM(s) Oral every 12 hours  ascorbic acid 500 milliGRAM(s) Oral daily  atorvastatin 40 milliGRAM(s) Oral at bedtime  chlorhexidine 2% Cloths 1 Application(s) Topical <User Schedule>  dextrose 40% Gel 15 Gram(s) Oral once  dextrose 5%. 1000 milliLiter(s) (50 mL/Hr) IV Continuous <Continuous>  dextrose 5%. 1000 milliLiter(s) (100 mL/Hr) IV Continuous <Continuous>  dextrose 50% Injectable 25 Gram(s) IV Push once  dextrose 50% Injectable 12.5 Gram(s) IV Push once  dextrose 50% Injectable 25 Gram(s) IV Push once  epoetin maria d-epbx (RETACRIT) Injectable 4000 Unit(s) IV Push <User Schedule>  glucagon  Injectable 1 milliGRAM(s) IntraMuscular once  insulin glargine Injectable (LANTUS) 8 Unit(s) SubCutaneous at bedtime  insulin lispro (ADMELOG) corrective regimen sliding scale   SubCutaneous three times a day before meals  insulin lispro Injectable (ADMELOG) 3 Unit(s) SubCutaneous three times a day before meals  metoprolol tartrate 25 milliGRAM(s) Oral two times a day  polyethylene glycol 3350 17 Gram(s) Oral daily  senna 2 Tablet(s) Oral at bedtime  zinc sulfate 220 milliGRAM(s) Oral daily    MEDICATIONS  (PRN):  ALBUTerol    90 MICROgram(s) HFA Inhaler 2 Puff(s) Inhalation every 6 hours PRN Shortness of Breath and/or Wheezing  bisacodyl Suppository 10 milliGRAM(s) Rectal daily PRN Constipation  midodrine. 10 milliGRAM(s) Oral <User Schedule> PRN DIALYSIS  sodium chloride 0.9% lock flush 10 milliLiter(s) IV Push every 1 hour PRN Pre/post blood products, medications, blood draw, and to maintain line patency      Vital Signs Last 24 Hrs  T(C): 36.5 (26 Jul 2021 10:49), Max: 37.4 (26 Jul 2021 05:15)  T(F): 97.7 (26 Jul 2021 10:49), Max: 99.4 (26 Jul 2021 05:15)  HR: 110 (26 Jul 2021 10:49) (73 - 141)  BP: 171/79 (26 Jul 2021 10:49) (110/50 - 171/79)  BP(mean): 62 (25 Jul 2021 18:00) (56 - 112)  RR: 18 (26 Jul 2021 10:49) (18 - 34)  SpO2: 100% (26 Jul 2021 10:49) (92% - 100%)    PHYSICAL EXAMINATION:  GENERAL: NAD, well built  HEAD:  Atraumatic, Normocephalic  EYES:  conjunctiva and sclera clear  NECK: Supple, No JVD, Normal thyroid  CHEST/LUNG: (+) bilateral rhonchi and congestion; No wheezing, or rubs  HEART: Regular rate and rhythm; No murmurs, rubs, or gallops  ABDOMEN: Soft, Nontender, Nondistended; Bowel sounds present, no pain or masses on palpation  NERVOUS SYSTEM:  Alert & Oriented X3  : voiding well  EXTREMITIES:  2+ Peripheral Pulses, No clubbing, cyanosis, or edema  SKIN: warm dry                          8.3    7.74  )-----------( 177      ( 26 Jul 2021 04:31 )             26.8     07-26    136  |  101  |  38<H>  ----------------------------<  294<H>  4.4   |  24  |  5.70<H>    Ca    9.0      26 Jul 2021 04:31  Phos  2.6     07-26  Mg     2.4     07-26    TPro  6.6  /  Alb  2.6<L>  /  TBili  0.6  /  DBili  x   /  AST  12  /  ALT  18  /  AlkPhos  117  07-26    LIVER FUNCTIONS - ( 26 Jul 2021 04:31 )  Alb: 2.6 g/dL / Pro: 6.6 g/dL / ALK PHOS: 117 U/L / ALT: 18 U/L DA / AST: 12 U/L / GGT: x                   I&O's Summary    25 Jul 2021 07:01  -  26 Jul 2021 07:00  --------------------------------------------------------  IN: 400 mL / OUT: 0 mL / NET: 400 mL            CAPILLARY BLOOD GLUCOSE      RADIOLOGY & ADDITIONAL TESTS:    < from: Xray Chest 1 View- PORTABLE-Urgent (Xray Chest 1 View- PORTABLE-Urgent .) (07.24.21 @ 02:08) >  COMPARISON: 7/23/2021    Interval insertion left internal jugular CVL with tip overlying the internal jugular/brachiocephalic vein junction. No pneumothorax. Right internal jugular hemodialysis catheter again seen in place. Micra pacemaker noted.    Heart size may be exaggerated by AP technique. Tubing overlies the left chest. Question left retrocardiac opacity. Otherwise no focal lung consolidation or sizable pleural effusion. No significant osseous changes.    IMPRESSION:    Interval insertion left internal jugular CVL. No pneumothorax.    < end of copied text >

## 2021-07-26 NOTE — CHART NOTE - NSCHARTNOTEFT_GEN_A_CORE
Pt. examined at bedside. Currently has work of breathing. Auscultation revealed bilateral rhonci and congestion. This morning O2 was inc. from 3 to 5LPM (92-95%). Scheduled for HD today (10:00 am). Called Dr. Huitron and was advised to call HD immediately. We called HD and confirmed that they would bring the pt down. Pt. is also complaining of constipation. Denies any bowel movement nor urinary output this morning. Ordered an enema for the pt. Pt. was informed of the plan for her constipation and SOB. Attending and Nurse was also informed and agreed to the plan.

## 2021-07-26 NOTE — PROGRESS NOTE ADULT - ASSESSMENT
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 Pt a/w Entero/ Rhino virus, acute respiratory distress requiring BIPAP with concerns for fluid overload. Nephrology consulted for ESRD status.     1) ESRD: Last HD 7/24 with phenylephrine and IV albumin immediately prior to HD.  HD was tolerated. Pt for HD today but refusing until pt has a BM. Discussed risk of respiratory distress/ intubation but pt refuses HD until constipation resolved. Monitor hemodynamics carefully. Monitor electrolytes.  Plan for next HD tomorrow 7/27.  2) HTN 2/2 CKD- BP labile. Metoprolol 25mg PO q8hrs held.  Continue with Midodrine 10mg PO prior to HD to aid in fluid removal. Monitor BP  3) Anemia of renal disease: Hb low. Check iron studies including ferritin in am. Will increase Epogen to 8k units IV tiw with HD. Monitor Hb.  4) Hyperphosphatemia: Phosphorus acceptable. No need for phos binder. c/w low phos diet. Monitor serum calcium and phosphorus.      [facility]  Brad Chen M.D.  Oneal Tim D.O.  Roselia Huitron M.D.  Milli Platt, MSN, ANP-C  [phone]    [address] [place] [address]

## 2021-07-26 NOTE — PROGRESS NOTE ADULT - PROBLEM SELECTOR PLAN 6
- Hgb 8.3 (7/26/21)  - no signs of bleeding noted  - likely 2/2 ESRD  - monitor CBC daily   - Weekly EPO - history of DM on insulin at home  - A1c of 7.8  - [facility] SSI

## 2021-07-26 NOTE — PROGRESS NOTE ADULT - ASSESSMENT
Patient is a 89 year old female, wheel chair bound, from home, with PMH of Afib on Eliquis, ESRD on HD(Monday, Tuesday, Thursday, Saturday), DM on insulin, HTN, COPD on home O2 3L, who presented to the ED due to SOB. In ED, patient placed on BiPAP and had HD session with about 1.274L fluid removed and blood pressure noting to drop to 60s despite being given midodrine. Admitted to ICU Acute Hypoxic Respiratory Failure.

## 2021-07-26 NOTE — PROGRESS NOTE ADULT - ATTENDING COMMENTS
89 F, wheel chair bound, from home with PMH of Afib on Eliquis, ESRD on HD(Monday, Tuesday, Thursday, Saturday), DM on insulin, HTN, Home O2 3L presented with shortness of breath, vomiting x1 day.  Patient also has COPD, on bipap, and was just in MICU for COPD exacerbation.     Today she is SOB.  Had tap water enema x 2 for BM before going to dialysis. 89 F, wheel chair bound, from home with PMH of Afib on Eliquis, ESRD on HD(Monday, Tuesday, Thursday, Saturday), DM on insulin, HTN, Home O2 3L presented with shortness of breath, vomiting x1 day.  Patient also has COPD, on bipap, and was just in MICU for COPD exacerbation.     Today she is SOB.  Had tap water enema x 2 for BM before going to dialysis.    Alert, obese, chronically-ill woman, using nasal oxygen in moderate respiratory distress  Vital Signs Last 24 Hrs  T(C): 36.7 (26 Jul 2021 20:20), Max: 37.4 (26 Jul 2021 05:15)  T(F): 98.1 (26 Jul 2021 20:20), Max: 99.4 (26 Jul 2021 05:15)  HR: 114 (26 Jul 2021 20:20) (73 - 141)  BP: 124/60 (26 Jul 2021 20:20) (106/69 - 194/69)  BP(mean): --  RR: 25 (26 Jul 2021 20:20) (18 - 26)  SpO2: 100% (26 Jul 2021 20:20) (92% - 100%)  Lungs, rales at bases  Cor, irreg  Abdomen, obese, soft  Neurological, non-focal                        8.3    7.74  )-----------( 177      ( 26 Jul 2021 04:31 )             26.8   07-26    136  |  101  |  38<H>  ----------------------------<  294<H>  4.4   |  24  |  5.70<H>    Ca    9.0      26 Jul 2021 04:31  Phos  2.6     07-26  Mg     2.4     07-26    TPro  6.6  /  Alb  2.6<L>  /  TBili  0.6  /  DBili  x   /  AST  12  /  ALT  18  /  AlkPhos  117  07-26    IMP:  COPD exacerbation, no longer requiring continuous bipap.  SOB secondary to underlying COPD and to ESRD with fluid overload.  Hypotension         is the limiting factor in removal of fluid on dialysis, and she is pre-treated with midodrine.           DM, suboptimal control          HTN, afib, HLD  Plan: Continue dialysis with removal of fluid, as BP allows.  Continue midodrine before dialysis.           Amiodarone, metoprolol, apixiban.  Will need to continue home oxygen.           Case management evaluation to consider appropriate post-hospital care.

## 2021-07-26 NOTE — PROGRESS NOTE ADULT - PROBLEM SELECTOR PLAN 7
- lipid panel: wnl  - continue statin - Hgb 8.3 (7/26/21)  - no signs of bleeding noted  - likely 2/2 ESRD  - monitor CBC daily   - Weekly EPO

## 2021-07-27 LAB
ALBUMIN SERPL ELPH-MCNC: 2.6 G/DL — LOW (ref 3.5–5)
ALP SERPL-CCNC: 128 U/L — HIGH (ref 40–120)
ALT FLD-CCNC: 21 U/L DA — SIGNIFICANT CHANGE UP (ref 10–60)
ANION GAP SERPL CALC-SCNC: 10 MMOL/L — SIGNIFICANT CHANGE UP (ref 5–17)
ANION GAP SERPL CALC-SCNC: 11 MMOL/L — SIGNIFICANT CHANGE UP (ref 5–17)
ANISOCYTOSIS BLD QL: SLIGHT — SIGNIFICANT CHANGE UP
AST SERPL-CCNC: 16 U/L — SIGNIFICANT CHANGE UP (ref 10–40)
BASOPHILS # BLD AUTO: 0 K/UL — SIGNIFICANT CHANGE UP (ref 0–0.2)
BASOPHILS # BLD AUTO: 0.05 K/UL — SIGNIFICANT CHANGE UP (ref 0–0.2)
BASOPHILS NFR BLD AUTO: 0 % — SIGNIFICANT CHANGE UP (ref 0–2)
BASOPHILS NFR BLD AUTO: 0.5 % — SIGNIFICANT CHANGE UP (ref 0–2)
BILIRUB SERPL-MCNC: 0.7 MG/DL — SIGNIFICANT CHANGE UP (ref 0.2–1.2)
BUN SERPL-MCNC: 16 MG/DL — SIGNIFICANT CHANGE UP (ref 7–18)
BUN SERPL-MCNC: 34 MG/DL — HIGH (ref 7–18)
CALCIUM SERPL-MCNC: 8.6 MG/DL — SIGNIFICANT CHANGE UP (ref 8.4–10.5)
CALCIUM SERPL-MCNC: 8.7 MG/DL — SIGNIFICANT CHANGE UP (ref 8.4–10.5)
CHLORIDE SERPL-SCNC: 100 MMOL/L — SIGNIFICANT CHANGE UP (ref 96–108)
CHLORIDE SERPL-SCNC: 99 MMOL/L — SIGNIFICANT CHANGE UP (ref 96–108)
CK MB BLD-MCNC: 2.6 % — SIGNIFICANT CHANGE UP (ref 0–3.5)
CK MB CFR SERPL CALC: 1.9 NG/ML — SIGNIFICANT CHANGE UP (ref 0–3.6)
CK SERPL-CCNC: 73 U/L — SIGNIFICANT CHANGE UP (ref 21–215)
CO2 SERPL-SCNC: 27 MMOL/L — SIGNIFICANT CHANGE UP (ref 22–31)
CO2 SERPL-SCNC: 28 MMOL/L — SIGNIFICANT CHANGE UP (ref 22–31)
CREAT SERPL-MCNC: 2.98 MG/DL — HIGH (ref 0.5–1.3)
CREAT SERPL-MCNC: 4.82 MG/DL — HIGH (ref 0.5–1.3)
CULTURE RESULTS: SIGNIFICANT CHANGE UP
CULTURE RESULTS: SIGNIFICANT CHANGE UP
EOSINOPHIL # BLD AUTO: 0 K/UL — SIGNIFICANT CHANGE UP (ref 0–0.5)
EOSINOPHIL # BLD AUTO: 0.11 K/UL — SIGNIFICANT CHANGE UP (ref 0–0.5)
EOSINOPHIL NFR BLD AUTO: 0 % — SIGNIFICANT CHANGE UP (ref 0–6)
EOSINOPHIL NFR BLD AUTO: 1 % — SIGNIFICANT CHANGE UP (ref 0–6)
FERRITIN SERPL-MCNC: 722 NG/ML — HIGH (ref 15–150)
GLUCOSE BLDC GLUCOMTR-MCNC: 165 MG/DL — HIGH (ref 70–99)
GLUCOSE BLDC GLUCOMTR-MCNC: 173 MG/DL — HIGH (ref 70–99)
GLUCOSE BLDC GLUCOMTR-MCNC: 183 MG/DL — HIGH (ref 70–99)
GLUCOSE BLDC GLUCOMTR-MCNC: 198 MG/DL — HIGH (ref 70–99)
GLUCOSE BLDC GLUCOMTR-MCNC: 252 MG/DL — HIGH (ref 70–99)
GLUCOSE BLDC GLUCOMTR-MCNC: 347 MG/DL — HIGH (ref 70–99)
GLUCOSE SERPL-MCNC: 210 MG/DL — HIGH (ref 70–99)
GLUCOSE SERPL-MCNC: 312 MG/DL — HIGH (ref 70–99)
HCT VFR BLD CALC: 25.7 % — LOW (ref 34.5–45)
HCT VFR BLD CALC: 31.6 % — LOW (ref 34.5–45)
HGB BLD-MCNC: 8 G/DL — LOW (ref 11.5–15.5)
HGB BLD-MCNC: 9.8 G/DL — LOW (ref 11.5–15.5)
HYPOCHROMIA BLD QL: SLIGHT — SIGNIFICANT CHANGE UP
IMM GRANULOCYTES NFR BLD AUTO: 3.7 % — HIGH (ref 0–1.5)
IRON SATN MFR SERPL: 22 % — SIGNIFICANT CHANGE UP (ref 15–50)
IRON SATN MFR SERPL: 23 UG/DL — LOW (ref 40–150)
LYMPHOCYTES # BLD AUTO: 0.48 K/UL — LOW (ref 1–3.3)
LYMPHOCYTES # BLD AUTO: 0.88 K/UL — LOW (ref 1–3.3)
LYMPHOCYTES # BLD AUTO: 6 % — LOW (ref 13–44)
LYMPHOCYTES # BLD AUTO: 8.3 % — LOW (ref 13–44)
MAGNESIUM SERPL-MCNC: 2.4 MG/DL — SIGNIFICANT CHANGE UP (ref 1.6–2.6)
MANUAL SMEAR VERIFICATION: SIGNIFICANT CHANGE UP
MCHC RBC-ENTMCNC: 29.4 PG — SIGNIFICANT CHANGE UP (ref 27–34)
MCHC RBC-ENTMCNC: 29.6 PG — SIGNIFICANT CHANGE UP (ref 27–34)
MCHC RBC-ENTMCNC: 31 GM/DL — LOW (ref 32–36)
MCHC RBC-ENTMCNC: 31.1 GM/DL — LOW (ref 32–36)
MCV RBC AUTO: 94.9 FL — SIGNIFICANT CHANGE UP (ref 80–100)
MCV RBC AUTO: 95.2 FL — SIGNIFICANT CHANGE UP (ref 80–100)
MONOCYTES # BLD AUTO: 0.48 K/UL — SIGNIFICANT CHANGE UP (ref 0–0.9)
MONOCYTES # BLD AUTO: 1.29 K/UL — HIGH (ref 0–0.9)
MONOCYTES NFR BLD AUTO: 12.1 % — SIGNIFICANT CHANGE UP (ref 2–14)
MONOCYTES NFR BLD AUTO: 6 % — SIGNIFICANT CHANGE UP (ref 2–14)
MYELOCYTES NFR BLD: 5 % — HIGH (ref 0–0)
NEUTROPHILS # BLD AUTO: 6.7 K/UL — SIGNIFICANT CHANGE UP (ref 1.8–7.4)
NEUTROPHILS # BLD AUTO: 7.93 K/UL — HIGH (ref 1.8–7.4)
NEUTROPHILS NFR BLD AUTO: 74.4 % — SIGNIFICANT CHANGE UP (ref 43–77)
NEUTROPHILS NFR BLD AUTO: 83 % — HIGH (ref 43–77)
NRBC # BLD: 0 /100 WBCS — SIGNIFICANT CHANGE UP (ref 0–0)
NRBC # BLD: 0 /100 — SIGNIFICANT CHANGE UP (ref 0–0)
PHOSPHATE SERPL-MCNC: 3 MG/DL — SIGNIFICANT CHANGE UP (ref 2.5–4.5)
PLAT MORPH BLD: NORMAL — SIGNIFICANT CHANGE UP
PLATELET # BLD AUTO: 195 K/UL — SIGNIFICANT CHANGE UP (ref 150–400)
PLATELET # BLD AUTO: 233 K/UL — SIGNIFICANT CHANGE UP (ref 150–400)
PLATELET COUNT - ESTIMATE: NORMAL — SIGNIFICANT CHANGE UP
POIKILOCYTOSIS BLD QL AUTO: SLIGHT — SIGNIFICANT CHANGE UP
POLYCHROMASIA BLD QL SMEAR: SLIGHT — SIGNIFICANT CHANGE UP
POTASSIUM SERPL-MCNC: 3.6 MMOL/L — SIGNIFICANT CHANGE UP (ref 3.5–5.3)
POTASSIUM SERPL-MCNC: 4.1 MMOL/L — SIGNIFICANT CHANGE UP (ref 3.5–5.3)
POTASSIUM SERPL-SCNC: 3.6 MMOL/L — SIGNIFICANT CHANGE UP (ref 3.5–5.3)
POTASSIUM SERPL-SCNC: 4.1 MMOL/L — SIGNIFICANT CHANGE UP (ref 3.5–5.3)
PROT SERPL-MCNC: 7.1 G/DL — SIGNIFICANT CHANGE UP (ref 6–8.3)
RBC # BLD: 2.7 M/UL — LOW (ref 3.8–5.2)
RBC # BLD: 3.33 M/UL — LOW (ref 3.8–5.2)
RBC # FLD: 15.6 % — HIGH (ref 10.3–14.5)
RBC # FLD: 15.7 % — HIGH (ref 10.3–14.5)
RBC BLD AUTO: ABNORMAL
SODIUM SERPL-SCNC: 137 MMOL/L — SIGNIFICANT CHANGE UP (ref 135–145)
SODIUM SERPL-SCNC: 138 MMOL/L — SIGNIFICANT CHANGE UP (ref 135–145)
SPECIMEN SOURCE: SIGNIFICANT CHANGE UP
SPECIMEN SOURCE: SIGNIFICANT CHANGE UP
TIBC SERPL-MCNC: 104 UG/DL — LOW (ref 250–450)
TROPONIN I SERPL-MCNC: 0.06 NG/ML — HIGH (ref 0–0.04)
TROPONIN I SERPL-MCNC: 0.06 NG/ML — HIGH (ref 0–0.04)
UIBC SERPL-MCNC: 81 UG/DL — LOW (ref 110–370)
WBC # BLD: 10.65 K/UL — HIGH (ref 3.8–10.5)
WBC # BLD: 8.07 K/UL — SIGNIFICANT CHANGE UP (ref 3.8–10.5)
WBC # FLD AUTO: 10.65 K/UL — HIGH (ref 3.8–10.5)
WBC # FLD AUTO: 8.07 K/UL — SIGNIFICANT CHANGE UP (ref 3.8–10.5)

## 2021-07-27 PROCEDURE — 99291 CRITICAL CARE FIRST HOUR: CPT

## 2021-07-27 PROCEDURE — 99233 SBSQ HOSP IP/OBS HIGH 50: CPT | Mod: GC

## 2021-07-27 PROCEDURE — 99233 SBSQ HOSP IP/OBS HIGH 50: CPT

## 2021-07-27 PROCEDURE — 70450 CT HEAD/BRAIN W/O DYE: CPT | Mod: 26

## 2021-07-27 RX ORDER — ASPIRIN/CALCIUM CARB/MAGNESIUM 324 MG
81 TABLET ORAL DAILY
Refills: 0 | Status: DISCONTINUED | OUTPATIENT
Start: 2021-07-27 | End: 2021-08-03

## 2021-07-27 RX ORDER — INSULIN GLARGINE 100 [IU]/ML
10 INJECTION, SOLUTION SUBCUTANEOUS AT BEDTIME
Refills: 0 | Status: DISCONTINUED | OUTPATIENT
Start: 2021-07-27 | End: 2021-07-28

## 2021-07-27 RX ORDER — ALBUTEROL 90 UG/1
2 AEROSOL, METERED ORAL EVERY 6 HOURS
Refills: 0 | Status: DISCONTINUED | OUTPATIENT
Start: 2021-07-27 | End: 2021-08-03

## 2021-07-27 RX ORDER — IPRATROPIUM/ALBUTEROL SULFATE 18-103MCG
3 AEROSOL WITH ADAPTER (GRAM) INHALATION EVERY 6 HOURS
Refills: 0 | Status: DISCONTINUED | OUTPATIENT
Start: 2021-07-27 | End: 2021-07-27

## 2021-07-27 RX ORDER — ATORVASTATIN CALCIUM 80 MG/1
80 TABLET, FILM COATED ORAL AT BEDTIME
Refills: 0 | Status: DISCONTINUED | OUTPATIENT
Start: 2021-07-27 | End: 2021-08-03

## 2021-07-27 RX ORDER — BUDESONIDE AND FORMOTEROL FUMARATE DIHYDRATE 160; 4.5 UG/1; UG/1
2 AEROSOL RESPIRATORY (INHALATION)
Refills: 0 | Status: DISCONTINUED | OUTPATIENT
Start: 2021-07-27 | End: 2021-07-27

## 2021-07-27 RX ORDER — BUDESONIDE AND FORMOTEROL FUMARATE DIHYDRATE 160; 4.5 UG/1; UG/1
2 AEROSOL RESPIRATORY (INHALATION)
Refills: 0 | Status: DISCONTINUED | OUTPATIENT
Start: 2021-07-27 | End: 2021-08-03

## 2021-07-27 RX ORDER — TIOTROPIUM BROMIDE 18 UG/1
1 CAPSULE ORAL; RESPIRATORY (INHALATION) DAILY
Refills: 0 | Status: DISCONTINUED | OUTPATIENT
Start: 2021-07-27 | End: 2021-08-03

## 2021-07-27 RX ADMIN — Medication 3 MILLILITER(S): at 04:00

## 2021-07-27 RX ADMIN — MIDODRINE HYDROCHLORIDE 10 MILLIGRAM(S): 2.5 TABLET ORAL at 11:44

## 2021-07-27 RX ADMIN — INSULIN GLARGINE 10 UNIT(S): 100 INJECTION, SOLUTION SUBCUTANEOUS at 21:45

## 2021-07-27 RX ADMIN — Medication 25 MILLIGRAM(S): at 05:46

## 2021-07-27 RX ADMIN — APIXABAN 2.5 MILLIGRAM(S): 2.5 TABLET, FILM COATED ORAL at 05:46

## 2021-07-27 RX ADMIN — AMIODARONE HYDROCHLORIDE 200 MILLIGRAM(S): 400 TABLET ORAL at 05:46

## 2021-07-27 RX ADMIN — SENNA PLUS 2 TABLET(S): 8.6 TABLET ORAL at 21:44

## 2021-07-27 RX ADMIN — Medication 81 MILLIGRAM(S): at 17:45

## 2021-07-27 RX ADMIN — Medication 3: at 17:48

## 2021-07-27 RX ADMIN — ATORVASTATIN CALCIUM 80 MILLIGRAM(S): 80 TABLET, FILM COATED ORAL at 18:06

## 2021-07-27 RX ADMIN — CHLORHEXIDINE GLUCONATE 1 APPLICATION(S): 213 SOLUTION TOPICAL at 05:46

## 2021-07-27 RX ADMIN — Medication 4: at 08:16

## 2021-07-27 RX ADMIN — Medication 3 UNIT(S): at 08:16

## 2021-07-27 RX ADMIN — Medication 3 UNIT(S): at 17:47

## 2021-07-27 RX ADMIN — ERYTHROPOIETIN 8000 UNIT(S): 10000 INJECTION, SOLUTION INTRAVENOUS; SUBCUTANEOUS at 11:45

## 2021-07-27 RX ADMIN — APIXABAN 2.5 MILLIGRAM(S): 2.5 TABLET, FILM COATED ORAL at 17:45

## 2021-07-27 RX ADMIN — BUDESONIDE AND FORMOTEROL FUMARATE DIHYDRATE 2 PUFF(S): 160; 4.5 AEROSOL RESPIRATORY (INHALATION) at 21:45

## 2021-07-27 RX ADMIN — Medication 3 MILLILITER(S): at 09:52

## 2021-07-27 NOTE — PROGRESS NOTE ADULT - PROBLEM SELECTOR PLAN 6
- history of DM on insulin at home  - A1c of 7.8  - [facility] SSI

## 2021-07-27 NOTE — CHART NOTE - NSCHARTNOTEFT_GEN_A_CORE
Code stroke was called for patient at 4.12 pm. Patient found to have weakness of left side.   Vital signs:   /56    was saturating 75-79% initially on 3L NC, later improved to 90s on %. Patient awake and answering questions though lethargic. On exam, pt has weakness of left arm and left leg that is new along with left sided facial droop and tongue deviation to the left side. Code stroke was called for patient at 4.12 pm. Patient found to have weakness of left side.   Vital signs:   /56    was saturating 75-79% initially on 3L NC, later improved to 90s on %. Patient awake and answering questions though lethargic. On exam, pt has weakness of left arm and left leg that is new along with left sided facial droop and tongue deviation to the left side. Pt has h/o similar episode in the past as per family. Will draw stat labs and patient bring taken for stat CT head. Neurology Dr Philip consulted. Code stroke was called for patient at 4.12 pm. Patient found to have weakness of left side.   Vital signs:   /56    was saturating 75-79% initially on 3L NC, later improved to 90s on %. Patient awake and answering questions though lethargic. On exam, pt has weakness of left arm and left leg that is new along with left sided facial droop and tongue deviation to the left side. NIHSS 5. Pt has h/o similar episode in the past as per family. Will draw stat labs and patient bring taken for stat CT head. Neurology Dr Philip consulted. Code stroke was called for patient at 4.12 pm. Patient found to have weakness of left side.   Vital signs:   /56    was saturating 75-79% initially on 3L NC, later improved to 90s on %. Patient awake and answering questions though lethargic. On exam, pt has weakness of left arm and left leg that is new along with left sided facial droop and tongue deviation to the left side. NIHSS 5. Pt has h/o similar episode in the past as per family. Will draw stat labs and patient bring taken for stat CT head. Neurology Dr Philip consulted.        ATTENDING ATTESTATION:  Patient noted to have left facial droop and left arm weakness after returning from HD. Patient evaluated by me at bedside and noted to have left tongue deviation as well. Given concern for acute CVA, CODE STROKE was activated. Vitals noted as above, . Daughter was at bedside who noted the change in her mothers facial droop and confirmed that it was different from her baseline. Will obtain stroke work up, consult neurology, add ASA and high intensity statin, continue with previous Eliquis and hold adding plavix until neuro eval. Will likely need MRI brain as well pending neuro input.    CRITICAL CARE TIME SPENT: 45 minutes by me at bedside, activating code stroke, and coordinating care with medical team and family.

## 2021-07-27 NOTE — CONSULT NOTE ADULT - SUBJECTIVE AND OBJECTIVE BOX
To be Completed    Per Code Stroke Note:  "Code stroke was called for patient at 4.12 pm. Patient found to have weakness of left side.   Vital signs:   /56    was saturating 75-79% initially on 3L NC, later improved to 90s on %. Patient awake and answering questions though lethargic. On exam, pt has weakness of left arm and left leg that is new along with left sided facial droop and tongue deviation to the left side. NIHSS 5. Pt has h/o similar episode in the past as per family. Will draw stat labs and patient bring taken for stat CT head. Neurology Dr Philip consulted."    Calculated MAP (mean arterial pressure) from above: 80mm Hg      EXAMINATION    Obese.  Awake, alert.  Mild psychomotor retardation.  Grossly normal comprehension, expression, prosody, articulation.  Normal facial/lingual movements.  Confrontation visual fields: no gross field cut to unilateral stimuli; suppresses L hemifield stimuli on DSS (double simultaneous stimulation).         No UE drift. Symmetric slow-normal Nurys of hands.  Somewhat worse than moderate generalized asthenia, wprse in LEs, without grossly demonstrable asymmetry, on confrontation testing of limb motor function.      Detects/localizes unilateral P/LT stimuli; extinguishes L-sided P/LT stimuli on DSS      Reflex                           Right    Left   Comment       Biceps                             1        1  Triceps                            0       0  Patellar                      pain     pain not testable  Gastroc                           0?     0?  Plantar                       flexor  flexor     History from Admission H&P of 7/22/21    <Start of quote(s) from H&P>/  "Reason for Admission: SOB  History of Present Illness:   89 F, wheel chair bound, from home with PMH of Afib on Eliquis, ESRD on HD(Monday, Tuesday, Thursday, Saturday), DM on insulin, HTN, Home O2 3L presented with shortness of breath, vomiting x1 day. Pt was drinking tea from home when she had a vomiting episode. Her daughter at bedside describes "all this fluid came out of her mouth." As per daughter, pt has been drinking a lot of fluid over the past day. No new medications, no hospitalizations or ER visits in the interim as per daughter. No fever, no chest pain, no palpitations, no diarrhea, no dysuria. Pt is oliguric. Pt had COVID in March 2021, and as such daughter reports she did not yet get vaccinated.     Review of Systems:  Other Review of Systems: All other review of systems negative, except as noted in HPI    Goals of Care:     . . .     Allergies and Intolerances:        Allergies:  	No Known Allergies:     Home Medications:   * Patient Currently Takes Medications as of 22-Jul-2021 04:04 documented in Structured Notes  · 	midodrine 10 mg oral tablet: Last Dose Taken:  , 1 tab(s) orally once a day ONLY BEFORE DIALYSIS  · 	Metoprolol Tartrate 25 mg oral tablet: Last Dose Taken:  , 1 tab(s) orally every 8 hours  · 	Eliquis 2.5 mg oral tablet: Last Dose Taken:  , 1 tab(s) orally 2 times a day  · 	Crestor 10 mg oral tablet: Last Dose Taken:  , 1 tab(s) orally once a day  · 	amiodarone 200 mg oral tablet: Last Dose Taken:  , 1 tab(s) orally once a day  · 	HumaLOG 100 units/mL injectable solution: Last Dose Taken:  , Sliding scale  · 	Toujeo SoloStar 300 units/mL subcutaneous solution: Last Dose Taken:  , 10 unit(s) subcutaneous once a day (at bedtime)    Patient History:    Past Medical, Past Surgical, and Family History:  PAST MEDICAL HISTORY:  Afib   CKD (chronic kidney disease)   DM (diabetes mellitus)   HLD (hyperlipidemia)   HTN (hypertension).     PAST SURGICAL HISTORY:  Artificial pacemaker.  . . .      Heart Failure:  Does this patient have a history of or has been diagnosed with heart failure? yes."  <End of quote(s) from H&P>      Per Code Stroke Note:  "Code stroke was called for patient at 4.12 pm. Patient found to have weakness of left side.   Vital signs:   /56    was saturating 75-79% initially on 3L NC, later improved to 90s on %. Patient awake and answering questions though lethargic. On exam, pt has weakness of left arm and left leg that is new along with left sided facial droop and tongue deviation to the left side. NIHSS 5. Pt has h/o similar episode in the past as per family. Will draw stat labs and patient bring taken for stat CT head. Neurology Dr Philip consulted."    Calculated MAP (mean arterial pressure) from above: 80mm Hg      EXAMINATION    Obese.  Awake, alert.  Mild psychomotor retardation.  Grossly normal comprehension, expression, prosody, articulation.  Normal facial/lingual movements.  Confrontation visual fields: no gross field cut to unilateral stimuli; suppresses L hemifield stimuli on DSS (double simultaneous stimulation).         No UE drift. Symmetric slow-normal Nurys of hands.  Somewhat worse than moderate generalized asthenia, wprse in LEs, without grossly demonstrable asymmetry, on confrontation testing of limb motor function.      Detects/localizes unilateral P/LT stimuli; extinguishes L-sided P/LT stimuli on DSS      Reflex                           Right    Left   Comment       Biceps                             1        1  Triceps                            0       0  Patellar                      pain     pain not testable  Gastroc                           0?     0?  Plantar                       flexor  flexor

## 2021-07-27 NOTE — PROGRESS NOTE ADULT - PROBLEM SELECTOR PLAN 7
- Hgb 8.3 (7/26/21)  - no signs of bleeding noted  - likely 2/2 ESRD  - monitor CBC daily  - Fe - 23, TIBC - 104, %Fe - 22  - Weekly EPO

## 2021-07-27 NOTE — PROGRESS NOTE ADULT - PROBLEM SELECTOR PLAN 5
- hold metoprolol   - continue meds with hold parameters  - monitor BP closely   - continue midodrine prior to HD sessions   - will give albumin 100ml

## 2021-07-27 NOTE — PROGRESS NOTE ADULT - ASSESSMENT
Patient is a 89yo Female with ESRD on HD, Afib on Eliquis, HTN, Intradialytic hypotension req Midodrine prior to HD, failed Rt AVF due to steal syndrome s/p ligation, h/o COVID s/p PPM p/w SOB x 1 day. Pt a/w Entero/ Rhino virus, acute respiratory distress requiring BIPAP with concerns for fluid overload. Nephrology consulted for ESRD status.     1) ESRD: Last HD 7/26, tolerated well with net 1273ml removed. Pt now BiPAP, will plan for next HD earlier today; UF as tolerated. Monitor electrolytes.  2) HTN 2/2 CKD- BP acceptable. Metoprolol 25mg PO q8hrs held.  Continue with Midodrine 10mg PO prior to HD to aid in fluid removal. Monitor BP  3) Anemia of renal disease: Hb low. Will avoid IV iron due to elevated Ferritin. c/w Epogen 8k units IV tiw with HD (increased 7/26). Monitor Hb.  4) Hyperphosphatemia: Phosphorus acceptable. No need for phos binder. c/w low phos diet. Monitor serum calcium and phosphorus.      [facility] NEPHROLOGY  Brad Chen M.D.  Oneal Tim D.O.  Roselia Huitron M.D.  Milli Platt, MSN, ANP-C  [phone]    [id number] [place] [id number]

## 2021-07-27 NOTE — CHART NOTE - NSCHARTNOTESELECT_GEN_ALL_CORE
Event Note
Event Note
Medicine/Transfer Note
CODE STROKE/Event Note
Event Note

## 2021-07-27 NOTE — PROGRESS NOTE ADULT - SUBJECTIVE AND OBJECTIVE BOX
Interval Events:  pt on bipap  eating well    Allergies    No Known Allergies    Intolerances      Endocrine/Metabolic Medications:  atorvastatin 40 milliGRAM(s) Oral at bedtime  dextrose 40% Gel 15 Gram(s) Oral once  dextrose 50% Injectable 25 Gram(s) IV Push once  dextrose 50% Injectable 12.5 Gram(s) IV Push once  dextrose 50% Injectable 25 Gram(s) IV Push once  glucagon  Injectable 1 milliGRAM(s) IntraMuscular once  insulin glargine Injectable (LANTUS) 8 Unit(s) SubCutaneous at bedtime  insulin lispro (ADMELOG) corrective regimen sliding scale   SubCutaneous three times a day before meals  insulin lispro Injectable (ADMELOG) 3 Unit(s) SubCutaneous three times a day before meals      Vital Signs Last 24 Hrs  T(C): 36.9 (27 Jul 2021 05:45), Max: 37.2 (26 Jul 2021 16:55)  T(F): 98.4 (27 Jul 2021 05:45), Max: 99 (26 Jul 2021 16:55)  HR: 66 (27 Jul 2021 05:45) (61 - 118)  BP: 120/53 (27 Jul 2021 05:45) (106/69 - 194/69)  BP(mean): --  RR: 18 (27 Jul 2021 05:45) (18 - 26)  SpO2: 100% (27 Jul 2021 05:45) (93% - 100%)      PHYSICAL EXAM  All physical exam findings normal, except those marked:  General:	Alert, active, cooperative, NAD, well hydrated  .		[] Abnormal:  Neck		Normal: supple, no cervical adenopathy, no palpable thyroid  .		[] Abnormal:  Cardiovascular	Normal: regular rate, normal S1, S2, no murmurs  .		[] Abnormal:  Respiratory	Normal: no chest wall deformity, normal respiratory pattern, CTA B/L  .		[] Abnormal:  Abdominal	Normal: soft, ND, NT, bowel sounds present, no masses, no organomegaly  .		[] Abnormal:  		Normal normal genitalia, testes descended, circumcised/uncircumcised  .		Tiny stage:			Breast tiny:  .		Menstrual history:  .		[] Abnormal:  Extremities	Normal: FROM x4  .		[] Abnormal:  Skin		Normal: intact and not indurated, no rash, no acanthosis nigricans  .		[] Abnormal:  Neurologic	Normal: grossly intact  .		[] Abnormal:    LABS                        8.0    8.07  )-----------( 195      ( 27 Jul 2021 06:10 )             25.7                               138    |  100    |  34                  Calcium: 8.7   / iCa: x      (07-27 @ 06:10)    ----------------------------<  312       Magnesium: 2.4                              4.1     |  27     |  4.82             Phosphorous: 3.0        CAPILLARY BLOOD GLUCOSE      POCT Blood Glucose.: 347 mg/dL (27 Jul 2021 08:05)  POCT Blood Glucose.: 186 mg/dL (26 Jul 2021 21:34)  POCT Blood Glucose.: 171 mg/dL (26 Jul 2021 20:06)  POCT Blood Glucose.: 348 mg/dL (26 Jul 2021 11:17)        Assesment/plan    89 F, wheel chair bound, from home with PMH of Afib on Eliquis, ESRD on HD(Monday, Tuesday, Thursday, Saturday), DM on insulin, HTN, Home O2 3L presented with shortness of breath, vomiting x1 day.   Found to have uncont dm. Pt admits to taking basal bolus insulin given by her daughter. Does not recall fsg or if she has hypos. Currently eating well .       Problem/Recommendation - 1:  Problem: Uncontrolled diabetes mellitus. Recommendation: hyperglycemia better controlled   decent aic as out pt- 7.8%  change lantus to 10 units   cont  admelog 3 ac tid   fsg ac and hs  aim fsg 140- <200 due to age and comorbidities   d/w hs.     Problem/Recommendation - 2:  ·  Problem: Acute respiratory failure with hypoxia and hypercapnia.  Recommendation: s/p icu tx- downgrade  tx per prim team.

## 2021-07-27 NOTE — CONSULT NOTE ADULT - ASSESSMENT
Transient recrudescence (now resolved) of L hemiparesis in the setting of hypoxemia, low MAP, and  dialysis-related fluid/electrloyte shifts.     This is the most recent episode in a series of such episodes the PT has had.      No further neurologic work-up indicated at this time.

## 2021-07-27 NOTE — PROGRESS NOTE ADULT - SUBJECTIVE AND OBJECTIVE BOX
[facility] NEPHROLOGY- PROGRESS NOTE    Patient is a 89yo Female with ESRD on HD, Afib on Eliquis, HTN, Intradialytic hypotension req Midodrine prior to HD, failed Rt AVF due to steal syndrome s/p ligation, h/o COVID s/p PPM p/w SOB x 1 day. Pt a/w Entero/ Rhino virus, acute respiratory distress requiring BIPAP with concerns for fluid overload. Nephrology consulted for ESRD status.     Hospital Medications: Medications reviewed.  REVIEW OF SYSTEMS: Limited on BiPAP + SOB  Constipation resolved    VITALS:  T(F): 98 (07-27-21 @ 11:20), Max: 99 (07-26-21 @ 16:55)  HR: 105 (07-27-21 @ 11:20)  BP: 118/50 (07-27-21 @ 11:20)  RR: 18 (07-27-21 @ 11:20)  SpO2: 97% (07-27-21 @ 11:20)  Wt(kg): --      PHYSICAL EXAM:  Gen: NAD  HEENT: anicteric;   Neck: no JVD  Cards: RRR, +S1/S2,   Resp: coarse BS b/l on BiPAP  GI: soft, NT/ND, NABS  Extremities: No LE edema B/L  Access: Rt IJ tunneled HD catheter- benign      LABS:  07-27    138  |  100  |  34<H>  ----------------------------<  312<H>  4.1   |  27  |  4.82<H>    Ca    8.7      27 Jul 2021 06:10  Phos  3.0     07-27  Mg     2.4     07-27    TPro  6.6  /  Alb  2.6<L>  /  TBili  0.6  /  DBili      /  AST  12  /  ALT  18  /  AlkPhos  117  07-26    Creatinine Trend: 4.82 <--, 5.70 <--, 3.87 <--, 2.46 <--, 3.63 <--, 4.23 <--, 4.91 <--                        8.0    8.07  )-----------( 195      ( 27 Jul 2021 06:10 )             25.7     Urine Studies:

## 2021-07-27 NOTE — PROGRESS NOTE ADULT - PROBLEM SELECTOR PLAN 4
- had an episodes of tachy (110-140)  - 1 dose digoxin 250mg PO given  - continue home med of amiodarone and eliquis   - monitor HR   - monitor on telemetry

## 2021-07-27 NOTE — PROGRESS NOTE ADULT - PROBLEM SELECTOR PLAN 3
- patient and daughter states that she has "a little bit of COPD" but not on any meds at home just home oxygen at 3L NC   - no wheezing presently  - bilateral congestion  - continue albuterol PRN   - monitor O2 sats

## 2021-07-27 NOTE — PROGRESS NOTE ADULT - ATTENDING COMMENTS
Patient admitted for acute hypoxic respiratory failure due to fluid overload in the setting of ESRD. Hypotension has limited fluid removal during HD, is on Midodrine prior to HD. Possible component of COPD as well, is on 3L O2 at home, does not use Bipap at home but responded well here. No wheezing on exam but mild b/l crackles, will DC duonebs and start symbicort/spiriva for maintenance. Of note, after HD today, patient was noted to have left facial droop, left tongue deviation, and left arm weakness concerning for possible acute CVA. She was noted to be hypotensive during HD therefore CODE STROKE was activated (see separate code note for details. Obtain stat CT head, consult neurology, start ASA, statin, hold plavix as she's already on eliquis, obtain PT and S&S evals, telemetry monitoring, and carotid US, hold repeat echo as recently obtained in May. Will follow up with endocrine regarding poorly controlled DM and nephrology regarding further HD sessions w/ her hypotension.

## 2021-07-27 NOTE — PROGRESS NOTE ADULT - SUBJECTIVE AND OBJECTIVE BOX
PGY-1 Progress Note discussed with attending    PAGER #: [[phone]] TILL 5:00 PM  PLEASE CONTACT ON CALL TEAM:  - On Call Team (Please refer to Sheri) FROM 5:00 PM - 8:30PM  - Nightfloat Team FROM 8:30 -7:30 AM    CHIEF COMPLAINT & BRIEF HOSPITAL COURSE:      INTERVAL HPI/OVERNIGHT EVENTS:     Pt. examined at bedside, AAO x 2. She was hooked with BIPAP during the morning rounds. She still has some SOB, and bilateral congestion. She has mild work of breathing. Denies any abdominal pain and constipation. Had her HD session yesterday (7/27/21). No other significant events overnight.    REVIEW OF SYSTEMS:  CONSTITUTIONAL: No fever, weight loss, or fatigue  RESPIRATORY: No cough, wheezing, chills or hemoptysis; No shortness of breath  CARDIOVASCULAR: No chest pain, palpitations, dizziness, or leg swelling  GASTROINTESTINAL: No abdominal pain. No nausea, vomiting, or hematemesis; No diarrhea or constipation. No melena or hematochezia.  GENITOURINARY: No dysuria or hematuria, urinary frequency  NEUROLOGICAL: No headaches, memory loss, loss of strength, numbness, or tremors  SKIN: No itching, burning, rashes, or lesions     MEDICATIONS  (STANDING):  albuterol/ipratropium for Nebulization 3 milliLiter(s) Nebulizer every 6 hours  aMIOdarone    Tablet 200 milliGRAM(s) Oral daily  apixaban 2.5 milliGRAM(s) Oral every 12 hours  ascorbic acid 500 milliGRAM(s) Oral daily  atorvastatin 40 milliGRAM(s) Oral at bedtime  chlorhexidine 2% Cloths 1 Application(s) Topical <User Schedule>  dextrose 40% Gel 15 Gram(s) Oral once  dextrose 5%. 1000 milliLiter(s) (50 mL/Hr) IV Continuous <Continuous>  dextrose 5%. 1000 milliLiter(s) (100 mL/Hr) IV Continuous <Continuous>  dextrose 50% Injectable 25 Gram(s) IV Push once  dextrose 50% Injectable 12.5 Gram(s) IV Push once  dextrose 50% Injectable 25 Gram(s) IV Push once  epoetin maria d-epbx (RETACRIT) Injectable 8000 Unit(s) IV Push <User Schedule>  glucagon  Injectable 1 milliGRAM(s) IntraMuscular once  insulin glargine Injectable (LANTUS) 10 Unit(s) SubCutaneous at bedtime  insulin lispro (ADMELOG) corrective regimen sliding scale   SubCutaneous three times a day before meals  insulin lispro Injectable (ADMELOG) 3 Unit(s) SubCutaneous three times a day before meals  metoprolol tartrate 25 milliGRAM(s) Oral two times a day  polyethylene glycol 3350 17 Gram(s) Oral daily  senna 2 Tablet(s) Oral at bedtime  zinc sulfate 220 milliGRAM(s) Oral daily    MEDICATIONS  (PRN):  ALBUTerol    90 MICROgram(s) HFA Inhaler 2 Puff(s) Inhalation every 6 hours PRN Shortness of Breath and/or Wheezing  bisacodyl Suppository 10 milliGRAM(s) Rectal daily PRN Constipation  midodrine. 10 milliGRAM(s) Oral <User Schedule> PRN DIALYSIS  sodium chloride 0.9% lock flush 10 milliLiter(s) IV Push every 1 hour PRN Pre/post blood products, medications, blood draw, and to maintain line patency      Vital Signs Last 24 Hrs  T(C): 36.9 (27 Jul 2021 05:45), Max: 37.2 (26 Jul 2021 16:55)  T(F): 98.4 (27 Jul 2021 05:45), Max: 99 (26 Jul 2021 16:55)  HR: 66 (27 Jul 2021 08:30) (61 - 118)  BP: 120/53 (27 Jul 2021 05:45) (106/69 - 194/69)  BP(mean): --  RR: 18 (27 Jul 2021 05:45) (18 - 26)  SpO2: 95% (27 Jul 2021 08:30) (93% - 100%)    PHYSICAL EXAMINATION:  GENERAL: NAD, well built  HEAD:  Atraumatic, Normocephalic  EYES:  conjunctiva and sclera clear  NECK: Supple, No JVD, Normal thyroid  CHEST/LUNG: Clear to auscultation. Clear to percussion bilaterally; No rales, rhonchi, wheezing, or rubs  HEART: Regular rate and rhythm; No murmurs, rubs, or gallops  ABDOMEN: Soft, Nontender, Nondistended; Bowel sounds present, no pain or masses on palpation  NERVOUS SYSTEM:  Alert & Oriented X3  : voiding well  EXTREMITIES:  2+ Peripheral Pulses, No clubbing, cyanosis, or edema  SKIN: warm dry                          8.0    8.07  )-----------( 195      ( 27 Jul 2021 06:10 )             25.7     07-27    138  |  100  |  34<H>  ----------------------------<  312<H>  4.1   |  27  |  4.82<H>    Ca    8.7      27 Jul 2021 06:10  Phos  3.0     07-27  Mg     2.4     07-27    TPro  6.6  /  Alb  2.6<L>  /  TBili  0.6  /  DBili  x   /  AST  12  /  ALT  18  /  AlkPhos  117  07-26    LIVER FUNCTIONS - ( 26 Jul 2021 04:31 )  Alb: 2.6 g/dL / Pro: 6.6 g/dL / ALK PHOS: 117 U/L / ALT: 18 U/L DA / AST: 12 U/L / GGT: x                   I&O's Summary          CAPILLARY BLOOD GLUCOSE      RADIOLOGY & ADDITIONAL TESTS:                   PGY-1 Progress Note discussed with attending    PAGER #: [[phone]] TILL 5:00 PM  PLEASE CONTACT ON CALL TEAM:  - On Call Team (Please refer to Sheri) FROM 5:00 PM - 8:30PM  - Nightfloat Team FROM 8:30 -7:30 AM    CHIEF COMPLAINT & BRIEF HOSPITAL COURSE:      INTERVAL HPI/OVERNIGHT EVENTS:     Pt. examined at bedside, AAO x 2. She was hooked with BIPAP during the morning rounds. She still has some SOB, and mild bilateral congestion. She has mild work of breathing. Denies any abdominal pain and constipation. Had her HD session yesterday (7/27/21). No other significant events overnight.    REVIEW OF SYSTEMS:  CONSTITUTIONAL: No fever, weight loss, or fatigue  RESPIRATORY: (+) SOB, (+) congestion. No cough, wheezing, chills or hemoptysis;  CARDIOVASCULAR: No chest pain, palpitations, dizziness, or leg swelling  GASTROINTESTINAL: No abdominal pain. No nausea, vomiting, or hematemesis; No diarrhea or constipation. No melena or hematochezia.  GENITOURINARY: No dysuria or hematuria, urinary frequency  NEUROLOGICAL: No headaches, memory loss, loss of strength, numbness, or tremors  SKIN: No itching, burning, rashes, or lesions     MEDICATIONS  (STANDING):  albuterol/ipratropium for Nebulization 3 milliLiter(s) Nebulizer every 6 hours  aMIOdarone    Tablet 200 milliGRAM(s) Oral daily  apixaban 2.5 milliGRAM(s) Oral every 12 hours  ascorbic acid 500 milliGRAM(s) Oral daily  atorvastatin 40 milliGRAM(s) Oral at bedtime  chlorhexidine 2% Cloths 1 Application(s) Topical <User Schedule>  dextrose 40% Gel 15 Gram(s) Oral once  dextrose 5%. 1000 milliLiter(s) (50 mL/Hr) IV Continuous <Continuous>  dextrose 5%. 1000 milliLiter(s) (100 mL/Hr) IV Continuous <Continuous>  dextrose 50% Injectable 25 Gram(s) IV Push once  dextrose 50% Injectable 12.5 Gram(s) IV Push once  dextrose 50% Injectable 25 Gram(s) IV Push once  epoetin maria d-epbx (RETACRIT) Injectable 8000 Unit(s) IV Push <User Schedule>  glucagon  Injectable 1 milliGRAM(s) IntraMuscular once  insulin glargine Injectable (LANTUS) 10 Unit(s) SubCutaneous at bedtime  insulin lispro (ADMELOG) corrective regimen sliding scale   SubCutaneous three times a day before meals  insulin lispro Injectable (ADMELOG) 3 Unit(s) SubCutaneous three times a day before meals  metoprolol tartrate 25 milliGRAM(s) Oral two times a day  polyethylene glycol 3350 17 Gram(s) Oral daily  senna 2 Tablet(s) Oral at bedtime  zinc sulfate 220 milliGRAM(s) Oral daily    MEDICATIONS  (PRN):  ALBUTerol    90 MICROgram(s) HFA Inhaler 2 Puff(s) Inhalation every 6 hours PRN Shortness of Breath and/or Wheezing  bisacodyl Suppository 10 milliGRAM(s) Rectal daily PRN Constipation  midodrine. 10 milliGRAM(s) Oral <User Schedule> PRN DIALYSIS  sodium chloride 0.9% lock flush 10 milliLiter(s) IV Push every 1 hour PRN Pre/post blood products, medications, blood draw, and to maintain line patency      Vital Signs Last 24 Hrs  T(C): 36.9 (27 Jul 2021 05:45), Max: 37.2 (26 Jul 2021 16:55)  T(F): 98.4 (27 Jul 2021 05:45), Max: 99 (26 Jul 2021 16:55)  HR: 66 (27 Jul 2021 08:30) (61 - 118)  BP: 120/53 (27 Jul 2021 05:45) (106/69 - 194/69)  BP(mean): --  RR: 18 (27 Jul 2021 05:45) (18 - 26)  SpO2: 95% (27 Jul 2021 08:30) (93% - 100%)    PHYSICAL EXAMINATION:  GENERAL: NAD, obese  HEAD:  Atraumatic, Normocephalic  EYES:  conjunctiva and sclera clear  NECK: Supple, No JVD, Normal thyroid  CHEST/LUNG: (+) bilateral congestion and rhonchi; No rales, rhonchi, wheezing, or rubs  HEART: Regular rate and rhythm; No murmurs, rubs, or gallops  ABDOMEN: Soft, Nontender, Nondistended; Bowel sounds present, no pain or masses on palpation  NERVOUS SYSTEM:  Alert & Oriented X3  : voiding well  EXTREMITIES:  2+ Peripheral Pulses, No clubbing, cyanosis, or edema  SKIN: warm dry                          8.0    8.07  )-----------( 195      ( 27 Jul 2021 06:10 )             25.7     07-27    138  |  100  |  34<H>  ----------------------------<  312<H>  4.1   |  27  |  4.82<H>    Ca    8.7      27 Jul 2021 06:10  Phos  3.0     07-27  Mg     2.4     07-27    TPro  6.6  /  Alb  2.6<L>  /  TBili  0.6  /  DBili  x   /  AST  12  /  ALT  18  /  AlkPhos  117  07-26    LIVER FUNCTIONS - ( 26 Jul 2021 04:31 )  Alb: 2.6 g/dL / Pro: 6.6 g/dL / ALK PHOS: 117 U/L / ALT: 18 U/L DA / AST: 12 U/L / GGT: x                   I&O's Summary          CAPILLARY BLOOD GLUCOSE      RADIOLOGY & ADDITIONAL TESTS:                   PGY-1 Progress Note discussed with attending    PAGER #: [[phone]] TILL 5:00 PM  PLEASE CONTACT ON CALL TEAM:  - On Call Team (Please refer to Sheri) FROM 5:00 PM - 8:30PM  - Nightfloat Team FROM 8:30 -7:30 AM    CHIEF COMPLAINT & BRIEF HOSPITAL COURSE:    Patient is a 89 year old female, PMH of Afib on Eliquis, ESRD on HD(Monday, Tuesday, Thursday, Saturday), DM on insulin, HTN, COPD on home O2 3L, who presented to the ED due to SOB. In ED, patient placed on BiPAP and had HD session with about 1.274L fluid removed and blood pressure noting to drop to 60s despite being given midodrine. Admitted to ICU Acute Hypoxic Respiratory Failure.     ICU course. Patient arrived to the ICU on 7/22 for AHRF 2/2 fluid overload. Patient had HD and BIPAP was tapered off. Patient hypotensive during HD, given midodrine pre-HD and pressor support. Patient can resume HD on the floors. Patient off pressors since 2 a.m. Patient on amiodarone for AF.    7/24 pt had [facility]J central line placement after dialysis became hypotensive and phenylephrine.    In the alfredo. Pt. was stable and down-graded to 5 South. Scheduled for another HD on 7/26/21. She still has episodes of SOB and was hooked to 5LPM. She also had episode of tahcycardia and is on Telemetry monitoring.. Cardiology (Dr. Arteaga) was consulted.    INTERVAL HPI/OVERNIGHT EVENTS:     Pt. examined at bedside, AAO x 2. She was hooked with BIPAP during the morning rounds. She still has some SOB, and mild bilateral congestion. She has mild work of breathing. Denies any abdominal pain and constipation. Had her HD session yesterday (7/27/21). No other significant events overnight.    REVIEW OF SYSTEMS:  CONSTITUTIONAL: No fever, weight loss, or fatigue  RESPIRATORY: (+) SOB, (+) congestion. No cough, wheezing, chills or hemoptysis;  CARDIOVASCULAR: No chest pain, palpitations, dizziness, or leg swelling  GASTROINTESTINAL: No abdominal pain. No nausea, vomiting, or hematemesis; No diarrhea or constipation. No melena or hematochezia.  GENITOURINARY: No dysuria or hematuria, urinary frequency  NEUROLOGICAL: No headaches, memory loss, loss of strength, numbness, or tremors  SKIN: No itching, burning, rashes, or lesions     MEDICATIONS  (STANDING):  albuterol/ipratropium for Nebulization 3 milliLiter(s) Nebulizer every 6 hours  aMIOdarone    Tablet 200 milliGRAM(s) Oral daily  apixaban 2.5 milliGRAM(s) Oral every 12 hours  ascorbic acid 500 milliGRAM(s) Oral daily  atorvastatin 40 milliGRAM(s) Oral at bedtime  chlorhexidine 2% Cloths 1 Application(s) Topical <User Schedule>  dextrose 40% Gel 15 Gram(s) Oral once  dextrose 5%. 1000 milliLiter(s) (50 mL/Hr) IV Continuous <Continuous>  dextrose 5%. 1000 milliLiter(s) (100 mL/Hr) IV Continuous <Continuous>  dextrose 50% Injectable 25 Gram(s) IV Push once  dextrose 50% Injectable 12.5 Gram(s) IV Push once  dextrose 50% Injectable 25 Gram(s) IV Push once  epoetin maria d-epbx (RETACRIT) Injectable 8000 Unit(s) IV Push <User Schedule>  glucagon  Injectable 1 milliGRAM(s) IntraMuscular once  insulin glargine Injectable (LANTUS) 10 Unit(s) SubCutaneous at bedtime  insulin lispro (ADMELOG) corrective regimen sliding scale   SubCutaneous three times a day before meals  insulin lispro Injectable (ADMELOG) 3 Unit(s) SubCutaneous three times a day before meals  metoprolol tartrate 25 milliGRAM(s) Oral two times a day  polyethylene glycol 3350 17 Gram(s) Oral daily  senna 2 Tablet(s) Oral at bedtime  zinc sulfate 220 milliGRAM(s) Oral daily    MEDICATIONS  (PRN):  ALBUTerol    90 MICROgram(s) HFA Inhaler 2 Puff(s) Inhalation every 6 hours PRN Shortness of Breath and/or Wheezing  bisacodyl Suppository 10 milliGRAM(s) Rectal daily PRN Constipation  midodrine. 10 milliGRAM(s) Oral <User Schedule> PRN DIALYSIS  sodium chloride 0.9% lock flush 10 milliLiter(s) IV Push every 1 hour PRN Pre/post blood products, medications, blood draw, and to maintain line patency      Vital Signs Last 24 Hrs  T(C): 36.9 (27 Jul 2021 05:45), Max: 37.2 (26 Jul 2021 16:55)  T(F): 98.4 (27 Jul 2021 05:45), Max: 99 (26 Jul 2021 16:55)  HR: 66 (27 Jul 2021 08:30) (61 - 118)  BP: 120/53 (27 Jul 2021 05:45) (106/69 - 194/69)  BP(mean): --  RR: 18 (27 Jul 2021 05:45) (18 - 26)  SpO2: 95% (27 Jul 2021 08:30) (93% - 100%)    PHYSICAL EXAMINATION:  GENERAL: NAD, obese  HEAD:  Atraumatic, Normocephalic  EYES:  conjunctiva and sclera clear  NECK: Supple, No JVD, Normal thyroid  CHEST/LUNG: (+) bilateral congestion and rhonchi; No rales, rhonchi, wheezing, or rubs  HEART: Regular rate and rhythm; No murmurs, rubs, or gallops  ABDOMEN: Soft, Nontender, Nondistended; Bowel sounds present, no pain or masses on palpation  NERVOUS SYSTEM:  Alert & Oriented X3  : voiding well  EXTREMITIES:  2+ Peripheral Pulses, No clubbing, cyanosis, or edema  SKIN: warm dry                          8.0    8.07  )-----------( 195      ( 27 Jul 2021 06:10 )             25.7     07-27    138  |  100  |  34<H>  ----------------------------<  312<H>  4.1   |  27  |  4.82<H>    Ca    8.7      27 Jul 2021 06:10  Phos  3.0     07-27  Mg     2.4     07-27    TPro  6.6  /  Alb  2.6<L>  /  TBili  0.6  /  DBili  x   /  AST  12  /  ALT  18  /  AlkPhos  117  07-26    LIVER FUNCTIONS - ( 26 Jul 2021 04:31 )  Alb: 2.6 g/dL / Pro: 6.6 g/dL / ALK PHOS: 117 U/L / ALT: 18 U/L DA / AST: 12 U/L / GGT: x                   I&O's Summary          CAPILLARY BLOOD GLUCOSE      RADIOLOGY & ADDITIONAL TESTS:

## 2021-07-28 LAB
ANION GAP SERPL CALC-SCNC: 10 MMOL/L — SIGNIFICANT CHANGE UP (ref 5–17)
BASOPHILS # BLD AUTO: 0.05 K/UL — SIGNIFICANT CHANGE UP (ref 0–0.2)
BASOPHILS NFR BLD AUTO: 0.5 % — SIGNIFICANT CHANGE UP (ref 0–2)
BUN SERPL-MCNC: 28 MG/DL — HIGH (ref 7–18)
CALCIUM SERPL-MCNC: 8.8 MG/DL — SIGNIFICANT CHANGE UP (ref 8.4–10.5)
CHLORIDE SERPL-SCNC: 99 MMOL/L — SIGNIFICANT CHANGE UP (ref 96–108)
CO2 SERPL-SCNC: 28 MMOL/L — SIGNIFICANT CHANGE UP (ref 22–31)
CREAT SERPL-MCNC: 4.17 MG/DL — HIGH (ref 0.5–1.3)
EOSINOPHIL # BLD AUTO: 0.16 K/UL — SIGNIFICANT CHANGE UP (ref 0–0.5)
EOSINOPHIL NFR BLD AUTO: 1.7 % — SIGNIFICANT CHANGE UP (ref 0–6)
GLUCOSE BLDC GLUCOMTR-MCNC: 216 MG/DL — HIGH (ref 70–99)
GLUCOSE BLDC GLUCOMTR-MCNC: 216 MG/DL — HIGH (ref 70–99)
GLUCOSE BLDC GLUCOMTR-MCNC: 223 MG/DL — HIGH (ref 70–99)
GLUCOSE BLDC GLUCOMTR-MCNC: 241 MG/DL — HIGH (ref 70–99)
GLUCOSE SERPL-MCNC: 196 MG/DL — HIGH (ref 70–99)
HCT VFR BLD CALC: 30.9 % — LOW (ref 34.5–45)
HGB BLD-MCNC: 9.4 G/DL — LOW (ref 11.5–15.5)
IMM GRANULOCYTES NFR BLD AUTO: 4.4 % — HIGH (ref 0–1.5)
LYMPHOCYTES # BLD AUTO: 1.22 K/UL — SIGNIFICANT CHANGE UP (ref 1–3.3)
LYMPHOCYTES # BLD AUTO: 12.8 % — LOW (ref 13–44)
MAGNESIUM SERPL-MCNC: 2.5 MG/DL — SIGNIFICANT CHANGE UP (ref 1.6–2.6)
MCHC RBC-ENTMCNC: 29 PG — SIGNIFICANT CHANGE UP (ref 27–34)
MCHC RBC-ENTMCNC: 30.4 GM/DL — LOW (ref 32–36)
MCV RBC AUTO: 95.4 FL — SIGNIFICANT CHANGE UP (ref 80–100)
MONOCYTES # BLD AUTO: 1.46 K/UL — HIGH (ref 0–0.9)
MONOCYTES NFR BLD AUTO: 15.3 % — HIGH (ref 2–14)
NEUTROPHILS # BLD AUTO: 6.23 K/UL — SIGNIFICANT CHANGE UP (ref 1.8–7.4)
NEUTROPHILS NFR BLD AUTO: 65.3 % — SIGNIFICANT CHANGE UP (ref 43–77)
NRBC # BLD: 0 /100 WBCS — SIGNIFICANT CHANGE UP (ref 0–0)
PHOSPHATE SERPL-MCNC: 2.7 MG/DL — SIGNIFICANT CHANGE UP (ref 2.5–4.5)
PLATELET # BLD AUTO: 259 K/UL — SIGNIFICANT CHANGE UP (ref 150–400)
POTASSIUM SERPL-MCNC: 4.1 MMOL/L — SIGNIFICANT CHANGE UP (ref 3.5–5.3)
POTASSIUM SERPL-SCNC: 4.1 MMOL/L — SIGNIFICANT CHANGE UP (ref 3.5–5.3)
RBC # BLD: 3.24 M/UL — LOW (ref 3.8–5.2)
RBC # FLD: 15.8 % — HIGH (ref 10.3–14.5)
SODIUM SERPL-SCNC: 137 MMOL/L — SIGNIFICANT CHANGE UP (ref 135–145)
WBC # BLD: 9.54 K/UL — SIGNIFICANT CHANGE UP (ref 3.8–10.5)
WBC # FLD AUTO: 9.54 K/UL — SIGNIFICANT CHANGE UP (ref 3.8–10.5)

## 2021-07-28 PROCEDURE — 93880 EXTRACRANIAL BILAT STUDY: CPT | Mod: 26

## 2021-07-28 PROCEDURE — 99233 SBSQ HOSP IP/OBS HIGH 50: CPT | Mod: GC

## 2021-07-28 RX ORDER — MIDODRINE HYDROCHLORIDE 2.5 MG/1
10 TABLET ORAL
Refills: 0 | Status: DISCONTINUED | OUTPATIENT
Start: 2021-07-28 | End: 2021-08-03

## 2021-07-28 RX ORDER — INSULIN GLARGINE 100 [IU]/ML
12 INJECTION, SOLUTION SUBCUTANEOUS AT BEDTIME
Refills: 0 | Status: DISCONTINUED | OUTPATIENT
Start: 2021-07-28 | End: 2021-07-29

## 2021-07-28 RX ORDER — INSULIN LISPRO 100/ML
4 VIAL (ML) SUBCUTANEOUS
Refills: 0 | Status: DISCONTINUED | OUTPATIENT
Start: 2021-07-28 | End: 2021-07-29

## 2021-07-28 RX ADMIN — Medication 2: at 08:40

## 2021-07-28 RX ADMIN — POLYETHYLENE GLYCOL 3350 17 GRAM(S): 17 POWDER, FOR SOLUTION ORAL at 12:02

## 2021-07-28 RX ADMIN — Medication 3 UNIT(S): at 12:01

## 2021-07-28 RX ADMIN — Medication 500 MILLIGRAM(S): at 12:02

## 2021-07-28 RX ADMIN — INSULIN GLARGINE 12 UNIT(S): 100 INJECTION, SOLUTION SUBCUTANEOUS at 21:58

## 2021-07-28 RX ADMIN — TIOTROPIUM BROMIDE 1 CAPSULE(S): 18 CAPSULE ORAL; RESPIRATORY (INHALATION) at 12:02

## 2021-07-28 RX ADMIN — Medication 3 UNIT(S): at 08:40

## 2021-07-28 RX ADMIN — ZINC SULFATE TAB 220 MG (50 MG ZINC EQUIVALENT) 220 MILLIGRAM(S): 220 (50 ZN) TAB at 12:03

## 2021-07-28 RX ADMIN — Medication 81 MILLIGRAM(S): at 12:02

## 2021-07-28 RX ADMIN — Medication 4 UNIT(S): at 17:36

## 2021-07-28 RX ADMIN — APIXABAN 2.5 MILLIGRAM(S): 2.5 TABLET, FILM COATED ORAL at 17:37

## 2021-07-28 RX ADMIN — Medication 2: at 12:01

## 2021-07-28 RX ADMIN — CHLORHEXIDINE GLUCONATE 1 APPLICATION(S): 213 SOLUTION TOPICAL at 05:35

## 2021-07-28 RX ADMIN — ATORVASTATIN CALCIUM 80 MILLIGRAM(S): 80 TABLET, FILM COATED ORAL at 21:58

## 2021-07-28 RX ADMIN — BUDESONIDE AND FORMOTEROL FUMARATE DIHYDRATE 2 PUFF(S): 160; 4.5 AEROSOL RESPIRATORY (INHALATION) at 12:02

## 2021-07-28 RX ADMIN — AMIODARONE HYDROCHLORIDE 200 MILLIGRAM(S): 400 TABLET ORAL at 05:35

## 2021-07-28 RX ADMIN — SENNA PLUS 2 TABLET(S): 8.6 TABLET ORAL at 21:58

## 2021-07-28 RX ADMIN — APIXABAN 2.5 MILLIGRAM(S): 2.5 TABLET, FILM COATED ORAL at 05:35

## 2021-07-28 RX ADMIN — Medication 2: at 17:35

## 2021-07-28 RX ADMIN — BUDESONIDE AND FORMOTEROL FUMARATE DIHYDRATE 2 PUFF(S): 160; 4.5 AEROSOL RESPIRATORY (INHALATION) at 21:59

## 2021-07-28 NOTE — PROGRESS NOTE ADULT - ATTENDING COMMENTS
Patient admitted for acute hypoxic respiratory failure due to fluid overload in the setting of ESRD. Hypotension has limited fluid removal during HD, is on Midodrine prior to HD. Possible component of COPD as well, is on 3L O2 at home, does not use Bipap at home but responded well here, will continue qhs. No wheezing on exam but continues to have b/l crackles, no need for duonebs at this time, started symbicort/spiriva for maintenance. Of note, patient had left facial droop, left tongue deviation, and left arm weakness after HD session on 7/27, code stroke was activated, CT head showed chronic right MCA infarct, appreciate neurology consult, symptoms apprear to be secondary to recrudescence of her prior stroke, continue ASA and statin. Follow up PT and S&S evals, telemetry monitoring, and carotid US, hold repeat echo as recently obtained in May. Appreciate endocrine input regarding poorly controlled DM, increase lantus to 12 units and lispro to 4 tid. Will f/up with nephrology regarding further HD sessions w/ her hypotension, metoprolol held to avoid hypotension, may resume after HD tomorrow if BP stable.

## 2021-07-28 NOTE — PROGRESS NOTE ADULT - ASSESSMENT
Patient is a 89yo Female with ESRD on HD, Afib on Eliquis, HTN, Intradialytic hypotension req Midodrine prior to HD, failed Rt AVF due to steal syndrome s/p ligation, h/o COVID s/p PPM p/w SOB x 1 day. Pt a/w Entero/ Rhino virus, acute respiratory distress requiring BIPAP with concerns for fluid overload. Nephrology consulted for ESRD status.     1) ESRD: Last HD 7/27, with intradialytic hypotension (ehsan 76/43) with net 675ml removed. Plan for next HD 7/29. Monitor electrolytes.  2) HTN 2/2 CKD- BP low normal. Metoprolol 25mg PO q8hrs held.  Continue with Midodrine 10mg PO prior to HD to aid in fluid removal. Monitor BP  3) Anemia of renal disease: Hb low. Will avoid IV iron due to elevated Ferritin. c/w Epogen 8k units IV tiw with HD (increased 7/26). Monitor Hb.  4) Hyperphosphatemia: Phosphorus low normal. No need for phos binder. Will liberalize phos in diet. Monitor serum calcium and phosphorus.      [facility] NEPHROLOGY  Brad Chen M.D.  Oneal Tim D.O.  Roselia Huitron M.D.  Milli Platt, MSN, ANP-C  [phone]    [address] [id number]   Patient is a 89yo Female with ESRD on HD, Afib on Eliquis, HTN, Intradialytic hypotension req Midodrine prior to HD, failed Rt AVF due to steal syndrome s/p ligation, h/o COVID s/p PPM p/w SOB x 1 day. Pt a/w Entero/ Rhino virus, acute respiratory distress requiring BIPAP with concerns for fluid overload. Nephrology consulted for ESRD status.     1) ESRD: Last HD 7/27, with intradialytic hypotension (ehsan 76/43) with net 675ml removed. Plan for next HD 7/29. Monitor electrolytes.  2) HTN 2/2 CKD- BP low normal. Metoprolol 25mg PO q8hrs held.  Continue with Midodrine 10mg PO prior to HD to aid in fluid removal. Monitor BP  3) Anemia of renal disease: Hb low. Will avoid IV iron due to elevated Ferritin. c/w Epogen 8k units IV tiw with HD (increased 7/26). Monitor Hb.  4) Hyperphosphatemia: Phosphorus low normal. No need for phos binder. Will liberalize phos in diet. Monitor serum calcium and phosphorus.    Spoke with pt's daughter, Cece today. Discussed that pt is not tolerating HD and unable to tolerate fluid removal despite Midodrine 10mg PO prior to HD. As per daughter, she is fine in outpt dialysis and needs to be d/c soon to resume outpt dialysis Rachel.    [facility] NEPHROLOGY  Brad Chen M.D.  Oneal Tim D.O.  Roselia Huitron M.D.  Milli Platt, PARAG, ANP-C  [phone]    [address] [place] [address]

## 2021-07-28 NOTE — PROGRESS NOTE ADULT - PROBLEM SELECTOR PLAN 6
- history of DM on insulin at home  - A1c of 7.8  - ACHS SSI  - change lantus to 12 units   - cont  admelog 3 ac tid

## 2021-07-28 NOTE — CONSULT NOTE ADULT - SUBJECTIVE AND OBJECTIVE BOX
CARDIOLOGY  DATE OF SERVICE: 21    HISTORY OF PRESENT ILLNESS: HPI:  89 F, wheel chair bound, from home with PMH of PAF on amio for rhythm control and on Eliquis, Micra PPM placed by Dr Evangelina Mcclelland at [facility] earlier this year ESRD on HD(Monday, Tuesday, Thursday, Saturday), DM on insulin, HTN, Home O2 3L presented with shortness of breath, vomiting x1 day. Pt was drinking tea from home when she had a vomiting episode. Her daughter at bedside describes "all this fluid came out of her mouth." As per daughter, pt has been drinking a lot of fluid over the past day. No new medications, no hospitalizations or ER visits in the interim as per daughter. No fever, no chest pain, no palpitations, no diarrhea, no dysuria. Pt is oliguric. Pt had COVID in 2021, and as such daughter reports she did not yet get vaccinated.       PAST MEDICAL & SURGICAL HISTORY:    HTN (hypertension)  HLD (hyperlipidemia)  CKD (chronic kidney disease)  Afib  DM (diabetes mellitus)      MEDICATIONS:  MEDICATIONS  (STANDING):  aMIOdarone    Tablet 200 milliGRAM(s) Oral daily  apixaban 2.5 milliGRAM(s) Oral every 12 hours  ascorbic acid 500 milliGRAM(s) Oral daily  aspirin  chewable 81 milliGRAM(s) Oral daily  atorvastatin 80 milliGRAM(s) Oral at bedtime  budesonide 160 MICROgram(s)/formoterol 4.5 MICROgram(s) Inhaler 2 Puff(s) Inhalation two times a day  chlorhexidine 2% Cloths 1 Application(s) Topical <User Schedule>  dextrose 40% Gel 15 Gram(s) Oral once  dextrose 5%. 1000 milliLiter(s) (50 mL/Hr) IV Continuous <Continuous>  dextrose 5%. 1000 milliLiter(s) (100 mL/Hr) IV Continuous <Continuous>  dextrose 50% Injectable 25 Gram(s) IV Push once  dextrose 50% Injectable 12.5 Gram(s) IV Push once  dextrose 50% Injectable 25 Gram(s) IV Push once  epoetin maria d-epbx (RETACRIT) Injectable 8000 Unit(s) IV Push <User Schedule>  glucagon  Injectable 1 milliGRAM(s) IntraMuscular once  insulin glargine Injectable (LANTUS) 12 Unit(s) SubCutaneous at bedtime  insulin lispro (ADMELOG) corrective regimen sliding scale   SubCutaneous three times a day before meals  insulin lispro Injectable (ADMELOG) 3 Unit(s) SubCutaneous three times a day before meals  polyethylene glycol 3350 17 Gram(s) Oral daily  senna 2 Tablet(s) Oral at bedtime  tiotropium 18 MICROgram(s) Capsule 1 Capsule(s) Inhalation daily  zinc sulfate 220 milliGRAM(s) Oral daily      Allergies    No Known Allergies    Intolerances        FAMILY HISTORY:    Non-contributary for premature coronary disease or sudden cardiac death    SOCIAL HISTORY:    [ X] Non-smoker  [ ] Smoker  [ ] Alcohol      REVIEW OF SYSTEMS:  [ ]chest pain  [ X ]shortness of breath  [  ]palpitations  [  ]syncope  [ ]near syncope [ ]upper extremity weakness   [ ] lower extremity weakness  [  ]diplopia  [  ]altered mental status   [  ]fevers  [ ]chills [ ]nausea  [ ]vomitting  [  ]dysphagia    [ ]abdominal pain  [ ]melena  [ ]BRBPR    [  ]epistaxis  [  ]rash    [ ]lower extremity edema        [ X] All others negative	  [ ] Unable to obtain      LABS:	 	    CARDIAC MARKERS:  CARDIAC MARKERS ( 2021 22:53 )  0.058 ng/mL / x     / x     / x     / x      CARDIAC MARKERS ( 2021 17:01 )  0.064 ng/mL / x     / 73 U/L / x     / 1.9 ng/mL                              9.4    9.54  )-----------( 259      ( 2021 06:20 )             30.9     Hb Trend: 9.4<--, 9.8<--        137  |  99  |  28<H>  ----------------------------<  196<H>  4.1   |  28  |  4.17<H>    Ca    8.8      2021 06:20  Phos  2.7       Mg     2.5         TPro  7.1  /  Alb  2.6<L>  /  TBili  0.7  /  DBili  x   /  AST  16  /  ALT  21  /  AlkPhos  128<H>      Creatinine Trend: 4.17<--, 2.98<--, 4.82<--, 5.70<--, 3.87<--, 2.46<--          PHYSICAL EXAM:  T(C): 36.7 (21 @ 05:20), Max: 37 (21 @ 01:30)  HR: 115 (21 @ 05:20) (97 - 117)  BP: 118/67 (21 @ 05:20) (97/55 - 118/67)  RR: 18 (21 @ 05:20) (16 - 18)  SpO2: 95% (21 @ 05:20) (92% - 97%)  Wt(kg): --   BMI (kg/m2): 30.1 (21 @ 10:00)  I&O's Summary    2021 07:01  -  2021 07:00  --------------------------------------------------------  IN: 700 mL / OUT: 1375 mL / NET: -675 mL    HEENT:  (-)icterus (-)pallor  CV: N S1 S2 1/6 SHANE (+)2 Pulses B/l  Resp:  Clear to ausculatation B/L, normal effort  GI: (+) BS Soft, NT, ND  Lymph:  (-)Edema, (-)obvious lymphadenopathy  Skin: Warm to touch, Normal turgor  Psych: Appropriate mood and affect        TELEMETRY: 	  Afib    EC. sinus 67 BPM nonspecific ST/ T wav abnormality               2. Afib 134 ST depressions V5-V6     RADIOLOGY:         CXR:   < from: Xray Chest 1 View- PORTABLE-Routine (Xray Chest 1 View- PORTABLE-Routine .) (21 @ 11:52) >  LEFT retrocardiac airspace consolidation obscuring LEFT diaphragm..    < end of copied text >      ASSESSMENT/PLAN: 	89y Female PMH of PAF on amio for rhythm control and on Eliquis, Micra PPM placed by Dr Evangelina Mcclelland at [facility] earlier this year ESRD on HD(Monday, Tuesday, Thursday, Saturday), DM on insulin, HTN, Home O2 3L presented with shortness of breath, now with rapid afib.    -  can start cardizem gtt for short term rate control  - HR driven by underlying medical illness  - cont eliquis  - doesnt appear fluid overloaded    I once again thank you for allowing me to participate in the care of your patient.  If you have any questions or concerns please do not hesitate to contact me.    Christopher Arteaga MD, [facility]  BEEPER [phone]

## 2021-07-28 NOTE — PROGRESS NOTE ADULT - SUBJECTIVE AND OBJECTIVE BOX
PGY-1 Progress Note discussed with attending    PAGER #: [[phone]] TILL 5:00 PM  PLEASE CONTACT ON CALL TEAM:  - On Call Team (Please refer to Sheri) FROM 5:00 PM - 8:30PM  - Nightfloat Team FROM 8:30 -7:30 AM    CHIEF COMPLAINT & BRIEF HOSPITAL COURSE:    Patient is a 89 year old female, PMH of Afib on Eliquis, ESRD on HD(Monday, Tuesday, Thursday, Saturday), DM on insulin, HTN, COPD on home O2 3L, who presented to the ED due to SOB. In ED, patient placed on BiPAP and had HD session with about 1.274L fluid removed and blood pressure noting to drop to 60s despite being given midodrine. Admitted to ICU Acute Hypoxic Respiratory Failure.     ICU course. Patient arrived to the ICU on 7/22 for AHRF 2/2 fluid overload. Patient had HD and BIPAP was tapered off. Patient hypotensive during HD, given midodrine pre-HD and pressor support. Patient can resume HD on the floors. Patient off pressors since 2 a.m. Patient on amiodarone for AF.    7/24 pt had [facility] central line placement after dialysis became hypotensive and phenylephrine.    In the alfredo. Pt. was stable and down-graded to 5 South. Scheduled for another HD on 7/26/21. She still has episodes of SOB and was hooked to 5LPM. She also had episode of tahcycardia and is on Telemetry monitoring.. Cardiology (Dr. Arteaga) was consulted. She had an episode of L sided facial and extremity weakness. 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 Palliative was also consulted (Dr. Aquino).    INTERVAL HPI/OVERNIGHT EVENTS:     Pt. examined at bedside. AAO x 2. Still has noted work of breathing but so far breath sounds are clear. Still complains of episodes of SOB. Denies any N/V, diarrhea, constipation. s/p HD (7/27/21). Code stroke was called yesterday due to L facial, extremity weakness. CT Head showed no active infarct/bleed. Currently still have L sided weakness but milder. She can move her arms and legs when instructed.    REVIEW OF SYSTEMS:  CONSTITUTIONAL: No fever, weight loss, or fatigue  RESPIRATORY: No cough, wheezing, chills or hemoptysis; No shortness of breath  CARDIOVASCULAR: No chest pain, palpitations, dizziness, or leg swelling  GASTROINTESTINAL: No abdominal pain. No nausea, vomiting, or hematemesis; No diarrhea or constipation. No melena or hematochezia.  GENITOURINARY: No dysuria or hematuria, urinary frequency  NEUROLOGICAL: No headaches, memory loss, loss of strength, numbness, or tremors  SKIN: No itching, burning, rashes, or lesions     MEDICATIONS  (STANDING):  aMIOdarone    Tablet 200 milliGRAM(s) Oral daily  apixaban 2.5 milliGRAM(s) Oral every 12 hours  ascorbic acid 500 milliGRAM(s) Oral daily  aspirin  chewable 81 milliGRAM(s) Oral daily  atorvastatin 80 milliGRAM(s) Oral at bedtime  budesonide 160 MICROgram(s)/formoterol 4.5 MICROgram(s) Inhaler 2 Puff(s) Inhalation two times a day  chlorhexidine 2% Cloths 1 Application(s) Topical <User Schedule>  dextrose 40% Gel 15 Gram(s) Oral once  dextrose 5%. 1000 milliLiter(s) (50 mL/Hr) IV Continuous <Continuous>  dextrose 5%. 1000 milliLiter(s) (100 mL/Hr) IV Continuous <Continuous>  dextrose 50% Injectable 25 Gram(s) IV Push once  dextrose 50% Injectable 12.5 Gram(s) IV Push once  dextrose 50% Injectable 25 Gram(s) IV Push once  epoetin mraia d-epbx (RETACRIT) Injectable 8000 Unit(s) IV Push <User Schedule>  glucagon  Injectable 1 milliGRAM(s) IntraMuscular once  insulin glargine Injectable (LANTUS) 12 Unit(s) SubCutaneous at bedtime  insulin lispro (ADMELOG) corrective regimen sliding scale   SubCutaneous three times a day before meals  insulin lispro Injectable (ADMELOG) 3 Unit(s) SubCutaneous three times a day before meals  polyethylene glycol 3350 17 Gram(s) Oral daily  senna 2 Tablet(s) Oral at bedtime  tiotropium 18 MICROgram(s) Capsule 1 Capsule(s) Inhalation daily  zinc sulfate 220 milliGRAM(s) Oral daily    MEDICATIONS  (PRN):  ALBUTerol    90 MICROgram(s) HFA Inhaler 2 Puff(s) Inhalation every 6 hours PRN Shortness of Breath and/or Wheezing  bisacodyl Suppository 10 milliGRAM(s) Rectal daily PRN Constipation  midodrine. 10 milliGRAM(s) Oral <User Schedule> PRN DIALYSIS  sodium chloride 0.9% lock flush 10 milliLiter(s) IV Push every 1 hour PRN Pre/post blood products, medications, blood draw, and to maintain line patency      Vital Signs Last 24 Hrs  T(C): 36.7 (28 Jul 2021 05:20), Max: 37 (28 Jul 2021 01:30)  T(F): 98.1 (28 Jul 2021 05:20), Max: 98.6 (28 Jul 2021 01:30)  HR: 115 (28 Jul 2021 05:20) (97 - 117)  BP: 118/67 (28 Jul 2021 05:20) (97/55 - 118/67)  BP(mean): --  RR: 18 (28 Jul 2021 05:20) (16 - 18)  SpO2: 95% (28 Jul 2021 05:20) (92% - 97%)    PHYSICAL EXAMINATION:  GENERAL: NAD, well built  HEAD:  Atraumatic, Normocephalic  EYES:  conjunctiva and sclera clear  NECK: Supple, No JVD, Normal thyroid  CHEST/LUNG: Clear to auscultation. Clear to percussion bilaterally; No rales, rhonchi, wheezing, or rubs  HEART: Regular rate and rhythm; No murmurs, rubs, or gallops  ABDOMEN: Soft, Nontender, Nondistended; Bowel sounds present, no pain or masses on palpation  NERVOUS SYSTEM:  Alert & Oriented X3  : voiding well  EXTREMITIES:  2+ Peripheral Pulses, No clubbing, cyanosis, or edema  SKIN: warm dry                          9.4    9.54  )-----------( 259      ( 28 Jul 2021 06:20 )             30.9     07-28    137  |  99  |  28<H>  ----------------------------<  196<H>  4.1   |  28  |  4.17<H>    Ca    8.8      28 Jul 2021 06:20  Phos  2.7     07-28  Mg     2.5     07-28    TPro  7.1  /  Alb  2.6<L>  /  TBili  0.7  /  DBili  x   /  AST  16  /  ALT  21  /  AlkPhos  128<H>  07-27    LIVER FUNCTIONS - ( 27 Jul 2021 17:01 )  Alb: 2.6 g/dL / Pro: 7.1 g/dL / ALK PHOS: 128 U/L / ALT: 21 U/L DA / AST: 16 U/L / GGT: x           CARDIAC MARKERS ( 27 Jul 2021 22:53 )  0.058 ng/mL / x     / x     / x     / x      CARDIAC MARKERS ( 27 Jul 2021 17:01 )  0.064 ng/mL / x     / 73 U/L / x     / 1.9 ng/mL          I&O's Summary    27 Jul 2021 07:01  -  28 Jul 2021 07:00  --------------------------------------------------------  IN: 700 mL / OUT: 1375 mL / NET: -675 mL            CAPILLARY BLOOD GLUCOSE      RADIOLOGY & ADDITIONAL TESTS:

## 2021-07-28 NOTE — PROGRESS NOTE ADULT - SUBJECTIVE AND OBJECTIVE BOX
Interval Events:  pt in nad    Allergies    No Known Allergies    Intolerances      Endocrine/Metabolic Medications:  atorvastatin 80 milliGRAM(s) Oral at bedtime  dextrose 40% Gel 15 Gram(s) Oral once  dextrose 50% Injectable 25 Gram(s) IV Push once  dextrose 50% Injectable 12.5 Gram(s) IV Push once  dextrose 50% Injectable 25 Gram(s) IV Push once  glucagon  Injectable 1 milliGRAM(s) IntraMuscular once  insulin glargine Injectable (LANTUS) 10 Unit(s) SubCutaneous at bedtime  insulin lispro (ADMELOG) corrective regimen sliding scale   SubCutaneous three times a day before meals  insulin lispro Injectable (ADMELOG) 3 Unit(s) SubCutaneous three times a day before meals      Vital Signs Last 24 Hrs  T(C): 36.7 (28 Jul 2021 05:20), Max: 37 (28 Jul 2021 01:30)  T(F): 98.1 (28 Jul 2021 05:20), Max: 98.6 (28 Jul 2021 01:30)  HR: 115 (28 Jul 2021 05:20) (97 - 117)  BP: 118/67 (28 Jul 2021 05:20) (97/55 - 118/67)  BP(mean): --  RR: 18 (28 Jul 2021 05:20) (16 - 18)  SpO2: 95% (28 Jul 2021 05:20) (92% - 97%)      PHYSICAL EXAM  All physical exam findings normal, except those marked:  General:	Alert, active, cooperative, NAD, well hydrated  .		[] Abnormal:  Neck		Normal: supple, no cervical adenopathy, no palpable thyroid  .		[] Abnormal:  Cardiovascular	Normal: regular rate, normal S1, S2, no murmurs  .		[] Abnormal:  Respiratory	Normal: no chest wall deformity, normal respiratory pattern, CTA B/L  .		[] Abnormal:  Abdominal	Normal: soft, ND, NT, bowel sounds present, no masses, no organomegaly  .		[] Abnormal:  		Normal normal genitalia, testes descended, circumcised/uncircumcised  .		Tiny stage:			Breast tiny:  .		Menstrual history:  .		[] Abnormal:  Extremities	Normal: FROM x4  .		[] Abnormal:  Skin		Normal: intact and not indurated, no rash, no acanthosis nigricans  .		[] Abnormal:  Neurologic	Normal: grossly intact  .		[] Abnormal:    LABS                        9.4    9.54  )-----------( 259      ( 28 Jul 2021 06:20 )             30.9                               137    |  99     |  28                  Calcium: 8.8   / iCa: x      (07-28 @ 06:20)    ----------------------------<  196       Magnesium: 2.5                              4.1     |  28     |  4.17             Phosphorous: 2.7      TPro  7.1    /  Alb  2.6    /  TBili  0.7    /  DBili  x      /  AST  16     /  ALT  21     /  AlkPhos  128    27 Jul 2021 17:01    CAPILLARY BLOOD GLUCOSE      POCT Blood Glucose.: 216 mg/dL (28 Jul 2021 08:25)  POCT Blood Glucose.: 183 mg/dL (27 Jul 2021 21:43)  POCT Blood Glucose.: 252 mg/dL (27 Jul 2021 17:46)  POCT Blood Glucose.: 198 mg/dL (27 Jul 2021 16:14)  POCT Blood Glucose.: 173 mg/dL (27 Jul 2021 15:18)  POCT Blood Glucose.: 165 mg/dL (27 Jul 2021 14:01)        Assesment/plan      89 F, wheel chair bound, from home with PMH of Afib on Eliquis, ESRD on HD(Monday, Tuesday, Thursday, Saturday), DM on insulin, HTN, Home O2 3L presented with shortness of breath, vomiting x1 day.   Found to have uncont dm. Pt admits to taking basal bolus insulin given by her daughter. Does not recall fsg or if she has hypos. Currently eating well .       Problem/Recommendation - 1:  Problem: Uncontrolled diabetes mellitus. Recommendation: hyperglycemia better controlled   decent aic as out pt- 7.8%  change lantus to 12 units   cont  admelog 3 ac tid   fsg ac and hs  aim fsg 140- <200 due to age and comorbidities   d/w hs.     Problem/Recommendation - 2:  ·  Problem: Acute respiratory failure with hypoxia and hypercapnia.  Recommendation: s/p icu tx- downgrade  tx per prim team.

## 2021-07-28 NOTE — PROGRESS NOTE ADULT - SUBJECTIVE AND OBJECTIVE BOX
[facility] NEPHROLOGY- PROGRESS NOTE    Patient is a 89yo Female with ESRD on HD, Afib on Eliquis, HTN, Intradialytic hypotension req Midodrine prior to HD, failed Rt AVF due to steal syndrome s/p ligation, h/o COVID s/p PPM p/w SOB x 1 day. Pt a/w Entero/ Rhino virus, acute respiratory distress requiring BIPAP with concerns for fluid overload. Nephrology consulted for ESRD status.     Hospital Medications: Medications reviewed.  REVIEW OF SYSTEMS: Denies any SOB or chest pain    VITALS:  T(F): 98.6 (07-28-21 @ 14:45), Max: 98.6 (07-28-21 @ 01:30)  HR: 104 (07-28-21 @ 14:45)  BP: 104/59 (07-28-21 @ 14:45)  RR: 18 (07-28-21 @ 14:45)  SpO2: 95% (07-28-21 @ 14:45)  Wt(kg): --    07-27 @ 07:01  -  07-28 @ 07:00  --------------------------------------------------------  IN: 700 mL / OUT: 1375 mL / NET: -675 mL      PHYSICAL EXAM:  Gen: NAD  HEENT: anicteric;   Neck: no JVD  Cards: RRR, +S1/S2,   Resp: coarse BS b/l on NC  GI: soft, NT/ND, NABS  Extremities: No LE edema B/L  Access: Rt IJ tunneled HD catheter- benign      LABS:  07-28    137  |  99  |  28<H>  ----------------------------<  196<H>  4.1   |  28  |  4.17<H>    Ca    8.8      28 Jul 2021 06:20  Phos  2.7     07-28  Mg     2.5     07-28    TPro  7.1  /  Alb  2.6<L>  /  TBili  0.7  /  DBili      /  AST  16  /  ALT  21  /  AlkPhos  128<H>  07-27    Creatinine Trend: 4.17 <--, 2.98 <--, 4.82 <--, 5.70 <--, 3.87 <--, 2.46 <--, 3.63 <--, 4.23 <--, 4.91 <--                        9.4    9.54  )-----------( 497 ( 54 Dsi 2021 06:20 )             30.9     Urine Studies:

## 2021-07-28 NOTE — PROGRESS NOTE ADULT - PROBLEM SELECTOR PLAN 4
- had an episodes of tachy (110-140)  - 1 dose digoxin 250mg PO given  - continue home med of amiodarone and eliquis   - can start cardizem gtt for short term rate control  - monitor HR   - monitor on telemetry

## 2021-07-29 LAB
ALBUMIN SERPL ELPH-MCNC: 2.4 G/DL — LOW (ref 3.5–5)
ALP SERPL-CCNC: 111 U/L — SIGNIFICANT CHANGE UP (ref 40–120)
ALT FLD-CCNC: 17 U/L DA — SIGNIFICANT CHANGE UP (ref 10–60)
ANION GAP SERPL CALC-SCNC: 10 MMOL/L — SIGNIFICANT CHANGE UP (ref 5–17)
AST SERPL-CCNC: 24 U/L — SIGNIFICANT CHANGE UP (ref 10–40)
BASOPHILS # BLD AUTO: 0.04 K/UL — SIGNIFICANT CHANGE UP (ref 0–0.2)
BASOPHILS NFR BLD AUTO: 0.4 % — SIGNIFICANT CHANGE UP (ref 0–2)
BILIRUB DIRECT SERPL-MCNC: 0.1 MG/DL — SIGNIFICANT CHANGE UP (ref 0–0.2)
BILIRUB INDIRECT FLD-MCNC: 0.4 MG/DL — SIGNIFICANT CHANGE UP (ref 0.2–1)
BILIRUB SERPL-MCNC: 0.5 MG/DL — SIGNIFICANT CHANGE UP (ref 0.2–1.2)
BUN SERPL-MCNC: 45 MG/DL — HIGH (ref 7–18)
CALCIUM SERPL-MCNC: 8.6 MG/DL — SIGNIFICANT CHANGE UP (ref 8.4–10.5)
CHLORIDE SERPL-SCNC: 98 MMOL/L — SIGNIFICANT CHANGE UP (ref 96–108)
CO2 SERPL-SCNC: 28 MMOL/L — SIGNIFICANT CHANGE UP (ref 22–31)
CREAT SERPL-MCNC: 6.17 MG/DL — HIGH (ref 0.5–1.3)
EOSINOPHIL # BLD AUTO: 0.31 K/UL — SIGNIFICANT CHANGE UP (ref 0–0.5)
EOSINOPHIL NFR BLD AUTO: 3.3 % — SIGNIFICANT CHANGE UP (ref 0–6)
GLUCOSE BLDC GLUCOMTR-MCNC: 137 MG/DL — HIGH (ref 70–99)
GLUCOSE BLDC GLUCOMTR-MCNC: 174 MG/DL — HIGH (ref 70–99)
GLUCOSE BLDC GLUCOMTR-MCNC: 179 MG/DL — HIGH (ref 70–99)
GLUCOSE BLDC GLUCOMTR-MCNC: 227 MG/DL — HIGH (ref 70–99)
GLUCOSE BLDC GLUCOMTR-MCNC: 251 MG/DL — HIGH (ref 70–99)
GLUCOSE SERPL-MCNC: 156 MG/DL — HIGH (ref 70–99)
HCT VFR BLD CALC: 29 % — LOW (ref 34.5–45)
HGB BLD-MCNC: 9 G/DL — LOW (ref 11.5–15.5)
IMM GRANULOCYTES NFR BLD AUTO: 3.5 % — HIGH (ref 0–1.5)
LYMPHOCYTES # BLD AUTO: 1.15 K/UL — SIGNIFICANT CHANGE UP (ref 1–3.3)
LYMPHOCYTES # BLD AUTO: 12.1 % — LOW (ref 13–44)
MAGNESIUM SERPL-MCNC: 2.6 MG/DL — SIGNIFICANT CHANGE UP (ref 1.6–2.6)
MCHC RBC-ENTMCNC: 29.5 PG — SIGNIFICANT CHANGE UP (ref 27–34)
MCHC RBC-ENTMCNC: 31 GM/DL — LOW (ref 32–36)
MCV RBC AUTO: 95.1 FL — SIGNIFICANT CHANGE UP (ref 80–100)
MONOCYTES # BLD AUTO: 1.24 K/UL — HIGH (ref 0–0.9)
MONOCYTES NFR BLD AUTO: 13.1 % — SIGNIFICANT CHANGE UP (ref 2–14)
NEUTROPHILS # BLD AUTO: 6.41 K/UL — SIGNIFICANT CHANGE UP (ref 1.8–7.4)
NEUTROPHILS NFR BLD AUTO: 67.6 % — SIGNIFICANT CHANGE UP (ref 43–77)
NRBC # BLD: 0 /100 WBCS — SIGNIFICANT CHANGE UP (ref 0–0)
PHOSPHATE SERPL-MCNC: 3.5 MG/DL — SIGNIFICANT CHANGE UP (ref 2.5–4.5)
PLATELET # BLD AUTO: 237 K/UL — SIGNIFICANT CHANGE UP (ref 150–400)
POTASSIUM SERPL-MCNC: 4.7 MMOL/L — SIGNIFICANT CHANGE UP (ref 3.5–5.3)
POTASSIUM SERPL-SCNC: 4.7 MMOL/L — SIGNIFICANT CHANGE UP (ref 3.5–5.3)
PROT SERPL-MCNC: 6.4 G/DL — SIGNIFICANT CHANGE UP (ref 6–8.3)
RBC # BLD: 3.05 M/UL — LOW (ref 3.8–5.2)
RBC # FLD: 15.7 % — HIGH (ref 10.3–14.5)
SODIUM SERPL-SCNC: 136 MMOL/L — SIGNIFICANT CHANGE UP (ref 135–145)
WBC # BLD: 9.48 K/UL — SIGNIFICANT CHANGE UP (ref 3.8–10.5)
WBC # FLD AUTO: 9.48 K/UL — SIGNIFICANT CHANGE UP (ref 3.8–10.5)

## 2021-07-29 PROCEDURE — 99233 SBSQ HOSP IP/OBS HIGH 50: CPT | Mod: GC

## 2021-07-29 RX ORDER — METOPROLOL TARTRATE 50 MG
25 TABLET ORAL ONCE
Refills: 0 | Status: COMPLETED | OUTPATIENT
Start: 2021-07-29 | End: 2021-07-29

## 2021-07-29 RX ORDER — INSULIN GLARGINE 100 [IU]/ML
15 INJECTION, SOLUTION SUBCUTANEOUS AT BEDTIME
Refills: 0 | Status: DISCONTINUED | OUTPATIENT
Start: 2021-07-29 | End: 2021-07-30

## 2021-07-29 RX ORDER — INSULIN LISPRO 100/ML
5 VIAL (ML) SUBCUTANEOUS
Refills: 0 | Status: DISCONTINUED | OUTPATIENT
Start: 2021-07-29 | End: 2021-07-30

## 2021-07-29 RX ORDER — METOPROLOL TARTRATE 50 MG
25 TABLET ORAL
Refills: 0 | Status: DISCONTINUED | OUTPATIENT
Start: 2021-07-30 | End: 2021-08-03

## 2021-07-29 RX ADMIN — POLYETHYLENE GLYCOL 3350 17 GRAM(S): 17 POWDER, FOR SOLUTION ORAL at 15:20

## 2021-07-29 RX ADMIN — Medication 100 MILLIGRAM(S): at 15:30

## 2021-07-29 RX ADMIN — ALBUTEROL 2 PUFF(S): 90 AEROSOL, METERED ORAL at 15:20

## 2021-07-29 RX ADMIN — CHLORHEXIDINE GLUCONATE 1 APPLICATION(S): 213 SOLUTION TOPICAL at 05:55

## 2021-07-29 RX ADMIN — APIXABAN 2.5 MILLIGRAM(S): 2.5 TABLET, FILM COATED ORAL at 05:55

## 2021-07-29 RX ADMIN — Medication 25 MILLIGRAM(S): at 18:27

## 2021-07-29 RX ADMIN — BUDESONIDE AND FORMOTEROL FUMARATE DIHYDRATE 2 PUFF(S): 160; 4.5 AEROSOL RESPIRATORY (INHALATION) at 22:13

## 2021-07-29 RX ADMIN — Medication 81 MILLIGRAM(S): at 15:20

## 2021-07-29 RX ADMIN — Medication 4 UNIT(S): at 08:26

## 2021-07-29 RX ADMIN — BUDESONIDE AND FORMOTEROL FUMARATE DIHYDRATE 2 PUFF(S): 160; 4.5 AEROSOL RESPIRATORY (INHALATION) at 15:20

## 2021-07-29 RX ADMIN — TIOTROPIUM BROMIDE 1 CAPSULE(S): 18 CAPSULE ORAL; RESPIRATORY (INHALATION) at 15:21

## 2021-07-29 RX ADMIN — Medication 500 MILLIGRAM(S): at 15:20

## 2021-07-29 RX ADMIN — AMIODARONE HYDROCHLORIDE 200 MILLIGRAM(S): 400 TABLET ORAL at 05:55

## 2021-07-29 RX ADMIN — Medication 2: at 17:32

## 2021-07-29 RX ADMIN — Medication 1: at 08:26

## 2021-07-29 RX ADMIN — APIXABAN 2.5 MILLIGRAM(S): 2.5 TABLET, FILM COATED ORAL at 17:33

## 2021-07-29 RX ADMIN — INSULIN GLARGINE 15 UNIT(S): 100 INJECTION, SOLUTION SUBCUTANEOUS at 22:14

## 2021-07-29 RX ADMIN — ERYTHROPOIETIN 8000 UNIT(S): 10000 INJECTION, SOLUTION INTRAVENOUS; SUBCUTANEOUS at 13:46

## 2021-07-29 RX ADMIN — Medication 5 UNIT(S): at 17:33

## 2021-07-29 RX ADMIN — ATORVASTATIN CALCIUM 80 MILLIGRAM(S): 80 TABLET, FILM COATED ORAL at 22:13

## 2021-07-29 RX ADMIN — SENNA PLUS 2 TABLET(S): 8.6 TABLET ORAL at 22:13

## 2021-07-29 RX ADMIN — ZINC SULFATE TAB 220 MG (50 MG ZINC EQUIVALENT) 220 MILLIGRAM(S): 220 (50 ZN) TAB at 15:20

## 2021-07-29 NOTE — PROGRESS NOTE ADULT - SUBJECTIVE AND OBJECTIVE BOX
[facility] NEPHROLOGY- PROGRESS NOTE    Patient is a 89yo Female with ESRD on HD, Afib on Eliquis, HTN, Intradialytic hypotension req Midodrine prior to HD, failed Rt AVF due to steal syndrome s/p ligation, h/o COVID s/p PPM p/w SOB x 1 day. Pt a/w Entero/ Rhino virus, acute respiratory distress requiring BIPAP with concerns for fluid overload. Nephrology consulted for ESRD status.     Hospital Medications: Medications reviewed.  REVIEW OF SYSTEMS: Denies any SOB or chest pain +increased mucus lety post nasal drip; unable to expectorate    VITALS:  T(F): 98.3 (07-29-21 @ 10:47), Max: 99.1 (07-29-21 @ 01:45)  HR: 86 (07-29-21 @ 10:47)  BP: 113/62 (07-29-21 @ 10:47)  RR: 20 (07-29-21 @ 10:47)  SpO2: 100% (07-29-21 @ 10:47)  Wt(kg): --    07-28 @ 07:01  -  07-29 @ 07:00  --------------------------------------------------------  IN: 240 mL / OUT: 0 mL / NET: 240 mL      PHYSICAL EXAM:  Gen: NAD  HEENT: anicteric;   Neck: no JVD  Cards: RRR, +S1/S2,   Resp: coarse BS on Rt sided; left anterior rales  GI: soft, NT/ND, NABS  Extremities: No LE edema B/L  Access: Rt IJ tunneled HD catheter- benign      LABS:  07-29    136  |  98  |  45<H>  ----------------------------<  156<H>  4.7   |  28  |  6.17<H>    Ca    8.6      29 Jul 2021 06:12  Phos  3.5     07-29  Mg     2.6     07-29    TPro  6.4  /  Alb  2.4<L>  /  TBili  0.5  /  DBili  0.1  /  AST  24  /  ALT  17  /  AlkPhos  111  07-29    Creatinine Trend: 6.17 <--, 4.17 <--, 2.98 <--, 4.82 <--, 5.70 <--, 3.87 <--, 2.46 <--, 3.63 <--, 4.23 <--                        9.0    9.48  )-----------( 846 ( 29 Jul 2021 06:12 )             29.0     Urine Studies:

## 2021-07-29 NOTE — PROGRESS NOTE ADULT - SUBJECTIVE AND OBJECTIVE BOX
PGY-1 Progress Note discussed with attending    PAGER #: [[phone]] TILL 5:00 PM  PLEASE CONTACT ON CALL TEAM:  - On Call Team (Please refer to Sheri) FROM 5:00 PM - 8:30PM  - Nightfloat Team FROM 8:30 -7:30 AM    CHIEF COMPLAINT & BRIEF HOSPITAL COURSE:      INTERVAL HPI/OVERNIGHT EVENTS:     Pt. examined at bedside, AAOx2. Still has SOB and mild left lower facial weakness. Currently hooked to 5LPM. Scheduled for HD today (7/29/21)      REVIEW OF SYSTEMS:  CONSTITUTIONAL: No fever, weight loss, or fatigue  RESPIRATORY: No cough, wheezing, chills or hemoptysis; No shortness of breath  CARDIOVASCULAR: No chest pain, palpitations, dizziness, or leg swelling  GASTROINTESTINAL: No abdominal pain. No nausea, vomiting, or hematemesis; No diarrhea or constipation. No melena or hematochezia.  GENITOURINARY: No dysuria or hematuria, urinary frequency  NEUROLOGICAL: No headaches, memory loss, loss of strength, numbness, or tremors  SKIN: No itching, burning, rashes, or lesions     MEDICATIONS  (STANDING):  aMIOdarone    Tablet 200 milliGRAM(s) Oral daily  apixaban 2.5 milliGRAM(s) Oral every 12 hours  ascorbic acid 500 milliGRAM(s) Oral daily  aspirin  chewable 81 milliGRAM(s) Oral daily  atorvastatin 80 milliGRAM(s) Oral at bedtime  budesonide 160 MICROgram(s)/formoterol 4.5 MICROgram(s) Inhaler 2 Puff(s) Inhalation two times a day  chlorhexidine 2% Cloths 1 Application(s) Topical <User Schedule>  dextrose 40% Gel 15 Gram(s) Oral once  dextrose 5%. 1000 milliLiter(s) (50 mL/Hr) IV Continuous <Continuous>  dextrose 5%. 1000 milliLiter(s) (100 mL/Hr) IV Continuous <Continuous>  dextrose 50% Injectable 25 Gram(s) IV Push once  dextrose 50% Injectable 12.5 Gram(s) IV Push once  dextrose 50% Injectable 25 Gram(s) IV Push once  epoetin maria d-epbx (RETACRIT) Injectable 8000 Unit(s) IV Push <User Schedule>  glucagon  Injectable 1 milliGRAM(s) IntraMuscular once  insulin glargine Injectable (LANTUS) 12 Unit(s) SubCutaneous at bedtime  insulin lispro (ADMELOG) corrective regimen sliding scale   SubCutaneous three times a day before meals  insulin lispro Injectable (ADMELOG) 4 Unit(s) SubCutaneous three times a day before meals  midodrine. 10 milliGRAM(s) Oral <User Schedule>  polyethylene glycol 3350 17 Gram(s) Oral daily  senna 2 Tablet(s) Oral at bedtime  tiotropium 18 MICROgram(s) Capsule 1 Capsule(s) Inhalation daily  zinc sulfate 220 milliGRAM(s) Oral daily    MEDICATIONS  (PRN):  ALBUTerol    90 MICROgram(s) HFA Inhaler 2 Puff(s) Inhalation every 6 hours PRN Shortness of Breath and/or Wheezing  bisacodyl Suppository 10 milliGRAM(s) Rectal daily PRN Constipation  sodium chloride 0.9% lock flush 10 milliLiter(s) IV Push every 1 hour PRN Pre/post blood products, medications, blood draw, and to maintain line patency      Vital Signs Last 24 Hrs  T(C): 37.1 (29 Jul 2021 05:00), Max: 37.3 (29 Jul 2021 01:45)  T(F): 98.8 (29 Jul 2021 05:00), Max: 99.1 (29 Jul 2021 01:45)  HR: 99 (29 Jul 2021 05:00) (98 - 113)  BP: 125/63 (29 Jul 2021 05:00) (104/59 - 125/63)  BP(mean): --  RR: 18 (29 Jul 2021 05:00) (18 - 19)  SpO2: 99% (29 Jul 2021 05:00) (93% - 100%)    PHYSICAL EXAMINATION:  GENERAL: NAD, well built  HEAD:  Atraumatic, Normocephalic  EYES:  conjunctiva and sclera clear  NECK: Supple, No JVD, Normal thyroid  CHEST/LUNG: Clear to auscultation. Clear to percussion bilaterally; No rales, rhonchi, wheezing, or rubs  HEART: Regular rate and rhythm; No murmurs, rubs, or gallops  ABDOMEN: Soft, Nontender, Nondistended; Bowel sounds present, no pain or masses on palpation  NERVOUS SYSTEM:  Alert & Oriented X3  : voiding well  EXTREMITIES:  2+ Peripheral Pulses, No clubbing, cyanosis, or edema  SKIN: warm dry                          9.0    9.48  )-----------( 237      ( 29 Jul 2021 06:12 )             29.0     07-29    136  |  98  |  45<H>  ----------------------------<  156<H>  4.7   |  28  |  6.17<H>    Ca    8.6      29 Jul 2021 06:12  Phos  3.5     07-29  Mg     2.6     07-29    TPro  6.4  /  Alb  2.4<L>  /  TBili  0.5  /  DBili  0.1  /  AST  24  /  ALT  17  /  AlkPhos  111  07-29    LIVER FUNCTIONS - ( 29 Jul 2021 06:12 )  Alb: 2.4 g/dL / Pro: 6.4 g/dL / ALK PHOS: 111 U/L / ALT: 17 U/L DA / AST: 24 U/L / GGT: x           CARDIAC MARKERS ( 27 Jul 2021 22:53 )  0.058 ng/mL / x     / x     / x     / x      CARDIAC MARKERS ( 27 Jul 2021 17:01 )  0.064 ng/mL / x     / 73 U/L / x     / 1.9 ng/mL          I&O's Summary    28 Jul 2021 07:01  -  29 Jul 2021 07:00  --------------------------------------------------------  IN: 240 mL / OUT: 0 mL / NET: 240 mL            CAPILLARY BLOOD GLUCOSE      RADIOLOGY & ADDITIONAL TESTS:                   PGY-1 Progress Note discussed with attending    PAGER #: [[phone]] TILL 5:00 PM  PLEASE CONTACT ON CALL TEAM:  - On Call Team (Please refer to Sheri) FROM 5:00 PM - 8:30PM  - Nightfloat Team FROM 8:30 -7:30 AM    CHIEF COMPLAINT & BRIEF HOSPITAL COURSE:    Patient is a 89 year old female, PMH of Afib on Eliquis, ESRD on HD(Monday, Tuesday, Thursday, Saturday), DM on insulin, HTN, COPD on home O2 3L, who presented to the ED due to SOB. In ED, patient placed on BiPAP and had HD session with about 1.274L fluid removed and blood pressure noting to drop to 60s despite being given midodrine. Admitted to ICU Acute Hypoxic Respiratory Failure.     ICU course. Patient arrived to the ICU on  for AHRF 2/2 fluid overload. Patient had HD and BIPAP was tapered off. Patient hypotensive during HD, given midodrine pre-HD and pressor support. Patient can resume HD on the floors. Patient off pressors since 2 a.m. Patient on amiodarone for AF.     pt had [facility]J central line placement after dialysis became hypotensive and phenylephrine.    In the alfredo. Pt. was stable and down-graded to 5 South. She still has episodes of SOB and was hooked to 5LPM. She also had episode of tahcycardia and is on Telemetry monitoring.. Cardiology (Dr. Arteaga) was consulted. She had an episode of L sided facial and extremity weakness. 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 Palliative was also consulted (Dr. Aquino). HD was done as scheduled. Seen by Nephro (Dr. Huitron). She has been having episodes of hypotension intradialysis.    INTERVAL HPI/OVERNIGHT EVENTS:     Pt. examined at bedside, AAOx2. Still has SOB and mild left lower facial weakness. No more left sided extremity weakness. No fever, cough, , Currently hooked to 5LPM. Scheduled for HD today (21). No significant telemetry events overnight. No significant events overnight.      REVIEW OF SYSTEMS:  CONSTITUTIONAL: No fever, weight loss, or fatigue  RESPIRATORY: (+) wheezing, (+) SOB. No cough, chills or hemoptysis;  CARDIOVASCULAR: No chest pain, palpitations, dizziness, or leg swelling  GASTROINTESTINAL: No abdominal pain. No nausea, vomiting, or hematemesis; No diarrhea or constipation. No melena or hematochezia.  GENITOURINARY: No dysuria or hematuria, urinary frequency  NEUROLOGICAL: No headaches, memory loss, loss of strength, numbness, or tremors  SKIN: No itching, burning, rashes, or lesions     MEDICATIONS  (STANDING):  aMIOdarone    Tablet 200 milliGRAM(s) Oral daily  apixaban 2.5 milliGRAM(s) Oral every 12 hours  ascorbic acid 500 milliGRAM(s) Oral daily  aspirin  chewable 81 milliGRAM(s) Oral daily  atorvastatin 80 milliGRAM(s) Oral at bedtime  budesonide 160 MICROgram(s)/formoterol 4.5 MICROgram(s) Inhaler 2 Puff(s) Inhalation two times a day  chlorhexidine 2% Cloths 1 Application(s) Topical <User Schedule>  dextrose 40% Gel 15 Gram(s) Oral once  dextrose 5%. 1000 milliLiter(s) (50 mL/Hr) IV Continuous <Continuous>  dextrose 5%. 1000 milliLiter(s) (100 mL/Hr) IV Continuous <Continuous>  dextrose 50% Injectable 25 Gram(s) IV Push once  dextrose 50% Injectable 12.5 Gram(s) IV Push once  dextrose 50% Injectable 25 Gram(s) IV Push once  epoetin maria d-epbx (RETACRIT) Injectable 8000 Unit(s) IV Push <User Schedule>  glucagon  Injectable 1 milliGRAM(s) IntraMuscular once  insulin glargine Injectable (LANTUS) 12 Unit(s) SubCutaneous at bedtime  insulin lispro (ADMELOG) corrective regimen sliding scale   SubCutaneous three times a day before meals  insulin lispro Injectable (ADMELOG) 4 Unit(s) SubCutaneous three times a day before meals  midodrine. 10 milliGRAM(s) Oral <User Schedule>  polyethylene glycol 3350 17 Gram(s) Oral daily  senna 2 Tablet(s) Oral at bedtime  tiotropium 18 MICROgram(s) Capsule 1 Capsule(s) Inhalation daily  zinc sulfate 220 milliGRAM(s) Oral daily    MEDICATIONS  (PRN):  ALBUTerol    90 MICROgram(s) HFA Inhaler 2 Puff(s) Inhalation every 6 hours PRN Shortness of Breath and/or Wheezing  bisacodyl Suppository 10 milliGRAM(s) Rectal daily PRN Constipation  sodium chloride 0.9% lock flush 10 milliLiter(s) IV Push every 1 hour PRN Pre/post blood products, medications, blood draw, and to maintain line patency      Vital Signs Last 24 Hrs  T(C): 37.1 (2021 05:00), Max: 37.3 (2021 01:45)  T(F): 98.8 (2021 05:00), Max: 99.1 (2021 01:45)  HR: 99 (2021 05:00) (98 - 113)  BP: 125/63 (2021 05:00) (104/59 - 125/63)  BP(mean): --  RR: 18 (2021 05:00) (18 - 19)  SpO2: 99% (2021 05:00) (93% - 100%)    PHYSICAL EXAMINATION:  GENERAL: NAD, obese  HEAD:  Atraumatic, Normocephalic  EYES:  conjunctiva and sclera clear  NECK: Supple, No JVD, Normal thyroid  CHEST/LUNG: (+) bilateral wheezing; No rales, rhonchi, or rubs  HEART: Regular rate and rhythm; No murmurs, rubs, or gallops  ABDOMEN: Soft, Nontender, Nondistended; Bowel sounds present, no pain or masses on palpation  NERVOUS SYSTEM:  Alert & Oriented X2. Mild left lower facial drooping  : voiding well  EXTREMITIES:  2+ Peripheral Pulses, No clubbing, cyanosis, or edema  SKIN: warm dry                          9.0    9.48  )-----------( 237      ( 2021 06:12 )             29.0     07-29    136  |  98  |  45<H>  ----------------------------<  156<H>  4.7   |  28  |  6.17<H>    Ca    8.6      2021 06:12  Phos  3.5     07-  Mg     2.6     07-29    TPro  6.4  /  Alb  2.4<L>  /  TBili  0.5  /  DBili  0.1  /  AST  24  /  ALT  17  /  AlkPhos  111      LIVER FUNCTIONS - ( 2021 06:12 )  Alb: 2.4 g/dL / Pro: 6.4 g/dL / ALK PHOS: 111 U/L / ALT: 17 U/L DA / AST: 24 U/L / GGT: x           CARDIAC MARKERS ( 2021 22:53 )  0.058 ng/mL / x     / x     / x     / x      CARDIAC MARKERS ( 2021 17:01 )  0.064 ng/mL / x     / 73 U/L / x     / 1.9 ng/mL          I&O's Summary    2021 07:01  -  2021 07:00  --------------------------------------------------------  IN: 240 mL / OUT: 0 mL / NET: 240 mL            CAPILLARY BLOOD GLUCOSE      RADIOLOGY & ADDITIONAL TESTS:

## 2021-07-29 NOTE — PROGRESS NOTE ADULT - SUBJECTIVE AND OBJECTIVE BOX
C A R D I O L O G Y  **********************************     DATE OF SERVICE: 07-29-21    Patient denies chest pain or shortness of breath.   Review of systems otherwise (-)  	  MEDICATIONS:  MEDICATIONS  (STANDING):  aMIOdarone    Tablet 200 milliGRAM(s) Oral daily  apixaban 2.5 milliGRAM(s) Oral every 12 hours  ascorbic acid 500 milliGRAM(s) Oral daily  aspirin  chewable 81 milliGRAM(s) Oral daily  atorvastatin 80 milliGRAM(s) Oral at bedtime  budesonide 160 MICROgram(s)/formoterol 4.5 MICROgram(s) Inhaler 2 Puff(s) Inhalation two times a day  chlorhexidine 2% Cloths 1 Application(s) Topical <User Schedule>  dextrose 40% Gel 15 Gram(s) Oral once  dextrose 5%. 1000 milliLiter(s) (50 mL/Hr) IV Continuous <Continuous>  dextrose 5%. 1000 milliLiter(s) (100 mL/Hr) IV Continuous <Continuous>  dextrose 50% Injectable 25 Gram(s) IV Push once  dextrose 50% Injectable 12.5 Gram(s) IV Push once  dextrose 50% Injectable 25 Gram(s) IV Push once  epoetin maria d-epbx (RETACRIT) Injectable 8000 Unit(s) IV Push <User Schedule>  glucagon  Injectable 1 milliGRAM(s) IntraMuscular once  insulin glargine Injectable (LANTUS) 12 Unit(s) SubCutaneous at bedtime  insulin lispro (ADMELOG) corrective regimen sliding scale   SubCutaneous three times a day before meals  insulin lispro Injectable (ADMELOG) 4 Unit(s) SubCutaneous three times a day before meals  midodrine. 10 milliGRAM(s) Oral <User Schedule>  polyethylene glycol 3350 17 Gram(s) Oral daily  senna 2 Tablet(s) Oral at bedtime  tiotropium 18 MICROgram(s) Capsule 1 Capsule(s) Inhalation daily  zinc sulfate 220 milliGRAM(s) Oral daily      LABS:	 	    CARDIAC MARKERS:  CARDIAC MARKERS ( 27 Jul 2021 22:53 )  0.058 ng/mL / x     / x     / x     / x      CARDIAC MARKERS ( 27 Jul 2021 17:01 )  0.064 ng/mL / x     / 73 U/L / x     / 1.9 ng/mL                                9.0    9.48  )-----------( 237      ( 29 Jul 2021 06:12 )             29.0     Hemoglobin: 9.0 g/dL (07-29 @ 06:12)  Hemoglobin: 9.4 g/dL (07-28 @ 06:20)  Hemoglobin: 9.8 g/dL (07-27 @ 17:01)  Hemoglobin: 8.0 g/dL (07-27 @ 06:10)  Hemoglobin: 8.3 g/dL (07-26 @ 04:31)      07-29    136  |  98  |  45<H>  ----------------------------<  156<H>  4.7   |  28  |  6.17<H>    Ca    8.6      29 Jul 2021 06:12  Phos  3.5     07-29  Mg     2.6     07-29    TPro  6.4  /  Alb  2.4<L>  /  TBili  0.5  /  DBili  0.1  /  AST  24  /  ALT  17  /  AlkPhos  111  07-29    Creatinine Trend: 6.17<--, 4.17<--, 2.98<--, 4.82<--, 5.70<--, 3.87<--        PHYSICAL EXAM:  T(C): 36.8 (07-29-21 @ 10:47), Max: 37.3 (07-29-21 @ 01:45)  HR: 86 (07-29-21 @ 10:47) (86 - 104)  BP: 113/62 (07-29-21 @ 10:47) (104/59 - 125/63)  RR: 20 (07-29-21 @ 10:47) (18 - 20)  SpO2: 100% (07-29-21 @ 10:47) (94% - 100%)  Wt(kg): --  I&O's Summary    28 Jul 2021 07:01  -  29 Jul 2021 07:00  --------------------------------------------------------  IN: 240 mL / OUT: 0 mL / NET: 240 mL      HEENT:  (-)icterus (-)pallor  CV: N S1 S2 1/6 SHANE (+)2 Pulses B/l  Resp:  Clear to ausculatation B/L, normal effort  GI: (+) BS Soft, NT, ND  Lymph:  (-)Edema, (-)obvious lymphadenopathy  Skin: Warm to touch, Normal turgor  Psych: Appropriate mood and affect      TELEMETRY: 	  afib         ASSESSMENT/PLAN: 	89y  Female PMH of PAF on amio for rhythm control and on Eliquis, Micra PPM placed by Dr Evangelina Mcclelland at [facility] earlier this year ESRD on HD(Monday, Tuesday, Thursday, Saturday), DM on insulin, HTN, Home O2 3L presented with shortness of breath, now with rapid afib.    - HR improved  - HR driven by underlying medical illness  - cont eliquis  - doesnt appear fluid overloaded  - episodes of hypotension despite midodrine particularly with HD limits use of BB and CCB.  cont amio for now  - consider checking cortisol ? adrenal insufficiency.  - If afib rate control remains refractory can consider Dig, althoug as a last resort since she is paroxysmal and dig can trigger episodes of afib.    Christopher Arteaga MD, [facility]  BEEPER [phone]

## 2021-07-29 NOTE — PROGRESS NOTE ADULT - ATTENDING COMMENTS
Patient admitted for acute hypoxic respiratory failure due to fluid overload in the setting of ESRD. Hypotension has limited fluid removal during HD, is on Midodrine prior to HD. Possible component of COPD as well, is on 3L O2 at home, does not use Bipap at home but responded well here, will continue qhs. No wheezing on exam but continues to have b/l crackles, no need for duonebs at this time, started symbicort/spiriva for maintenance, although unclear if there is clear fluid overload vs pneumonia vs pulm HTN, will obtain CT chest w/o contrast and consult Pulmonary, Dr. Cuellar. Of note, patient had code stroke called after HD session on 7/27 activated, CT head showed chronic right MCA infarct, appreciate neurology consult, symptoms apprear to be secondary to recrudescence of her prior stroke, continue ASA and statin. Follow up PT and S&S evals, telemetry monitoring, and carotid US, hold repeat echo as recently obtained in May. Appreciate endocrine input regarding poorly controlled DM, increase lantus to 15 units and lispro to 5 tid. Appreciate inputs from nephrology and cardiology, will obtain AM cortisol to evaluate for adrenal insufficiency, and monitor BP s/p HD, no fluid removed today. Now having rapid afib after hold metoprolol, will resume Tartrate 25 q12h but hold on mornings of HD, continue Amiodarone.

## 2021-07-29 NOTE — PROGRESS NOTE ADULT - ASSESSMENT
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 Pt a/w Entero/ Rhino virus, acute respiratory distress requiring BIPAP with concerns for fluid overload. Nephrology consulted for ESRD status.     1) ESRD: Last HD 7/27, with intradialytic hypotension (ehsan 76/43) with net 675ml removed. Plan for next HD today; 7/29. Monitor electrolytes.  2) HTN 2/2 CKD- BP low normal. Metoprolol 25mg PO q8hrs held.  Continue with Midodrine 10mg PO prior to HD to aid in fluid removal. Monitor BP  3) Anemia of renal disease: Hb low. Will avoid IV iron due to elevated Ferritin. c/w Epogen 8k units IV tiw with HD (increased 7/26). Monitor Hb.  4) Hyperphosphatemia: Phosphorus acceptable. No need for phos binder. Phos liberalized in diet. Monitor serum calcium and phosphorus.    Spoke with pt's daughter, Cece 7/28. Discussed that pt is not tolerating HD and unable to tolerate fluid removal despite Midodrine 10mg PO prior to HD. As per daughter, she is fine in outpt dialysis and needs to be d/c soon to resume outpt dialysis at [facility]. Daughter unrealistic about long term goals; pt remains full code.     [facility] NEPHROLOG[facility]  Brad Chen M.D.  Oneal Tim D.O.  Roselia Huitron M.D.  Milli Platt, PARAG, ANP-C  [phone]    [address] [place] [address]

## 2021-07-29 NOTE — PROGRESS NOTE ADULT - PROBLEM SELECTOR PLAN 5
- on metoprolol   - continue meds with hold parameters  - monitor BP closely   - continue midodrine prior to HD sessions   - will give albumin 100ml  - Cardio: Dr. Arteaga  - hold Cardizem drip due to episodes of hypotension during dialysis

## 2021-07-29 NOTE — PROGRESS NOTE ADULT - SUBJECTIVE AND OBJECTIVE BOX
Interval Events:  pt in nad    Allergies    No Known Allergies    Intolerances      Endocrine/Metabolic Medications:  atorvastatin 80 milliGRAM(s) Oral at bedtime  dextrose 40% Gel 15 Gram(s) Oral once  dextrose 50% Injectable 25 Gram(s) IV Push once  dextrose 50% Injectable 12.5 Gram(s) IV Push once  dextrose 50% Injectable 25 Gram(s) IV Push once  glucagon  Injectable 1 milliGRAM(s) IntraMuscular once  insulin glargine Injectable (LANTUS) 12 Unit(s) SubCutaneous at bedtime  insulin lispro (ADMELOG) corrective regimen sliding scale   SubCutaneous three times a day before meals  insulin lispro Injectable (ADMELOG) 4 Unit(s) SubCutaneous three times a day before meals      Vital Signs Last 24 Hrs  T(C): 37.1 (29 Jul 2021 05:00), Max: 37.3 (29 Jul 2021 01:45)  T(F): 98.8 (29 Jul 2021 05:00), Max: 99.1 (29 Jul 2021 01:45)  HR: 99 (29 Jul 2021 05:00) (98 - 113)  BP: 125/63 (29 Jul 2021 05:00) (104/59 - 125/63)  BP(mean): --  RR: 18 (29 Jul 2021 05:00) (18 - 19)  SpO2: 99% (29 Jul 2021 05:00) (93% - 100%)      PHYSICAL EXAM  All physical exam findings normal, except those marked:  General:	Alert, active, cooperative, NAD, well hydrated  .		[] Abnormal:  Neck		Normal: supple, no cervical adenopathy, no palpable thyroid  .		[] Abnormal:  Cardiovascular	Normal: regular rate, normal S1, S2, no murmurs  .		[] Abnormal:  Respiratory	Normal: no chest wall deformity, normal respiratory pattern, CTA B/L  .		[] Abnormal:  Abdominal	Normal: soft, ND, NT, bowel sounds present, no masses, no organomegaly  .		[] Abnormal:  		Normal normal genitalia, testes descended, circumcised/uncircumcised  .		Tiny stage:			Breast tiny:  .		Menstrual history:  .		[] Abnormal:  Extremities	Normal: FROM x4  .		[] Abnormal:  Skin		Normal: intact and not indurated, no rash, no acanthosis nigricans  .		[] Abnormal:  Neurologic	Normal: grossly intact  .		[] Abnormal:    LABS                        9.0    9.48  )-----------( 237      ( 29 Jul 2021 06:12 )             29.0                               136    |  98     |  45                  Calcium: 8.6   / iCa: x      (07-29 @ 06:12)    ----------------------------<  156       Magnesium: 2.6                              4.7     |  28     |  6.17             Phosphorous: 3.5      TPro  6.4    /  Alb  2.4    /  TBili  0.5    /  DBili  0.1    /  AST  24     /  ALT  17     /  AlkPhos  111    29 Jul 2021 06:12    CAPILLARY BLOOD GLUCOSE      POCT Blood Glucose.: 174 mg/dL (29 Jul 2021 07:55)  POCT Blood Glucose.: 241 mg/dL (28 Jul 2021 21:35)  POCT Blood Glucose.: 216 mg/dL (28 Jul 2021 17:29)  POCT Blood Glucose.: 223 mg/dL (28 Jul 2021 11:59)        Assesment/plan      89 F, wheel chair bound, from home with PMH of Afib on Eliquis, ESRD on HD(Monday, Tuesday, Thursday, Saturday), DM on insulin, HTN, Home O2 3L presented with shortness of breath, vomiting x1 day.   Found to have uncont dm. Pt admits to taking basal bolus insulin given by her daughter. Does not recall fsg or if she has hypos. Currently eating well .       Problem/Recommendation - 1:  Problem: Uncontrolled diabetes mellitus. Recommendation: hyperglycemia better controlled   decent aic as out pt- 7.8%  cont lantus to 12 units   agree with admelog 4 ac tid   fsg ac and hs  aim fsg 140- <200 due to age and comorbidities   d/w hs.     Problem/Recommendation - 2:  ·  Problem: Acute respiratory failure with hypoxia and hypercapnia.  Recommendation: s/p icu tx- downgrade  tx per prim team.

## 2021-07-30 ENCOUNTER — TRANSCRIPTION ENCOUNTER (OUTPATIENT)
Age: 86
End: 2021-07-30

## 2021-07-30 LAB
ACANTHOCYTES BLD QL SMEAR: SLIGHT — SIGNIFICANT CHANGE UP
ANION GAP SERPL CALC-SCNC: 8 MMOL/L — SIGNIFICANT CHANGE UP (ref 5–17)
ANISOCYTOSIS BLD QL: SLIGHT — SIGNIFICANT CHANGE UP
BASOPHILS # BLD AUTO: 0.1 K/UL — SIGNIFICANT CHANGE UP (ref 0–0.2)
BASOPHILS NFR BLD AUTO: 1 % — SIGNIFICANT CHANGE UP (ref 0–2)
BITE CELLS BLD QL SMEAR: PRESENT — SIGNIFICANT CHANGE UP
BUN SERPL-MCNC: 36 MG/DL — HIGH (ref 7–18)
CALCIUM SERPL-MCNC: 8.3 MG/DL — LOW (ref 8.4–10.5)
CHLORIDE SERPL-SCNC: 98 MMOL/L — SIGNIFICANT CHANGE UP (ref 96–108)
CO2 SERPL-SCNC: 29 MMOL/L — SIGNIFICANT CHANGE UP (ref 22–31)
CORTIS AM PEAK SERPL-MCNC: 14.1 UG/DL — SIGNIFICANT CHANGE UP (ref 6–18.4)
CREAT SERPL-MCNC: 5.5 MG/DL — HIGH (ref 0.5–1.3)
DACRYOCYTES BLD QL SMEAR: SLIGHT — SIGNIFICANT CHANGE UP
ELLIPTOCYTES BLD QL SMEAR: SLIGHT — SIGNIFICANT CHANGE UP
EOSINOPHIL # BLD AUTO: 0.6 K/UL — HIGH (ref 0–0.5)
EOSINOPHIL NFR BLD AUTO: 6 % — SIGNIFICANT CHANGE UP (ref 0–6)
GLUCOSE BLDC GLUCOMTR-MCNC: 153 MG/DL — HIGH (ref 70–99)
GLUCOSE BLDC GLUCOMTR-MCNC: 194 MG/DL — HIGH (ref 70–99)
GLUCOSE BLDC GLUCOMTR-MCNC: 240 MG/DL — HIGH (ref 70–99)
GLUCOSE BLDC GLUCOMTR-MCNC: 246 MG/DL — HIGH (ref 70–99)
GLUCOSE SERPL-MCNC: 162 MG/DL — HIGH (ref 70–99)
HCT VFR BLD CALC: 28.8 % — LOW (ref 34.5–45)
HGB BLD-MCNC: 8.9 G/DL — LOW (ref 11.5–15.5)
LYMPHOCYTES # BLD AUTO: 0.4 K/UL — LOW (ref 1–3.3)
LYMPHOCYTES # BLD AUTO: 4 % — LOW (ref 13–44)
MACROCYTES BLD QL: SLIGHT — SIGNIFICANT CHANGE UP
MAGNESIUM SERPL-MCNC: 2.3 MG/DL — SIGNIFICANT CHANGE UP (ref 1.6–2.6)
MANUAL SMEAR VERIFICATION: SIGNIFICANT CHANGE UP
MCHC RBC-ENTMCNC: 29.4 PG — SIGNIFICANT CHANGE UP (ref 27–34)
MCHC RBC-ENTMCNC: 30.9 GM/DL — LOW (ref 32–36)
MCV RBC AUTO: 95 FL — SIGNIFICANT CHANGE UP (ref 80–100)
MICROCYTES BLD QL: SLIGHT — SIGNIFICANT CHANGE UP
MONOCYTES # BLD AUTO: 1.1 K/UL — HIGH (ref 0–0.9)
MONOCYTES NFR BLD AUTO: 11 % — SIGNIFICANT CHANGE UP (ref 2–14)
MYELOCYTES NFR BLD: 1 % — HIGH (ref 0–0)
NEUTROPHILS # BLD AUTO: 7.67 K/UL — HIGH (ref 1.8–7.4)
NEUTROPHILS NFR BLD AUTO: 77 % — SIGNIFICANT CHANGE UP (ref 43–77)
NEUTS VAC BLD QL SMEAR: SLIGHT — SIGNIFICANT CHANGE UP
NRBC # BLD: 0 /100 — SIGNIFICANT CHANGE UP (ref 0–0)
PHOSPHATE SERPL-MCNC: 3.4 MG/DL — SIGNIFICANT CHANGE UP (ref 2.5–4.5)
PLAT MORPH BLD: NORMAL — SIGNIFICANT CHANGE UP
PLATELET # BLD AUTO: 257 K/UL — SIGNIFICANT CHANGE UP (ref 150–400)
PLATELET COUNT - ESTIMATE: NORMAL — SIGNIFICANT CHANGE UP
POIKILOCYTOSIS BLD QL AUTO: SLIGHT — SIGNIFICANT CHANGE UP
POLYCHROMASIA BLD QL SMEAR: SLIGHT — SIGNIFICANT CHANGE UP
POTASSIUM SERPL-MCNC: 4.2 MMOL/L — SIGNIFICANT CHANGE UP (ref 3.5–5.3)
POTASSIUM SERPL-SCNC: 4.2 MMOL/L — SIGNIFICANT CHANGE UP (ref 3.5–5.3)
RBC # BLD: 3.03 M/UL — LOW (ref 3.8–5.2)
RBC # FLD: 15.8 % — HIGH (ref 10.3–14.5)
RBC BLD AUTO: ABNORMAL
SARS-COV-2 RNA SPEC QL NAA+PROBE: SIGNIFICANT CHANGE UP
SCHISTOCYTES BLD QL AUTO: SLIGHT — SIGNIFICANT CHANGE UP
SODIUM SERPL-SCNC: 135 MMOL/L — SIGNIFICANT CHANGE UP (ref 135–145)
WBC # BLD: 9.96 K/UL — SIGNIFICANT CHANGE UP (ref 3.8–10.5)
WBC # FLD AUTO: 9.96 K/UL — SIGNIFICANT CHANGE UP (ref 3.8–10.5)

## 2021-07-30 PROCEDURE — 99233 SBSQ HOSP IP/OBS HIGH 50: CPT | Mod: GC

## 2021-07-30 PROCEDURE — 71250 CT THORAX DX C-: CPT | Mod: 26

## 2021-07-30 RX ORDER — LANOLIN ALCOHOL/MO/W.PET/CERES
3 CREAM (GRAM) TOPICAL AT BEDTIME
Refills: 0 | Status: DISCONTINUED | OUTPATIENT
Start: 2021-07-30 | End: 2021-08-03

## 2021-07-30 RX ORDER — LACTULOSE 10 G/15ML
200 SOLUTION ORAL ONCE
Refills: 0 | Status: COMPLETED | OUTPATIENT
Start: 2021-07-30 | End: 2021-07-30

## 2021-07-30 RX ORDER — ERYTHROPOIETIN 10000 [IU]/ML
10000 INJECTION, SOLUTION INTRAVENOUS; SUBCUTANEOUS
Refills: 0 | Status: DISCONTINUED | OUTPATIENT
Start: 2021-07-30 | End: 2021-08-03

## 2021-07-30 RX ORDER — INSULIN LISPRO 100/ML
4 VIAL (ML) SUBCUTANEOUS
Refills: 0 | Status: DISCONTINUED | OUTPATIENT
Start: 2021-07-30 | End: 2021-08-01

## 2021-07-30 RX ORDER — INSULIN GLARGINE 100 [IU]/ML
12 INJECTION, SOLUTION SUBCUTANEOUS AT BEDTIME
Refills: 0 | Status: DISCONTINUED | OUTPATIENT
Start: 2021-07-30 | End: 2021-08-01

## 2021-07-30 RX ORDER — PANTOPRAZOLE SODIUM 20 MG/1
40 TABLET, DELAYED RELEASE ORAL ONCE
Refills: 0 | Status: COMPLETED | OUTPATIENT
Start: 2021-07-30 | End: 2021-07-30

## 2021-07-30 RX ADMIN — INSULIN GLARGINE 12 UNIT(S): 100 INJECTION, SOLUTION SUBCUTANEOUS at 22:32

## 2021-07-30 RX ADMIN — Medication 2: at 12:15

## 2021-07-30 RX ADMIN — Medication 81 MILLIGRAM(S): at 12:17

## 2021-07-30 RX ADMIN — PANTOPRAZOLE SODIUM 40 MILLIGRAM(S): 20 TABLET, DELAYED RELEASE ORAL at 08:49

## 2021-07-30 RX ADMIN — APIXABAN 2.5 MILLIGRAM(S): 2.5 TABLET, FILM COATED ORAL at 05:30

## 2021-07-30 RX ADMIN — Medication 25 MILLIGRAM(S): at 05:30

## 2021-07-30 RX ADMIN — Medication 1: at 08:15

## 2021-07-30 RX ADMIN — ZINC SULFATE TAB 220 MG (50 MG ZINC EQUIVALENT) 220 MILLIGRAM(S): 220 (50 ZN) TAB at 12:17

## 2021-07-30 RX ADMIN — BUDESONIDE AND FORMOTEROL FUMARATE DIHYDRATE 2 PUFF(S): 160; 4.5 AEROSOL RESPIRATORY (INHALATION) at 22:32

## 2021-07-30 RX ADMIN — ATORVASTATIN CALCIUM 80 MILLIGRAM(S): 80 TABLET, FILM COATED ORAL at 22:32

## 2021-07-30 RX ADMIN — Medication 3 MILLIGRAM(S): at 22:32

## 2021-07-30 RX ADMIN — CHLORHEXIDINE GLUCONATE 1 APPLICATION(S): 213 SOLUTION TOPICAL at 05:30

## 2021-07-30 RX ADMIN — APIXABAN 2.5 MILLIGRAM(S): 2.5 TABLET, FILM COATED ORAL at 17:18

## 2021-07-30 RX ADMIN — Medication 5 UNIT(S): at 12:16

## 2021-07-30 RX ADMIN — SENNA PLUS 2 TABLET(S): 8.6 TABLET ORAL at 22:32

## 2021-07-30 RX ADMIN — Medication 5 UNIT(S): at 08:15

## 2021-07-30 RX ADMIN — Medication 500 MILLIGRAM(S): at 12:17

## 2021-07-30 RX ADMIN — LACTULOSE 200 GRAM(S): 10 SOLUTION ORAL at 17:06

## 2021-07-30 RX ADMIN — Medication 2: at 17:17

## 2021-07-30 RX ADMIN — Medication 25 MILLIGRAM(S): at 17:26

## 2021-07-30 RX ADMIN — AMIODARONE HYDROCHLORIDE 200 MILLIGRAM(S): 400 TABLET ORAL at 05:30

## 2021-07-30 RX ADMIN — Medication 100 MILLIGRAM(S): at 05:29

## 2021-07-30 RX ADMIN — POLYETHYLENE GLYCOL 3350 17 GRAM(S): 17 POWDER, FOR SOLUTION ORAL at 12:17

## 2021-07-30 RX ADMIN — Medication 4 UNIT(S): at 17:17

## 2021-07-30 NOTE — DISCHARGE NOTE PROVIDER - NSDCMRMEDTOKEN_GEN_ALL_CORE_FT
amiodarone 200 mg oral tablet: 1 tab(s) orally once a day  Crestor 10 mg oral tablet: 1 tab(s) orally once a day  Eliquis 2.5 mg oral tablet: 1 tab(s) orally 2 times a day  HumaLOG 100 units/mL injectable solution: Sliding scale  Metoprolol Tartrate 25 mg oral tablet: 1 tab(s) orally every 8 hours  midodrine 10 mg oral tablet: 1 tab(s) orally once a day ONLY BEFORE DIALYSIS  Tohanny SoloStar 300 units/mL subcutaneous solution: 10 unit(s) subcutaneous once a day (at bedtime)   Admelog 100 units/mL injectable solution: 5 unit(s) injectable 3 times a day (with meals)   albuterol 90 mcg/inh inhalation aerosol: 2 puff(s) inhaled every 6 hours, As needed, Shortness of Breath and/or Wheezing  amiodarone 200 mg oral tablet: 1 tab(s) orally once a day  apixaban 2.5 mg oral tablet: 1 tab(s) orally every 12 hours  ascorbic acid 500 mg oral tablet: 1 tab(s) orally once a day  aspirin 81 mg oral tablet, chewable: 1 tab(s) orally once a day  bisacodyl 10 mg rectal suppository: 1 suppository(ies) rectal once a day, As needed, Constipation  Crestor 10 mg oral tablet: 1 tab(s) orally once a day  Lantus Solostar Pen 100 units/mL subcutaneous solution: 12 unit(s) subcutaneous once a day (at bedtime)   metoprolol tartrate 25 mg oral tablet: 1 tab(s) orally 2 times a day  midodrine 10 mg oral tablet: 1 tab(s) orally 4 times a week , 30 minutes before Dialysis   polyethylene glycol 3350 oral powder for reconstitution: 17 gram(s) orally once a day  senna oral tablet: 2 tab(s) orally once a day (at bedtime)  Spiriva HandiHaler 18 mcg inhalation capsule: 1 cap(s) inhaled once a day   Symbicort 160 mcg-4.5 mcg/inh inhalation aerosol: 2 puff(s) inhaled 2 times a day   zinc sulfate 220 mg oral capsule: 1 cap(s) orally once a day

## 2021-07-30 NOTE — PROGRESS NOTE ADULT - PROBLEM SELECTOR PLAN 5
- on metoprolol   - continue meds with hold parameters  - monitor BP closely   - continue midodrine prior to HD sessions   - will give albumin 100ml  - Cardio: Dr. Arteaga  - hold Cardizem drip due to episodes of hypotension during dialysis  - Resume Metoprolol Tartrate 25 mg BID and hold AM dose on HD days (MTThS)

## 2021-07-30 NOTE — PROGRESS NOTE ADULT - PROBLEM SELECTOR PLAN 3
- patient and daughter states that she has "a little bit of COPD" but not on any meds at home just home oxygen at 3L NC   - no wheezing presently  - bilateral congestion  - continue albuterol PRN   - monitor O2 sats  - Pulmo (Dr. Cuellar)

## 2021-07-30 NOTE — DISCHARGE NOTE PROVIDER - HOSPITAL COURSE
Patient is a 89 year old female, PMH of Afib on Eliquis, ESRD on HD(Monday, Tuesday, Thursday, Saturday), DM on insulin, HTN, COPD on home O2 3L, who presented to the ED due to SOB. In ED, patient placed on BiPAP and had HD session with about 1.274L fluid removed and blood pressure noting to drop to 60s despite being given midodrine. Admitted to ICU Acute Hypoxic Respiratory Failure.     ICU course. Patient arrived to the ICU on 7/22 for AHRF 2/2 fluid overload. Patient had HD and BIPAP was tapered off. Patient hypotensive during HD, given midodrine pre-HD and pressor support. Patient can resume HD on the floors. Patient off pressors since 2 a.m. Patient on amiodarone for AF. on 7/24 pt required pressor duroing HD hence  LIJ central line placement placed and  phenylephrine started. Patient down graded to floor off pressors. Oxygen requirement remained 5 liters NC , patient required Bi Pap over night. While on floor   patient had an episode of an episode of L sided facial and extremity weakness s/p Hd 2/2 hypotension. 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  Metoprolol discontinued for permissive HTN , complicated with afib with RVR, Cardiology (Dr. Arteaga) was consulted. Recommended pulmonary evaluation , as her tachycardia is mainly after albuterol use. on following attempts for HD , patient could not tolerate HD due to hypotension, Nephrology spoke with pt's daughter, Cece 7/28. Discussed that pt is not tolerating HD and unable to tolerate fluid removal despite Midodrine 10mg PO prior to HD. As per daughter, she is fine in outpt dialysis and needs to be d/c soon to resume outpt dialysis at [facility]. Daughter unrealistic about long term goals; pt remains full code. Palliative was also consulted (Dr. Aquino), recommended --------- Pulmonology (Dr. Cuellar) consulted to evaluate SOB and BIPAP use. repeat CT Chest was ordered, resulted ------- Patient is a 89 year old female, PMH of Afib on Eliquis, ESRD on HD(Monday, Tuesday, Thursday, Saturday), DM on insulin, HTN, COPD on home O2 3L, who presented to the ED due to SOB. In ED, patient placed on BiPAP and had HD session with about 1.274L fluid removed and blood pressure noting to drop to 60s despite being given midodrine. Admitted to ICU Acute Hypoxic Respiratory Failure, likely 2/2 hypervolemia , in setting of ERSD.    ICU course. Patient arrived to the ICU on 7/22 for AHRF 2/2 fluid overload. Patient had HD and BIPAP was tapered off. Patient hypotensive during HD, given midodrine pre-HD and pressor support. Patient can resume HD on the floors. Patient off pressors since 2 a.m. Patient on amiodarone for AF. on 7/24 pt required pressor duroing HD hence  LIJ central line placement placed and  phenylephrine started. Patient down graded to floor off pressors. Oxygen requirement remained 5 liters NC , patient required Bi Pap over night. While on floor   patient had an episode of an episode of L sided facial and extremity weakness s/p Hd 2/2 hypotension. 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  Metoprolol discontinued for permissive HTN , complicated with afib with RVR, Cardiology (Dr. Arteaga) was consulted. Recommended pulmonary evaluation , as her tachycardia is mainly after albuterol use. on following attempts for HD , patient could not tolerate HD due to hypotension, Nephrology spoke with pt's daughter, Cece 7/28. Discussed that pt is not tolerating HD and unable to tolerate fluid removal despite Midodrine 10mg PO prior to HD. As per daughter, she is fine in outpt dialysis and needs to be d/c soon to resume outpt dialysis at [facility]. Daughter unrealistic about long term goals; pt remains full code. patient on Midodrine 30 minutes prior to HD, HD on 7/31 complicated  with intradialytic hypotension (ehsan 76/48) with net 600ml removed. Plan for next HD 8/2---------- Palliative was also consulted (Dr. Aquino), recommended --------- Pulmonology (Dr. Cuellar) consulted to evaluate SOB and BIPAP use, repeat CT Chest was ordered, resulted bilateral reticulonodular opacities , Band type opacities within the bilateral lower lobes likely related to atelectasis. Opacity within the inferior/posterior left lower lobe likely related to atelectasis versus consolidation. Pulmonology recommended to keep O2 above >90%, continue Symbicort/ Spiriva. component of COPD is possible , patient is on 3L O2 at home, does not use Bipap at home but responded well over her admission. Bi pap qhs continued while admitted. PT recommended home PT.  Zeke was seen by endo regarding poorly controlled DM, increase lantus to 15 units and lispro 5 tid.            Patient is a 89 year old female, PMH of Afib on Eliquis, ESRD on HD(Monday, Tuesday, Thursday, Saturday), DM on insulin, HTN, COPD on home O2 3L, who presented to the ED due to SOB. In ED, patient placed on BiPAP and had HD session with about 1.274L fluid removed and blood pressure noting to drop to 60s despite being given midodrine. Admitted to ICU Acute Hypoxic Respiratory Failure, likely 2/2 hypervolemia , in setting of ERSD.    ICU course. Patient arrived to the ICU on 7/22 for AHRF 2/2 fluid overload. Patient had HD and BIPAP was tapered off. Patient hypotensive during HD, given midodrine pre-HD and pressor support. Patient can resume HD on the floors. Patient off pressors since 2 a.m. Patient on amiodarone for AF. on 7/24 pt required pressor duroing HD hence  LIJ central line placement placed and  phenylephrine started. Patient down graded to floor off pressors. Oxygen requirement remained 5 liters NC , patient required Bi Pap over night. While on floor   patient had an episode of an episode of L sided facial and extremity weakness s/p Hd 2/2 hypotension. 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  Metoprolol discontinued for permissive HTN , complicated with afib with RVR, Cardiology (Dr. Arteaga) was consulted. Recommended pulmonary evaluation , as her tachycardia is mainly after albuterol use. on following attempts for HD , patient could not tolerate HD due to hypotension, Nephrology spoke with pt's daughter, Cece 7/28. Discussed that pt is not tolerating HD and unable to tolerate fluid removal despite Midodrine 10mg PO prior to HD. As per daughter, she is fine in outpt dialysis and needs to be d/c soon to resume outpt dialysis at [facility]. Daughter unrealistic about long term goals; pt remains full code. patient on Midodrine 30 minutes prior to HD, HD on 7/31 complicated  with intradialytic hypotension (ehsan 76/48) with net 600ml removed. Plan for next HD 8/2---------- Palliative was also consulted (Dr. Aquino), recommended --------- Pulmonology (Dr. Cuellar) consulted to evaluate SOB and BIPAP use, repeat CT Chest was ordered, resulted bilateral reticulonodular opacities , Band type opacities within the bilateral lower lobes likely related to atelectasis. Opacity within the inferior/posterior left lower lobe likely related to atelectasis versus consolidation. Pulmonology recommended to keep O2 above >90%, continue Symbicort/ Spiriva. component of COPD is possible , patient is on 3L O2 at home, does not use Bipap at home but responded well over her admission. Bi pap qhs continued while admitted. PT recommended home PT.  Zeke was seen by endo regarding poorly controlled DM, increase lantus to 15 units and lispro 5 tid.     While in medicine floors pt has been on supplemental O2 at 4 L, no acute respiratory distress. Has continued HD sessions. Endocrinology service evaluated her, recommending pt be changed to lantus to 12 units  and admelog 5 ac tid, aim fsg 140- <200 due to age and comorbidities. Pt also evaluated by cardiology BP stable, as per their recommendations cont eliquis, no need for further inpatient cardiac work up. Oupt cardiac f/u with Dr. Evangelina Mcclelland ( EP implanter of [facility]). Pt ESRD: Last HD 82, with intradialytic hypotension (ehsan 89/25) with net UF 1L. P. Continue with Midodrine 10mg PO prior to HD to aid in fluid removal. Monitor BP, Hb low. recommending to avoid IV iron due to elevated Ferritin. c/w Epogen 10k units IV tiw with HD. Today patient tolerating supplemental O2 at 3L via NC which is the baseline supplemental O2 that she uses at home, normal respiratory rate, no acute distress  HD today. Planning to DC home as per pt's daughters wishes after HD session      Patient is a 89 year old female, PMH of Afib on Eliquis, ESRD on HD(Monday, Tuesday, Thursday, Saturday), DM on insulin, HTN, COPD on home O2 3L, who presented to the ED due to SOB. In ED, patient placed on BiPAP and had HD session with about 1.274L fluid removed and blood pressure noting to drop to 60s despite being given midodrine. Admitted to ICU Acute Hypoxic Respiratory Failure, likely 2/2 hypervolemia , in setting of ERSD.    ICU course. Patient arrived to the ICU on 7/22 for AHRF 2/2 fluid overload. Patient had HD and BIPAP was tapered off. Patient hypotensive during HD, given midodrine pre-HD and pressor support. Patient can resume HD on the floors. Patient off pressors since 2 a.m. Patient on amiodarone for AF. on 7/24 pt required pressor duroing HD hence  LIJ central line placement placed and  phenylephrine started. Patient down graded to floor off pressors. Oxygen requirement remained 5 liters NC , patient required Bi Pap over night. While on floor   patient had an episode of an episode of L sided facial and extremity weakness s/p Hd 2/2 hypotension. 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  Metoprolol discontinued for permissive HTN , complicated with afib with RVR, Cardiology (Dr. Arteaga) was consulted. Recommended pulmonary evaluation , as her tachycardia is mainly after albuterol use. on following attempts for HD , patient could not tolerate HD due to hypotension, Nephrology spoke with pt's daughter, Cece 7/28. Discussed that pt is not tolerating HD and unable to tolerate fluid removal despite Midodrine 10mg PO prior to HD. As per daughter, she is fine in outpt dialysis and needs to be d/c soon to resume outpt dialysis at [facility]. Daughter unrealistic about long term goals; pt remains full code. patient on Midodrine 30 minutes prior to HD, HD on 7/31 complicated  with intradialytic hypotension (ehsan 76/48) with net 600ml removed. Pulmonology (Dr. Cuellar) consulted to evaluate SOB and BIPAP use, repeat CT Chest was ordered, resulted bilateral reticulonodular opacities , Band type opacities within the bilateral lower lobes likely related to atelectasis. Opacity within the inferior/posterior left lower lobe likely related to atelectasis versus consolidation. Pulmonology recommended to keep O2 above >90%, continue Symbicort/ Spiriva. component of COPD is possible , patient is on 3L O2 at home, does not use Bipap at home but responded well over her admission. Bi pap qhs continued while admitted. PT recommended home PT.  Zeke was seen by endo regarding poorly controlled DM, increase lantus to 15 units and lispro 5 tid.     While in medicine floors pt has been on supplemental O2 at 4 L, no acute respiratory distress. Has continued HD sessions. Endocrinology service evaluated her, recommending pt be changed to lantus to 12 units  and admelog 5 ac tid, aim fsg 140- <200 due to age and comorbidities. Pt also evaluated by cardiology BP stable, as per their recommendations cont eliquis, no need for further inpatient cardiac work up. Oupt cardiac f/u with Dr. Evangelina Mcclelland ( EP implanter of [facility]). Pt ESRD: Last HD 82, with intradialytic hypotension (ehsan 89/25) with net UF 1L. P. Continue with Midodrine 10mg PO prior to HD to aid in fluid removal. Monitor BP, Hb low. recommending to avoid IV iron due to elevated Ferritin. c/w Epogen 10k units IV tiw with HD. Today patient tolerating supplemental O2 at 3L via NC which is the baseline supplemental O2 that she uses at home, normal respiratory rate, no acute distress  HD today. Planning to DC home as per pt's daughters wishes after HD session

## 2021-07-30 NOTE — CONSULT NOTE ADULT - CONSULT REQUESTED DATE/TIME
26-Jul-2021 10:38
22-Jul-2021 15:26
27-Jul-2100 17:00
30-Jul-2021
22-Jul-2021 16:34
22-Jul-2021 17:11
28-Jul-2021 11:18

## 2021-07-30 NOTE — PROGRESS NOTE ADULT - ATTENDING COMMENTS
Patient admitted for acute hypoxic respiratory failure due to fluid overload in the setting of ESRD. Hypotension has limited fluid removal during HD, is on Midodrine prior to HD. Possible component of COPD as well, is on 3L O2 at home, does not use Bipap at home but responded well here, will continue qhs. No wheezing on exam but continues to have b/l crackles, no need for duonebs at this time, started symbicort/spiriva for maintenance, although unclear if there is clear fluid overload vs pneumonia vs pulm HTN, pending CT chest w/o contrast and Pulmonary consult, Dr. Cuellar. Of note, patient had code stroke called after HD session on 7/27 activated, CT head showed chronic right MCA infarct, appreciate neurology consult, symptoms apprear to be secondary to recrudescence of her prior stroke, continue ASA and statin. PT recommended home PT. Appreciate endocrine input regarding poorly controlled DM, continue with increased lantus at 15 units and lispro to 5 tid. Appreciate inputs from nephrology and cardiology, AM cortisol. Planned for HD tomorrow will give midorine prior and hold metoprolol prior to each HD session, monitor BP s/p HD. If stable after HD tomorrow can possibly discharge home in the afternoon pending CT chest and pulmonary input.

## 2021-07-30 NOTE — PROGRESS NOTE ADULT - PROBLEM SELECTOR PLAN 6
- history of DM on insulin at home  - A1c of 7.8  - Lantus: 12 ODHS, Admelo TID  - ACHS SSI  - Endo: Dr. Hawkins

## 2021-07-30 NOTE — PROGRESS NOTE ADULT - SUBJECTIVE AND OBJECTIVE BOX
PGY-1 Progress Note discussed with attending    PAGER #: [[phone]] TILL 5:00 PM  PLEASE CONTACT ON CALL TEAM:  - On Call Team (Please refer to Sheri) FROM 5:00 PM - 8:30PM  - Nightfloat Team FROM 8:30 -7:30 AM    CHIEF COMPLAINT & BRIEF HOSPITAL COURSE:    Patient is a 89 year old female, PMH of Afib on Eliquis, ESRD on HD(Monday, Tuesday, Thursday, Saturday), DM on insulin, HTN, COPD on home O2 3L, who presented to the ED due to SOB. In ED, patient placed on BiPAP and had HD session with about 1.274L fluid removed and blood pressure noting to drop to 60s despite being given midodrine. Admitted to ICU Acute Hypoxic Respiratory Failure.     ICU course. Patient arrived to the ICU on 7/22 for AHRF 2/2 fluid overload. Patient had HD and BIPAP was tapered off. Patient hypotensive during HD, given midodrine pre-HD and pressor support. Patient can resume HD on the floors. Patient off pressors since 2 a.m. Patient on amiodarone for AF.    7/24 pt had LIJ central line placement after dialysis became hypotensive and phenylephrine.    In the alfredo. Pt. was stable and down-graded to 5 South. She still has episodes of SOB and was hooked to 5LPM. She also had episode of tahcycardia and is on Telemetry monitoring. Cardiology (Dr. Arteaga) was consulted. She had an episode of L sided facial and extremity weakness. 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 Palliative was also consulted (Dr. Aquino). HD was done as scheduled. Seen by Nephro (Dr. Huitron). She has been having episodes of hypotension intradialysis. Seen by Pulmonology (Dr. Cuellar) to evaluate SOB and BIPAP use. CT Chest was ordered.    INTERVAL HPI/OVERNIGHT EVENTS:     Pt. examined at bedside. AAOx2. Still has SOB. Facial drooping on L lower face resolving. No more weakness on L lower extremity. No fever, wheezing. No significant telemetry overnight. No significant events overnight. Next HD theresa (7/31/21).    REVIEW OF SYSTEMS:  CONSTITUTIONAL: No fever, weight loss, or fatigue  RESPIRATORY: (+) SOB. No cough, wheezing, chills or hemoptysis;  CARDIOVASCULAR: No chest pain, palpitations, dizziness, or leg swelling  GASTROINTESTINAL: No abdominal pain. No nausea, vomiting, or hematemesis; No diarrhea or constipation. No melena or hematochezia.  GENITOURINARY: No dysuria or hematuria, urinary frequency  NEUROLOGICAL: No headaches, memory loss, loss of strength, numbness, or tremors  SKIN: No itching, burning, rashes, or lesions     MEDICATIONS  (STANDING):  aMIOdarone    Tablet 200 milliGRAM(s) Oral daily  apixaban 2.5 milliGRAM(s) Oral every 12 hours  ascorbic acid 500 milliGRAM(s) Oral daily  aspirin  chewable 81 milliGRAM(s) Oral daily  atorvastatin 80 milliGRAM(s) Oral at bedtime  budesonide 160 MICROgram(s)/formoterol 4.5 MICROgram(s) Inhaler 2 Puff(s) Inhalation two times a day  chlorhexidine 2% Cloths 1 Application(s) Topical <User Schedule>  dextrose 40% Gel 15 Gram(s) Oral once  dextrose 5%. 1000 milliLiter(s) (50 mL/Hr) IV Continuous <Continuous>  dextrose 5%. 1000 milliLiter(s) (100 mL/Hr) IV Continuous <Continuous>  dextrose 50% Injectable 25 Gram(s) IV Push once  dextrose 50% Injectable 25 Gram(s) IV Push once  dextrose 50% Injectable 12.5 Gram(s) IV Push once  epoetin maria d-epbx (RETACRIT) Injectable 8000 Unit(s) IV Push <User Schedule>  glucagon  Injectable 1 milliGRAM(s) IntraMuscular once  insulin glargine Injectable (LANTUS) 15 Unit(s) SubCutaneous at bedtime  insulin lispro (ADMELOG) corrective regimen sliding scale   SubCutaneous three times a day before meals  insulin lispro Injectable (ADMELOG) 5 Unit(s) SubCutaneous three times a day before meals  LORazepam     Tablet 0.5 milliGRAM(s) Oral once  metoprolol tartrate 25 milliGRAM(s) Oral two times a day  midodrine. 10 milliGRAM(s) Oral <User Schedule>  polyethylene glycol 3350 17 Gram(s) Oral daily  senna 2 Tablet(s) Oral at bedtime  tiotropium 18 MICROgram(s) Capsule 1 Capsule(s) Inhalation daily  zinc sulfate 220 milliGRAM(s) Oral daily    MEDICATIONS  (PRN):  ALBUTerol    90 MICROgram(s) HFA Inhaler 2 Puff(s) Inhalation every 6 hours PRN Shortness of Breath and/or Wheezing  bisacodyl Suppository 10 milliGRAM(s) Rectal daily PRN Constipation  guaiFENesin Oral Liquid (Sugar-Free) 100 milliGRAM(s) Oral every 6 hours PRN Cough  sodium chloride 0.9% lock flush 10 milliLiter(s) IV Push every 1 hour PRN Pre/post blood products, medications, blood draw, and to maintain line patency      Vital Signs Last 24 Hrs  T(C): 36.4 (30 Jul 2021 06:00), Max: 36.7 (29 Jul 2021 21:45)  T(F): 97.6 (30 Jul 2021 06:00), Max: 98.1 (30 Jul 2021 01:59)  HR: 98 (30 Jul 2021 10:37) (89 - 129)  BP: 129/91 (30 Jul 2021 10:37) (101/54 - 130/57)  BP(mean): --  RR: 17 (30 Jul 2021 10:37) (17 - 20)  SpO2: 95% (30 Jul 2021 10:37) (93% - 100%)    PHYSICAL EXAMINATION:  GENERAL: NAD, obese  HEAD:  Atraumatic, Normocephalic  EYES:  conjunctiva and sclera clear  NECK: Supple, No JVD, Normal thyroid  CHEST/LUNG: Clear to auscultation. Clear to percussion bilaterally; No rales, rhonchi, wheezing, or rubs  HEART: Regular rate and rhythm; No murmurs, rubs, or gallops  ABDOMEN: Soft, Nontender, Nondistended; Bowel sounds present, no pain or masses on palpation  NERVOUS SYSTEM:  Alert & Oriented X2. Mild left facial drooping. 5/5 muscle strength on all extremities  : voiding well  EXTREMITIES:  2+ Peripheral Pulses, No clubbing, cyanosis, or edema  SKIN: warm dry                          8.9    9.96  )-----------( 257      ( 30 Jul 2021 06:32 )             28.8     07-30    135  |  98  |  36<H>  ----------------------------<  162<H>  4.2   |  29  |  5.50<H>    Ca    8.3<L>      30 Jul 2021 06:32  Phos  3.4     07-30  Mg     2.3     07-30    TPro  6.4  /  Alb  2.4<L>  /  TBili  0.5  /  DBili  0.1  /  AST  24  /  ALT  17  /  AlkPhos  111  07-29    LIVER FUNCTIONS - ( 29 Jul 2021 06:12 )  Alb: 2.4 g/dL / Pro: 6.4 g/dL / ALK PHOS: 111 U/L / ALT: 17 U/L DA / AST: 24 U/L / GGT: x                   I&O's Summary          CAPILLARY BLOOD GLUCOSE      RADIOLOGY & ADDITIONAL TESTS:

## 2021-07-30 NOTE — DISCHARGE NOTE PROVIDER - ATTENDING COMMENTS
89 y/o female with a PMH of Afib on Eliquis, ESRD on HD (Monday, Tuesday, Thursday, Saturday), DM on insulin, HTN, COPD on 3L home O2 who is admitted for acute hypoxic respiratory failure due to fluid overload in the setting of ESRD. Hypotension has limited fluid removal during HD in the past, improved w/ Midodrine 30 minutes prior to HD. Possible component of COPD as well, is on 3L O2 at home, will discharge w/ Albuterol prn/symbicort/spiriva and outpatient pulmonary follow up with Dr. Cuellar. Of note, had a code stroke called after HD session on 7/27 activated, CT head showed chronic right MCA infarct, appreciate neurology consult, felt her symptoms were due to recrudescence of her prior stroke, continue ASA and statin. PT recommended home PT. Appreciate endocrine input regarding poorly controlled DM, will send w/ Lantus 12 units and lispro 5 tid. Appreciate inputs from nephrology and cardiology. Continue Midodrine 30 mins prior to HD and hold metoprolol on mornings of HD. Was treated for constipation, resolved w/ lactulose, will continue miralax/senna. Offered discharge to [facility] however patient/family declined and would like to go home with Home PT instead.        Discharge time spent: 40 minutes

## 2021-07-30 NOTE — DISCHARGE NOTE PROVIDER - NSDCCPCAREPLAN_GEN_ALL_CORE_FT
PRINCIPAL DISCHARGE DIAGNOSIS  Diagnosis: Acute respiratory failure with hypoxia  Assessment and Plan of Treatment: You presented with low oxygen level and dissiculty breathing due to fluid overload in the setting of ESRD (end stage kidney disease). Since your blood prerssure drops during dialysis,  limited amount of fluid can be removed during dialysis, you might have component of COPD as well.you were placed on Bi pap at night,  with improvemnet in your symptoms.  plaese continue with your dyalisis and continue with your uinhakers, symbicort and spiriva,      SECONDARY DISCHARGE DIAGNOSES  Diagnosis: Afib  Assessment and Plan of Treatment:   Please continue with your anticoagulation medication as instructed in the medication reconciliation and your primary care physician.  Please continue with you rate control medication metoprolol as instructed in the medication reconciliation and your primary care physician.   you need to hold morning dose of metoprolol on your dialysis day. it will cause drop in your blood pressure during dialysis.       Diagnosis: ESRD (end stage renal disease) on dialysis  Assessment and Plan of Treatment: Continue your dialysis as intructed by your Nephrologist. Dialysis will be reinstated upon your discharge.  Please mantain a diet adequate for you condition  Please hold your morning dose metoprolol medication on days of dialysis.   Continue Midodrine 30 mins prior to dialysis .      Diagnosis: Stroke  Assessment and Plan of Treatment: Your were noted to have  facial droop and left sided weakness after    after dyalysis  session on 7/27,  CT head showed chronic infarct, Neurology felt your symptoms were due to recrudescence of your prior stroke, continue your aspirin and statin. During the admission physical therapy specialist  evaluated and recommended , physical therapy at home.       Diagnosis: DM (diabetes mellitus)  Assessment and Plan of Treatment: Your sugar level is poorly controlled , you were seen by endocrinologist and your insulin adjusted .  Plesae conmtinue with  lantus  15 units and lispro 5 before meals. Continue with your blood sugar medication.  .  You must maintain a healthy diet that consist of low sugar, low fat, low sodium diet. Exercise frequently if possible. Consider repeating your Hemoglobin A1c within 3 months after discharge to monitor your average blood glucose control. Follow up with primary care physician in one week after discharge.      Diagnosis: Constipation  Assessment and Plan of Treatment: Constipation resolved with lactulose. contienue with high fiber diet and you can use Lactulose as needed to have not more than 2 bowel movemnet per day.        PRINCIPAL DISCHARGE DIAGNOSIS  Diagnosis: Acute respiratory failure with hypoxia  Assessment and Plan of Treatment: You presented with low oxygen level and dissiculty breathing due to fluid overload in the setting of ESRD (end stage kidney disease). Since your blood prerssure drops during dialysis,  limited amount of fluid can be removed during dialysis, you might have component of COPD as well.you were placed on Bi pap at night,  with improvemnet in your symptoms.  plaese continue with your dyalisis and continue with your uinhakers, symbicort and spiriva,      SECONDARY DISCHARGE DIAGNOSES  Diagnosis: Afib  Assessment and Plan of Treatment: Please continue with your anticoagulation medication as instructed in the medication reconciliation and your primary care physician.  Please continue with you rate control medication metoprolol as instructed in the medication reconciliation and your primary care physician.   you need to hold morning dose of metoprolol on your dialysis day. it will cause drop in your blood pressure during dialysis.   Follow up with Primary Care Physician, and cardiologist within one week after discharge to inform of your recent hospitalization. Patient/Family was oriented on instruction.      Diagnosis: ESRD (end stage renal disease) on dialysis  Assessment and Plan of Treatment: Continue your dialysis as intructed by your Nephrologist. Dialysis will be reinstated upon your discharge.  Please mantain a diet adequate for you condition  Please hold your morning dose metoprolol medication on days of dialysis.   Continue Midodrine 30 mins prior to dialysis .      Diagnosis: Stroke  Assessment and Plan of Treatment: Your were noted to have  facial droop and left sided weakness after    after dyalysis  session on 7/27,  CT head showed chronic infarct, Neurology felt your symptoms were due to recrudescence of your prior stroke, continue your aspirin and statin. During the admission physical therapy specialist  evaluated and recommended , physical therapy at home.       Diagnosis: DM (diabetes mellitus)  Assessment and Plan of Treatment: Your sugar level is poorly controlled , you were seen by endocrinologist and your insulin adjusted .  Plesae conmtinue with  lantus  15 units and lispro 5 before meals. Continue with your blood sugar medication.  .  You must maintain a healthy diet that consist of low sugar, low fat, low sodium diet. Exercise frequently if possible. Consider repeating your Hemoglobin A1c within 3 months after discharge to monitor your average blood glucose control. Follow up with primary care physician in one week after discharge.      Diagnosis: Constipation  Assessment and Plan of Treatment: Constipation resolved with lactulose. contienue with high fiber diet and medication as prescribed.

## 2021-07-30 NOTE — DISCHARGE NOTE PROVIDER - PROVIDER TOKENS
PROVIDER:[TOKEN:[[id number]],FOLLOWUP:[2 weeks],ESTABLISHEDPATIENT:[T]],PROVIDER:[TOKEN:[[id number]],FOLLOWUP:[2 weeks],ESTABLISHEDPATIENT:[T]],PROVIDER:[TOKEN:[[id number]],FOLLOWUP:[1 month]],PROVIDER:[TOKEN:[[id number]],FOLLOWUP:[1 month]]

## 2021-07-30 NOTE — DISCHARGE NOTE PROVIDER - NSDCPNSUBOBJ_GEN_ALL_CORE
Patient seen and examined on day of discharge 8/3/21. Patient reports she feels a little better, SOB has improved. Denies cp, nausea, vomiting, or abdominal pain.    acetaminophen   Tablet .. 650 milliGRAM(s) Oral every 6 hours PRN  ALBUTerol    90 MICROgram(s) HFA Inhaler 2 Puff(s) Inhalation every 6 hours PRN  aMIOdarone    Tablet 200 milliGRAM(s) Oral daily  apixaban 2.5 milliGRAM(s) Oral every 12 hours  ascorbic acid 500 milliGRAM(s) Oral daily  aspirin  chewable 81 milliGRAM(s) Oral daily  atorvastatin 80 milliGRAM(s) Oral at bedtime  bisacodyl Suppository 10 milliGRAM(s) Rectal daily PRN  budesonide 160 MICROgram(s)/formoterol 4.5 MICROgram(s) Inhaler 2 Puff(s) Inhalation two times a day  chlorhexidine 2% Cloths 1 Application(s) Topical <User Schedule>  dextrose 40% Gel 15 Gram(s) Oral once  dextrose 5%. 1000 milliLiter(s) IV Continuous <Continuous>  dextrose 5%. 1000 milliLiter(s) IV Continuous <Continuous>  dextrose 50% Injectable 25 Gram(s) IV Push once  dextrose 50% Injectable 12.5 Gram(s) IV Push once  dextrose 50% Injectable 25 Gram(s) IV Push once  epoetin maria d-epbx (RETACRIT) Injectable 66016 Unit(s) IV Push <User Schedule>  glucagon  Injectable 1 milliGRAM(s) IntraMuscular once  guaiFENesin Oral Liquid (Sugar-Free) 100 milliGRAM(s) Oral every 6 hours PRN  insulin glargine Injectable (LANTUS) 15 Unit(s) SubCutaneous at bedtime  insulin lispro (ADMELOG) corrective regimen sliding scale   SubCutaneous three times a day before meals  insulin lispro Injectable (ADMELOG) 5 Unit(s) SubCutaneous three times a day before meals  melatonin 3 milliGRAM(s) Oral at bedtime PRN  metoprolol tartrate 25 milliGRAM(s) Oral two times a day  midodrine. 10 milliGRAM(s) Oral <User Schedule>  polyethylene glycol 3350 17 Gram(s) Oral daily  senna 2 Tablet(s) Oral at bedtime  sodium chloride 0.9% lock flush 10 milliLiter(s) IV Push every 1 hour PRN  tiotropium 18 MICROgram(s) Capsule 1 Capsule(s) Inhalation daily  zinc sulfate 220 milliGRAM(s) Oral daily      VITALS:  Vital Signs Last 24 Hrs  T(C): 36.9 (03 Aug 2021 16:45), Max: 37.2 (03 Aug 2021 15:00)  T(F): 98.5 (03 Aug 2021 16:45), Max: 98.9 (03 Aug 2021 15:00)  HR: 71 (03 Aug 2021 16:45) (60 - 101)  BP: 149/73 (03 Aug 2021 16:45) (109/62 - 172/83)  BP(mean): --  RR: 18 (03 Aug 2021 16:45) (17 - 94)  SpO2: 95% (03 Aug 2021 16:45) (91% - 100%)    EXAM:  GEN: obese elderly female, alert, in no acute distress  CVS: rrr  RESP: 3L O2 via NC, mild bibasilar crackles  ABD: soft, nontender, nondistended, normoactive bowel sounds  EXT: no LE edema  NEURO: aaox2-3    LABS:                        8.3    10.06 )-----------( 294      ( 03 Aug 2021 07:34 )             27.2     08-03    134<L>  |  98  |  34<H>  ----------------------------<  82  4.6   |  30  |  5.23<H>    Ca    8.3<L>      03 Aug 2021 07:34  Phos  3.5     08-03  Mg     2.4     08-03    TPro  5.8<L>  /  Alb  2.2<L>  /  TBili  0.4  /  DBili  x   /  AST  15  /  ALT  27  /  AlkPhos  132<H>  08-03      IMAGING: reviewed

## 2021-07-30 NOTE — PROGRESS NOTE ADULT - SUBJECTIVE AND OBJECTIVE BOX
C A R D I O L O G Y  **********************************  DATE OF SERVICE: 07-30-21    Patient denies chest pain or shortness of breath.   Review of symptoms otherwise negative.    ALBUTerol    90 MICROgram(s) HFA Inhaler 2 Puff(s) Inhalation every 6 hours PRN  aMIOdarone    Tablet 200 milliGRAM(s) Oral daily  apixaban 2.5 milliGRAM(s) Oral every 12 hours  ascorbic acid 500 milliGRAM(s) Oral daily  aspirin  chewable 81 milliGRAM(s) Oral daily  atorvastatin 80 milliGRAM(s) Oral at bedtime  bisacodyl Suppository 10 milliGRAM(s) Rectal daily PRN  budesonide 160 MICROgram(s)/formoterol 4.5 MICROgram(s) Inhaler 2 Puff(s) Inhalation two times a day  chlorhexidine 2% Cloths 1 Application(s) Topical <User Schedule>  dextrose 40% Gel 15 Gram(s) Oral once  dextrose 5%. 1000 milliLiter(s) IV Continuous <Continuous>  dextrose 5%. 1000 milliLiter(s) IV Continuous <Continuous>  dextrose 50% Injectable 25 Gram(s) IV Push once  dextrose 50% Injectable 25 Gram(s) IV Push once  dextrose 50% Injectable 12.5 Gram(s) IV Push once  epoetin maria d-epbx (RETACRIT) Injectable 8000 Unit(s) IV Push <User Schedule>  glucagon  Injectable 1 milliGRAM(s) IntraMuscular once  guaiFENesin Oral Liquid (Sugar-Free) 100 milliGRAM(s) Oral every 6 hours PRN  insulin glargine Injectable (LANTUS) 15 Unit(s) SubCutaneous at bedtime  insulin lispro (ADMELOG) corrective regimen sliding scale   SubCutaneous three times a day before meals  insulin lispro Injectable (ADMELOG) 5 Unit(s) SubCutaneous three times a day before meals  LORazepam     Tablet 0.5 milliGRAM(s) Oral once  metoprolol tartrate 25 milliGRAM(s) Oral two times a day  midodrine. 10 milliGRAM(s) Oral <User Schedule>  polyethylene glycol 3350 17 Gram(s) Oral daily  senna 2 Tablet(s) Oral at bedtime  sodium chloride 0.9% lock flush 10 milliLiter(s) IV Push every 1 hour PRN  tiotropium 18 MICROgram(s) Capsule 1 Capsule(s) Inhalation daily  zinc sulfate 220 milliGRAM(s) Oral daily                            8.9    9.96  )-----------( 257      ( 30 Jul 2021 06:32 )             28.8       Hemoglobin: 8.9 g/dL (07-30 @ 06:32)  Hemoglobin: 9.0 g/dL (07-29 @ 06:12)  Hemoglobin: 9.4 g/dL (07-28 @ 06:20)  Hemoglobin: 9.8 g/dL (07-27 @ 17:01)  Hemoglobin: 8.0 g/dL (07-27 @ 06:10)      07-30    135  |  98  |  36<H>  ----------------------------<  162<H>  4.2   |  29  |  5.50<H>    Ca    8.3<L>      30 Jul 2021 06:32  Phos  3.4     07-30  Mg     2.3     07-30    TPro  6.4  /  Alb  2.4<L>  /  TBili  0.5  /  DBili  0.1  /  AST  24  /  ALT  17  /  AlkPhos  111  07-29    Creatinine Trend: 5.50<--, 6.17<--, 4.17<--, 2.98<--, 4.82<--, 5.70<--    COAGS:     CARDIAC MARKERS ( 27 Jul 2021 22:53 )  0.058 ng/mL / x     / x     / x     / x      CARDIAC MARKERS ( 27 Jul 2021 17:01 )  0.064 ng/mL / x     / 73 U/L / x     / 1.9 ng/mL        T(C): 36.4 (07-30-21 @ 06:00), Max: 36.7 (07-29-21 @ 21:45)  HR: 98 (07-30-21 @ 10:37) (89 - 129)  BP: 129/91 (07-30-21 @ 10:37) (101/54 - 130/57)  RR: 17 (07-30-21 @ 10:37) (17 - 20)  SpO2: 95% (07-30-21 @ 10:37) (93% - 100%)  Wt(kg): --    I&O's Summary      HEENT:  (-)icterus (-)pallor  CV: N S1 S2 1/6 SHANE (+)2 Pulses B/l  Resp:  Clear to ausculatation B/L, normal effort  GI: (+) BS Soft, NT, ND  Lymph:  (-)Edema, (-)obvious lymphadenopathy  Skin: Warm to touch, Normal turgor  Psych: Appropriate mood and affect      TELEMETRY: 	  afib         ASSESSMENT/PLAN: 	89y  Female PMH of PAF on amio for rhythm control and on Eliquis, Micra PPM placed by Dr Evangelina Mcclelland at [facility] earlier this year ESRD on HD(Monday, Tuesday, Thursday, Saturday), DM on insulin, HTN, Home O2 3L presented with shortness of breath, now with rapid afib.    - HR improved  - HR driven by underlying medical illness  - cont eliquis  - doesnt appear fluid overloaded  - episodes of hypotension despite midodrine particularly with HD.  Started on BB seems to be tolerating for now  - Random cortisol wnl  - If afib rate control remains refractory can consider Dig, although as a last resort since she is paroxysmal and dig can trigger episodes of afib.  - Per family, inhalers seem to make her agitated at increase her HR,  ? if switching to Xopenex would help    Christopher Arteaga MD, [facility]  BEEPER [phone]

## 2021-07-30 NOTE — PROGRESS NOTE ADULT - SUBJECTIVE AND OBJECTIVE BOX
Interval Events:  pt in nad    Allergies    No Known Allergies    Intolerances      Endocrine/Metabolic Medications:  atorvastatin 80 milliGRAM(s) Oral at bedtime  dextrose 40% Gel 15 Gram(s) Oral once  dextrose 50% Injectable 25 Gram(s) IV Push once  dextrose 50% Injectable 25 Gram(s) IV Push once  dextrose 50% Injectable 12.5 Gram(s) IV Push once  glucagon  Injectable 1 milliGRAM(s) IntraMuscular once  insulin glargine Injectable (LANTUS) 15 Unit(s) SubCutaneous at bedtime  insulin lispro (ADMELOG) corrective regimen sliding scale   SubCutaneous three times a day before meals  insulin lispro Injectable (ADMELOG) 5 Unit(s) SubCutaneous three times a day before meals      Vital Signs Last 24 Hrs  T(C): 36.4 (30 Jul 2021 06:00), Max: 36.7 (29 Jul 2021 21:45)  T(F): 97.6 (30 Jul 2021 06:00), Max: 98.1 (30 Jul 2021 01:59)  HR: 98 (30 Jul 2021 10:37) (89 - 129)  BP: 129/91 (30 Jul 2021 10:37) (101/54 - 130/57)  BP(mean): --  RR: 17 (30 Jul 2021 10:37) (17 - 20)  SpO2: 95% (30 Jul 2021 10:37) (93% - 100%)      PHYSICAL EXAM  All physical exam findings normal, except those marked:  General:	Alert, active, cooperative, NAD, well hydrated  .		[] Abnormal:  Neck		Normal: supple, no cervical adenopathy, no palpable thyroid  .		[] Abnormal:  Cardiovascular	Normal: regular rate, normal S1, S2, no murmurs  .		[] Abnormal:  Respiratory	Normal: no chest wall deformity, normal respiratory pattern, CTA B/L  .		[] Abnormal:  Abdominal	Normal: soft, ND, NT, bowel sounds present, no masses, no organomegaly  .		[] Abnormal:  		Normal normal genitalia, testes descended, circumcised/uncircumcised  .		Tiny stage:			Breast tiny:  .		Menstrual history:  .		[] Abnormal:  Extremities	Normal: FROM x4  .		[] Abnormal:  Skin		Normal: intact and not indurated, no rash, no acanthosis nigricans  .		[] Abnormal:  Neurologic	Normal: grossly intact  .		[] Abnormal:    LABS                        8.9    9.96  )-----------( 257      ( 30 Jul 2021 06:32 )             28.8                               135    |  98     |  36                  Calcium: 8.3   / iCa: x      (07-30 @ 06:32)    ----------------------------<  162       Magnesium: 2.3                              4.2     |  29     |  5.50             Phosphorous: 3.4        CAPILLARY BLOOD GLUCOSE      POCT Blood Glucose.: 246 mg/dL (30 Jul 2021 11:24)  POCT Blood Glucose.: 153 mg/dL (30 Jul 2021 08:01)  POCT Blood Glucose.: 251 mg/dL (29 Jul 2021 21:38)  POCT Blood Glucose.: 227 mg/dL (29 Jul 2021 16:36)  POCT Blood Glucose.: 137 mg/dL (29 Jul 2021 15:02)  POCT Blood Glucose.: 179 mg/dL (29 Jul 2021 12:30)        Assesment/plan    89 F, wheel chair bound, from home with PMH of Afib on Eliquis, ESRD on HD(Monday, Tuesday, Thursday, Saturday), DM on insulin, HTN, Home O2 3L presented with shortness of breath, vomiting x1 day.   Found to have uncont dm. Pt admits to taking basal bolus insulin given by her daughter. Does not recall fsg or if she has hypos. Currently eating well .       Problem/Recommendation - 1:  Problem: Uncontrolled diabetes mellitus. Recommendation: hyperglycemia better controlled   decent a1c as out pt- 7.8%  cont lantus to 12 units   and admelog 4 ac tid   fsg ac and hs  aim fsg 140- <200 due to age and comorbidities   d/w hs.     Problem/Recommendation - 2:  ·  Problem: Acute respiratory failure with hypoxia and hypercapnia.  Recommendation: s/p icu tx- downgrade  tx per prim team.

## 2021-07-30 NOTE — PROGRESS NOTE ADULT - PROBLEM SELECTOR PLAN 4
- had an episodes of tachy (110-140)  - 1 dose digoxin 250mg PO given  - continue home med of amiodarone and eliquis   - monitor HR   - monitor on telemetry - (d/c 7/30/21)

## 2021-07-30 NOTE — CONSULT NOTE ADULT - SUBJECTIVE AND OBJECTIVE BOX
Time of visit:    CHIEF COMPLAINT: Patient is a 89y old  Female who presents with a chief complaint of SOB (30 Jul 2021 17:11)      HPI:  89 F, wheel chair bound, from home with PMH of Afib on Eliquis, ESRD on HD(Monday, Tuesday, Thursday, Saturday), DM on insulin, HTN, Home O2 3L presented with shortness of breath, vomiting x1 day. Pt was drinking tea from home when she had a vomiting episode. Her daughter at bedside describes "all this fluid came out of her mouth." As per daughter, pt has been drinking a lot of fluid over the past day. No new medications, no hospitalizations or ER visits in the interim as per daughter. No fever, no chest pain, no palpitations, no diarrhea, no dysuria. Pt is oliguric. Pt had COVID in March 2021, and as such daughter reports she did not yet get vaccinated. (22 Jul 2021 03:15)   Patient seen and examined.     PAST MEDICAL & SURGICAL HISTORY:  HTN (hypertension)    HLD (hyperlipidemia)    CKD (chronic kidney disease)    Afib    DM (diabetes mellitus)    Artificial pacemaker        Allergies    No Known Allergies    Intolerances        MEDICATIONS  (STANDING):  aMIOdarone    Tablet 200 milliGRAM(s) Oral daily  apixaban 2.5 milliGRAM(s) Oral every 12 hours  ascorbic acid 500 milliGRAM(s) Oral daily  aspirin  chewable 81 milliGRAM(s) Oral daily  atorvastatin 80 milliGRAM(s) Oral at bedtime  budesonide 160 MICROgram(s)/formoterol 4.5 MICROgram(s) Inhaler 2 Puff(s) Inhalation two times a day  chlorhexidine 2% Cloths 1 Application(s) Topical <User Schedule>  dextrose 40% Gel 15 Gram(s) Oral once  dextrose 5%. 1000 milliLiter(s) (50 mL/Hr) IV Continuous <Continuous>  dextrose 5%. 1000 milliLiter(s) (100 mL/Hr) IV Continuous <Continuous>  dextrose 50% Injectable 25 Gram(s) IV Push once  dextrose 50% Injectable 12.5 Gram(s) IV Push once  dextrose 50% Injectable 25 Gram(s) IV Push once  epoetin maria d-epbx (RETACRIT) Injectable 28002 Unit(s) IV Push <User Schedule>  glucagon  Injectable 1 milliGRAM(s) IntraMuscular once  insulin glargine Injectable (LANTUS) 12 Unit(s) SubCutaneous at bedtime  insulin lispro (ADMELOG) corrective regimen sliding scale   SubCutaneous three times a day before meals  insulin lispro Injectable (ADMELOG) 4 Unit(s) SubCutaneous three times a day before meals  metoprolol tartrate 25 milliGRAM(s) Oral two times a day  midodrine. 10 milliGRAM(s) Oral <User Schedule>  polyethylene glycol 3350 17 Gram(s) Oral daily  senna 2 Tablet(s) Oral at bedtime  tiotropium 18 MICROgram(s) Capsule 1 Capsule(s) Inhalation daily  zinc sulfate 220 milliGRAM(s) Oral daily      MEDICATIONS  (PRN):  ALBUTerol    90 MICROgram(s) HFA Inhaler 2 Puff(s) Inhalation every 6 hours PRN Shortness of Breath and/or Wheezing  bisacodyl Suppository 10 milliGRAM(s) Rectal daily PRN Constipation  guaiFENesin Oral Liquid (Sugar-Free) 100 milliGRAM(s) Oral every 6 hours PRN Cough  sodium chloride 0.9% lock flush 10 milliLiter(s) IV Push every 1 hour PRN Pre/post blood products, medications, blood draw, and to maintain line patency   Medications up to date at time of exam.    Medications up to date at time of exam.    FAMILY HISTORY:      SOCIAL HISTORY  Smoking History: [x   ]  none smoking/smoke exposure, [   ] former smoker  Living Condition: [   ] apartment, [   ] private house  Work History:   Travel History: denies recent travel  Illicit Substance Use: denies  Alcohol Use: denies    REVIEW OF SYSTEMS:    CONSTITUTIONAL:  denies fevers, chills, sweats, weight loss    HEENT:  denies diplopia or blurred vision, sore throat or runny nose.    CARDIOVASCULAR:  denies pressure, squeezing, tightness, or heaviness about the chest; no palpitations.    RESPIRATORY:  denies SOB, cough, TSANG, wheezing.    GASTROINTESTINAL:  denies abdominal pain, nausea, vomiting or diarrhea.    GENITOURINARY: denies dysuria, frequency or urgency.    NEUROLOGIC:  denies numbness, tingling, seizures or weakness.    PSYCHIATRIC:  denies disorder of thought or mood.    MSK: denies swelling, redness      PHYSICAL EXAMINATION:    GENERAL: The patient is a well-developed, well-nourished, in no apparent distress.     Vital Signs Last 24 Hrs  T(C): 36.5 (30 Jul 2021 14:45), Max: 36.7 (29 Jul 2021 21:45)  T(F): 97.7 (30 Jul 2021 14:45), Max: 98.1 (30 Jul 2021 01:59)  HR: 116 (30 Jul 2021 17:20) (85 - 116)  BP: 118/70 (30 Jul 2021 17:20) (109/64 - 130/62)  BP(mean): --  RR: 19 (30 Jul 2021 14:45) (17 - 19)  SpO2: 96% (30 Jul 2021 14:45) (93% - 100%)   (if applicable)    Chest Tube (if applicable)    HEENT: Head is normocephalic and atraumatic. Extraocular muscles are intact. Mucous membranes are moist.     NECK: Supple, no palpable adenopathy.    LUNGS: Clear to auscultation, no wheezing, rales, or rhonchi.    HEART: Regular rate and rhythm without murmur.    ABDOMEN: Soft, nontender, and nondistended.  No hepatosplenomegaly is noted.    RENAL: No difficulty voiding, no pelvic pain    EXTREMITIES: Without any cyanosis, clubbing, rash, lesions or edema.    NEUROLOGIC: Awake, alert,    SKIN: Warm, dry, good turgor.      LABS:                        8.9    9.96  )-----------( 257      ( 30 Jul 2021 06:32 )             28.8     07-30    135  |  98  |  36<H>  ----------------------------<  162<H>  4.2   |  29  |  5.50<H>    Ca    8.3<L>      30 Jul 2021 06:32  Phos  3.4     07-30  Mg     2.3     07-30    TPro  6.4  /  Alb  2.4<L>  /  TBili  0.5  /  DBili  0.1  /  AST  24  /  ALT  17  /  AlkPhos  111  07-29                        MICROBIOLOGY: (if applicable)    RADIOLOGY & ADDITIONAL STUDIES:  EKG:   CT chest:< from: CT Chest No Cont (07.30.21 @ 15:16) >    EXAM:  CT CHEST                            PROCEDURE DATE:  07/30/2021          INTERPRETATION:  CLINICAL INFORMATION: Chest radiography 7/26/2021 reported left retrocardiac airspace consolidation. Follow-up assessment.    COMPARISON: No prior CT examinations available for comparison.    CONTRAST/COMPLICATIONS:  IV Contrast: NONE  Oral Contrast: NONE  Complications: None reported at time of study completion    PROCEDURE:  CT of the Chest was performed.  Sagittal and coronal reformats were performed.    FINDINGS:    LUNGS AND AIRWAYS: Patent central airways.  Bilateral reticulonodular opacities are identified. Band type opacities within the bilateral lower lobes likely related to atelectasis. Opacity within the inferior/posterior left lower lobe likely related to atelectasis versus consolidation.  PLEURA: No pleural effusion. No pneumothorax.  MEDIASTINUM AND PRANAY: Subcentimeter mediastinal lymph nodes identified with a dominant 2.0 x 1.4 cm right lower paratracheal lymph node. No hilar masses.  VESSELS: Thoracic aortic caliber appears unremarkable. Coronary arterial calcifications noted. A right-sided central venous catheter is identified, the distal tip at the level of the SVC/RA confluence.  HEART: Cardiomegaly. No pericardial effusion. Note is made of an indwelling Micra pacemaker.  CHEST WALL AND LOWER NECK: Within normal limits.  VISUALIZED UPPER ABDOMEN: The visualized adrenal glands appear unremarkable.  BONES: Degenerative change. Patient status post right humeral ORIF.    IMPRESSION:  1. Bilateral reticulonodular opacities are identified. Band type opacities within the bilateral lower lobes likely related to atelectasis. Opacity within the inferior/posterior left lower lobe likely related to atelectasis versus consolidation.          --- End of Report ---            NYASIA VAIL MD; Attending Radiologist  This document has been electronically signed. Jul 30 2021  4:10PM    < end of copied text >    ECHO:    IMPRESSION: 89y Female PAST MEDICAL & SURGICAL HISTORY:  HTN (hypertension)    HLD (hyperlipidemia)    CKD (chronic kidney disease)    Afib    DM (diabetes mellitus)    Artificial pacemaker     p/w              IMP: This is a 89 year old white woman  Afib on Eliquis, ESRD on HD(Monday, Tuesday, Thursday, Saturday), DM on insulin, HTN, supp  O2 3L, who presented to the ED due to SOB. In ED, patient placed on BiPAP and had HD session with about 1.274L fluid removed and blood pressure noting to drop to 60s despite being given midodrine. Admitted to ICU Acute Hypoxic Respiratory Failure.      Patient arrived to the ICU on 7/22 for AHRF 2/2 fluid overload. Patient had HD and BIPAP was tapered off. Patient hypotensive during HD, given midodrine pre-HD and pressor support.    Pat was transferred to medical floor . She is requiring 5 L O2 supp. CT chest reviewed       Sugg  -continue dialysis   -continue symbicort for now   -spoke with pat and daughter regarding compliance with water  -dialysis as per Neph  -O2 supp and needed to maintain sat >90%  -monitor blood sugar     d/c with Dr Pineda

## 2021-07-30 NOTE — PROGRESS NOTE ADULT - SUBJECTIVE AND OBJECTIVE BOX
[facility] NEPHROLOGY- PROGRESS NOTE    Patient is a 89yo Female with ESRD on HD, Afib on Eliquis, HTN, Intradialytic hypotension req Midodrine prior to HD, failed Rt AVF due to steal syndrome s/p ligation, h/o COVID s/p PPM p/w SOB x 1 day. Pt a/w Entero/ Rhino virus, acute respiratory distress requiring BIPAP with concerns for fluid overload. Nephrology consulted for ESRD status.     Hospital Medications: Medications reviewed.  REVIEW OF SYSTEMS: Denies any SOB or chest pain, +constipated    VITALS:  T(F): 97.6 (07-30-21 @ 06:00), Max: 98.1 (07-30-21 @ 01:59)  HR: 98 (07-30-21 @ 10:37)  BP: 129/91 (07-30-21 @ 10:37)  RR: 17 (07-30-21 @ 10:37)  SpO2: 95% (07-30-21 @ 10:37)  Wt(kg): --      PHYSICAL EXAM:  Gen: NAD  HEENT: anicteric;   Neck: no JVD  Cards: RRR, +S1/S2,   Resp: Rt side clear; left sided rales  GI: soft, NT/ND, NABS  Extremities: No LE edema B/L  Access: Rt IJ tunneled HD catheter- benign      LABS:  07-30    135  |  98  |  36<H>  ----------------------------<  162<H>  4.2   |  29  |  5.50<H>    Ca    8.3<L>      30 Jul 2021 06:32  Phos  3.4     07-30  Mg     2.3     07-30    TPro  6.4  /  Alb  2.4<L>  /  TBili  0.5  /  DBili  0.1  /  AST  24  /  ALT  17  /  AlkPhos  111  07-29    Creatinine Trend: 5.50 <--, 6.17 <--, 4.17 <--, 2.98 <--, 4.82 <--, 5.70 <--, 3.87 <--, 2.46 <--, 3.63 <--                        8.9    9.96  )-----------( 257      ( 30 Jul 2021 06:32 )             28.8     Urine Studies:

## 2021-07-31 LAB
ANION GAP SERPL CALC-SCNC: 12 MMOL/L — SIGNIFICANT CHANGE UP (ref 5–17)
BASOPHILS # BLD AUTO: 0.04 K/UL — SIGNIFICANT CHANGE UP (ref 0–0.2)
BASOPHILS NFR BLD AUTO: 0.4 % — SIGNIFICANT CHANGE UP (ref 0–2)
BUN SERPL-MCNC: 55 MG/DL — HIGH (ref 7–18)
CALCIUM SERPL-MCNC: 8.2 MG/DL — LOW (ref 8.4–10.5)
CHLORIDE SERPL-SCNC: 96 MMOL/L — SIGNIFICANT CHANGE UP (ref 96–108)
CO2 SERPL-SCNC: 26 MMOL/L — SIGNIFICANT CHANGE UP (ref 22–31)
CREAT SERPL-MCNC: 7.04 MG/DL — HIGH (ref 0.5–1.3)
EOSINOPHIL # BLD AUTO: 0.22 K/UL — SIGNIFICANT CHANGE UP (ref 0–0.5)
EOSINOPHIL NFR BLD AUTO: 2.3 % — SIGNIFICANT CHANGE UP (ref 0–6)
GLUCOSE BLDC GLUCOMTR-MCNC: 146 MG/DL — HIGH (ref 70–99)
GLUCOSE BLDC GLUCOMTR-MCNC: 153 MG/DL — HIGH (ref 70–99)
GLUCOSE BLDC GLUCOMTR-MCNC: 174 MG/DL — HIGH (ref 70–99)
GLUCOSE BLDC GLUCOMTR-MCNC: 206 MG/DL — HIGH (ref 70–99)
GLUCOSE SERPL-MCNC: 123 MG/DL — HIGH (ref 70–99)
HCT VFR BLD CALC: 28 % — LOW (ref 34.5–45)
HGB BLD-MCNC: 8.8 G/DL — LOW (ref 11.5–15.5)
IMM GRANULOCYTES NFR BLD AUTO: 1.8 % — HIGH (ref 0–1.5)
LYMPHOCYTES # BLD AUTO: 0.58 K/UL — LOW (ref 1–3.3)
LYMPHOCYTES # BLD AUTO: 6 % — LOW (ref 13–44)
MAGNESIUM SERPL-MCNC: 2.3 MG/DL — SIGNIFICANT CHANGE UP (ref 1.6–2.6)
MCHC RBC-ENTMCNC: 30 PG — SIGNIFICANT CHANGE UP (ref 27–34)
MCHC RBC-ENTMCNC: 31.4 GM/DL — LOW (ref 32–36)
MCV RBC AUTO: 95.6 FL — SIGNIFICANT CHANGE UP (ref 80–100)
MONOCYTES # BLD AUTO: 0.91 K/UL — HIGH (ref 0–0.9)
MONOCYTES NFR BLD AUTO: 9.4 % — SIGNIFICANT CHANGE UP (ref 2–14)
NEUTROPHILS # BLD AUTO: 7.78 K/UL — HIGH (ref 1.8–7.4)
NEUTROPHILS NFR BLD AUTO: 80.1 % — HIGH (ref 43–77)
NRBC # BLD: 0 /100 WBCS — SIGNIFICANT CHANGE UP (ref 0–0)
PHOSPHATE SERPL-MCNC: 4.6 MG/DL — HIGH (ref 2.5–4.5)
PLATELET # BLD AUTO: 234 K/UL — SIGNIFICANT CHANGE UP (ref 150–400)
POTASSIUM SERPL-MCNC: 4.8 MMOL/L — SIGNIFICANT CHANGE UP (ref 3.5–5.3)
POTASSIUM SERPL-SCNC: 4.8 MMOL/L — SIGNIFICANT CHANGE UP (ref 3.5–5.3)
RBC # BLD: 2.93 M/UL — LOW (ref 3.8–5.2)
RBC # FLD: 15.9 % — HIGH (ref 10.3–14.5)
SODIUM SERPL-SCNC: 134 MMOL/L — LOW (ref 135–145)
WBC # BLD: 9.7 K/UL — SIGNIFICANT CHANGE UP (ref 3.8–10.5)
WBC # FLD AUTO: 9.7 K/UL — SIGNIFICANT CHANGE UP (ref 3.8–10.5)

## 2021-07-31 PROCEDURE — 99233 SBSQ HOSP IP/OBS HIGH 50: CPT

## 2021-07-31 RX ORDER — LACTULOSE 10 G/15ML
10 SOLUTION ORAL EVERY 8 HOURS
Refills: 0 | Status: DISCONTINUED | OUTPATIENT
Start: 2021-07-31 | End: 2021-08-02

## 2021-07-31 RX ORDER — MIDODRINE HYDROCHLORIDE 2.5 MG/1
10 TABLET ORAL ONCE
Refills: 0 | Status: COMPLETED | OUTPATIENT
Start: 2021-07-31 | End: 2021-07-31

## 2021-07-31 RX ADMIN — LACTULOSE 10 GRAM(S): 10 SOLUTION ORAL at 22:07

## 2021-07-31 RX ADMIN — Medication 81 MILLIGRAM(S): at 14:49

## 2021-07-31 RX ADMIN — Medication 500 MILLIGRAM(S): at 14:48

## 2021-07-31 RX ADMIN — BUDESONIDE AND FORMOTEROL FUMARATE DIHYDRATE 2 PUFF(S): 160; 4.5 AEROSOL RESPIRATORY (INHALATION) at 22:07

## 2021-07-31 RX ADMIN — ZINC SULFATE TAB 220 MG (50 MG ZINC EQUIVALENT) 220 MILLIGRAM(S): 220 (50 ZN) TAB at 14:48

## 2021-07-31 RX ADMIN — Medication 4 UNIT(S): at 08:17

## 2021-07-31 RX ADMIN — ATORVASTATIN CALCIUM 80 MILLIGRAM(S): 80 TABLET, FILM COATED ORAL at 22:07

## 2021-07-31 RX ADMIN — LACTULOSE 10 GRAM(S): 10 SOLUTION ORAL at 14:49

## 2021-07-31 RX ADMIN — MIDODRINE HYDROCHLORIDE 10 MILLIGRAM(S): 2.5 TABLET ORAL at 08:30

## 2021-07-31 RX ADMIN — Medication 4 UNIT(S): at 17:24

## 2021-07-31 RX ADMIN — APIXABAN 2.5 MILLIGRAM(S): 2.5 TABLET, FILM COATED ORAL at 17:25

## 2021-07-31 RX ADMIN — ERYTHROPOIETIN 10000 UNIT(S): 10000 INJECTION, SOLUTION INTRAVENOUS; SUBCUTANEOUS at 10:00

## 2021-07-31 RX ADMIN — Medication 2: at 17:24

## 2021-07-31 RX ADMIN — Medication 100 MILLIGRAM(S): at 14:57

## 2021-07-31 RX ADMIN — INSULIN GLARGINE 12 UNIT(S): 100 INJECTION, SOLUTION SUBCUTANEOUS at 22:07

## 2021-07-31 RX ADMIN — Medication 25 MILLIGRAM(S): at 17:31

## 2021-07-31 RX ADMIN — POLYETHYLENE GLYCOL 3350 17 GRAM(S): 17 POWDER, FOR SOLUTION ORAL at 14:49

## 2021-07-31 RX ADMIN — APIXABAN 2.5 MILLIGRAM(S): 2.5 TABLET, FILM COATED ORAL at 05:40

## 2021-07-31 RX ADMIN — Medication 1: at 08:16

## 2021-07-31 RX ADMIN — SENNA PLUS 2 TABLET(S): 8.6 TABLET ORAL at 22:06

## 2021-07-31 RX ADMIN — CHLORHEXIDINE GLUCONATE 1 APPLICATION(S): 213 SOLUTION TOPICAL at 05:40

## 2021-07-31 RX ADMIN — AMIODARONE HYDROCHLORIDE 200 MILLIGRAM(S): 400 TABLET ORAL at 05:40

## 2021-07-31 NOTE — PROGRESS NOTE ADULT - ASSESSMENT
89 y/o female with a PMH of Afib on Eliquis, ESRD on HD(Monday, Tuesday, Thursday, Saturday), DM on insulin, HTN, COPD on 3L home O2 who is admitted for acute hypoxic respiratory failure due to fluid overload in the setting of ESRD. Hypotension has limited fluid removal during HD, is on Midodrine 30 minutes prior to HD. Possible component of COPD as well, is on 3L O2 at home, does not use Bipap at home but responded well here, will continue qhs for now. No wheezing on exam, has some signs of pulm edema but improved from previous. Continue to remove as much fluid as BP will allow during HD. Continue symbicort/spiriva. CT chest reviewed, appreciate input from Pulmonary, Dr. Cuellar. Of note, had a code stroke called after HD session on 7/27 activated, CT head showed chronic right MCA infarct, appreciate neurology consult, felt her symptoms were due to recrudescence of her prior stroke, continue ASA and statin. PT recommended home PT. Appreciate endocrine input regarding poorly controlled DM, continue lantus 12 units and lispro 4 tid. Appreciate inputs from nephrology and cardiology. Received HD today, continue Midodrine 30 mins prior to HD and hold metoprolol on mornings of HD. Constipation returned once again, had a BM a few days ago, enemas were unsuccessful will add lactulose and monitor for BM. Possible discharge home after HD session on Monday.

## 2021-07-31 NOTE — PROGRESS NOTE ADULT - SUBJECTIVE AND OBJECTIVE BOX
C A R D I O L O G Y  **********************************  DATE OF SERVICE: 07-31-21    Patient denies chest pain or shortness of breath.   Review of symptoms otherwise negative.    ALBUTerol    90 MICROgram(s) HFA Inhaler 2 Puff(s) Inhalation every 6 hours PRN  aMIOdarone    Tablet 200 milliGRAM(s) Oral daily  apixaban 2.5 milliGRAM(s) Oral every 12 hours  ascorbic acid 500 milliGRAM(s) Oral daily  aspirin  chewable 81 milliGRAM(s) Oral daily  atorvastatin 80 milliGRAM(s) Oral at bedtime  bisacodyl Suppository 10 milliGRAM(s) Rectal daily PRN  budesonide 160 MICROgram(s)/formoterol 4.5 MICROgram(s) Inhaler 2 Puff(s) Inhalation two times a day  chlorhexidine 2% Cloths 1 Application(s) Topical <User Schedule>  dextrose 40% Gel 15 Gram(s) Oral once  dextrose 5%. 1000 milliLiter(s) IV Continuous <Continuous>  dextrose 5%. 1000 milliLiter(s) IV Continuous <Continuous>  dextrose 50% Injectable 25 Gram(s) IV Push once  dextrose 50% Injectable 12.5 Gram(s) IV Push once  dextrose 50% Injectable 25 Gram(s) IV Push once  epoetin maria d-epbx (RETACRIT) Injectable 05398 Unit(s) IV Push <User Schedule>  glucagon  Injectable 1 milliGRAM(s) IntraMuscular once  guaiFENesin Oral Liquid (Sugar-Free) 100 milliGRAM(s) Oral every 6 hours PRN  insulin glargine Injectable (LANTUS) 12 Unit(s) SubCutaneous at bedtime  insulin lispro (ADMELOG) corrective regimen sliding scale   SubCutaneous three times a day before meals  insulin lispro Injectable (ADMELOG) 4 Unit(s) SubCutaneous three times a day before meals  lactulose Syrup 10 Gram(s) Oral every 8 hours  melatonin 3 milliGRAM(s) Oral at bedtime PRN  metoprolol tartrate 25 milliGRAM(s) Oral two times a day  midodrine. 10 milliGRAM(s) Oral <User Schedule>  polyethylene glycol 3350 17 Gram(s) Oral daily  senna 2 Tablet(s) Oral at bedtime  sodium chloride 0.9% lock flush 10 milliLiter(s) IV Push every 1 hour PRN  tiotropium 18 MICROgram(s) Capsule 1 Capsule(s) Inhalation daily  zinc sulfate 220 milliGRAM(s) Oral daily                            8.8    9.70  )-----------( 234      ( 31 Jul 2021 07:12 )             28.0       Hemoglobin: 8.8 g/dL (07-31 @ 07:12)  Hemoglobin: 8.9 g/dL (07-30 @ 06:32)  Hemoglobin: 9.0 g/dL (07-29 @ 06:12)  Hemoglobin: 9.4 g/dL (07-28 @ 06:20)  Hemoglobin: 9.8 g/dL (07-27 @ 17:01)      07-31    134<L>  |  96  |  55<H>  ----------------------------<  123<H>  4.8   |  26  |  7.04<H>    Ca    8.2<L>      31 Jul 2021 07:12  Phos  4.6     07-31  Mg     2.3     07-31      Creatinine Trend: 7.04<--, 5.50<--, 6.17<--, 4.17<--, 2.98<--, 4.82<--    COAGS:           T(C): 36.5 (07-31-21 @ 14:45), Max: 36.9 (07-31-21 @ 04:57)  HR: 55 (07-31-21 @ 14:45) (55 - 116)  BP: 117/60 (07-31-21 @ 14:45) (95/57 - 136/73)  RR: 18 (07-31-21 @ 14:45) (16 - 18)  SpO2: 96% (07-31-21 @ 14:45) (96% - 98%)  Wt(kg): --    I&O's Summary    31 Jul 2021 07:01  -  31 Jul 2021 16:28  --------------------------------------------------------  IN: 600 mL / OUT: 1000 mL / NET: -400 mL        HEENT:  (-)icterus (-)pallor  CV: N S1 S2 1/6 SHANE (+)2 Pulses B/l  Resp:  Clear to ausculatation B/L, normal effort  GI: (+) BS Soft, NT, ND  Lymph:  (-)Edema, (-)obvious lymphadenopathy  Skin: Warm to touch, Normal turgor  Psych: Appropriate mood and affect      TELEMETRY: 	 off      ASSESSMENT/PLAN: 	89y  Female PMH of PAF on amio for rhythm control and on Eliquis, Micra PPM placed by Dr Evangelina Mcclelland at [facility] earlier this year ESRD on HD(Monday, Tuesday, Thursday, Saturday), DM on insulin, HTN, Home O2 3L presented with shortness of breath, now with rapid afib.    - HR improved, now of tele  - BP stable  - cont eliquis  - doesnt appear fluid overloaded  - tolerating bb  - Random cortisol wnl  - No need for further inpatient cardiac work up.      Christopher Arteaga MD, [facility]  BEEPER [phone]

## 2021-07-31 NOTE — PROGRESS NOTE ADULT - SUBJECTIVE AND OBJECTIVE BOX
[facility] NEPHROLOGY- PROGRESS NOTE    Patient is a 89yo Female with ESRD on HD (MTTS), Afib on Eliquis, HTN, Intradialytic hypotension req Midodrine prior to HD, failed Rt AVF due to steal syndrome s/p ligation, h/o COVID s/p PPM p/w SOB x 1 day. Pt a/w Entero/ Rhino virus, acute respiratory distress requiring BIPAP with concerns for fluid overload. Nephrology consulted for ESRD status.     Hospital Medications: Medications reviewed.  REVIEW OF SYSTEMS: Denies any SOB or chest pain, +constipated    VITALS:  T(F): 97.7 (07-31-21 @ 14:45), Max: 98.5 (07-31-21 @ 04:57)  HR: 55 (07-31-21 @ 14:45)  BP: 117/60 (07-31-21 @ 14:45)  RR: 18 (07-31-21 @ 14:45)  SpO2: 96% (07-31-21 @ 14:45)  Wt(kg): --    07-31 @ 07:01  -  07-31 @ 14:38  --------------------------------------------------------  IN: 600 mL / OUT: 1000 mL / NET: -400 mL      PHYSICAL EXAM:  Gen: NAD  HEENT: anicteric;   Neck: no JVD  Cards: RRR, +S1/S2,   Resp: Rt side course BS; left sided with mild wheezing  GI: soft, NT/ND, NABS  Extremities: No LE edema B/L  Access: Rt IJ tunneled HD catheter- benign      LABS:  07-31    134<L>  |  96  |  55<H>  ----------------------------<  123<H>  4.8   |  26  |  7.04<H>    Ca    8.2<L>      31 Jul 2021 07:12  Phos  4.6     07-31  Mg     2.3     07-31      Creatinine Trend: 7.04 <--, 5.50 <--, 6.17 <--, 4.17 <--, 2.98 <--, 4.82 <--, 5.70 <--, 3.87 <--, 2.46 <--                        8.8    9.70  )-----------( 224 ( 31 Jul 2021 07:12 )             28.0     Urine Studies:

## 2021-07-31 NOTE — PROGRESS NOTE ADULT - ASSESSMENT
Patient is a 89yo Female with ESRD on HD, Afib on Eliquis, HTN, Intradialytic hypotension req Midodrine prior to HD, failed Rt AVF due to steal syndrome s/p ligation, h/o COVID s/p PPM p/w SOB x 1 day. Pt a/w Entero/ Rhino virus, acute respiratory distress requiring BIPAP with concerns for fluid overload. Nephrology consulted for ESRD status.     1) ESRD: Last HD 7/31 (earlier today), with intradialytic hypotension (ehsan 76/48) with net 600ml removed. Plan for next HD 8/2 (1st shift pending possible d/c). Monitor electrolytes.   2) HTN 2/2 CKD- BP low normal. Pt on Metoprolol 25mg PO bid for rate control (held prior to HD). Continue with Midodrine 10mg PO prior to HD to aid in fluid removal. Monitor BP  3) Anemia of renal disease: Hb low. Will avoid IV iron due to elevated Ferritin. c/w Epogen 10k units IV tiw with HD (increased 7/31). Monitor Hb.  4) Hyperphosphatemia: Phosphorus acceptable. No need for phos binder. Phos liberalized in diet. Monitor serum calcium and phosphorus.    Spoke with pt's daughter, Cece 7/28. Discussed that pt is not tolerating HD and unable to tolerate fluid removal despite Midodrine 10mg PO prior to HD. As per daughter, she is fine in outpt dialysis and needs to be d/c soon to resume outpt dialysis at [facility]. Daughter unrealistic about long term goals; pt remains full code.     [facility] NEPHROLOGY  Brad Chen M.D.  Oneal Tim D.O.  Roselia Huitron M.D.  Milli Platt, PARAG, ANP-C  [phone]    [address] [place] [address]

## 2021-07-31 NOTE — PROGRESS NOTE ADULT - SUBJECTIVE AND OBJECTIVE BOX
Patient seen and examined this morning while at dialysis. She reports her breathing is better today. Complaining of constipation, unable to go despite enemas yesterday. Otherwise denies acute complaints.     ALBUTerol    90 MICROgram(s) HFA Inhaler 2 Puff(s) Inhalation every 6 hours PRN  aMIOdarone    Tablet 200 milliGRAM(s) Oral daily  apixaban 2.5 milliGRAM(s) Oral every 12 hours  ascorbic acid 500 milliGRAM(s) Oral daily  aspirin  chewable 81 milliGRAM(s) Oral daily  atorvastatin 80 milliGRAM(s) Oral at bedtime  bisacodyl Suppository 10 milliGRAM(s) Rectal daily PRN  budesonide 160 MICROgram(s)/formoterol 4.5 MICROgram(s) Inhaler 2 Puff(s) Inhalation two times a day  chlorhexidine 2% Cloths 1 Application(s) Topical <User Schedule>  dextrose 40% Gel 15 Gram(s) Oral once  dextrose 5%. 1000 milliLiter(s) IV Continuous <Continuous>  dextrose 5%. 1000 milliLiter(s) IV Continuous <Continuous>  dextrose 50% Injectable 25 Gram(s) IV Push once  dextrose 50% Injectable 12.5 Gram(s) IV Push once  dextrose 50% Injectable 25 Gram(s) IV Push once  epoetin maria d-epbx (RETACRIT) Injectable 62709 Unit(s) IV Push <User Schedule>  glucagon  Injectable 1 milliGRAM(s) IntraMuscular once  guaiFENesin Oral Liquid (Sugar-Free) 100 milliGRAM(s) Oral every 6 hours PRN  insulin glargine Injectable (LANTUS) 12 Unit(s) SubCutaneous at bedtime  insulin lispro (ADMELOG) corrective regimen sliding scale   SubCutaneous three times a day before meals  insulin lispro Injectable (ADMELOG) 4 Unit(s) SubCutaneous three times a day before meals  lactulose Syrup 10 Gram(s) Oral every 8 hours  melatonin 3 milliGRAM(s) Oral at bedtime PRN  metoprolol tartrate 25 milliGRAM(s) Oral two times a day  midodrine. 10 milliGRAM(s) Oral <User Schedule>  polyethylene glycol 3350 17 Gram(s) Oral daily  senna 2 Tablet(s) Oral at bedtime  sodium chloride 0.9% lock flush 10 milliLiter(s) IV Push every 1 hour PRN  tiotropium 18 MICROgram(s) Capsule 1 Capsule(s) Inhalation daily  zinc sulfate 220 milliGRAM(s) Oral daily      VITALS:  Vital Signs Last 24 Hrs  T(C): 36.5 (31 Jul 2021 14:45), Max: 36.9 (31 Jul 2021 04:57)  T(F): 97.7 (31 Jul 2021 14:45), Max: 98.5 (31 Jul 2021 04:57)  HR: 67 (31 Jul 2021 17:31) (55 - 91)  BP: 132/67 (31 Jul 2021 17:31) (95/57 - 136/73)  BP(mean): --  RR: 18 (31 Jul 2021 14:45) (16 - 18)  SpO2: 96% (31 Jul 2021 14:45) (96% - 98%)    EXAM:  GEN: obese female, alert, in no acute distress  CVS: rrr, normal s1/s2  RESP: on 5L O2 via NC, mild bibasilar rales  ABD: soft, mildly distended, nontender, normoactive  EXT: no LE edema  NEURO: aaox3    LABS:                        8.8    9.70  )-----------( 234      ( 31 Jul 2021 07:12 )             28.0     07-31    134<L>  |  96  |  55<H>  ----------------------------<  123<H>  4.8   |  26  |  7.04<H>    Ca    8.2<L>      31 Jul 2021 07:12  Phos  4.6     07-31  Mg     2.3     07-31        IMAGING: reviewed

## 2021-08-01 LAB
ANION GAP SERPL CALC-SCNC: 6 MMOL/L — SIGNIFICANT CHANGE UP (ref 5–17)
BASOPHILS # BLD AUTO: 0.07 K/UL — SIGNIFICANT CHANGE UP (ref 0–0.2)
BASOPHILS NFR BLD AUTO: 0.7 % — SIGNIFICANT CHANGE UP (ref 0–2)
BUN SERPL-MCNC: 36 MG/DL — HIGH (ref 7–18)
CALCIUM SERPL-MCNC: 8.1 MG/DL — LOW (ref 8.4–10.5)
CHLORIDE SERPL-SCNC: 101 MMOL/L — SIGNIFICANT CHANGE UP (ref 96–108)
CO2 SERPL-SCNC: 28 MMOL/L — SIGNIFICANT CHANGE UP (ref 22–31)
CREAT SERPL-MCNC: 5.61 MG/DL — HIGH (ref 0.5–1.3)
EOSINOPHIL # BLD AUTO: 0.21 K/UL — SIGNIFICANT CHANGE UP (ref 0–0.5)
EOSINOPHIL NFR BLD AUTO: 2.1 % — SIGNIFICANT CHANGE UP (ref 0–6)
GLUCOSE BLDC GLUCOMTR-MCNC: 148 MG/DL — HIGH (ref 70–99)
GLUCOSE BLDC GLUCOMTR-MCNC: 209 MG/DL — HIGH (ref 70–99)
GLUCOSE BLDC GLUCOMTR-MCNC: 219 MG/DL — HIGH (ref 70–99)
GLUCOSE BLDC GLUCOMTR-MCNC: 229 MG/DL — HIGH (ref 70–99)
GLUCOSE SERPL-MCNC: 155 MG/DL — HIGH (ref 70–99)
HCT VFR BLD CALC: 28.8 % — LOW (ref 34.5–45)
HGB BLD-MCNC: 8.8 G/DL — LOW (ref 11.5–15.5)
IMM GRANULOCYTES NFR BLD AUTO: 1.9 % — HIGH (ref 0–1.5)
LYMPHOCYTES # BLD AUTO: 0.86 K/UL — LOW (ref 1–3.3)
LYMPHOCYTES # BLD AUTO: 8.5 % — LOW (ref 13–44)
MAGNESIUM SERPL-MCNC: 2.4 MG/DL — SIGNIFICANT CHANGE UP (ref 1.6–2.6)
MCHC RBC-ENTMCNC: 29.8 PG — SIGNIFICANT CHANGE UP (ref 27–34)
MCHC RBC-ENTMCNC: 30.6 GM/DL — LOW (ref 32–36)
MCV RBC AUTO: 97.6 FL — SIGNIFICANT CHANGE UP (ref 80–100)
MONOCYTES # BLD AUTO: 1.07 K/UL — HIGH (ref 0–0.9)
MONOCYTES NFR BLD AUTO: 10.6 % — SIGNIFICANT CHANGE UP (ref 2–14)
NEUTROPHILS # BLD AUTO: 7.66 K/UL — HIGH (ref 1.8–7.4)
NEUTROPHILS NFR BLD AUTO: 76.2 % — SIGNIFICANT CHANGE UP (ref 43–77)
NRBC # BLD: 0 /100 WBCS — SIGNIFICANT CHANGE UP (ref 0–0)
PHOSPHATE SERPL-MCNC: 4.1 MG/DL — SIGNIFICANT CHANGE UP (ref 2.5–4.5)
PLATELET # BLD AUTO: 277 K/UL — SIGNIFICANT CHANGE UP (ref 150–400)
POTASSIUM SERPL-MCNC: 4.8 MMOL/L — SIGNIFICANT CHANGE UP (ref 3.5–5.3)
POTASSIUM SERPL-SCNC: 4.8 MMOL/L — SIGNIFICANT CHANGE UP (ref 3.5–5.3)
RBC # BLD: 2.95 M/UL — LOW (ref 3.8–5.2)
RBC # FLD: 16.5 % — HIGH (ref 10.3–14.5)
SODIUM SERPL-SCNC: 135 MMOL/L — SIGNIFICANT CHANGE UP (ref 135–145)
WBC # BLD: 10.06 K/UL — SIGNIFICANT CHANGE UP (ref 3.8–10.5)
WBC # FLD AUTO: 10.06 K/UL — SIGNIFICANT CHANGE UP (ref 3.8–10.5)

## 2021-08-01 PROCEDURE — 99233 SBSQ HOSP IP/OBS HIGH 50: CPT | Mod: GC

## 2021-08-01 RX ORDER — INSULIN GLARGINE 100 [IU]/ML
15 INJECTION, SOLUTION SUBCUTANEOUS AT BEDTIME
Refills: 0 | Status: DISCONTINUED | OUTPATIENT
Start: 2021-08-01 | End: 2021-08-03

## 2021-08-01 RX ORDER — INSULIN LISPRO 100/ML
5 VIAL (ML) SUBCUTANEOUS
Refills: 0 | Status: DISCONTINUED | OUTPATIENT
Start: 2021-08-01 | End: 2021-08-03

## 2021-08-01 RX ADMIN — CHLORHEXIDINE GLUCONATE 1 APPLICATION(S): 213 SOLUTION TOPICAL at 06:36

## 2021-08-01 RX ADMIN — SENNA PLUS 2 TABLET(S): 8.6 TABLET ORAL at 21:41

## 2021-08-01 RX ADMIN — POLYETHYLENE GLYCOL 3350 17 GRAM(S): 17 POWDER, FOR SOLUTION ORAL at 12:31

## 2021-08-01 RX ADMIN — TIOTROPIUM BROMIDE 1 CAPSULE(S): 18 CAPSULE ORAL; RESPIRATORY (INHALATION) at 12:49

## 2021-08-01 RX ADMIN — Medication 2: at 08:32

## 2021-08-01 RX ADMIN — LACTULOSE 10 GRAM(S): 10 SOLUTION ORAL at 21:42

## 2021-08-01 RX ADMIN — LACTULOSE 10 GRAM(S): 10 SOLUTION ORAL at 06:37

## 2021-08-01 RX ADMIN — Medication 500 MILLIGRAM(S): at 12:31

## 2021-08-01 RX ADMIN — Medication 25 MILLIGRAM(S): at 17:35

## 2021-08-01 RX ADMIN — Medication 2: at 17:36

## 2021-08-01 RX ADMIN — Medication 81 MILLIGRAM(S): at 12:31

## 2021-08-01 RX ADMIN — BUDESONIDE AND FORMOTEROL FUMARATE DIHYDRATE 2 PUFF(S): 160; 4.5 AEROSOL RESPIRATORY (INHALATION) at 09:53

## 2021-08-01 RX ADMIN — BUDESONIDE AND FORMOTEROL FUMARATE DIHYDRATE 2 PUFF(S): 160; 4.5 AEROSOL RESPIRATORY (INHALATION) at 21:42

## 2021-08-01 RX ADMIN — AMIODARONE HYDROCHLORIDE 200 MILLIGRAM(S): 400 TABLET ORAL at 06:36

## 2021-08-01 RX ADMIN — Medication 4 UNIT(S): at 08:33

## 2021-08-01 RX ADMIN — ATORVASTATIN CALCIUM 80 MILLIGRAM(S): 80 TABLET, FILM COATED ORAL at 21:41

## 2021-08-01 RX ADMIN — INSULIN GLARGINE 15 UNIT(S): 100 INJECTION, SOLUTION SUBCUTANEOUS at 21:42

## 2021-08-01 RX ADMIN — Medication 25 MILLIGRAM(S): at 06:36

## 2021-08-01 RX ADMIN — Medication 5 UNIT(S): at 12:31

## 2021-08-01 RX ADMIN — APIXABAN 2.5 MILLIGRAM(S): 2.5 TABLET, FILM COATED ORAL at 17:35

## 2021-08-01 RX ADMIN — Medication 5 UNIT(S): at 17:36

## 2021-08-01 RX ADMIN — APIXABAN 2.5 MILLIGRAM(S): 2.5 TABLET, FILM COATED ORAL at 06:36

## 2021-08-01 RX ADMIN — ZINC SULFATE TAB 220 MG (50 MG ZINC EQUIVALENT) 220 MILLIGRAM(S): 220 (50 ZN) TAB at 12:31

## 2021-08-01 RX ADMIN — LACTULOSE 10 GRAM(S): 10 SOLUTION ORAL at 14:09

## 2021-08-01 NOTE — PROGRESS NOTE ADULT - SUBJECTIVE AND OBJECTIVE BOX
[facility] NEPHROLOGY- PROGRESS NOTE    Patient is a 89yo Female with ESRD on HD (MTTS), Afib on Eliquis, HTN, Intradialytic hypotension req Midodrine prior to HD, failed Rt AVF due to steal syndrome s/p ligation, h/o COVID s/p PPM p/w SOB x 1 day. Pt a/w Entero/ Rhino virus, acute respiratory distress requiring BIPAP with concerns for fluid overload. Nephrology consulted for ESRD status.     Hospital Medications: Medications reviewed.  REVIEW OF SYSTEMS: Denies any SOB or chest pain,    VITALS:  T(F): 97.6 (08-01-21 @ 05:45), Max: 99.4 (07-31-21 @ 21:45)  HR: 100 (08-01-21 @ 05:45)  BP: 121/72 (08-01-21 @ 05:45)  RR: 16 (08-01-21 @ 05:45)  SpO2: 99% (08-01-21 @ 05:45)  Wt(kg): --    07-31 @ 07:01  -  08-01 @ 07:00  --------------------------------------------------------  IN: 600 mL / OUT: 1000 mL / NET: -400 mL      PHYSICAL EXAM:  Gen: NAD  HEENT: anicteric;   Neck: no JVD  Cards: RRR, +S1/S2,   Resp: Course BS but improving  GI: soft, NT/ND, NABS  Extremities: No LE edema B/L  Access: Rt IJ tunneled HD catheter- benign      LABS:  08-01    135  |  101  |  36<H>  ----------------------------<  155<H>  4.8   |  28  |  5.61<H>    Ca    8.1<L>      01 Aug 2021 07:06  Phos  4.1     08-01  Mg     2.4     08-01      Creatinine Trend: 5.61 <--, 7.04 <--, 5.50 <--, 6.17 <--, 4.17 <--, 2.98 <--, 4.82 <--, 5.70 <--                        8.8    10.06 )-----------( 277      ( 01 Aug 2021 07:06 )             28.8     Urine Studies:

## 2021-08-01 NOTE — PROGRESS NOTE ADULT - SUBJECTIVE AND OBJECTIVE BOX
Time of Visit:  Patient seen and examined. son at bedside    MEDICATIONS  (STANDING):  aMIOdarone    Tablet 200 milliGRAM(s) Oral daily  apixaban 2.5 milliGRAM(s) Oral every 12 hours  ascorbic acid 500 milliGRAM(s) Oral daily  aspirin  chewable 81 milliGRAM(s) Oral daily  atorvastatin 80 milliGRAM(s) Oral at bedtime  budesonide 160 MICROgram(s)/formoterol 4.5 MICROgram(s) Inhaler 2 Puff(s) Inhalation two times a day  chlorhexidine 2% Cloths 1 Application(s) Topical <User Schedule>  dextrose 40% Gel 15 Gram(s) Oral once  dextrose 5%. 1000 milliLiter(s) (50 mL/Hr) IV Continuous <Continuous>  dextrose 5%. 1000 milliLiter(s) (100 mL/Hr) IV Continuous <Continuous>  dextrose 50% Injectable 12.5 Gram(s) IV Push once  dextrose 50% Injectable 25 Gram(s) IV Push once  dextrose 50% Injectable 25 Gram(s) IV Push once  epoetin maria d-epbx (RETACRIT) Injectable 12280 Unit(s) IV Push <User Schedule>  glucagon  Injectable 1 milliGRAM(s) IntraMuscular once  insulin glargine Injectable (LANTUS) 15 Unit(s) SubCutaneous at bedtime  insulin lispro (ADMELOG) corrective regimen sliding scale   SubCutaneous three times a day before meals  insulin lispro Injectable (ADMELOG) 5 Unit(s) SubCutaneous three times a day before meals  lactulose Syrup 10 Gram(s) Oral every 8 hours  metoprolol tartrate 25 milliGRAM(s) Oral two times a day  midodrine. 10 milliGRAM(s) Oral <User Schedule>  polyethylene glycol 3350 17 Gram(s) Oral daily  senna 2 Tablet(s) Oral at bedtime  tiotropium 18 MICROgram(s) Capsule 1 Capsule(s) Inhalation daily  zinc sulfate 220 milliGRAM(s) Oral daily      MEDICATIONS  (PRN):  ALBUTerol    90 MICROgram(s) HFA Inhaler 2 Puff(s) Inhalation every 6 hours PRN Shortness of Breath and/or Wheezing  bisacodyl Suppository 10 milliGRAM(s) Rectal daily PRN Constipation  guaiFENesin Oral Liquid (Sugar-Free) 100 milliGRAM(s) Oral every 6 hours PRN Cough  melatonin 3 milliGRAM(s) Oral at bedtime PRN Insomnia  sodium chloride 0.9% lock flush 10 milliLiter(s) IV Push every 1 hour PRN Pre/post blood products, medications, blood draw, and to maintain line patency       Medications up to date at time of exam.      PHYSICAL EXAMINATION:  Patient has no new complaints.  GENERAL: The patient is a well-developed, well-nourished, in no apparent distress.     Vital Signs Last 24 Hrs  T(C): 36.3 (01 Aug 2021 15:17), Max: 37.4 (31 Jul 2021 21:45)  T(F): 97.4 (01 Aug 2021 15:17), Max: 99.4 (31 Jul 2021 21:45)  HR: 85 (01 Aug 2021 17:30) (80 - 100)  BP: 137/63 (01 Aug 2021 17:30) (119/61 - 137/63)  BP(mean): --  RR: 17 (01 Aug 2021 17:30) (16 - 19)  SpO2: 95% (01 Aug 2021 17:30) (95% - 99%)   (if applicable)    Chest Tube (if applicable)    HEENT: Head is normocephalic and atraumatic. Extraocular muscles are intact. Mucous membranes are moist.     NECK: Supple, no palpable adenopathy.    LUNGS: Clear to auscultation, no wheezing, rales, or rhonchi.    HEART: Regular rate and rhythm without murmur.    ABDOMEN: Soft, nontender, and nondistended.  No hepatosplenomegaly is noted.    : No painful voiding, no pelvic pain    EXTREMITIES: Without any cyanosis, clubbing, rash, lesions or edema.    NEUROLOGIC: Awake, answer simple questions     SKIN: Warm, dry, good turgor.      LABS:                        8.8    10.06 )-----------( 277      ( 01 Aug 2021 07:06 )             28.8     08-01    135  |  101  |  36<H>  ----------------------------<  155<H>  4.8   |  28  |  5.61<H>    Ca    8.1<L>      01 Aug 2021 07:06  Phos  4.1     08-01  Mg     2.4     08-01                          MICROBIOLOGY: (if applicable)    RADIOLOGY & ADDITIONAL STUDIES:  EKG:   CXR:  ECHO:    IMPRESSION: 89y Female PAST MEDICAL & SURGICAL HISTORY:  HTN (hypertension)    HLD (hyperlipidemia)    CKD (chronic kidney disease)    Afib    DM (diabetes mellitus)    Artificial pacemaker     p/w          IMP: This is a 89 year old white woman  Afib on Eliquis, ESRD on HD(Monday, Tuesday, Thursday, Saturday), DM on insulin, HTN, supp  O2 3L, who presented to the ED due to SOB. In ED, patient placed on BiPAP and had HD session with about 1.274L fluid removed and blood pressure noting to drop to 60s despite being given midodrine. Admitted to ICU Acute Hypoxic Respiratory Failure.      Patient arrived to the ICU on 7/22 for AHRF 2/2 fluid overload. Patient had HD and BIPAP was tapered off. Patient hypotensive during HD, given midodrine pre-HD and pressor support.    Pat was transferred to medical floor . She is requiring 5 L O2 supp. CT chest reviewed       Sugg  -continue dialysis   -continue Symbicort for now   -spoke with pat and daughter regarding compliance with water  -dialysis as per Neph  -O2 supp and needed to maintain sat >90%, now on 5 L/min via NC  -advise son to restrict fluid /water intake   -monitor blood sugar     d/c with Dr Pineda

## 2021-08-01 NOTE — PROGRESS NOTE ADULT - SUBJECTIVE AND OBJECTIVE BOX
C A R D I O L O G Y  **********************************  DATE OF SERVICE: 08/01/21  pt seen and examined, no complaints, ROS - .          ALBUTerol    90 MICROgram(s) HFA Inhaler 2 Puff(s) Inhalation every 6 hours PRN  aMIOdarone    Tablet 200 milliGRAM(s) Oral daily  apixaban 2.5 milliGRAM(s) Oral every 12 hours  ascorbic acid 500 milliGRAM(s) Oral daily  aspirin  chewable 81 milliGRAM(s) Oral daily  atorvastatin 80 milliGRAM(s) Oral at bedtime  bisacodyl Suppository 10 milliGRAM(s) Rectal daily PRN  budesonide 160 MICROgram(s)/formoterol 4.5 MICROgram(s) Inhaler 2 Puff(s) Inhalation two times a day  chlorhexidine 2% Cloths 1 Application(s) Topical <User Schedule>  dextrose 40% Gel 15 Gram(s) Oral once  dextrose 5%. 1000 milliLiter(s) IV Continuous <Continuous>  dextrose 5%. 1000 milliLiter(s) IV Continuous <Continuous>  dextrose 50% Injectable 25 Gram(s) IV Push once  dextrose 50% Injectable 12.5 Gram(s) IV Push once  dextrose 50% Injectable 25 Gram(s) IV Push once  epoetin maria d-epbx (RETACRIT) Injectable 38005 Unit(s) IV Push <User Schedule>  glucagon  Injectable 1 milliGRAM(s) IntraMuscular once  guaiFENesin Oral Liquid (Sugar-Free) 100 milliGRAM(s) Oral every 6 hours PRN  insulin glargine Injectable (LANTUS) 12 Unit(s) SubCutaneous at bedtime  insulin lispro (ADMELOG) corrective regimen sliding scale   SubCutaneous three times a day before meals  insulin lispro Injectable (ADMELOG) 4 Unit(s) SubCutaneous three times a day before meals  lactulose Syrup 10 Gram(s) Oral every 8 hours  melatonin 3 milliGRAM(s) Oral at bedtime PRN  metoprolol tartrate 25 milliGRAM(s) Oral two times a day  midodrine. 10 milliGRAM(s) Oral <User Schedule>  polyethylene glycol 3350 17 Gram(s) Oral daily  senna 2 Tablet(s) Oral at bedtime  sodium chloride 0.9% lock flush 10 milliLiter(s) IV Push every 1 hour PRN  tiotropium 18 MICROgram(s) Capsule 1 Capsule(s) Inhalation daily  zinc sulfate 220 milliGRAM(s) Oral daily                            8.8    10.06 )-----------( 277      ( 01 Aug 2021 07:06 )             28.8       Hemoglobin: 8.8 g/dL (08-01 @ 07:06)  Hemoglobin: 8.8 g/dL (07-31 @ 07:12)  Hemoglobin: 8.9 g/dL (07-30 @ 06:32)  Hemoglobin: 9.0 g/dL (07-29 @ 06:12)  Hemoglobin: 9.4 g/dL (07-28 @ 06:20)      08-01    135  |  101  |  36<H>  ----------------------------<  155<H>  4.8   |  28  |  5.61<H>    Ca    8.1<L>      01 Aug 2021 07:06  Phos  4.1     08-01  Mg     2.4     08-01      Creatinine Trend: 5.61<--, 7.04<--, 5.50<--, 6.17<--, 4.17<--, 2.98<--    COAGS:           T(C): 36.4 (08-01-21 @ 05:45), Max: 37.4 (07-31-21 @ 21:45)  HR: 100 (08-01-21 @ 05:45) (55 - 100)  BP: 121/72 (08-01-21 @ 05:45) (95/57 - 132/67)  RR: 16 (08-01-21 @ 05:45) (16 - 19)  SpO2: 99% (08-01-21 @ 05:45) (96% - 99%)  Wt(kg): --    I&O's Summary    31 Jul 2021 07:01  -  01 Aug 2021 07:00  --------------------------------------------------------  IN: 600 mL / OUT: 1000 mL / NET: -400 mL      HEENT:  (-)icterus (-)pallor  CV: N S1 S2 1/6 SHANE (+)2 Pulses B/l  Resp:  Clear to ausculatation B/L, normal effort  GI: (+) BS Soft, NT, ND  Lymph:  (-)Edema, (-)obvious lymphadenopathy  Skin: Warm to touch, Normal turgor  Psych: Appropriate mood and affect      TELEMETRY: 	 off      ASSESSMENT/PLAN: 	89y  Female PMH of PAF on amio for rhythm control and on Eliquis, Micra PPM placed by Dr Evangelina Mcclelland at [facility] earlier this year ESRD on HD(Monday, Tuesday, Thursday, Saturday), DM on insulin, HTN, Home O2 3L presented with shortness of breath, now with rapid afib.    -  Tele stable  - cont eliquis  - doesnt appear fluid overloaded  - tolerating bb  - ASA, statin   - Bp stable on Proamatine   - No need for further inpatient cardiac work up.

## 2021-08-01 NOTE — PROGRESS NOTE ADULT - SUBJECTIVE AND OBJECTIVE BOX
PGY-1 Progress Note discussed with attending    PAGER #: [[phone]] TILL 5:00 PM  PLEASE CONTACT ON CALL TEAM:  - On Call Team (Please refer to Sheri) FROM 5:00 PM - 8:30PM  - Nightfloat Team FROM 8:30 -7:30 AM    CHIEF COMPLAINT & BRIEF HOSPITAL COURSE:    Patient is a 89 year old female, PMH of Afib on Eliquis, ESRD on HD(Monday, Tuesday, Thursday, Saturday), DM on insulin, HTN, COPD on home O2 3L, who presented to the ED due to SOB. In ED, patient placed on BiPAP and had HD session with about 1.274L fluid removed and blood pressure noting to drop to 60s despite being given midodrine. Admitted to ICU Acute Hypoxic Respiratory Failure.     ICU course. Patient arrived to the ICU on  for AHRF 2/2 fluid overload. Patient had HD and BIPAP was tapered off. Patient hypotensive during HD, given midodrine pre-HD and pressor support. Patient can resume HD on the floors. Patient off pressors since 2 a.m. Patient on amiodarone for AF.     pt had [facility] central line placement after dialysis became hypotensive and phenylephrine.    In the alfredo. Pt. was stable and down-graded to 5 South. She still has episodes of SOB and was hooked to 5LPM. She also had episode of tahcycardia and is on Telemetry monitoring. Cardiology (Dr. Arteaga) was consulted. She had an episode of L sided facial and extremity weakness. 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 Palliative was also consulted (Dr. Aquino). HD was done as scheduled. Seen by Nephro (Dr. Huitron). She has been having episodes of hypotension intradialysis. Seen by Pulmonology (Dr. Cuellar) to evaluate SOB and BIPAP use. CT Chest results showed bilateral reticulonodular opacities are identified. Band type opacities within the bilateral lower lobes likely related to atelectasis. Opacity within the inferior/posterior left lower lobe likely related to atelectasis versus consolidation. Pulmonology recommended to keep O2 above >90%.    INTERVAL HPI/OVERNIGHT EVENTS:     Pt. examined at bedside. AAO x 2. Still hooked on O2 at 5 LPM but says she has no episodes of SOB and . Did not use BIPAP last night. Noted bilateral wheezing on both lung fields. Still has mild left facial drooping but improving daily. No fever, cough, chest pain, palpitation, abdominal pain, diarrhea/ constipation. S/P HD (21). Will have another session on Monday (21). For possible discharge after Monday HD session.    REVIEW OF SYSTEMS:  CONSTITUTIONAL: No fever, weight loss, or fatigue  RESPIRATORY: (+) wheezing. No cough, chills or hemoptysis; No shortness of breath  CARDIOVASCULAR: No chest pain, palpitations, dizziness, or leg swelling  GASTROINTESTINAL: No abdominal pain. No nausea, vomiting, or hematemesis; No diarrhea or constipation. No melena or hematochezia.  GENITOURINARY: No dysuria or hematuria, urinary frequency  NEUROLOGICAL: No headaches, memory loss, loss of strength, numbness, or tremors  SKIN: No itching, burning, rashes, or lesions     MEDICATIONS  (STANDING):  aMIOdarone    Tablet 200 milliGRAM(s) Oral daily  apixaban 2.5 milliGRAM(s) Oral every 12 hours  ascorbic acid 500 milliGRAM(s) Oral daily  aspirin  chewable 81 milliGRAM(s) Oral daily  atorvastatin 80 milliGRAM(s) Oral at bedtime  budesonide 160 MICROgram(s)/formoterol 4.5 MICROgram(s) Inhaler 2 Puff(s) Inhalation two times a day  chlorhexidine 2% Cloths 1 Application(s) Topical <User Schedule>  dextrose 40% Gel 15 Gram(s) Oral once  dextrose 5%. 1000 milliLiter(s) (50 mL/Hr) IV Continuous <Continuous>  dextrose 5%. 1000 milliLiter(s) (100 mL/Hr) IV Continuous <Continuous>  dextrose 50% Injectable 25 Gram(s) IV Push once  dextrose 50% Injectable 12.5 Gram(s) IV Push once  dextrose 50% Injectable 25 Gram(s) IV Push once  epoetin maria d-epbx (RETACRIT) Injectable 67169 Unit(s) IV Push <User Schedule>  glucagon  Injectable 1 milliGRAM(s) IntraMuscular once  insulin glargine Injectable (LANTUS) 12 Unit(s) SubCutaneous at bedtime  insulin lispro (ADMELOG) corrective regimen sliding scale   SubCutaneous three times a day before meals  insulin lispro Injectable (ADMELOG) 4 Unit(s) SubCutaneous three times a day before meals  lactulose Syrup 10 Gram(s) Oral every 8 hours  metoprolol tartrate 25 milliGRAM(s) Oral two times a day  midodrine. 10 milliGRAM(s) Oral <User Schedule>  polyethylene glycol 3350 17 Gram(s) Oral daily  senna 2 Tablet(s) Oral at bedtime  tiotropium 18 MICROgram(s) Capsule 1 Capsule(s) Inhalation daily  zinc sulfate 220 milliGRAM(s) Oral daily    MEDICATIONS  (PRN):  ALBUTerol    90 MICROgram(s) HFA Inhaler 2 Puff(s) Inhalation every 6 hours PRN Shortness of Breath and/or Wheezing  bisacodyl Suppository 10 milliGRAM(s) Rectal daily PRN Constipation  guaiFENesin Oral Liquid (Sugar-Free) 100 milliGRAM(s) Oral every 6 hours PRN Cough  melatonin 3 milliGRAM(s) Oral at bedtime PRN Insomnia  sodium chloride 0.9% lock flush 10 milliLiter(s) IV Push every 1 hour PRN Pre/post blood products, medications, blood draw, and to maintain line patency      Vital Signs Last 24 Hrs  T(C): 36.4 (01 Aug 2021 05:45), Max: 37.4 (2021 21:45)  T(F): 97.6 (01 Aug 2021 05:45), Max: 99.4 (2021 21:45)  HR: 100 (01 Aug 2021 05:45) (55 - 100)  BP: 121/72 (01 Aug 2021 05:45) (95/57 - 132/67)  BP(mean): --  RR: 16 (01 Aug 2021 05:45) (16 - 19)  SpO2: 99% (01 Aug 2021 05:45) (96% - 99%)    PHYSICAL EXAMINATION:  GENERAL: NAD, well built  HEAD:  Atraumatic, Normocephalic  EYES:  conjunctiva and sclera clear  NECK: Supple, No JVD, Normal thyroid  CHEST/LUNG: Clear to auscultation. Clear to percussion bilaterally; No rales, rhonchi, wheezing, or rubs  HEART: Regular rate and rhythm; No murmurs, rubs, or gallops  ABDOMEN: Soft, Nontender, Nondistended; Bowel sounds present, no pain or masses on palpation  NERVOUS SYSTEM:  Alert & Oriented X3  : voiding well  EXTREMITIES:  2+ Peripheral Pulses, No clubbing, cyanosis, or edema  SKIN: warm dry                          8.8    10.06 )-----------( 277      ( 01 Aug 2021 07:06 )             28.8     08-    135  |  101  |  36<H>  ----------------------------<  155<H>  4.8   |  28  |  5.61<H>    Ca    8.1<L>      01 Aug 2021 07:06  Phos  4.1       Mg     2.4                     I&O's Summary    2021 07:01  -  01 Aug 2021 07:00  --------------------------------------------------------  IN: 600 mL / OUT: 1000 mL / NET: -400 mL            CAPILLARY BLOOD GLUCOSE      RADIOLOGY & ADDITIONAL TESTS:    < from: CT Chest No Cont (21 @ 15:16) >  FINDINGS:    LUNGS AND AIRWAYS: Patent central airways.  Bilateral reticulonodular opacities are identified. Band type opacities within the bilateral lower lobes likely related to atelectasis. Opacity within the inferior/posterior left lower lobe likely related to atelectasis versus consolidation.  PLEURA: No pleural effusion. No pneumothorax.  MEDIASTINUM AND PRANAY: Subcentimeter mediastinal lymph nodes identified with a dominant 2.0 x 1.4 cm right lower paratracheal lymph node. No hilar masses.  VESSELS: Thoracic aortic caliber appears unremarkable. Coronary arterial calcifications noted. A right-sided central venous catheter is identified, the distal tip at the level of the SVC/RA confluence.  HEART: Cardiomegaly. No pericardial effusion. Note is made of an indwelling Micra pacemaker.  CHEST WALL AND LOWER NECK: Within normal limits.  VISUALIZED UPPER ABDOMEN: The visualized adrenal glands appear unremarkable.  BONES: Degenerative change. Patient status post right humeral ORIF.    IMPRESSION:  1. Bilateral reticulonodular opacities are identified. Band type opacities within the bilateral lower lobes likely related to atelectasis. Opacity within the inferior/posterior left lower lobe likely related to atelectasis versus consolidation.    < end of copied text >

## 2021-08-01 NOTE — PROGRESS NOTE ADULT - PROBLEM SELECTOR PLAN 5
- on metoprolol   - continue meds with hold parameters (see below)  - monitor BP closely   - continue midodrine prior to HD sessions   - will give albumin 100ml  - Cardio: Dr. Arteaga  - hold Cardizem drip due to episodes of hypotension during dialysis  - Resume Metoprolol Tartrate 25 mg BID and hold AM dose on HD days (MTThS)

## 2021-08-01 NOTE — PROGRESS NOTE ADULT - PROBLEM SELECTOR PLAN 3
- patient and daughter states that she has "a little bit of COPD" but not on any meds at home just home oxygen at 3L NC   - no wheezing presently  - bilateral congestion  - continue albuterol PRN   - monitor O2 sats (keep above 90%)  - Pulmo (Dr. Cuellar)  - CT: b/l reticulonodular opacities. Band type opacities w/in b/l lower lobes likely atelectasis. Opacity w/in inferior/posterior left lower lobe likely related to atelectasis vs. consolidation

## 2021-08-01 NOTE — PROGRESS NOTE ADULT - PROBLEM SELECTOR PLAN 4
- had an episodes of tachy (110-140)  - 1 dose digoxin 250mg PO given  - continue home med of amiodarone and eliquis   - monitor HR (stable)  - monitor on telemetry - (d/c 7/30/21)

## 2021-08-01 NOTE — PROGRESS NOTE ADULT - PROBLEM SELECTOR PLAN 6
- history of DM on insulin at home  - A1c of 7.8  - Lantus: 12 ODHS, Admelo TID  - ACHS SSI  - Endo: Dr. Hawkins  - glucose controlled

## 2021-08-01 NOTE — PROGRESS NOTE ADULT - ATTENDING COMMENTS
89 y/o female with a PMH of Afib on Eliquis, ESRD on HD (Monday, Tuesday, Thursday, Saturday), DM on insulin, HTN, COPD on 3L home O2 who is admitted for acute hypoxic respiratory failure due to fluid overload in the setting of ESRD. Hypotension has limited fluid removal during HD, is on Midodrine 30 minutes prior to HD. Possible component of COPD as well, is on 3L O2 at home, does not use Bipap at home but responded well here, continue qhs for now. No wheezing on exam, has some signs of pulm edema but improved from previous. Continue to remove as much fluid as BP will allow during HD. Continue symbicort/spiriva. CT chest reviewed, appreciate input from Pulmonary, Dr. Cuellar. Of note, had a code stroke called after HD session on 7/27 activated, CT head showed chronic right MCA infarct, appreciate neurology consult, felt her symptoms were due to recrudescence of her prior stroke, continue ASA and statin. PT recommended home PT. Appreciate endocrine input regarding poorly controlled DM, increase lantus to 15 units and lispro 5 tid. Appreciate inputs from nephrology and cardiology. Continue Midodrine 30 mins prior to HD and hold metoprolol on mornings of HD. Constipation resolved w/ lactulose, will continue until 2nd BM then can stop lactulose. Plan for discharge home after HD session on Tomorrow/Monday.

## 2021-08-01 NOTE — PROGRESS NOTE ADULT - ASSESSMENT
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 Pt a/w Entero/ Rhino virus, acute respiratory distress requiring BIPAP with concerns for fluid overload. Nephrology consulted for ESRD status.     1) ESRD: Last HD 7/31, with intradialytic hypotension (ehsan 76/48) with net 600ml removed. Plan for next HD 8/2 (1st shift pending possible d/c). Monitor electrolytes.   2) HTN 2/2 CKD- BP low normal. Pt on Metoprolol 25mg PO bid for rate control (held prior to HD). Continue with Midodrine 10mg PO prior to HD to aid in fluid removal. Monitor BP  3) Anemia of renal disease: Hb low. Will avoid IV iron due to elevated Ferritin. c/w Epogen 10k units IV tiw with HD (increased 7/31). Monitor Hb.  4) Hyperphosphatemia: Phosphorus acceptable. No need for phos binder. Phos liberalized in diet. Monitor serum calcium and phosphorus.    Spoke with pt's daughter, Cece 7/28. Discussed that pt is not tolerating HD and unable to tolerate fluid removal despite Midodrine 10mg PO prior to HD. As per daughter, she is fine in outpt dialysis and needs to be d/c soon to resume outpt dialysis at [facility]. Daughter unrealistic about long term goals; pt remains full code.     [facility] NEPHROLOGY  Brad Chen M.D.  Oneal Tim D.O.  Roselia Huitron M.D.  Milli Platt, MSN, ANP-C  [phone]    [address] [place] [address]

## 2021-08-02 DIAGNOSIS — I63.9 CEREBRAL INFARCTION, UNSPECIFIED: ICD-10-CM

## 2021-08-02 LAB
ANION GAP SERPL CALC-SCNC: 11 MMOL/L — SIGNIFICANT CHANGE UP (ref 5–17)
BASOPHILS # BLD AUTO: 0.04 K/UL — SIGNIFICANT CHANGE UP (ref 0–0.2)
BASOPHILS NFR BLD AUTO: 0.3 % — SIGNIFICANT CHANGE UP (ref 0–2)
BUN SERPL-MCNC: 48 MG/DL — HIGH (ref 7–18)
CALCIUM SERPL-MCNC: 8.6 MG/DL — SIGNIFICANT CHANGE UP (ref 8.4–10.5)
CHLORIDE SERPL-SCNC: 98 MMOL/L — SIGNIFICANT CHANGE UP (ref 96–108)
CO2 SERPL-SCNC: 28 MMOL/L — SIGNIFICANT CHANGE UP (ref 22–31)
CREAT SERPL-MCNC: 6.97 MG/DL — HIGH (ref 0.5–1.3)
EOSINOPHIL # BLD AUTO: 0.44 K/UL — SIGNIFICANT CHANGE UP (ref 0–0.5)
EOSINOPHIL NFR BLD AUTO: 3.6 % — SIGNIFICANT CHANGE UP (ref 0–6)
GLUCOSE BLDC GLUCOMTR-MCNC: 133 MG/DL — HIGH (ref 70–99)
GLUCOSE BLDC GLUCOMTR-MCNC: 134 MG/DL — HIGH (ref 70–99)
GLUCOSE BLDC GLUCOMTR-MCNC: 230 MG/DL — HIGH (ref 70–99)
GLUCOSE BLDC GLUCOMTR-MCNC: 279 MG/DL — HIGH (ref 70–99)
GLUCOSE SERPL-MCNC: 124 MG/DL — HIGH (ref 70–99)
HCT VFR BLD CALC: 29.2 % — LOW (ref 34.5–45)
HGB BLD-MCNC: 9 G/DL — LOW (ref 11.5–15.5)
IMM GRANULOCYTES NFR BLD AUTO: 1.2 % — SIGNIFICANT CHANGE UP (ref 0–1.5)
LYMPHOCYTES # BLD AUTO: 0.93 K/UL — LOW (ref 1–3.3)
LYMPHOCYTES # BLD AUTO: 7.6 % — LOW (ref 13–44)
MAGNESIUM SERPL-MCNC: 2.6 MG/DL — SIGNIFICANT CHANGE UP (ref 1.6–2.6)
MCHC RBC-ENTMCNC: 29.7 PG — SIGNIFICANT CHANGE UP (ref 27–34)
MCHC RBC-ENTMCNC: 30.8 GM/DL — LOW (ref 32–36)
MCV RBC AUTO: 96.4 FL — SIGNIFICANT CHANGE UP (ref 80–100)
MONOCYTES # BLD AUTO: 1.14 K/UL — HIGH (ref 0–0.9)
MONOCYTES NFR BLD AUTO: 9.4 % — SIGNIFICANT CHANGE UP (ref 2–14)
NEUTROPHILS # BLD AUTO: 9.49 K/UL — HIGH (ref 1.8–7.4)
NEUTROPHILS NFR BLD AUTO: 77.9 % — HIGH (ref 43–77)
NRBC # BLD: 0 /100 WBCS — SIGNIFICANT CHANGE UP (ref 0–0)
PHOSPHATE SERPL-MCNC: 4.2 MG/DL — SIGNIFICANT CHANGE UP (ref 2.5–4.5)
PLATELET # BLD AUTO: 302 K/UL — SIGNIFICANT CHANGE UP (ref 150–400)
POTASSIUM SERPL-MCNC: 4.9 MMOL/L — SIGNIFICANT CHANGE UP (ref 3.5–5.3)
POTASSIUM SERPL-SCNC: 4.9 MMOL/L — SIGNIFICANT CHANGE UP (ref 3.5–5.3)
RBC # BLD: 3.03 M/UL — LOW (ref 3.8–5.2)
RBC # FLD: 16.7 % — HIGH (ref 10.3–14.5)
SODIUM SERPL-SCNC: 137 MMOL/L — SIGNIFICANT CHANGE UP (ref 135–145)
WBC # BLD: 12.19 K/UL — HIGH (ref 3.8–10.5)
WBC # FLD AUTO: 12.19 K/UL — HIGH (ref 3.8–10.5)

## 2021-08-02 PROCEDURE — 99233 SBSQ HOSP IP/OBS HIGH 50: CPT | Mod: GC

## 2021-08-02 PROCEDURE — 71045 X-RAY EXAM CHEST 1 VIEW: CPT | Mod: 26

## 2021-08-02 RX ORDER — ACETAMINOPHEN 500 MG
650 TABLET ORAL EVERY 6 HOURS
Refills: 0 | Status: DISCONTINUED | OUTPATIENT
Start: 2021-08-02 | End: 2021-08-03

## 2021-08-02 RX ADMIN — ZINC SULFATE TAB 220 MG (50 MG ZINC EQUIVALENT) 220 MILLIGRAM(S): 220 (50 ZN) TAB at 13:44

## 2021-08-02 RX ADMIN — APIXABAN 2.5 MILLIGRAM(S): 2.5 TABLET, FILM COATED ORAL at 17:47

## 2021-08-02 RX ADMIN — BUDESONIDE AND FORMOTEROL FUMARATE DIHYDRATE 2 PUFF(S): 160; 4.5 AEROSOL RESPIRATORY (INHALATION) at 21:16

## 2021-08-02 RX ADMIN — CHLORHEXIDINE GLUCONATE 1 APPLICATION(S): 213 SOLUTION TOPICAL at 05:18

## 2021-08-02 RX ADMIN — Medication 5 UNIT(S): at 17:47

## 2021-08-02 RX ADMIN — POLYETHYLENE GLYCOL 3350 17 GRAM(S): 17 POWDER, FOR SOLUTION ORAL at 13:44

## 2021-08-02 RX ADMIN — SENNA PLUS 2 TABLET(S): 8.6 TABLET ORAL at 21:16

## 2021-08-02 RX ADMIN — Medication 81 MILLIGRAM(S): at 13:44

## 2021-08-02 RX ADMIN — MIDODRINE HYDROCHLORIDE 10 MILLIGRAM(S): 2.5 TABLET ORAL at 09:18

## 2021-08-02 RX ADMIN — Medication 3: at 17:46

## 2021-08-02 RX ADMIN — ATORVASTATIN CALCIUM 80 MILLIGRAM(S): 80 TABLET, FILM COATED ORAL at 21:16

## 2021-08-02 RX ADMIN — APIXABAN 2.5 MILLIGRAM(S): 2.5 TABLET, FILM COATED ORAL at 05:18

## 2021-08-02 RX ADMIN — AMIODARONE HYDROCHLORIDE 200 MILLIGRAM(S): 400 TABLET ORAL at 05:18

## 2021-08-02 RX ADMIN — Medication 650 MILLIGRAM(S): at 09:17

## 2021-08-02 RX ADMIN — Medication 100 MILLIGRAM(S): at 22:11

## 2021-08-02 RX ADMIN — Medication 5 UNIT(S): at 08:02

## 2021-08-02 RX ADMIN — Medication 650 MILLIGRAM(S): at 08:02

## 2021-08-02 RX ADMIN — Medication 25 MILLIGRAM(S): at 17:50

## 2021-08-02 RX ADMIN — Medication 500 MILLIGRAM(S): at 13:44

## 2021-08-02 RX ADMIN — INSULIN GLARGINE 15 UNIT(S): 100 INJECTION, SOLUTION SUBCUTANEOUS at 21:16

## 2021-08-02 NOTE — PROGRESS NOTE ADULT - SUBJECTIVE AND OBJECTIVE BOX
PGY-1 Progress Note discussed with attending      PLEASE CONTACT ON CALL TEAM:  - On Call Team (Please refer to Sheri) FROM 5:00 PM - 8:30PM  - Nightfloat Team FROM 8:30 -7:30 AM      INTERVAL HPI/OVERNIGHT EVENTS: c/o cough with phlegm and HA      REVIEW OF SYSTEMS:  CONSTITUTIONAL: No fever, weight loss, or fatigue  RESPIRATORY:  No chills or hemoptysis; No shortness of breath  CARDIOVASCULAR: No chest pain, palpitations, dizziness, or leg swelling  GASTROINTESTINAL: No abdominal pain. No nausea, vomiting, or hematemesis; No diarrhea or constipation. No melena or hematochezia.  GENITOURINARY: No dysuria or hematuria, urinary frequency  NEUROLOGICAL: No memory loss, loss of strength, numbness, or tremors  SKIN: No itching, burning, rashes, or lesions       PHYSICAL EXAMINATION:  GENERAL: NAD, well built  HEAD:  Atraumatic, Normocephalic  EYES:  conjunctiva and sclera clear  NECK: Supple, No JVD, Normal thyroid  CHEST/LUNG: occasional wheezing on auscultation. Clear to percussion bilaterally; No rales, rhoncus or rubs  HEART: Regular rate and rhythm; No murmurs, rubs, or gallops  ABDOMEN: Soft, Nontender, Nondistended; Bowel sounds present, no pain or masses on palpation  NERVOUS SYSTEM:  Alert & Oriented X3  EXTREMITIES:  2+ Peripheral Pulses, No clubbing, cyanosis, or edema  SKIN: warm dry

## 2021-08-02 NOTE — PROGRESS NOTE ADULT - PROBLEM SELECTOR PLAN 4
- had an episodes of tachy (110-140)  - continue home med of amiodarone and eliquis   - monitor HR (stable)

## 2021-08-02 NOTE — PROGRESS NOTE ADULT - SUBJECTIVE AND OBJECTIVE BOX
Interval Events:      Allergies    No Known Allergies    Intolerances      Endocrine/Metabolic Medications:  atorvastatin 80 milliGRAM(s) Oral at bedtime  dextrose 40% Gel 15 Gram(s) Oral once  dextrose 50% Injectable 12.5 Gram(s) IV Push once  dextrose 50% Injectable 25 Gram(s) IV Push once  dextrose 50% Injectable 25 Gram(s) IV Push once  glucagon  Injectable 1 milliGRAM(s) IntraMuscular once  insulin glargine Injectable (LANTUS) 15 Unit(s) SubCutaneous at bedtime  insulin lispro (ADMELOG) corrective regimen sliding scale   SubCutaneous three times a day before meals  insulin lispro Injectable (ADMELOG) 5 Unit(s) SubCutaneous three times a day before meals      Vital Signs Last 24 Hrs  T(C): 36.6 (02 Aug 2021 09:45), Max: 36.6 (01 Aug 2021 21:15)  T(F): 97.8 (02 Aug 2021 09:45), Max: 97.9 (01 Aug 2021 21:15)  HR: 94 (02 Aug 2021 09:45) (81 - 94)  BP: 129/63 (02 Aug 2021 09:45) (115/61 - 137/63)  BP(mean): 16 (01 Aug 2021 21:15) (16 - 16)  RR: 18 (02 Aug 2021 09:45) (16 - 18)  SpO2: 98% (02 Aug 2021 09:45) (95% - 98%)      PHYSICAL EXAM  All physical exam findings normal, except those marked:  General:	Alert, active, cooperative, NAD, well hydrated  .		[] Abnormal:  Neck		Normal: supple, no cervical adenopathy, no palpable thyroid  .		[] Abnormal:  Cardiovascular	Normal: regular rate, normal S1, S2, no murmurs  .		[] Abnormal:  Respiratory	Normal: no chest wall deformity, normal respiratory pattern, CTA B/L  .		[] Abnormal:  Abdominal	Normal: soft, ND, NT, bowel sounds present, no masses, no organomegaly  .		[] Abnormal:  		Normal normal genitalia, testes descended, circumcised/uncircumcised  .		Tiny stage:			Breast tiny:  .		Menstrual history:  .		[] Abnormal:  Extremities	Normal: FROM x4  .		[] Abnormal:  Skin		Normal: intact and not indurated, no rash, no acanthosis nigricans  .		[] Abnormal:  Neurologic	Normal: grossly intact  .		[] Abnormal:    LABS                        9.0    12.19 )-----------( 302      ( 02 Aug 2021 07:28 )             29.2                               137    |  98     |  48                  Calcium: 8.6   / iCa: x      (08-02 @ 07:28)    ----------------------------<  124       Magnesium: 2.6                              4.9     |  28     |  6.97             Phosphorous: 4.2        CAPILLARY BLOOD GLUCOSE      POCT Blood Glucose.: 134 mg/dL (02 Aug 2021 07:41)  POCT Blood Glucose.: 219 mg/dL (01 Aug 2021 21:30)  POCT Blood Glucose.: 229 mg/dL (01 Aug 2021 17:17)  POCT Blood Glucose.: 148 mg/dL (01 Aug 2021 11:55)        Assesment/plan       Interval Events:  pt in nad  being dialyzed    Allergies    No Known Allergies    Intolerances      Endocrine/Metabolic Medications:  atorvastatin 80 milliGRAM(s) Oral at bedtime  dextrose 40% Gel 15 Gram(s) Oral once  dextrose 50% Injectable 12.5 Gram(s) IV Push once  dextrose 50% Injectable 25 Gram(s) IV Push once  dextrose 50% Injectable 25 Gram(s) IV Push once  glucagon  Injectable 1 milliGRAM(s) IntraMuscular once  insulin glargine Injectable (LANTUS) 15 Unit(s) SubCutaneous at bedtime  insulin lispro (ADMELOG) corrective regimen sliding scale   SubCutaneous three times a day before meals  insulin lispro Injectable (ADMELOG) 5 Unit(s) SubCutaneous three times a day before meals      Vital Signs Last 24 Hrs  T(C): 36.6 (02 Aug 2021 09:45), Max: 36.6 (01 Aug 2021 21:15)  T(F): 97.8 (02 Aug 2021 09:45), Max: 97.9 (01 Aug 2021 21:15)  HR: 94 (02 Aug 2021 09:45) (81 - 94)  BP: 129/63 (02 Aug 2021 09:45) (115/61 - 137/63)  BP(mean): 16 (01 Aug 2021 21:15) (16 - 16)  RR: 18 (02 Aug 2021 09:45) (16 - 18)  SpO2: 98% (02 Aug 2021 09:45) (95% - 98%)      PHYSICAL EXAM  All physical exam findings normal, except those marked:  General:	Alert, active, cooperative, NAD, well hydrated  .		[] Abnormal:  Neck		Normal: supple, no cervical adenopathy, no palpable thyroid  .		[] Abnormal:  Cardiovascular	Normal: regular rate, normal S1, S2, no murmurs  .		[] Abnormal:  Respiratory	Normal: no chest wall deformity, normal respiratory pattern, CTA B/L  .		[] Abnormal:  Abdominal	Normal: soft, ND, NT, bowel sounds present, no masses, no organomegaly  .		[] Abnormal:  		Normal normal genitalia, testes descended, circumcised/uncircumcised  .		Tiny stage:			Breast tiny:  .		Menstrual history:  .		[] Abnormal:  Extremities	Normal: FROM x4  .		[] Abnormal:  Skin		Normal: intact and not indurated, no rash, no acanthosis nigricans  .		[] Abnormal:  Neurologic	Normal: grossly intact  .		[] Abnormal:    LABS                        9.0    12.19 )-----------( 302      ( 02 Aug 2021 07:28 )             29.2                               137    |  98     |  48                  Calcium: 8.6   / iCa: x      (08-02 @ 07:28)    ----------------------------<  124       Magnesium: 2.6                              4.9     |  28     |  6.97             Phosphorous: 4.2        CAPILLARY BLOOD GLUCOSE      POCT Blood Glucose.: 134 mg/dL (02 Aug 2021 07:41)  POCT Blood Glucose.: 219 mg/dL (01 Aug 2021 21:30)  POCT Blood Glucose.: 229 mg/dL (01 Aug 2021 17:17)  POCT Blood Glucose.: 148 mg/dL (01 Aug 2021 11:55)        Assesment/plan        Assesment/plan    89 F, wheel chair bound, from home with PMH of Afib on Eliquis, ESRD on HD(Monday, Tuesday, Thursday, Saturday), DM on insulin, HTN, Home O2 3L presented with shortness of breath, vomiting x1 day.   Found to have uncont dm. Pt admits to taking basal bolus insulin given by her daughter. Does not recall fsg or if she has hypos. Currently eating well .       Problem/Recommendation - 1:  Problem: Uncontrolled diabetes mellitus. Recommendation: hyperglycemia better controlled   decent a1c as out pt- 7.8%  cont lantus to 12 units   and admelog 5 ac tid   fsg ac and hs  aim fsg 140- <200 due to age and comorbidities   d/w hs.     Problem/Recommendation - 2:  ·  Problem: Acute respiratory failure with hypoxia and hypercapnia.  Recommendation: s/p icu tx- downgrade  tx per prim team.

## 2021-08-02 NOTE — PROGRESS NOTE ADULT - SUBJECTIVE AND OBJECTIVE BOX
Time of Visit:  Patient seen and examined.     MEDICATIONS  (STANDING):  aMIOdarone    Tablet 200 milliGRAM(s) Oral daily  apixaban 2.5 milliGRAM(s) Oral every 12 hours  ascorbic acid 500 milliGRAM(s) Oral daily  aspirin  chewable 81 milliGRAM(s) Oral daily  atorvastatin 80 milliGRAM(s) Oral at bedtime  budesonide 160 MICROgram(s)/formoterol 4.5 MICROgram(s) Inhaler 2 Puff(s) Inhalation two times a day  chlorhexidine 2% Cloths 1 Application(s) Topical <User Schedule>  dextrose 40% Gel 15 Gram(s) Oral once  dextrose 5%. 1000 milliLiter(s) (50 mL/Hr) IV Continuous <Continuous>  dextrose 5%. 1000 milliLiter(s) (100 mL/Hr) IV Continuous <Continuous>  dextrose 50% Injectable 25 Gram(s) IV Push once  dextrose 50% Injectable 12.5 Gram(s) IV Push once  dextrose 50% Injectable 25 Gram(s) IV Push once  epoetin maria d-epbx (RETACRIT) Injectable 23205 Unit(s) IV Push <User Schedule>  glucagon  Injectable 1 milliGRAM(s) IntraMuscular once  insulin glargine Injectable (LANTUS) 15 Unit(s) SubCutaneous at bedtime  insulin lispro (ADMELOG) corrective regimen sliding scale   SubCutaneous three times a day before meals  insulin lispro Injectable (ADMELOG) 5 Unit(s) SubCutaneous three times a day before meals  metoprolol tartrate 25 milliGRAM(s) Oral two times a day  midodrine. 10 milliGRAM(s) Oral <User Schedule>  polyethylene glycol 3350 17 Gram(s) Oral daily  senna 2 Tablet(s) Oral at bedtime  tiotropium 18 MICROgram(s) Capsule 1 Capsule(s) Inhalation daily  zinc sulfate 220 milliGRAM(s) Oral daily      MEDICATIONS  (PRN):  acetaminophen   Tablet .. 650 milliGRAM(s) Oral every 6 hours PRN Mild Pain (1 - 3)  ALBUTerol    90 MICROgram(s) HFA Inhaler 2 Puff(s) Inhalation every 6 hours PRN Shortness of Breath and/or Wheezing  bisacodyl Suppository 10 milliGRAM(s) Rectal daily PRN Constipation  guaiFENesin Oral Liquid (Sugar-Free) 100 milliGRAM(s) Oral every 6 hours PRN Cough  melatonin 3 milliGRAM(s) Oral at bedtime PRN Insomnia  sodium chloride 0.9% lock flush 10 milliLiter(s) IV Push every 1 hour PRN Pre/post blood products, medications, blood draw, and to maintain line patency       Medications up to date at time of exam.    ROS; No fever, chills, congestion. Has non productive cough.    PHYSICAL EXAMINATION:      Vital Signs Last 24 Hrs  T(C): 36.3 (02 Aug 2021 14:04), Max: 36.6 (01 Aug 2021 21:15)  T(F): 97.4 (02 Aug 2021 14:04), Max: 97.9 (01 Aug 2021 21:15)  HR: 93 (02 Aug 2021 14:04) (79 - 94)  BP: 143/50 (02 Aug 2021 14:04) (115/61 - 143/50)  BP(mean): 16 (01 Aug 2021 21:15) (16 - 16)  RR: 18 (02 Aug 2021 14:04) (17 - 18)  SpO2: 97% (02 Aug 2021 14:04) (95% - 100%)   (if applicable)    General: Alert and oriented. Able to answer question at rest with no SOB. No acute distress.     HEENT: Head is normocephalic and atraumatic. No nasal tenderness. Extraocular muscles are intact. Mucous membranes are moist.     NECK: Supple, no palpable adenopathy.    LUNGS: Clear to auscultation bilaterally with no wheezing, rales, or rhonchi. No use of accessory muscle.     HEART: S1 S2 Regular rate and no click/ rub.     ABDOMEN: Soft, nontender, and nondistended.  Active bowel sounds.     EXTREMITIES: Without any cyanosis, clubbing, rash, lesions or edema.    NEUROLOGIC: Awake, alert, oriented.     SKIN: Warm and moist. Non diaphoretic.       LABS:                        9.0    12.19 )-----------( 302      ( 02 Aug 2021 07:28 )             29.2     08-02    137  |  98  |  48<H>  ----------------------------<  124<H>  4.9   |  28  |  6.97<H>    Ca    8.6      02 Aug 2021 07:28  Phos  4.2     08-02  Mg     2.6     08-02    MICROBIOLOGY: (if applicable)    RADIOLOGY & ADDITIONAL STUDIES:  EKG:   CXR:  ECHO:    IMPRESSION: 89y Female PAST MEDICAL & SURGICAL HISTORY:  HTN (hypertension)    HLD (hyperlipidemia)    CKD (chronic kidney disease)    Afib    DM (diabetes mellitus)    Artificial pacemaker    Impression: 89 Y/O Female presented in ED with SOB. n ED, patient placed on BiPAP and had HD session with about 1.274L fluid removed and blood pressure noting to drop to 60s despite being given midodrine. Admitted to ICU Acute Hypoxic Respiratory Failure. Patient arrived to the ICU on 7/22 for AHRF 2/2 fluid overload. Patient had HD and BIPAP was tapered off. Patient hypotensive during HD, given midodrine pre-HD and pressor support. At present time in Medicine Unit and on Oxygen supplementation 5L NC and refusing Bipap.     Suggestion:   O2 saturation at rest 88% room air. O2 saturation 93% with O2 5L NC . Continue Oxygen supplementation 5L NC .   Daughter at bedside and reinforced the importance of Bipap at night  .  Patient refusing Bipap at night despite encouragement.      Pulmonary oral hygiene care.  Continue PRN Albuterol 2 Puffs Q 6 hours.  Continue Budesonide 2 Puffs Twice daily. Tiotropium Daily.    Dialysis per Nephro  .   Restrict fluid /water intake .  DVT/ GI prophylactic .    Time of Visit:  Patient seen and examined.     MEDICATIONS  (STANDING):  aMIOdarone    Tablet 200 milliGRAM(s) Oral daily  apixaban 2.5 milliGRAM(s) Oral every 12 hours  ascorbic acid 500 milliGRAM(s) Oral daily  aspirin  chewable 81 milliGRAM(s) Oral daily  atorvastatin 80 milliGRAM(s) Oral at bedtime  budesonide 160 MICROgram(s)/formoterol 4.5 MICROgram(s) Inhaler 2 Puff(s) Inhalation two times a day  chlorhexidine 2% Cloths 1 Application(s) Topical <User Schedule>  dextrose 40% Gel 15 Gram(s) Oral once  dextrose 5%. 1000 milliLiter(s) (50 mL/Hr) IV Continuous <Continuous>  dextrose 5%. 1000 milliLiter(s) (100 mL/Hr) IV Continuous <Continuous>  dextrose 50% Injectable 25 Gram(s) IV Push once  dextrose 50% Injectable 12.5 Gram(s) IV Push once  dextrose 50% Injectable 25 Gram(s) IV Push once  epoetin maria d-epbx (RETACRIT) Injectable 61910 Unit(s) IV Push <User Schedule>  glucagon  Injectable 1 milliGRAM(s) IntraMuscular once  insulin glargine Injectable (LANTUS) 15 Unit(s) SubCutaneous at bedtime  insulin lispro (ADMELOG) corrective regimen sliding scale   SubCutaneous three times a day before meals  insulin lispro Injectable (ADMELOG) 5 Unit(s) SubCutaneous three times a day before meals  metoprolol tartrate 25 milliGRAM(s) Oral two times a day  midodrine. 10 milliGRAM(s) Oral <User Schedule>  polyethylene glycol 3350 17 Gram(s) Oral daily  senna 2 Tablet(s) Oral at bedtime  tiotropium 18 MICROgram(s) Capsule 1 Capsule(s) Inhalation daily  zinc sulfate 220 milliGRAM(s) Oral daily      MEDICATIONS  (PRN):  acetaminophen   Tablet .. 650 milliGRAM(s) Oral every 6 hours PRN Mild Pain (1 - 3)  ALBUTerol    90 MICROgram(s) HFA Inhaler 2 Puff(s) Inhalation every 6 hours PRN Shortness of Breath and/or Wheezing  bisacodyl Suppository 10 milliGRAM(s) Rectal daily PRN Constipation  guaiFENesin Oral Liquid (Sugar-Free) 100 milliGRAM(s) Oral every 6 hours PRN Cough  melatonin 3 milliGRAM(s) Oral at bedtime PRN Insomnia  sodium chloride 0.9% lock flush 10 milliLiter(s) IV Push every 1 hour PRN Pre/post blood products, medications, blood draw, and to maintain line patency       Medications up to date at time of exam.    ROS; No fever, chills, congestion. Has non productive cough.    PHYSICAL EXAMINATION:      Vital Signs Last 24 Hrs  T(C): 36.3 (02 Aug 2021 14:04), Max: 36.6 (01 Aug 2021 21:15)  T(F): 97.4 (02 Aug 2021 14:04), Max: 97.9 (01 Aug 2021 21:15)  HR: 93 (02 Aug 2021 14:04) (79 - 94)  BP: 143/50 (02 Aug 2021 14:04) (115/61 - 143/50)  BP(mean): 16 (01 Aug 2021 21:15) (16 - 16)  RR: 18 (02 Aug 2021 14:04) (17 - 18)  SpO2: 97% (02 Aug 2021 14:04) (95% - 100%)   (if applicable)    General: Alert and oriented. Able to answer question at rest with no SOB. No acute distress.     HEENT: Head is normocephalic and atraumatic. No nasal tenderness. Extraocular muscles are intact. Mucous membranes are moist.     NECK: Supple, no palpable adenopathy.    LUNGS: Clear to auscultation bilaterally with no wheezing, rales, or rhonchi. No use of accessory muscle.     HEART: S1 S2 Regular rate and no click/ rub.     ABDOMEN: Soft, nontender, and nondistended.  Active bowel sounds.     EXTREMITIES: Without any cyanosis, clubbing, rash, lesions or edema.    NEUROLOGIC: Awake, alert, oriented.     SKIN: Warm and moist. Non diaphoretic.       LABS:                        9.0    12.19 )-----------( 302      ( 02 Aug 2021 07:28 )             29.2     08-02    137  |  98  |  48<H>  ----------------------------<  124<H>  4.9   |  28  |  6.97<H>    Ca    8.6      02 Aug 2021 07:28  Phos  4.2     08-02  Mg     2.6     08-02    MICROBIOLOGY: (if applicable)    RADIOLOGY & ADDITIONAL STUDIES:  EKG:   CXR:  ECHO:    IMPRESSION: 89y Female PAST MEDICAL & SURGICAL HISTORY:  HTN (hypertension)    HLD (hyperlipidemia)    CKD (chronic kidney disease)    Afib    DM (diabetes mellitus)    Artificial pacemaker    Impression: 89 Y/O Female presented in ED with SOB. n ED, patient placed on BiPAP and had HD session with about 1.274L fluid removed and blood pressure noting to drop to 60s despite being given midodrine. Admitted to ICU Acute Hypoxic Respiratory Failure. Patient arrived to the ICU on 7/22 for AHRF 2/2 fluid overload. Patient had HD and BIPAP was tapered off. Patient hypotensive during HD, given midodrine pre-HD and pressor support. At present time in Medicine Unit and on Oxygen supplementation 5L NC and refusing Bipap.     Suggestion:   O2 saturation at rest 88% room air. O2 saturation 93% with O2 5L NC . Continue Oxygen supplementation 5L NC .   Daughter at bedside and reinforced the importance of Bipap at night  .  Patient refusing Bipap at night despite encouragement.      Pulmonary oral hygiene care.  Continue PRN Albuterol 2 Puffs Q 6 hours.  Continue Budesonide 2 Puffs Twice daily. Tiotropium Daily.    Dialysis per Nephro  .   Restrict fluid /water intake .  DVT/ GI prophylactic .     Agree with above assessment and plan as transcribed.

## 2021-08-02 NOTE — PROGRESS NOTE ADULT - PROBLEM SELECTOR PLAN 1
P/w respiratory distress placed on BIPAP in ED  Given Vanc and Cefepime 2g in ED  Send MRSA swab, if swab negative, dc vanc, if positive please order Vanc  Started Cefepime dialysis dose 500mg q24 + Linezolid 600mg IV q12  CXR without infiltrates. Diffuse ronchi  VBG 7.19-70-47-27  c/w BIPAP for now, unable to wean off  ABG shows 7.43--23  procalcitonin 1.69  noted RVP +, Entero/Rhinovirus  f/u  sputum culture   f/u blood culture  f/u MRSA PCR  ID consulted - Dr. Doll  seen by pulm dr. Meraz last admission.
- likely secondary to fluid overload 2/2 ESRD  - placed on BiPAP in ED; appeared to be saturating well on 4L NC (upper 90's)   - patient on BIPAP at bedtime  - monitor O2 sats   - O2 at 5LPM  - COVID neg  - (+) Entero/Rhinovirus
- likely secondary to fluid overload 2/2 ESRD  - placed on BiPAP in ED; appeared to be saturating well on 4L NC (upper 90's)   - patient on BIPAP at bedtime  - monitor O2 sats   - O2 at 5LPM  - COVID neg  - (+) Entero/Rhinovirus
- likely secondary to fluid overload 2/2 ESRD  - placed on BiPAP in ED; appeared to be saturating well on 4L NC (upper 90's)   - patient on BIPAP at bedtime  - monitor O2 sats   - O2 at 5LPM  - COVID neg  - (+) Entero/Rhinovirus  -CXR ordered today showed no signs of consolidation or fluid overload  -Planning to d/c home tomorrow if pt continues stable and is able  to ambulate. If not pt may be d/c tomorrow to rehab facility
- likely secondary to fluid overload 2/2 ESRD  - placed on BiPAP in ED; appeared to be saturating well on 4L NC (upper 90's)   - patient on BIPAP at bedtime  - monitor O2 sats   - O2 at 5LPM  - COVID neg  - (+) Entero/Rhinovirus
- likely secondary to fluid overload 2/2 ESRD  - placed on BiPAP in ED; appeared to be saturating well on 4L NC (upper 90's)   - patient off BIPAP   - monitor O2 sats   - O2 at 5LPM  - COVID neg  - (+) Entero/Rhinovirus

## 2021-08-02 NOTE — PROGRESS NOTE ADULT - ASSESSMENT
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 Pt a/w Entero/ Rhino virus, acute respiratory distress requiring BIPAP with concerns for fluid overload. Nephrology consulted for ESRD status.     1) ESRD: Pt seen on HD, with intradialytic hypotension (ehsan 89/25) with UF goal 1L. Plan for next HD 8/3 (1st shift pending possible d/c). Monitor electrolytes.   2) HTN 2/2 CKD- BP low normal. Pt on Metoprolol 25mg PO bid for rate control (held prior to HD). Continue with Midodrine 10mg PO prior to HD to aid in fluid removal. Monitor BP  3) Anemia of renal disease: Hb low but improving. Will avoid IV iron due to elevated Ferritin. c/w Epogen 10k units IV tiw with HD (increased 7/31). Monitor Hb.  4) Hyperphosphatemia: Phosphorus acceptable. No need for phos binder. Phos liberalized in diet. Monitor serum calcium and phosphorus.    Spoke with pt's daughter, Cece 7/28. Discussed that pt is not tolerating HD and unable to tolerate fluid removal despite Midodrine 10mg PO prior to HD. As per daughter, she is fine in outpt dialysis and needs to be d/c soon to resume outpt dialysis at [facility]. Daughter unrealistic about long term goals; pt remains full code.     [facility]PHR[facility]  Brad Chen M.D.  Oneal Tim D.O.  Roselia Huitron M.D.  Milli Platt, MSN, ANP-C  [phone]    [address] [place] [address]

## 2021-08-02 NOTE — PROGRESS NOTE ADULT - ATTENDING COMMENTS
89 y/o female with a PMH of Afib on Eliquis, ESRD on HD (Monday, Tuesday, Thursday, Saturday), DM on insulin, HTN, COPD on 3L home O2 who is admitted for acute hypoxic respiratory failure due to fluid overload in the setting of ESRD. Hypotension has limited fluid removal during HD, is on Midodrine 30 minutes prior to HD. Possible component of COPD as well, is on 3L O2 at home, does not use Bipap at home but responded well here, continue qhs for now. No wheezing on exam, has some signs of pulm edema but improved from previous. Continue to remove as much fluid as BP will allow during HD. Continue symbicort/spiriva. CT chest reviewed, appreciate input from Pulmonary, Dr. Cuellar. Of note, had a code stroke called after HD session on 7/27 activated, CT head showed chronic right MCA infarct, appreciate neurology consult, felt her symptoms were due to recrudescence of her prior stroke, continue ASA and statin. PT recommended home PT. Appreciate endocrine input regarding poorly controlled DM, increase lantus to 15 units and lispro 5 tid. Appreciate inputs from nephrology and cardiology. Continue Midodrine 30 mins prior to HD and hold metoprolol on mornings of HD. Constipation resolved w/ lactulose, will stop and continue miralax/senna. Plan was for discharge home after HD session today but patient reports she feels too fatigued and has mild increase in SOB. Will give Albuterol, ambulate, and give 1 more session of HD tomorrow for further fluid removal. If she remains stable then can discharge tomorrow, if too weak to ambulate then will plan for BG instead.

## 2021-08-02 NOTE — PROGRESS NOTE ADULT - PROBLEM SELECTOR PLAN 9
daughter at bedside. wants aggressive treatment for now  Palliative consulted  ICU consulted as pt unable to wean off BIPAP, on tele monitor. ICU team will reassess after HD.
RISK                                                       Points  [] Previous VTE                                           3  [] Thrombophilia                                        2  [] Lower limb paralysis                               2   [] Current Cancer                                       2   [x] Immobilization > 24 hrs                         1  [] ICU/CCU stay > 24 hours                        1  [x] Age > 60                                             1    Hx of Afib  Eliquis 2.5 mg q12
RISK                                                       Points  [] Previous VTE                                           3  [] Thrombophilia                                        2  [] Lower limb paralysis                               2   [] Current Cancer                                       2   [x] Immobilization > 24 hrs                         1  [] ICU/CCU stay > 24 hours                        1  [x] Age > 60                                             1    Hx of Afib  Eliquis 2.5 mg q12 (cont. as outpatient)
RISK                                                       Points  [] Previous VTE                                           3  [] Thrombophilia                                        2  [] Lower limb paralysis                               2   [] Current Cancer                                       2   [x] Immobilization > 24 hrs                         1  [] ICU/CCU stay > 24 hours                        1  [x] Age > 60                                             1    Hx of Afib  Eliquis 2.5 mg q12
RISK                                                       Points  [] Previous VTE                                           3  [] Thrombophilia                                        2  [] Lower limb paralysis                               2   [] Current Cancer                                       2   [x] Immobilization > 24 hrs                         1  [] ICU/CCU stay > 24 hours                        1  [x] Age > 60                                             1    Hx of Afib  Eliquis 2.5 mg q12 (cont. as outpatient)
RISK                                                       Points  [] Previous VTE                                           3  [] Thrombophilia                                        2  [] Lower limb paralysis                               2   [] Current Cancer                                       2   [x] Immobilization > 24 hrs                         1  [] ICU/CCU stay > 24 hours                        1  [x] Age > 60                                             1    Hx of Afib  Eliquis 2.5 mg q12

## 2021-08-02 NOTE — PROGRESS NOTE ADULT - PROBLEM SELECTOR PLAN 3
- patient and daughter states that she has "a little bit of COPD" but not on any meds at home just home oxygen at 3L NC   - no wheezing presently  - continue albuterol PRN   - monitor O2 sats (keep above 90%)  - CT: b/l reticulonodular opacities. Band type opacities w/in b/l lower lobes likely atelectasis. Opacity w/in inferior/posterior left lower lobe likely related to atelectasis vs. consolidation  -We'll try to taper down O2 to 3 L

## 2021-08-02 NOTE — PROGRESS NOTE ADULT - SUBJECTIVE AND OBJECTIVE BOX
C A R D I O L O G Y  **********************************    DATE OF SERVICE: 08-02-21    Patient denies chest pain or shortness of breath.   Review of symptoms otherwise negative.    acetaminophen   Tablet .. 650 milliGRAM(s) Oral every 6 hours PRN  ALBUTerol    90 MICROgram(s) HFA Inhaler 2 Puff(s) Inhalation every 6 hours PRN  aMIOdarone    Tablet 200 milliGRAM(s) Oral daily  apixaban 2.5 milliGRAM(s) Oral every 12 hours  ascorbic acid 500 milliGRAM(s) Oral daily  aspirin  chewable 81 milliGRAM(s) Oral daily  atorvastatin 80 milliGRAM(s) Oral at bedtime  bisacodyl Suppository 10 milliGRAM(s) Rectal daily PRN  budesonide 160 MICROgram(s)/formoterol 4.5 MICROgram(s) Inhaler 2 Puff(s) Inhalation two times a day  chlorhexidine 2% Cloths 1 Application(s) Topical <User Schedule>  dextrose 40% Gel 15 Gram(s) Oral once  dextrose 5%. 1000 milliLiter(s) IV Continuous <Continuous>  dextrose 5%. 1000 milliLiter(s) IV Continuous <Continuous>  dextrose 50% Injectable 25 Gram(s) IV Push once  dextrose 50% Injectable 12.5 Gram(s) IV Push once  dextrose 50% Injectable 25 Gram(s) IV Push once  epoetin maria d-epbx (RETACRIT) Injectable 02216 Unit(s) IV Push <User Schedule>  glucagon  Injectable 1 milliGRAM(s) IntraMuscular once  guaiFENesin Oral Liquid (Sugar-Free) 100 milliGRAM(s) Oral every 6 hours PRN  insulin glargine Injectable (LANTUS) 15 Unit(s) SubCutaneous at bedtime  insulin lispro (ADMELOG) corrective regimen sliding scale   SubCutaneous three times a day before meals  insulin lispro Injectable (ADMELOG) 5 Unit(s) SubCutaneous three times a day before meals  lactulose Syrup 10 Gram(s) Oral every 8 hours  melatonin 3 milliGRAM(s) Oral at bedtime PRN  metoprolol tartrate 25 milliGRAM(s) Oral two times a day  midodrine. 10 milliGRAM(s) Oral <User Schedule>  polyethylene glycol 3350 17 Gram(s) Oral daily  senna 2 Tablet(s) Oral at bedtime  sodium chloride 0.9% lock flush 10 milliLiter(s) IV Push every 1 hour PRN  tiotropium 18 MICROgram(s) Capsule 1 Capsule(s) Inhalation daily  zinc sulfate 220 milliGRAM(s) Oral daily                            9.0    12.19 )-----------( 302      ( 02 Aug 2021 07:28 )             29.2       Hemoglobin: 9.0 g/dL (08-02 @ 07:28)  Hemoglobin: 8.8 g/dL (08-01 @ 07:06)  Hemoglobin: 8.8 g/dL (07-31 @ 07:12)  Hemoglobin: 8.9 g/dL (07-30 @ 06:32)  Hemoglobin: 9.0 g/dL (07-29 @ 06:12)      08-02    137  |  98  |  48<H>  ----------------------------<  124<H>  4.9   |  28  |  6.97<H>    Ca    8.6      02 Aug 2021 07:28  Phos  4.2     08-02  Mg     2.6     08-02      Creatinine Trend: 6.97<--, 5.61<--, 7.04<--, 5.50<--, 6.17<--, 4.17<--    COAGS:           T(C): 36.3 (08-02-21 @ 14:04), Max: 36.6 (08-01-21 @ 21:15)  HR: 93 (08-02-21 @ 14:04) (79 - 94)  BP: 143/50 (08-02-21 @ 14:04) (115/61 - 143/50)  RR: 18 (08-02-21 @ 14:04) (16 - 18)  SpO2: 97% (08-02-21 @ 14:04) (95% - 100%)  Wt(kg): --    I&O's Summary    02 Aug 2021 07:01  -  02 Aug 2021 14:09  --------------------------------------------------------  IN: 500 mL / OUT: 1500 mL / NET: -1000 mL        HEENT:  (-)icterus (-)pallor  CV: N S1 S2 1/6 SHANE (+)2 Pulses B/l  Resp:  Clear to ausculatation B/L, normal effort  GI: (+) BS Soft, NT, ND  Lymph:  (-)Edema, (-)obvious lymphadenopathy  Skin: Warm to touch, Normal turgor  Psych: Appropriate mood and affect      TELEMETRY: 	 off      ASSESSMENT/PLAN: 	89y  Female PMH of PAF on amio for rhythm control and on Eliquis, Micra PPM placed by Dr Evangelina Mcclelland at [facility] earlier this year ESRD on HD(Monday, Tuesday, Thursday, Saturday), DM on insulin, HTN, Home O2 3L presented with shortness of breath, now with rapid afib.    - HR improved, now off tele  - BP stable  - cont eliquis  - doesnt appear fluid overloaded  - tolerating bb  - Random cortisol wnl  - No need for further inpatient cardiac work up.  - HD per renal  - Oupt cardiac f/u with Dr. Evangelina Mcclelland ( EP implanter of Micra)      Christopher Arteaga MD, [facility]  BEEPER [phone]

## 2021-08-02 NOTE — PROGRESS NOTE ADULT - PROBLEM/PLAN-9
Agency/Facility Name: [facility]   Spoke To: Nolvia  Outcome: Need doctors order and F2F      LSW informed    
DISPLAY PLAN FREE TEXT

## 2021-08-02 NOTE — PROGRESS NOTE ADULT - PROBLEM SELECTOR PLAN 2
- patient with ESRD on HD Mon/Tue/Thu/Sat  -  plan for HD session today (7/26/21)  - Albumin 100mL   - nephro, Dr. Huitron consulted   - monitor BMP daily   - avoid excessive fluids  - s/p HD (7/27/21)
- patient with ESRD on HD Mon/Tue/Thu/Sat   - nephavelino, Dr. Huitron on board  - adequate BP after HD session today   - avoid excessive fluids  - HD done today - give Midodrine 30 mins prior. Hold Metoprolol AM dose on HD days (MTThS)  - next HD tomorrow prior to d/c
HD Monday Tuesday Thursday Saturday  Oliguric  Dialysis as per nephrology  BNP 4225 (vs 11k in may)  Takes midodrine 10mg 30 minutes prior to HD  midodrine PRN ordered for 30mins prior to hd, with instructions to hold if SBP >120  Nephro consulted Dr. Huitron
- patient with ESRD on HD Mon/Tue/Thu/Sat  -  plan for HD session today (7/26/21)  - Albumin 100mL   - nephro, Dr. Huitron consulted   - monitor BMP daily   - avoid excessive fluids  - HD theresa. (7/31/21) - give Midodrine 30 mins prior. Hold Metoprolol AM dose on HD days (MTThS)
- patient with ESRD on HD Mon/Tue/Thu/Sat  -  plan for HD session today (7/26/21)  - Albumin 100mL   - nephro, Dr. Huitron consulted   - monitor BMP daily   - avoid excessive fluids  - s/p HD (7/27/21)
- patient with ESRD on HD Mon/Tue/Thu/Sat  -  plan for HD session today (7/26/21)  - Albumin 100mL   - nephro, Dr. Huitron consulted   - monitor BMP daily   - avoid excessive fluids  - HD done (7/31/21) - give Midodrine 30 mins prior. Hold Metoprolol AM dose on HD days (MTThS)  - next HD (8/2/21)
- patient with ESRD on HD Mon/Tue/Thu/Sat  -  plan for HD session today (7/26/21)  - Albumin 100mL   - nephro, Dr. Huitron consulted   - monitor BMP daily   - avoid excessive fluids  - HD today (7/29/21)
- patient with ESRD on HD Mon/Tue/Thu/Sat  -  plan for HD session today (7/26/21)  - Albumin 100mL   - nephro, Dr. Huitron consulted   - monitor BMP daily   - avoid excessive fluids

## 2021-08-02 NOTE — PROGRESS NOTE ADULT - SUBJECTIVE AND OBJECTIVE BOX
[facility] NEPHROLOGY- PROGRESS NOTE    Patient is a 89yo Female with ESRD on HD (MTTS), Afib on Eliquis, HTN, Intradialytic hypotension req Midodrine prior to HD, failed Rt AVF due to steal syndrome s/p ligation, h/o COVID s/p PPM p/w SOB x 1 day. Pt a/w Entero/ Rhino virus, acute respiratory distress requiring BIPAP with concerns for fluid overload. Nephrology consulted for ESRD status.     Hospital Medications: Medications reviewed.  REVIEW OF SYSTEMS: Denies any SOB or chest pain, +light headed    VITALS:  T(F): 97.4 (08-02-21 @ 14:04), Max: 97.9 (08-01-21 @ 21:15)  HR: 93 (08-02-21 @ 14:04)  BP: 143/50 (08-02-21 @ 14:04)  RR: 18 (08-02-21 @ 14:04)  SpO2: 97% (08-02-21 @ 14:04)  Wt(kg): --    08-02 @ 07:01  -  08-02 @ 15:14  --------------------------------------------------------  IN: 500 mL / OUT: 1500 mL / NET: -1000 mL        PHYSICAL EXAM:  Gen: NAD  HEENT: anicteric;   Neck: no JVD  Cards: RRR, +S1/S2,   Resp: Clear ant with rare rhonchi  GI: soft, NT/ND, NABS  Extremities: No LE edema B/L  Access: Rt IJ tunneled HD catheter- benign      LABS:  08-02    137  |  98  |  48<H>  ----------------------------<  124<H>  4.9   |  28  |  6.97<H>    Ca    8.6      02 Aug 2021 07:28  Phos  4.2     08-02  Mg     2.6     08-02      Creatinine Trend: 6.97 <--, 5.61 <--, 7.04 <--, 5.50 <--, 6.17 <--, 4.17 <--, 2.98 <--, 4.82 <--                        9.0    12.19 )-----------( 302      ( 02 Aug 2021 07:28 )             29.2     Urine Studies:

## 2021-08-02 NOTE — PROGRESS NOTE ADULT - ASSESSMENT
Patient is a 89 year old female, wheel chair bound, from home, with PMH of Afib on Eliquis, ESRD on HD(Monday, Tuesday, Thursday, Saturday), DM on insulin, HTN, COPD on home O2 3L, who presented to the ED due to SOB. In ED, patient placed on BiPAP and had HD session with about 1.274L fluid removed and blood pressure noting to drop to 60s despite being given midodrine. Admitted for Acute Hypoxic Respiratory Failure.

## 2021-08-03 ENCOUNTER — TRANSCRIPTION ENCOUNTER (OUTPATIENT)
Age: 86
End: 2021-08-03

## 2021-08-03 VITALS
SYSTOLIC BLOOD PRESSURE: 149 MMHG | RESPIRATION RATE: 18 BRPM | DIASTOLIC BLOOD PRESSURE: 73 MMHG | HEART RATE: 71 BPM | TEMPERATURE: 98 F | OXYGEN SATURATION: 95 %

## 2021-08-03 LAB
ALBUMIN SERPL ELPH-MCNC: 2.2 G/DL — LOW (ref 3.5–5)
ALP SERPL-CCNC: 132 U/L — HIGH (ref 40–120)
ALT FLD-CCNC: 27 U/L DA — SIGNIFICANT CHANGE UP (ref 10–60)
ANION GAP SERPL CALC-SCNC: 6 MMOL/L — SIGNIFICANT CHANGE UP (ref 5–17)
AST SERPL-CCNC: 15 U/L — SIGNIFICANT CHANGE UP (ref 10–40)
BILIRUB SERPL-MCNC: 0.4 MG/DL — SIGNIFICANT CHANGE UP (ref 0.2–1.2)
BUN SERPL-MCNC: 34 MG/DL — HIGH (ref 7–18)
CALCIUM SERPL-MCNC: 8.3 MG/DL — LOW (ref 8.4–10.5)
CHLORIDE SERPL-SCNC: 98 MMOL/L — SIGNIFICANT CHANGE UP (ref 96–108)
CO2 SERPL-SCNC: 30 MMOL/L — SIGNIFICANT CHANGE UP (ref 22–31)
CREAT SERPL-MCNC: 5.23 MG/DL — HIGH (ref 0.5–1.3)
GLUCOSE BLDC GLUCOMTR-MCNC: 101 MG/DL — HIGH (ref 70–99)
GLUCOSE BLDC GLUCOMTR-MCNC: 136 MG/DL — HIGH (ref 70–99)
GLUCOSE SERPL-MCNC: 82 MG/DL — SIGNIFICANT CHANGE UP (ref 70–99)
HCT VFR BLD CALC: 27.2 % — LOW (ref 34.5–45)
HGB BLD-MCNC: 8.3 G/DL — LOW (ref 11.5–15.5)
MAGNESIUM SERPL-MCNC: 2.4 MG/DL — SIGNIFICANT CHANGE UP (ref 1.6–2.6)
MCHC RBC-ENTMCNC: 29.6 PG — SIGNIFICANT CHANGE UP (ref 27–34)
MCHC RBC-ENTMCNC: 30.5 GM/DL — LOW (ref 32–36)
MCV RBC AUTO: 97.1 FL — SIGNIFICANT CHANGE UP (ref 80–100)
NRBC # BLD: 0 /100 WBCS — SIGNIFICANT CHANGE UP (ref 0–0)
PHOSPHATE SERPL-MCNC: 3.5 MG/DL — SIGNIFICANT CHANGE UP (ref 2.5–4.5)
PLATELET # BLD AUTO: 294 K/UL — SIGNIFICANT CHANGE UP (ref 150–400)
POTASSIUM SERPL-MCNC: 4.6 MMOL/L — SIGNIFICANT CHANGE UP (ref 3.5–5.3)
POTASSIUM SERPL-SCNC: 4.6 MMOL/L — SIGNIFICANT CHANGE UP (ref 3.5–5.3)
PROT SERPL-MCNC: 5.8 G/DL — LOW (ref 6–8.3)
RBC # BLD: 2.8 M/UL — LOW (ref 3.8–5.2)
RBC # FLD: 16.6 % — HIGH (ref 10.3–14.5)
SODIUM SERPL-SCNC: 134 MMOL/L — LOW (ref 135–145)
WBC # BLD: 10.06 K/UL — SIGNIFICANT CHANGE UP (ref 3.8–10.5)
WBC # FLD AUTO: 10.06 K/UL — SIGNIFICANT CHANGE UP (ref 3.8–10.5)

## 2021-08-03 PROCEDURE — 84484 ASSAY OF TROPONIN QUANT: CPT

## 2021-08-03 PROCEDURE — 80061 LIPID PANEL: CPT

## 2021-08-03 PROCEDURE — 85025 COMPLETE CBC W/AUTO DIFF WBC: CPT

## 2021-08-03 PROCEDURE — 83540 ASSAY OF IRON: CPT

## 2021-08-03 PROCEDURE — 82962 GLUCOSE BLOOD TEST: CPT

## 2021-08-03 PROCEDURE — 86901 BLOOD TYPING SEROLOGIC RH(D): CPT

## 2021-08-03 PROCEDURE — 80076 HEPATIC FUNCTION PANEL: CPT

## 2021-08-03 PROCEDURE — 86769 SARS-COV-2 COVID-19 ANTIBODY: CPT

## 2021-08-03 PROCEDURE — 82728 ASSAY OF FERRITIN: CPT

## 2021-08-03 PROCEDURE — 96374 THER/PROPH/DIAG INJ IV PUSH: CPT

## 2021-08-03 PROCEDURE — 84443 ASSAY THYROID STIM HORMONE: CPT

## 2021-08-03 PROCEDURE — 82803 BLOOD GASES ANY COMBINATION: CPT

## 2021-08-03 PROCEDURE — 83036 HEMOGLOBIN GLYCOSYLATED A1C: CPT

## 2021-08-03 PROCEDURE — 82533 TOTAL CORTISOL: CPT

## 2021-08-03 PROCEDURE — 94640 AIRWAY INHALATION TREATMENT: CPT

## 2021-08-03 PROCEDURE — 93880 EXTRACRANIAL BILAT STUDY: CPT

## 2021-08-03 PROCEDURE — 83605 ASSAY OF LACTIC ACID: CPT

## 2021-08-03 PROCEDURE — 93005 ELECTROCARDIOGRAM TRACING: CPT

## 2021-08-03 PROCEDURE — 71250 CT THORAX DX C-: CPT

## 2021-08-03 PROCEDURE — 70450 CT HEAD/BRAIN W/O DYE: CPT

## 2021-08-03 PROCEDURE — 82550 ASSAY OF CK (CPK): CPT

## 2021-08-03 PROCEDURE — 99261: CPT

## 2021-08-03 PROCEDURE — 83735 ASSAY OF MAGNESIUM: CPT

## 2021-08-03 PROCEDURE — 99239 HOSP IP/OBS DSCHRG MGMT >30: CPT

## 2021-08-03 PROCEDURE — 36415 COLL VENOUS BLD VENIPUNCTURE: CPT

## 2021-08-03 PROCEDURE — 87640 STAPH A DNA AMP PROBE: CPT

## 2021-08-03 PROCEDURE — 0225U NFCT DS DNA&RNA 21 SARSCOV2: CPT

## 2021-08-03 PROCEDURE — 85027 COMPLETE CBC AUTOMATED: CPT

## 2021-08-03 PROCEDURE — 86850 RBC ANTIBODY SCREEN: CPT

## 2021-08-03 PROCEDURE — 94660 CPAP INITIATION&MGMT: CPT

## 2021-08-03 PROCEDURE — 87040 BLOOD CULTURE FOR BACTERIA: CPT

## 2021-08-03 PROCEDURE — P9047: CPT

## 2021-08-03 PROCEDURE — 84100 ASSAY OF PHOSPHORUS: CPT

## 2021-08-03 PROCEDURE — 83880 ASSAY OF NATRIURETIC PEPTIDE: CPT

## 2021-08-03 PROCEDURE — 82553 CREATINE MB FRACTION: CPT

## 2021-08-03 PROCEDURE — 87070 CULTURE OTHR SPECIMN AEROBIC: CPT

## 2021-08-03 PROCEDURE — 80053 COMPREHEN METABOLIC PANEL: CPT

## 2021-08-03 PROCEDURE — 83550 IRON BINDING TEST: CPT

## 2021-08-03 PROCEDURE — 99053 MED SERV 10PM-8AM 24 HR FAC: CPT

## 2021-08-03 PROCEDURE — 85610 PROTHROMBIN TIME: CPT

## 2021-08-03 PROCEDURE — 87635 SARS-COV-2 COVID-19 AMP PRB: CPT

## 2021-08-03 PROCEDURE — 87641 MR-STAPH DNA AMP PROBE: CPT

## 2021-08-03 PROCEDURE — 80048 BASIC METABOLIC PNL TOTAL CA: CPT

## 2021-08-03 PROCEDURE — 71045 X-RAY EXAM CHEST 1 VIEW: CPT

## 2021-08-03 PROCEDURE — 84145 PROCALCITONIN (PCT): CPT

## 2021-08-03 PROCEDURE — 99291 CRITICAL CARE FIRST HOUR: CPT

## 2021-08-03 PROCEDURE — 86900 BLOOD TYPING SEROLOGIC ABO: CPT

## 2021-08-03 PROCEDURE — 85730 THROMBOPLASTIN TIME PARTIAL: CPT

## 2021-08-03 RX ORDER — AMIODARONE HYDROCHLORIDE 400 MG/1
1 TABLET ORAL
Qty: 0 | Refills: 0 | DISCHARGE

## 2021-08-03 RX ORDER — SENNA PLUS 8.6 MG/1
2 TABLET ORAL
Qty: 60 | Refills: 0
Start: 2021-08-03 | End: 2021-09-01

## 2021-08-03 RX ORDER — ASPIRIN/CALCIUM CARB/MAGNESIUM 324 MG
1 TABLET ORAL
Qty: 0 | Refills: 0 | DISCHARGE
Start: 2021-08-03

## 2021-08-03 RX ORDER — TIOTROPIUM BROMIDE 18 UG/1
1 CAPSULE ORAL; RESPIRATORY (INHALATION)
Qty: 30 | Refills: 0
Start: 2021-08-03 | End: 2021-09-01

## 2021-08-03 RX ORDER — INSULIN LISPRO 100/ML
5 VIAL (ML) SUBCUTANEOUS
Qty: 5 | Refills: 0
Start: 2021-08-03 | End: 2021-09-01

## 2021-08-03 RX ORDER — APIXABAN 2.5 MG/1
1 TABLET, FILM COATED ORAL
Qty: 0 | Refills: 0 | DISCHARGE
Start: 2021-08-03

## 2021-08-03 RX ORDER — APIXABAN 2.5 MG/1
1 TABLET, FILM COATED ORAL
Qty: 0 | Refills: 0 | DISCHARGE

## 2021-08-03 RX ORDER — ALBUTEROL 90 UG/1
2 AEROSOL, METERED ORAL
Qty: 2 | Refills: 0
Start: 2021-08-03 | End: 2021-09-01

## 2021-08-03 RX ORDER — BUDESONIDE AND FORMOTEROL FUMARATE DIHYDRATE 160; 4.5 UG/1; UG/1
2 AEROSOL RESPIRATORY (INHALATION)
Qty: 2 | Refills: 0
Start: 2021-08-03 | End: 2021-09-01

## 2021-08-03 RX ORDER — ASCORBIC ACID 60 MG
1 TABLET,CHEWABLE ORAL
Qty: 0 | Refills: 0 | DISCHARGE
Start: 2021-08-03

## 2021-08-03 RX ORDER — METOPROLOL TARTRATE 50 MG
1 TABLET ORAL
Qty: 0 | Refills: 0 | DISCHARGE

## 2021-08-03 RX ORDER — ENOXAPARIN SODIUM 100 MG/ML
12 INJECTION SUBCUTANEOUS
Qty: 12 | Refills: 0
Start: 2021-08-03 | End: 2021-09-01

## 2021-08-03 RX ORDER — MIDODRINE HYDROCHLORIDE 2.5 MG/1
1 TABLET ORAL
Qty: 17 | Refills: 0
Start: 2021-08-03 | End: 2021-09-01

## 2021-08-03 RX ORDER — POLYETHYLENE GLYCOL 3350 17 G/17G
17 POWDER, FOR SOLUTION ORAL
Qty: 510 | Refills: 0
Start: 2021-08-03 | End: 2021-09-01

## 2021-08-03 RX ORDER — INSULIN LISPRO 100/ML
0 VIAL (ML) SUBCUTANEOUS
Qty: 0 | Refills: 0 | DISCHARGE

## 2021-08-03 RX ORDER — AMIODARONE HYDROCHLORIDE 400 MG/1
1 TABLET ORAL
Qty: 0 | Refills: 0 | DISCHARGE
Start: 2021-08-03

## 2021-08-03 RX ORDER — INSULIN GLARGINE 100 [IU]/ML
10 INJECTION, SOLUTION SUBCUTANEOUS
Qty: 0 | Refills: 0 | DISCHARGE

## 2021-08-03 RX ORDER — METOPROLOL TARTRATE 50 MG
1 TABLET ORAL
Qty: 0 | Refills: 0 | DISCHARGE
Start: 2021-08-03

## 2021-08-03 RX ORDER — ZINC SULFATE TAB 220 MG (50 MG ZINC EQUIVALENT) 220 (50 ZN) MG
1 TAB ORAL
Qty: 30 | Refills: 0
Start: 2021-08-03 | End: 2021-09-01

## 2021-08-03 RX ADMIN — Medication 5 UNIT(S): at 08:29

## 2021-08-03 RX ADMIN — AMIODARONE HYDROCHLORIDE 200 MILLIGRAM(S): 400 TABLET ORAL at 05:03

## 2021-08-03 RX ADMIN — BUDESONIDE AND FORMOTEROL FUMARATE DIHYDRATE 2 PUFF(S): 160; 4.5 AEROSOL RESPIRATORY (INHALATION) at 09:42

## 2021-08-03 RX ADMIN — APIXABAN 2.5 MILLIGRAM(S): 2.5 TABLET, FILM COATED ORAL at 05:03

## 2021-08-03 RX ADMIN — ERYTHROPOIETIN 10000 UNIT(S): 10000 INJECTION, SOLUTION INTRAVENOUS; SUBCUTANEOUS at 11:32

## 2021-08-03 RX ADMIN — CHLORHEXIDINE GLUCONATE 1 APPLICATION(S): 213 SOLUTION TOPICAL at 05:02

## 2021-08-03 RX ADMIN — MIDODRINE HYDROCHLORIDE 10 MILLIGRAM(S): 2.5 TABLET ORAL at 09:42

## 2021-08-03 NOTE — DISCHARGE NOTE NURSING/CASE MANAGEMENT/SOCIAL WORK - PATIENT PORTAL LINK FT
You can access the FollowMyHealth Patient Portal offered by [facility] by registering at the following website: [email or web address]. By joining [employer]’s FollowMyHealth portal, you will also be able to view your health information using other applications (apps) compatible with our system.

## 2021-08-03 NOTE — PROGRESS NOTE ADULT - PROVIDER SPECIALTY LIST ADULT
Cardiology
Critical Care
Infectious Disease
Internal Medicine
Nephrology
Cardiology
Endocrinology
Endocrinology
Internal Medicine
Nephrology
Cardiology
Critical Care
Endocrinology
Nephrology
Pulmonology
Cardiology
Critical Care
Endocrinology
Internal Medicine
Nephrology
Pulmonology
Pulmonology
Nephrology
Internal Medicine

## 2021-08-03 NOTE — PROGRESS NOTE ADULT - ASSESSMENT
Patient is a 89yo Female with ESRD on HD, Afib on Eliquis, HTN, Intradialytic hypotension req Midodrine prior to HD, failed Rt AVF due to steal syndrome s/p ligation, h/o COVID s/p PPM p/w SOB x 1 day. Pt a/w Entero/ Rhino virus, acute respiratory distress requiring BIPAP with concerns for fluid overload. Nephrology consulted for ESRD status.     1) ESRD: Last HD 82, with intradialytic hypotension (ehsan 89/25) with net UF 1L. Plan for next HD today; 8/3 (1st shift pending possible d/c). Monitor electrolytes.   2) HTN 2/2 CKD- BP acceptable. Pt on Metoprolol 25mg PO bid for rate control (held prior to HD). Continue with Midodrine 10mg PO prior to HD to aid in fluid removal. Monitor BP  3) Anemia of renal disease: Hb low. Will avoid IV iron due to elevated Ferritin. c/w Epogen 10k units IV tiw with HD (increased 7/31). Monitor Hb.  4) Hyperphosphatemia: Phosphorus acceptable. No need for phos binder. Phos liberalized in diet. Monitor serum calcium and phosphorus.    Spoke with pt's daughter, Cece 7/28. Discussed that pt is not tolerating HD and unable to tolerate fluid removal despite Midodrine 10mg PO prior to HD. As per daughter, she is fine in outpt dialysis and needs to be d/c soon to resume outpt dialysis at [facility]. Daughter unrealistic about long term goals; pt remains full code.     [facility] NEPHROLOGY  Brad Chen M.D.  Oneal Tim D.O.  Roselia Huitron M.D.  Milli Platt, MSN, ANP-C  [phone]    [address] [place] [address]

## 2021-08-03 NOTE — PROGRESS NOTE ADULT - SUBJECTIVE AND OBJECTIVE BOX
Interval Events:  pt in nad    Allergies    No Known Allergies    Intolerances      Endocrine/Metabolic Medications:  atorvastatin 80 milliGRAM(s) Oral at bedtime  dextrose 40% Gel 15 Gram(s) Oral once  dextrose 50% Injectable 25 Gram(s) IV Push once  dextrose 50% Injectable 12.5 Gram(s) IV Push once  dextrose 50% Injectable 25 Gram(s) IV Push once  glucagon  Injectable 1 milliGRAM(s) IntraMuscular once  insulin glargine Injectable (LANTUS) 15 Unit(s) SubCutaneous at bedtime  insulin lispro (ADMELOG) corrective regimen sliding scale   SubCutaneous three times a day before meals  insulin lispro Injectable (ADMELOG) 5 Unit(s) SubCutaneous three times a day before meals      Vital Signs Last 24 Hrs  T(C): 36.7 (03 Aug 2021 04:54), Max: 36.8 (02 Aug 2021 22:30)  T(F): 98 (03 Aug 2021 04:54), Max: 98.2 (02 Aug 2021 22:30)  HR: 95 (03 Aug 2021 04:54) (74 - 108)  BP: 125/69 (03 Aug 2021 04:54) (118/46 - 172/83)  BP(mean): 62 (02 Aug 2021 17:53) (62 - 62)  RR: 17 (03 Aug 2021 04:54) (17 - 94)  SpO2: 96% (03 Aug 2021 04:54) (91% - 100%)      PHYSICAL EXAM  All physical exam findings normal, except those marked:  General:	Alert, active, cooperative, NAD, well hydrated  .		[] Abnormal:  Neck		Normal: supple, no cervical adenopathy, no palpable thyroid  .		[] Abnormal:  Cardiovascular	Normal: regular rate, normal S1, S2, no murmurs  .		[] Abnormal:  Respiratory	Normal: no chest wall deformity, normal respiratory pattern, CTA B/L  .		[] Abnormal:  Abdominal	Normal: soft, ND, NT, bowel sounds present, no masses, no organomegaly  .		[] Abnormal:  		Normal normal genitalia, testes descended, circumcised/uncircumcised  .		Tiny stage:			Breast tiny:  .		Menstrual history:  .		[] Abnormal:  Extremities	Normal: FROM x4  .		[] Abnormal:  Skin		Normal: intact and not indurated, no rash, no acanthosis nigricans  .		[] Abnormal:  Neurologic	Normal: grossly intact  .		[] Abnormal:    LABS                        8.3    10.06 )-----------( 294      ( 03 Aug 2021 07:34 )             27.2                               134    |  98     |  34                  Calcium: 8.3   / iCa: x      (08-03 @ 07:34)    ----------------------------<  82        Magnesium: 2.4                              4.6     |  30     |  5.23             Phosphorous: 3.5      TPro  5.8    /  Alb  2.2    /  TBili  0.4    /  DBili  x      /  AST  15     /  ALT  27     /  AlkPhos  132    03 Aug 2021 07:34    CAPILLARY BLOOD GLUCOSE      POCT Blood Glucose.: 101 mg/dL (03 Aug 2021 08:01)  POCT Blood Glucose.: 230 mg/dL (02 Aug 2021 21:31)  POCT Blood Glucose.: 279 mg/dL (02 Aug 2021 17:15)  POCT Blood Glucose.: 133 mg/dL (02 Aug 2021 11:32)        Assesment/plan    89 F, wheel chair bound, from home with PMH of Afib on Eliquis, ESRD on HD(Monday, Tuesday, Thursday, Saturday), DM on insulin, HTN, Home O2 3L presented with shortness of breath, vomiting x1 day.   Found to have uncont dm. Pt admits to taking basal bolus insulin given by her daughter. Does not recall fsg or if she has hypos. Currently eating well .       Problem/Recommendation - 1:  Problem: Uncontrolled diabetes mellitus. Recommendation: hyperglycemia better controlled   decent a1c as out pt- 7.8%  chnage lantus to 12 units   and admelog 5 ac tid   fsg ac and hs  aim fsg 140- <200 due to age and comorbidities   d/w hs.  d/c planning     Problem/Recommendation - 2:  ·  Problem: Acute respiratory failure with hypoxia and hypercapnia.  Recommendation: s/p icu tx- downgrade  tx per prim team.

## 2021-08-03 NOTE — PROGRESS NOTE ADULT - SUBJECTIVE AND OBJECTIVE BOX
C A R D I O L O G Y  **********************************    DATE OF SERVICE: 08-03-21    Patient denies chest pain or shortness of breath.   Review of symptoms otherwise negative.    acetaminophen   Tablet .. 650 milliGRAM(s) Oral every 6 hours PRN  ALBUTerol    90 MICROgram(s) HFA Inhaler 2 Puff(s) Inhalation every 6 hours PRN  aMIOdarone    Tablet 200 milliGRAM(s) Oral daily  apixaban 2.5 milliGRAM(s) Oral every 12 hours  ascorbic acid 500 milliGRAM(s) Oral daily  aspirin  chewable 81 milliGRAM(s) Oral daily  atorvastatin 80 milliGRAM(s) Oral at bedtime  bisacodyl Suppository 10 milliGRAM(s) Rectal daily PRN  budesonide 160 MICROgram(s)/formoterol 4.5 MICROgram(s) Inhaler 2 Puff(s) Inhalation two times a day  chlorhexidine 2% Cloths 1 Application(s) Topical <User Schedule>  dextrose 40% Gel 15 Gram(s) Oral once  dextrose 5%. 1000 milliLiter(s) IV Continuous <Continuous>  dextrose 5%. 1000 milliLiter(s) IV Continuous <Continuous>  dextrose 50% Injectable 25 Gram(s) IV Push once  dextrose 50% Injectable 12.5 Gram(s) IV Push once  dextrose 50% Injectable 25 Gram(s) IV Push once  epoetin maria d-epbx (RETACRIT) Injectable 08698 Unit(s) IV Push <User Schedule>  glucagon  Injectable 1 milliGRAM(s) IntraMuscular once  guaiFENesin Oral Liquid (Sugar-Free) 100 milliGRAM(s) Oral every 6 hours PRN  insulin glargine Injectable (LANTUS) 15 Unit(s) SubCutaneous at bedtime  insulin lispro (ADMELOG) corrective regimen sliding scale   SubCutaneous three times a day before meals  insulin lispro Injectable (ADMELOG) 5 Unit(s) SubCutaneous three times a day before meals  melatonin 3 milliGRAM(s) Oral at bedtime PRN  metoprolol tartrate 25 milliGRAM(s) Oral two times a day  midodrine. 10 milliGRAM(s) Oral <User Schedule>  polyethylene glycol 3350 17 Gram(s) Oral daily  senna 2 Tablet(s) Oral at bedtime  sodium chloride 0.9% lock flush 10 milliLiter(s) IV Push every 1 hour PRN  tiotropium 18 MICROgram(s) Capsule 1 Capsule(s) Inhalation daily  zinc sulfate 220 milliGRAM(s) Oral daily                            8.3    10.06 )-----------( 294      ( 03 Aug 2021 07:34 )             27.2       Hemoglobin: 8.3 g/dL (08-03 @ 07:34)  Hemoglobin: 9.0 g/dL (08-02 @ 07:28)  Hemoglobin: 8.8 g/dL (08-01 @ 07:06)  Hemoglobin: 8.8 g/dL (07-31 @ 07:12)  Hemoglobin: 8.9 g/dL (07-30 @ 06:32)      08-03    134<L>  |  98  |  34<H>  ----------------------------<  82  4.6   |  30  |  5.23<H>    Ca    8.3<L>      03 Aug 2021 07:34  Phos  3.5     08-03  Mg     2.4     08-03    TPro  5.8<L>  /  Alb  2.2<L>  /  TBili  0.4  /  DBili  x   /  AST  15  /  ALT  27  /  AlkPhos  132<H>  08-03    Creatinine Trend: 5.23<--, 6.97<--, 5.61<--, 7.04<--, 5.50<--, 6.17<--    COAGS:           T(C): 36.7 (08-03-21 @ 04:54), Max: 36.8 (08-02-21 @ 22:30)  HR: 73 (08-03-21 @ 09:43) (73 - 108)  BP: 109/62 (08-03-21 @ 09:43) (109/62 - 172/83)  RR: 17 (08-03-21 @ 04:54) (17 - 94)  SpO2: 95% (08-03-21 @ 09:43) (91% - 100%)  Wt(kg): --    I&O's Summary    02 Aug 2021 07:01  -  03 Aug 2021 07:00  --------------------------------------------------------  IN: 500 mL / OUT: 1500 mL / NET: -1000 mL          HEENT:  (-)icterus (-)pallor  CV: N S1 S2 1/6 SHANE (+)2 Pulses B/l  Resp:  Clear to ausculatation B/L, normal effort  GI: (+) BS Soft, NT, ND  Lymph:  (-)Edema, (-)obvious lymphadenopathy  Skin: Warm to touch, Normal turgor  Psych: Appropriate mood and affect      TELEMETRY: 	 off      ASSESSMENT/PLAN: 	89y  Female PMH of PAF on amio for rhythm control and on Eliquis, Micra PPM placed by Dr Evangelina Mcclelland at [facility] earlier this year ESRD on HD(Monday, Tuesday, Thursday, Saturday), DM on insulin, HTN, Home O2 3L presented with shortness of breath, now with rapid afib.    - HR improved, now off tele  - BP stable  - cont eliquis  - doesnt appear fluid overloaded  - tolerating bb  - Random cortisol wnl  - No need for further inpatient cardiac work up.  - HD per renal  - Oupt cardiac f/u with Dr. Evangelina Mcclelland ( EP implanter of Micra)  - No need for further inpatient cardiac work up.        Christopher Arteaga MD, [facility]  BEEPER [phone]

## 2021-08-03 NOTE — PROGRESS NOTE ADULT - NSICDXPILOT_GEN_ALL_CORE
[place]

## 2021-08-03 NOTE — PROGRESS NOTE ADULT - SUBJECTIVE AND OBJECTIVE BOX
[facility] NEPHROLOGY- PROGRESS NOTE    Patient is a 89yo Female with ESRD on HD (MTTS), Afib on Eliquis, HTN, Intradialytic hypotension req Midodrine prior to HD, failed Rt AVF due to steal syndrome s/p ligation, h/o COVID s/p PPM p/w SOB x 1 day. Pt a/w Entero/ Rhino virus, acute respiratory distress requiring BIPAP with concerns for fluid overload. Nephrology consulted for ESRD status.     Hospital Medications: Medications reviewed.  REVIEW OF SYSTEMS: Denies any SOB or chest pain,     VITALS:  T(F): 97.6 (08-03-21 @ 10:54), Max: 98.2 (08-02-21 @ 22:30)  HR: 60 (08-03-21 @ 10:54)  BP: 123/50 (08-03-21 @ 10:54)  RR: 18 (08-03-21 @ 10:54)  SpO2: 100% (08-03-21 @ 10:54)  Wt(kg): --    08-02 @ 07:01  -  08-03 @ 07:00  --------------------------------------------------------  IN: 500 mL / OUT: 1500 mL / NET: -1000 mL      PHYSICAL EXAM:  Gen: NAD  HEENT: anicteric;   Neck: no JVD  Cards: RRR, +S1/S2,   Resp: Clear ant with mild rales at left base  GI: soft, NT/ND, NABS  Extremities: No LE edema B/L  Access: Rt IJ tunneled HD catheter- benign      LABS:  08-03    134<L>  |  98  |  34<H>  ----------------------------<  82  4.6   |  30  |  5.23<H>    Ca    8.3<L>      03 Aug 2021 07:34  Phos  3.5     08-03  Mg     2.4     08-03    TPro  5.8<L>  /  Alb  2.2<L>  /  TBili  0.4  /  DBili      /  AST  15  /  ALT  27  /  AlkPhos  132<H>  08-03    Creatinine Trend: 5.23 <--, 6.97 <--, 5.61 <--, 7.04 <--, 5.50 <--, 6.17 <--, 4.17 <--, 2.98 <--                        8.3    10.06 )-----------( 294      ( 03 Aug 2021 07:34 )             27.2     Urine Studies:

## 2021-08-03 NOTE — PROGRESS NOTE ADULT - SUBJECTIVE AND OBJECTIVE BOX
Time of Visit:  Patient seen and examined. pat seen and examined in HD suite     MEDICATIONS  (STANDING):  aMIOdarone    Tablet 200 milliGRAM(s) Oral daily  apixaban 2.5 milliGRAM(s) Oral every 12 hours  ascorbic acid 500 milliGRAM(s) Oral daily  aspirin  chewable 81 milliGRAM(s) Oral daily  atorvastatin 80 milliGRAM(s) Oral at bedtime  budesonide 160 MICROgram(s)/formoterol 4.5 MICROgram(s) Inhaler 2 Puff(s) Inhalation two times a day  chlorhexidine 2% Cloths 1 Application(s) Topical <User Schedule>  dextrose 40% Gel 15 Gram(s) Oral once  dextrose 5%. 1000 milliLiter(s) (100 mL/Hr) IV Continuous <Continuous>  dextrose 5%. 1000 milliLiter(s) (50 mL/Hr) IV Continuous <Continuous>  dextrose 50% Injectable 25 Gram(s) IV Push once  dextrose 50% Injectable 12.5 Gram(s) IV Push once  dextrose 50% Injectable 25 Gram(s) IV Push once  epoetin maria d-epbx (RETACRIT) Injectable 22220 Unit(s) IV Push <User Schedule>  glucagon  Injectable 1 milliGRAM(s) IntraMuscular once  insulin glargine Injectable (LANTUS) 15 Unit(s) SubCutaneous at bedtime  insulin lispro (ADMELOG) corrective regimen sliding scale   SubCutaneous three times a day before meals  insulin lispro Injectable (ADMELOG) 5 Unit(s) SubCutaneous three times a day before meals  metoprolol tartrate 25 milliGRAM(s) Oral two times a day  midodrine. 10 milliGRAM(s) Oral <User Schedule>  polyethylene glycol 3350 17 Gram(s) Oral daily  senna 2 Tablet(s) Oral at bedtime  tiotropium 18 MICROgram(s) Capsule 1 Capsule(s) Inhalation daily  zinc sulfate 220 milliGRAM(s) Oral daily      MEDICATIONS  (PRN):  acetaminophen   Tablet .. 650 milliGRAM(s) Oral every 6 hours PRN Mild Pain (1 - 3)  ALBUTerol    90 MICROgram(s) HFA Inhaler 2 Puff(s) Inhalation every 6 hours PRN Shortness of Breath and/or Wheezing  bisacodyl Suppository 10 milliGRAM(s) Rectal daily PRN Constipation  guaiFENesin Oral Liquid (Sugar-Free) 100 milliGRAM(s) Oral every 6 hours PRN Cough  melatonin 3 milliGRAM(s) Oral at bedtime PRN Insomnia  sodium chloride 0.9% lock flush 10 milliLiter(s) IV Push every 1 hour PRN Pre/post blood products, medications, blood draw, and to maintain line patency       Medications up to date at time of exam.      PHYSICAL EXAMINATION:  Patient has no new complaints.  GENERAL: The patient is a well-developed, well-nourished, in no apparent distress.     Vital Signs Last 24 Hrs  T(C): 36.9 (03 Aug 2021 16:45), Max: 37.2 (03 Aug 2021 15:00)  T(F): 98.5 (03 Aug 2021 16:45), Max: 98.9 (03 Aug 2021 15:00)  HR: 71 (03 Aug 2021 16:45) (60 - 101)  BP: 149/73 (03 Aug 2021 16:45) (109/62 - 172/83)  BP(mean): --  RR: 18 (03 Aug 2021 16:45) (17 - 94)  SpO2: 95% (03 Aug 2021 16:45) (91% - 100%)   (if applicable)    Chest Tube (if applicable)    HEENT: Head is normocephalic and atraumatic. Extraocular muscles are intact. Mucous membranes are moist.     NECK: Supple, no palpable adenopathy.    LUNGS: Clear to auscultation, no wheezing, rales, or rhonchi.    HEART: Regular rate and rhythm without murmur.    ABDOMEN: Soft, nontender, and nondistended.  No hepatosplenomegaly is noted.    : No painful voiding, no pelvic pain    EXTREMITIES: Without any cyanosis, clubbing, rash, lesions or edema.    NEUROLOGIC: Awake, alert, oriented, grossly intact    SKIN: Warm, dry, good turgor.      LABS:                        8.3    10.06 )-----------( 294      ( 03 Aug 2021 07:34 )             27.2     08-03    134<L>  |  98  |  34<H>  ----------------------------<  82  4.6   |  30  |  5.23<H>    Ca    8.3<L>      03 Aug 2021 07:34  Phos  3.5     08-03  Mg     2.4     08-03    TPro  5.8<L>  /  Alb  2.2<L>  /  TBili  0.4  /  DBili  x   /  AST  15  /  ALT  27  /  AlkPhos  132<H>  08-03                        MICROBIOLOGY: (if applicable)    RADIOLOGY & ADDITIONAL STUDIES:  EKG:   CXR:  ECHO:    IMPRESSION: 89y Female PAST MEDICAL & SURGICAL HISTORY:  HTN (hypertension)    HLD (hyperlipidemia)    CKD (chronic kidney disease)    Afib    DM (diabetes mellitus)    Artificial pacemaker     p/w         Impression: 89 Y/O Female presented in ED with SOB. n ED, patient placed on BiPAP and had HD session with about 1.274L fluid removed and blood pressure noting to drop to 60s despite being given midodrine. Admitted to ICU Acute Hypoxic Respiratory Failure. Patient arrived to the ICU on 7/22 for AHRF 2/2 fluid overload. Patient had HD and BIPAP was tapered off. Patient hypotensive during HD, given midodrine pre-HD and pressor support. At present time in Medicine Unit and on Oxygen supplementation 5L NC and refusing Bipap.     Suggestion:   O2 saturation at rest 88% room air. O2 saturation 93% with O2 5L NC . Continue Oxygen supplementation 5L NC .   Daughter at bedside and reinforced the importance of Bipap at night  .  Patient refusing Bipap at night despite encouragement.      Pulmonary oral hygiene care.  Continue PRN Albuterol 2 Puffs Q 6 hours.  Continue Budesonide 2 Puffs Twice daily. Tiotropium Daily.    Dialysis per Nephro  .   Restrict fluid /water intake .  DVT/ GI prophylactic .

## 2023-05-16 NOTE — DIETITIAN INITIAL EVALUATION ADULT. - PROBLEM SELECTOR PLAN 6
c/w crestor  lipid profile in 5/2021 wnl Azathioprine Counseling:  I discussed with the patient the risks of azathioprine including but not limited to myelosuppression, immunosuppression, hepatotoxicity, lymphoma, and infections.  The patient understands that monitoring is required including baseline LFTs, Creatinine, possible TPMP genotyping and weekly CBCs for the first month and then every 2 weeks thereafter.  The patient verbalized understanding of the proper use and possible adverse effects of azathioprine.  All of the patient's questions and concerns were addressed.

## 2024-03-05 NOTE — PROGRESS NOTE ADULT - REASON FOR ADMISSION
Shortness of breath
59
Shortness of breath
[place] website --- [email or web address] Website --- [email or web address]
Shortness of breath
Shortness of breath

## 2024-12-27 NOTE — ED PROVIDER NOTE - PROGRESS NOTE DETAILS
show improving in BIPAP. producing white sputum. cxr appears fluid overloaded. labs with leukocytosis, ESRD, elevated BNP. given lasix/nitro/abx ordered. will admit for fluid overload and likely pna. no fluids given 2/2 fluid overload.